# Patient Record
Sex: FEMALE | Race: WHITE | NOT HISPANIC OR LATINO | Employment: OTHER | URBAN - METROPOLITAN AREA
[De-identification: names, ages, dates, MRNs, and addresses within clinical notes are randomized per-mention and may not be internally consistent; named-entity substitution may affect disease eponyms.]

---

## 2022-04-06 ENCOUNTER — OFFICE VISIT (OUTPATIENT)
Dept: ORTHOPEDIC SURGERY | Facility: CLINIC | Age: 67
End: 2022-04-06

## 2022-04-06 VITALS — HEIGHT: 64 IN | HEART RATE: 85 BPM | OXYGEN SATURATION: 96 % | BODY MASS INDEX: 31.58 KG/M2 | WEIGHT: 185 LBS

## 2022-04-06 DIAGNOSIS — R60.0 LOCALIZED EDEMA: ICD-10-CM

## 2022-04-06 DIAGNOSIS — L03.113 CELLULITIS OF RIGHT UPPER EXTREMITY: ICD-10-CM

## 2022-04-06 DIAGNOSIS — M19.041 ARTHRITIS OF RIGHT HAND: Primary | ICD-10-CM

## 2022-04-06 PROCEDURE — 99203 OFFICE O/P NEW LOW 30 MIN: CPT | Performed by: STUDENT IN AN ORGANIZED HEALTH CARE EDUCATION/TRAINING PROGRAM

## 2022-04-06 RX ORDER — TRAMADOL HYDROCHLORIDE 50 MG/1
50 TABLET ORAL EVERY 6 HOURS PRN
Qty: 20 TABLET | Refills: 0 | Status: SHIPPED | OUTPATIENT
Start: 2022-04-06 | End: 2022-06-21

## 2022-04-06 RX ORDER — TRAMADOL HYDROCHLORIDE 50 MG/1
50 TABLET ORAL EVERY 6 HOURS PRN
Qty: 20 TABLET | Refills: 0 | Status: CANCELLED | OUTPATIENT
Start: 2022-04-06

## 2022-04-06 RX ORDER — CEPHALEXIN 500 MG/1
500 CAPSULE ORAL EVERY 6 HOURS
Qty: 28 CAPSULE | Refills: 0 | Status: SHIPPED | OUTPATIENT
Start: 2022-04-06 | End: 2022-04-13

## 2022-04-06 RX ORDER — IBUPROFEN 200 MG
200 TABLET ORAL EVERY 6 HOURS PRN
COMMUNITY
End: 2022-06-29

## 2022-04-06 NOTE — PROGRESS NOTES
"Chief Complaint  Pain and Edema of the Right Hand    Subjective          Linda Ortiz presents to [facility] for   History of Present Illness    The patient presents here today for her right hand. She reports right hand swelling. She reports this has been ongoing for about 3 weeks. She reports she has no real specific injury or trauma. She has no other complaints.  She denies any fevers.  She denies any wounds to the hand recently.  She denies any bites.  She reports she has pain with gripping and lifting with the hand.     Allergies   Allergen Reactions   • Hydrocodone-Guaifenesin GI Intolerance        Social History     Socioeconomic History   • Marital status: Single   Tobacco Use   • Smoking status: Former Smoker     Types: Cigarettes     Quit date: 2022     Years since quittin.1   • Smokeless tobacco: Never Used   Substance and Sexual Activity   • Alcohol use: Yes     Comment: occ beer        I reviewed the patient's chief complaint, history of present illness, review of systems, past medical history, surgical history, family history, social history, medications, and allergy list.     REVIEW OF SYSTEMS    Constitutional: Denies fevers, chills, weight loss  Cardiovascular: Denies chest pain, shortness of breath  Skin: Denies rashes, acute skin changes  Neurologic: Denies headache, loss of consciousness  MSK: Right hand      Objective   Vital Signs:   Pulse 85   Ht 162.6 cm (64\")   Wt 83.9 kg (185 lb)   SpO2 96%   BMI 31.76 kg/m²     Body mass index is 31.76 kg/m².    Physical Exam    General: Alert. No acute distress.     Right hand- Light redness and swelling to radial border of the first CMC joint. Swelling to thenar eminence that is compressible.  No discrete palpable fluid collections.  Non-tender to thumb distally. Sensation to light touch median, radial, ulnar nerve. Positive AIN, PIN, ulnar nerve. Positive pulses. No pain with wrist ROM.  Tolerates gentle passive " thumb range of motion with mild pain.    Procedures    Imaging Results (Most Recent)     None                   Assessment and Plan    Diagnoses and all orders for this visit:    1. Arthritis of right hand (Primary)    2. Localized edema      Discussed the treatment plan with the patient. I reviewed the x-rays that were previously obtained.  She has light erythema concerning for cellulitis.  No discrete fluid collections palpable on exam.  Discussed treatment options.  She would like to try oral antibiotics at this time.  Keflex was prescribed.  We will continue to monitor closely.  May consider an MRI if not improving.  Discussed concerning signs and symptoms related to infection.  Prescription for tramadol given.  Follow-up in 1 week.    Call or return if symptoms worsen or patient has any concerns.     Scribed for Trevor Nguyen MD by Michelle Cooper  04/06/2022   15:26 EDT         Follow Up   1 week and After MRI  Patient was given instructions and counseling regarding her condition or for health maintenance advice. Please see specific information pulled into the AVS if appropriate.       I have personally performed the services described in this document as scribed by the above individual and it is both accurate and complete.     Trevor Nguyen MD  04/06/22  15:34 EDT

## 2022-04-13 ENCOUNTER — OFFICE VISIT (OUTPATIENT)
Dept: ORTHOPEDIC SURGERY | Facility: CLINIC | Age: 67
End: 2022-04-13

## 2022-04-13 VITALS — OXYGEN SATURATION: 94 % | WEIGHT: 185 LBS | BODY MASS INDEX: 31.58 KG/M2 | HEART RATE: 72 BPM | HEIGHT: 64 IN

## 2022-04-13 DIAGNOSIS — R60.0 LOCALIZED EDEMA: Primary | ICD-10-CM

## 2022-04-13 DIAGNOSIS — M19.041 ARTHRITIS OF RIGHT HAND: ICD-10-CM

## 2022-04-13 PROCEDURE — 99213 OFFICE O/P EST LOW 20 MIN: CPT | Performed by: STUDENT IN AN ORGANIZED HEALTH CARE EDUCATION/TRAINING PROGRAM

## 2022-04-13 NOTE — PROGRESS NOTES
"Chief Complaint  Pain of the Right Hand    Subjective          Linda Ortiz presents to [facility] for   History of Present Illness    Ritu returns for follow-up of her right hand.  She reports her redness and swelling are beginning to improve.  She has been taking the Keflex and using her brace.  She denies any new injuries.  She has had to miss work because of her pain at times.  She denies any fevers or chills.  She denies any draining wounds.    Allergies   Allergen Reactions   • Hydrocodone-Guaifenesin GI Intolerance        Social History     Socioeconomic History   • Marital status: Single   Tobacco Use   • Smoking status: Former Smoker     Types: Cigarettes     Quit date: 2022     Years since quittin.1   • Smokeless tobacco: Never Used   Substance and Sexual Activity   • Alcohol use: Yes     Comment: occ beer        I reviewed the patient's chief complaint, history of present illness, review of systems, past medical history, surgical history, family history, social history, medications, and allergy list.     REVIEW OF SYSTEMS    Constitutional: Denies fevers, chills, weight loss  Cardiovascular: Denies chest pain, shortness of breath  Skin: Denies rashes, acute skin changes  Neurologic: Denies headache, loss of consciousness  MSK: Right hand pain      Objective   Vital Signs:   Pulse 72   Ht 162.6 cm (64\")   Wt 83.9 kg (185 lb)   SpO2 94%   BMI 31.76 kg/m²     Body mass index is 31.76 kg/m².    Physical Exam    General: Alert. No acute distress.   Right upper extremity: Swelling over the thenar eminence, slightly improved from previous evaluation.  This is centered over the CMC joint.  Erythema resolving.  No discrete fluid collections.  Arthritic changes in the fingers.  Sensation intact in the median, radial, ulnar nerve distribution.  Tolerates gentle passive thumb range of motion at the CMC, M CP, and IP joints with mild pain.  Palpable radial " pulse.    Procedures    Imaging Results (Most Recent)     None                   Assessment and Plan    Diagnoses and all orders for this visit:    1. Localized edema (Primary)    2. Arthritis of right hand        Due to her persistent swelling we discussed MRI evaluation.  She was agreeable.  She will complete her Keflex and follow-up with me to discuss the MRI results and additional treatment options.    Call or return if symptoms worsen or patient has any concerns.       Scribed for Trevor Nguyen MD by Trevor Nguyen MD  04/13/2022   14:38 EDT         Follow Up   Return for MRI results  .  Patient was given instructions and counseling regarding her condition or for health maintenance advice. Please see specific information pulled into the AVS if appropriate.       I have personally performed the services described in this document as scribed by the above individual and it is both accurate and complete.     Trevor Nguyen MD  04/13/22  17:13 EDT

## 2022-04-20 ENCOUNTER — TELEPHONE (OUTPATIENT)
Dept: ORTHOPEDIC SURGERY | Facility: CLINIC | Age: 67
End: 2022-04-20

## 2022-04-20 DIAGNOSIS — L03.113 CELLULITIS OF RIGHT UPPER EXTREMITY: Primary | ICD-10-CM

## 2022-04-20 RX ORDER — CEPHALEXIN 500 MG/1
500 CAPSULE ORAL EVERY 6 HOURS
Qty: 40 CAPSULE | Refills: 0 | Status: SHIPPED | OUTPATIENT
Start: 2022-04-20 | End: 2022-06-09

## 2022-04-20 NOTE — TELEPHONE ENCOUNTER
Keflex sent. Need to get MRI scheduled sooner. Switch to STAT if needed.     Electronically signed by Trevor Nguyen MD, 04/20/22, 3:44 PM EDT.

## 2022-04-20 NOTE — TELEPHONE ENCOUNTER
Patient states her right hand is still very swollen, painful and warm to the touch. She is unable to follow up with us in the office until 05/16/22 due to her MRI being scheduled for 05/10/22. Patient is concerned that she may need another round of Keflex for the right hand before her follow up appointment. Patient states if we call her back and she does not answer to please leave her a detailed message. Pharmacy [facility].

## 2022-04-21 ENCOUNTER — HOSPITAL ENCOUNTER (OUTPATIENT)
Dept: MRI IMAGING | Facility: HOSPITAL | Age: 67
Discharge: HOME | End: 2022-04-21
Admitting: STUDENT IN AN ORGANIZED HEALTH CARE EDUCATION/TRAINING PROGRAM

## 2022-04-21 DIAGNOSIS — M19.041 ARTHRITIS OF RIGHT HAND: ICD-10-CM

## 2022-04-21 DIAGNOSIS — R60.0 LOCALIZED EDEMA: ICD-10-CM

## 2022-04-21 PROCEDURE — 73218 MRI UPPER EXTREMITY W/O DYE: CPT

## 2022-04-21 NOTE — TELEPHONE ENCOUNTER
LVM for patient to advise of antibiotcs being called in to the pharmacy. Will work on getting her MRI moved up and rescheduled.

## 2022-04-21 NOTE — TELEPHONE ENCOUNTER
Spoke with patient, advised that MRI has been moved up to today 04/21 at 6:30pm at [facility], she states she is able to make it to that appointment. Also advised patient that the antibiotics had been sent to the pharmacy. Scheduled patient for a follow up on Wednesday 04/27/22 at 2:30pm to go over MRI with Dr. Nguyen.

## 2022-04-25 DIAGNOSIS — R22.32 MASS OF LEFT HAND: Primary | ICD-10-CM

## 2022-04-26 ENCOUNTER — TELEPHONE (OUTPATIENT)
Dept: ORTHOPEDIC SURGERY | Facility: CLINIC | Age: 67
End: 2022-04-26

## 2022-04-27 ENCOUNTER — OFFICE VISIT (OUTPATIENT)
Dept: ORTHOPEDIC SURGERY | Facility: CLINIC | Age: 67
End: 2022-04-27

## 2022-04-27 VITALS — OXYGEN SATURATION: 96 % | WEIGHT: 185 LBS | HEIGHT: 64 IN | HEART RATE: 105 BPM | BODY MASS INDEX: 31.58 KG/M2

## 2022-04-27 DIAGNOSIS — R60.0 LOCALIZED EDEMA: ICD-10-CM

## 2022-04-27 DIAGNOSIS — R22.31 MASS OF RIGHT HAND: Primary | ICD-10-CM

## 2022-04-27 PROCEDURE — 99213 OFFICE O/P EST LOW 20 MIN: CPT | Performed by: STUDENT IN AN ORGANIZED HEALTH CARE EDUCATION/TRAINING PROGRAM

## 2022-04-27 NOTE — PROGRESS NOTES
"Chief Complaint   Edema and Follow-up of the Right Hand    Subjective          Linda Ortiz presents to [facility] for   History of Present Illness    The patient presents here today for follow up evaluation of the right hand. She is recently had an MRI and is here today for those results. The patients MRI shows a mass to the thumb. She has been having swelling and pain to the hand. She tried antibiotics with no relief. She reports tramadol makes her sick.     Allergies   Allergen Reactions   • Hydrocodone-Guaifenesin GI Intolerance        Social History     Socioeconomic History   • Marital status: Single   Tobacco Use   • Smoking status: Former Smoker     Types: Cigarettes     Quit date: 2022     Years since quittin.2   • Smokeless tobacco: Never Used   Substance and Sexual Activity   • Alcohol use: Yes     Comment: occ beer        I reviewed the patient's chief complaint, history of present illness, review of systems, past medical history, surgical history, family history, social history, medications, and allergy list.     REVIEW OF SYSTEMS    Constitutional: Denies fevers, chills, weight loss  Cardiovascular: Denies chest pain, shortness of breath  Skin: Denies rashes, acute skin changes  Neurologic: Denies headache, loss of consciousness  MSK: Right hand pain      Objective   Vital Signs:   Pulse 105   Ht 162.6 cm (64\")   Wt 83.9 kg (185 lb)   SpO2 96%   BMI 31.76 kg/m²     Body mass index is 31.76 kg/m².    Physical Exam    General: Alert. No acute distress.   Right upper extremity: Swelling over the thenar eminence, slightly improved from previous evaluation.  This is centered over the CMC joint.  Erythema resolving.  No discrete fluid collections.  Arthritic changes in the fingers.  Sensation intact in the median, radial, ulnar nerve distribution.  Tolerates gentle passive thumb range of motion at the CMC, M CP, and IP joints with mild pain.  Palpable radial " pulse.    Procedures    Imaging Results (Most Recent)     None         MRI [facility]- IMPRESSION:               Large heterogeneous 4.7 cm mass centered in the thumb metacarpal with large soft tissue   extension, extension into the trapezium and adjacent thenar musculature, and with mass effect on   the flexor carpi radialis and flexor pollicis longus tendons, most consistent with a malignant soft   tissue mass.  Could consider postcontrast imaging.  Recommend orthopedic oncologic consultation and   biopsy.          Assessment and Plan    Diagnoses and all orders for this visit:    1. Mass of right hand (Primary)    2. Localized edema      Discussed the treatment plan with the patient. I reviewed her MRI with her today.  Plan to proceed with her referral to orthopedic oncology.  We had a long discussion regarding her MRI findings and the importance of following up for medical and surgical management going forward.  Her appointment has been made with the specialist in [place].     Call or return if symptoms worsen or patient has any concerns.       Scribed for Trevor Nguyen MD by Michelle Cooper  04/27/2022   14:57 EDT         Follow Up   Return if symptoms worsen or fail to improve.  Patient was given instructions and counseling regarding her condition or for health maintenance advice. Please see specific information pulled into the AVS if appropriate.       I have personally performed the services described in this document as scribed by the above individual and it is both accurate and complete.     Trevor Nguyen MD  04/27/22  15:02 EDT

## 2022-05-10 ENCOUNTER — APPOINTMENT (OUTPATIENT)
Dept: MRI IMAGING | Facility: HOSPITAL | Age: 67
End: 2022-05-10

## 2022-06-06 ENCOUNTER — TRANSCRIBE ORDERS (OUTPATIENT)
Dept: ADMINISTRATIVE | Facility: HOSPITAL | Age: 67
End: 2022-06-06

## 2022-06-06 DIAGNOSIS — Z85.118 PERSONAL HISTORY OF LUNG CANCER: Primary | ICD-10-CM

## 2022-06-09 ENCOUNTER — CONSULT (OUTPATIENT)
Dept: RADIATION ONCOLOGY | Facility: HOSPITAL | Age: 67
End: 2022-06-09

## 2022-06-09 VITALS
TEMPERATURE: 97.3 F | RESPIRATION RATE: 16 BRPM | WEIGHT: 171.74 LBS | HEART RATE: 102 BPM | BODY MASS INDEX: 29.48 KG/M2 | DIASTOLIC BLOOD PRESSURE: 66 MMHG | SYSTOLIC BLOOD PRESSURE: 98 MMHG | OXYGEN SATURATION: 95 %

## 2022-06-09 DIAGNOSIS — C34.12 MALIGNANT NEOPLASM OF UPPER LOBE OF LEFT LUNG: Primary | ICD-10-CM

## 2022-06-09 PROBLEM — C34.90 LUNG CANCER: Status: ACTIVE | Noted: 2022-06-09

## 2022-06-09 PROCEDURE — G0463 HOSPITAL OUTPT CLINIC VISIT: HCPCS | Performed by: RADIOLOGY

## 2022-06-09 PROCEDURE — 99204 OFFICE O/P NEW MOD 45 MIN: CPT | Performed by: RADIOLOGY

## 2022-06-09 RX ORDER — ACETAMINOPHEN 325 MG/1
650 TABLET ORAL EVERY 6 HOURS PRN
COMMUNITY

## 2022-06-09 NOTE — PROGRESS NOTES
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  Ms. Ortiz first began noticing right hand discomfort approximately 4 months ago.  She was evaluated by an orthopedic surgeon, Dr. Dumont and underwent MRI of the hand revealing a tumor.  CT scan of the chest, abdomen and pelvis performed on 5/17/2022 revealed multiple enlarged left hilar and mediastinal lymph nodes including left hilum, AP window, left paratracheal, precarinal, left supraclavicular regions.  There is a 9 mm nodule in the right lower lobe and a 7 mm nodule in the left upper lobe.  A right adrenal mass was apparent measuring 9.2 cm and a left adrenal mass measured 10.4 cm.  Pathology from biopsy of the left adrenal mass revealed poorly differentiated non-small cell carcinoma.  Tumor cells were positive for PAX 8, vimentin and focally positive for chromogranin and GATA3.  Cells were negative for CK7, CK20, TTF-1, Napsin, WT1, CDX2, ER, MS, synaptophysin, S100, CD10.  The pathologist reported that even though the negative TTF-1, CK7 and Napsin immunohistochemistry results do not support the diagnosis of lung primary adenocarcinoma, the possibility of primary lung carcinoma such as large cell carcinoma cannot be entirely ruled out.  Additionally other differential diagnoses include renal or gynecologic tract primary, hepatocellular carcinoma or carcinoma with Hepatolite differentiation.  Ms. Ortiz reports severe right hand pain and inability to use  this extremity.  She additionally reports swelling.  She denies any significant changes in breathing.  She additionally denies, headaches, vision changes or focal weakness.  She has yet to undergo MRI of the brain ordered PET/CT.    Subjective       Review of Systems: Review of Systems   Constitutional: Positive for fatigue and unexpected weight change (quit drinking beer 1/2022). Negative for appetite change.   HENT: Negative for sore throat, tinnitus and trouble swallowing.    Eyes: Negative for visual disturbance.        Wears glasses   Respiratory: Positive for cough (productive with clear secretions- coughed up some blood after quit smoking). Negative for shortness of breath.    Cardiovascular: Negative for chest pain and palpitations.   Gastrointestinal: Negative for constipation, diarrhea and nausea.   Genitourinary: Negative for difficulty urinating, dysuria, frequency and urgency.   Musculoskeletal: Positive for arthralgias, back pain (in shoulder blades) and joint swelling (right thumb).   Skin: Positive for color change (mild erythema in right hand). Negative for rash.   Neurological: Positive for dizziness and weakness (in right hand and generalized). Negative for headaches.   Psychiatric/Behavioral: Positive for sleep disturbance (wakes throughout the night with pain  in the right hand). Negative for agitation and suicidal ideas.       Past Medical History:   Past Medical History:   Diagnosis Date   • Lung cancer (HCC)    • Metastatic cancer (HCC)        Past Surgical History:   Past Surgical History:   Procedure Laterality Date   • HIP SURGERY Bilateral    • KNEE ARTHROSCOPY         Family History:   Family History   Problem Relation Age of Onset   • Breast cancer Mother    • Lung cancer Father    • Thyroid cancer Maternal Aunt    • Breast cancer Maternal Aunt    • Lung cancer Paternal Aunt    • COPD Paternal Uncle        Social History:   Social History     Socioeconomic History   • Marital status:  Single   Tobacco Use   • Smoking status: Former Smoker     Packs/day: 1.00     Types: Cigarettes     Start date:      Quit date: 2022     Years since quittin.3   • Smokeless tobacco: Never Used   Vaping Use   • Vaping Use: Former   • Start date: 2013   • Quit date: 2014   • Substances: Nicotine, Flavoring   Substance and Sexual Activity   • Alcohol use: Not Currently   • Drug use: Never       Medications:     Current Outpatient Medications:   •  acetaminophen (TYLENOL) 325 MG tablet, Take 650 mg by mouth Every 6 (Six) Hours As Needed for Mild Pain ., Disp: , Rfl:   •  ibuprofen (ADVIL,MOTRIN) 200 MG tablet, Take 200 mg by mouth Every 6 (Six) Hours As Needed for Mild Pain ., Disp: , Rfl:   •  traMADol (ULTRAM) 50 MG tablet, Take 1 tablet by mouth Every 6 (Six) Hours As Needed for Moderate Pain ., Disp: 20 tablet, Rfl: 0    Allergies:   Allergies   Allergen Reactions   • Hydrocodone-Guaifenesin GI Intolerance       Pain: (on a scale of 0-10)   Pain Score    22 1347   PainSc:   8   PainLoc: Hand  Comment: right       Advanced Care Plan: N   Quality of Life: 80 - Restricted Physical Activity    Objective     Physical Exam:   Vital Signs:   Vitals:    22 1347   BP: 98/66   Pulse: 102   Resp: 16   Temp: 97.3 °F (36.3 °C)   TempSrc: Temporal   SpO2: 95%   Weight: 77.9 kg (171 lb 11.8 oz)   PainSc:   8   PainLoc: Hand  Comment: right     Body mass index is 29.48 kg/m².     Physical Exam  Constitutional:       General: She is not in acute distress.     Appearance: Normal appearance. She is normal weight. She is not toxic-appearing.   HENT:      Head: Normocephalic and atraumatic.      Nose: Nose normal.      Mouth/Throat:      Mouth: Mucous membranes are moist.   Pulmonary:      Effort: Pulmonary effort is normal. No respiratory distress.      Breath sounds: Normal breath sounds. No wheezing or rales.   Abdominal:      General: There is no distension.      Palpations: Abdomen is soft.    Musculoskeletal:      Comments: Marked right hand edema with a firm palpable mass at the right thenar eminence edema extends proximally to the mid ulnar region   Skin:     General: Skin is warm and dry.   Neurological:      General: No focal deficit present.      Mental Status: She is alert and oriented to person, place, and time.      Cranial Nerves: No cranial nerve deficit.      Gait: Gait normal.   Psychiatric:         Mood and Affect: Mood normal.         Behavior: Behavior normal.         Judgment: Judgment normal.         Results:   Radiographs: I personally reviewed the MRI of the hand with and without contrast performed on 4/21/2022.  This reveals a heterogeneous 4.7 cm mass centered in the thumb metacarpal with large soft tissue extension.    Pathology: I personally reviewed the pathology report from the procedure performed on 5/22/2022.  The pertinent findings are as above in HPI.      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  ECOG 1    I discussed the clinical, radiographic and pathologic findings to date with Ms. Ortiz.  I explained the role of radiotherapy in the palliation of pain and metastatic lesions.  I recommended external beam radiotherapy to the right hand, outlining the risks, potential benefits and alternatives of this approach.  Ms. Ortiz is agreeable to my recommendation and is scheduled for CT simulation for treatment  planning purposes.        Torin Salcido MD  Radiation Oncology  [facility]    This document has been signed by Torin Salcido MD on Jemima 10, 2022 16:05 EDT

## 2022-06-10 ENCOUNTER — HOSPITAL ENCOUNTER (OUTPATIENT)
Dept: RADIATION ONCOLOGY | Facility: HOSPITAL | Age: 67
Setting detail: RADIATION/ONCOLOGY SERIES
End: 2022-06-10

## 2022-06-13 ENCOUNTER — OFFICE VISIT (OUTPATIENT)
Dept: SURGERY | Facility: CLINIC | Age: 67
End: 2022-06-13

## 2022-06-13 ENCOUNTER — PREP FOR SURGERY (OUTPATIENT)
Dept: OTHER | Facility: HOSPITAL | Age: 67
End: 2022-06-13

## 2022-06-13 ENCOUNTER — TELEPHONE (OUTPATIENT)
Dept: ONCOLOGY | Facility: HOSPITAL | Age: 67
End: 2022-06-13

## 2022-06-13 VITALS — WEIGHT: 137 LBS | HEIGHT: 64 IN | RESPIRATION RATE: 16 BRPM | BODY MASS INDEX: 23.39 KG/M2

## 2022-06-13 DIAGNOSIS — C34.92 MALIGNANT NEOPLASM OF LEFT LUNG, UNSPECIFIED PART OF LUNG: Primary | ICD-10-CM

## 2022-06-13 PROCEDURE — 77263 THER RADIOLOGY TX PLNG CPLX: CPT | Performed by: RADIOLOGY

## 2022-06-13 PROCEDURE — 99202 OFFICE O/P NEW SF 15 MIN: CPT | Performed by: SURGERY

## 2022-06-13 RX ORDER — CEFAZOLIN SODIUM 2 G/100ML
2 INJECTION, SOLUTION INTRAVENOUS ONCE
Status: CANCELLED | OUTPATIENT
Start: 2022-06-13 | End: 2022-06-13

## 2022-06-13 NOTE — TELEPHONE ENCOUNTER
Caller: MARLENE     Relationship: [facility] OFFICE OF DR MARI KIM.         What was the call regarding:     PATIENT IS ANXIOUS TO GET SCHEDULED FROM REFERRAL TO [facility],  CALLING TO FIND OUT STATUS OF REFERRAL AND WHERE AT TO BE SCHEDULED HAD LAST KNOWN IT WAS BEING REVIEWED    WARM TRANSFERRED TO KAILYN AT  TO FURTHER ASSIST.

## 2022-06-13 NOTE — PROGRESS NOTES
"Chief Complaint:  Vascular Access Problem    History of Present Illness  Linda Ortiz is a 67 y.o. female referred by  to have a port-a-catheter placed.  She has a right hand mass and in the work-up for the right hand mass it was determined the patient had lung cancer and the lung cancer is actually metastatic to her right hand.  The patient is going to receive radiation therapy she said for about 10 weeks and then she is going to receive chemotherapy.  The patient says she is not going to see Dr. Zaldivar again and is already scheduled to see Dr. Hernández next week.    Allergies: Hydrocodone-guaifenesin    Outpatient Medications Marked as Taking for the 22 encounter (Office Visit) with Eber Hull MD   Medication Sig Dispense Refill   • ibuprofen (ADVIL,MOTRIN) 200 MG tablet Take 200 mg by mouth Every 6 (Six) Hours As Needed for Mild Pain .         Past Medical History:   • Lung cancer (HCC)   • Metastatic cancer (HCC)        Past Surgical History:   • HIP SURGERY   • KNEE ARTHROSCOPY       Family History:   Family History   Problem Relation Age of Onset   • Breast cancer Mother    • Lung cancer Father    • Thyroid cancer Maternal Aunt    • Breast cancer Maternal Aunt    • Lung cancer Paternal Aunt    • COPD Paternal Uncle         Social History:  Social History     Tobacco Use   • Smoking status: Former Smoker     Packs/day: 1.00     Types: Cigarettes     Start date:      Quit date: 2022     Years since quittin.3   • Smokeless tobacco: Never Used   Substance Use Topics   • Alcohol use: Not Currently       Objective     Vital Signs:  Resp 16   Ht 162.6 cm (64\")   Wt 62.1 kg (137 lb)   BMI 23.52 kg/m²   • Constitutional: alert, no acute distress, reliable historian  • HENT:  NCAT, no visible deformities or lesions  • Eyes:  sclerae clear, conjunctivae clear, EOMI  • Neck:  normal appearance, no masses, trachea midline  • Respiratory:  breathing not labored, respiratory effort " appears normal  • Cardiovascular:  heart regular rate  • Abdomen:  soft, nontender, nondistended    • Skin and subcutaneous tissue:  no visible concerning rashes or lesions, no jaundice  • Musculoskeletal: moving all extremities symmetrically and purposefully, right had swollen  • Neurologic:  no obvious motor or sensory deficits, normal gait, able to stand without difficulty, cerebellar function without any obvious abnormalities, alert & oriented x 3, speech clear  • Psychiatric:  judgment and insight intact, mood normal, affect appropriate, cooperative      Assessment:  Malignant neoplasm of left lung    Plan:  Port-a-catheter placement    Discussion: Indications, options, risks, benefits, and expected outcomes of planned surgery were discussed with the patient and she agrees to proceed.    Eber Hull MD  06/13/2022    Electronically signed by Eber Hull MD, 06/13/22, 4:10 PM EDT.

## 2022-06-14 ENCOUNTER — HOSPITAL ENCOUNTER (OUTPATIENT)
Dept: RADIATION ONCOLOGY | Facility: HOSPITAL | Age: 67
Setting detail: RADIATION/ONCOLOGY SERIES
Discharge: HOME | End: 2022-06-14

## 2022-06-14 DIAGNOSIS — C7B.03 SECONDARY CARCINOID TUMOR OF BONE: ICD-10-CM

## 2022-06-14 PROCEDURE — 77290 THER RAD SIMULAJ FIELD CPLX: CPT | Performed by: RADIOLOGY

## 2022-06-14 PROCEDURE — 77334 RADIATION TREATMENT AID(S): CPT | Performed by: RADIOLOGY

## 2022-06-16 PROCEDURE — 77295 3-D RADIOTHERAPY PLAN: CPT | Performed by: RADIOLOGY

## 2022-06-16 PROCEDURE — 77334 RADIATION TREATMENT AID(S): CPT | Performed by: RADIOLOGY

## 2022-06-16 PROCEDURE — 77300 RADIATION THERAPY DOSE PLAN: CPT | Performed by: RADIOLOGY

## 2022-06-21 ENCOUNTER — HOSPITAL ENCOUNTER (OUTPATIENT)
Dept: CARDIOLOGY | Facility: HOSPITAL | Age: 67
Discharge: HOME | End: 2022-06-21

## 2022-06-21 ENCOUNTER — HOSPITAL ENCOUNTER (OUTPATIENT)
Dept: RADIATION ONCOLOGY | Facility: HOSPITAL | Age: 67
Discharge: HOME | End: 2022-06-21

## 2022-06-21 ENCOUNTER — CONSULT (OUTPATIENT)
Dept: ONCOLOGY | Facility: HOSPITAL | Age: 67
End: 2022-06-21

## 2022-06-21 ENCOUNTER — LAB (OUTPATIENT)
Dept: ONCOLOGY | Facility: HOSPITAL | Age: 67
End: 2022-06-21

## 2022-06-21 VITALS
HEART RATE: 91 BPM | WEIGHT: 162.48 LBS | SYSTOLIC BLOOD PRESSURE: 112 MMHG | DIASTOLIC BLOOD PRESSURE: 64 MMHG | TEMPERATURE: 98.9 F | HEIGHT: 64 IN | BODY MASS INDEX: 27.74 KG/M2 | OXYGEN SATURATION: 100 % | RESPIRATION RATE: 18 BRPM

## 2022-06-21 DIAGNOSIS — R63.0 LOSS OF APPETITE: ICD-10-CM

## 2022-06-21 DIAGNOSIS — C34.92 MALIGNANT NEOPLASM OF LEFT LUNG, UNSPECIFIED PART OF LUNG: ICD-10-CM

## 2022-06-21 DIAGNOSIS — M79.601 RIGHT ARM PAIN: ICD-10-CM

## 2022-06-21 DIAGNOSIS — R30.9 PAINFUL URINATION: ICD-10-CM

## 2022-06-21 DIAGNOSIS — C34.92 MALIGNANT NEOPLASM OF LEFT LUNG, UNSPECIFIED PART OF LUNG: Primary | ICD-10-CM

## 2022-06-21 PROBLEM — C34.12 MALIGNANT NEOPLASM OF UPPER LOBE OF LEFT LUNG: Status: ACTIVE | Noted: 2022-06-09

## 2022-06-21 PROBLEM — C7B.03: Status: RESOLVED | Noted: 2022-06-14 | Resolved: 2022-06-21

## 2022-06-21 LAB
AMORPH URATE CRY URNS QL MICRO: ABNORMAL /HPF
BACTERIA UR QL AUTO: ABNORMAL /HPF
BH CV UPPER VENOUS LEFT INTERNAL JUGULAR AUGMENT: NORMAL
BH CV UPPER VENOUS LEFT INTERNAL JUGULAR COMPRESS: NORMAL
BH CV UPPER VENOUS LEFT INTERNAL JUGULAR PHASIC: NORMAL
BH CV UPPER VENOUS LEFT INTERNAL JUGULAR SPONT: NORMAL
BH CV UPPER VENOUS RIGHT AXILLARY AUGMENT: NORMAL
BH CV UPPER VENOUS RIGHT AXILLARY COMPRESS: NORMAL
BH CV UPPER VENOUS RIGHT AXILLARY PHASIC: NORMAL
BH CV UPPER VENOUS RIGHT AXILLARY SPONT: NORMAL
BH CV UPPER VENOUS RIGHT BASILIC FOREARM COMPRESS: NORMAL
BH CV UPPER VENOUS RIGHT BASILIC UPPER COMPRESS: NORMAL
BH CV UPPER VENOUS RIGHT BRACHIAL COMPRESS: NORMAL
BH CV UPPER VENOUS RIGHT CEPHALIC FOREARM COMPRESS: NORMAL
BH CV UPPER VENOUS RIGHT CEPHALIC UPPER COMPRESS: NORMAL
BH CV UPPER VENOUS RIGHT INTERNAL JUGULAR AUGMENT: NORMAL
BH CV UPPER VENOUS RIGHT INTERNAL JUGULAR COMPRESS: NORMAL
BH CV UPPER VENOUS RIGHT INTERNAL JUGULAR PHASIC: NORMAL
BH CV UPPER VENOUS RIGHT INTERNAL JUGULAR SPONT: NORMAL
BH CV UPPER VENOUS RIGHT RADIAL COMPRESS: NORMAL
BH CV UPPER VENOUS RIGHT SUBCLAVIAN AUGMENT: NORMAL
BH CV UPPER VENOUS RIGHT SUBCLAVIAN COMPRESS: NORMAL
BH CV UPPER VENOUS RIGHT SUBCLAVIAN PHASIC: NORMAL
BH CV UPPER VENOUS RIGHT SUBCLAVIAN SPONT: NORMAL
BH CV UPPER VENOUS RIGHT ULNAR COMPRESS: NORMAL
BILIRUB UR QL STRIP: ABNORMAL
CLARITY UR: ABNORMAL
COARSE GRAN CASTS URNS QL MICRO: ABNORMAL /LPF
COLOR UR: YELLOW
GLUCOSE UR STRIP-MCNC: ABNORMAL MG/DL
HGB UR QL STRIP.AUTO: ABNORMAL
HYALINE CASTS UR QL AUTO: ABNORMAL /LPF
KETONES UR QL STRIP: ABNORMAL
LEUKOCYTE ESTERASE UR QL STRIP.AUTO: ABNORMAL
MAXIMAL PREDICTED HEART RATE: 154 BPM
NITRITE UR QL STRIP: POSITIVE
PH UR STRIP.AUTO: 5.5 [PH] (ref 5–8)
PROT UR QL STRIP: ABNORMAL
RAD ONC ARIA COURSE ID: NORMAL
RAD ONC ARIA COURSE INTENT: NORMAL
RAD ONC ARIA COURSE LAST TREATMENT DATE: NORMAL
RAD ONC ARIA COURSE START DATE: NORMAL
RAD ONC ARIA COURSE TREATMENT ELAPSED DAYS: 0
RAD ONC ARIA FIRST TREATMENT DATE: NORMAL
RAD ONC ARIA PLAN FRACTIONS TREATED TO DATE: 1
RAD ONC ARIA PLAN ID: NORMAL
RAD ONC ARIA PLAN NAME: NORMAL
RAD ONC ARIA PLAN PRESCRIBED DOSE PER FRACTION: 3 GY
RAD ONC ARIA PLAN PRIMARY REFERENCE POINT: NORMAL
RAD ONC ARIA PLAN TOTAL FRACTIONS PRESCRIBED: 10
RAD ONC ARIA PLAN TOTAL PRESCRIBED DOSE: 3000 CGY
RAD ONC ARIA REFERENCE POINT DOSAGE GIVEN TO DATE: 3 GY
RAD ONC ARIA REFERENCE POINT ID: NORMAL
RAD ONC ARIA REFERENCE POINT SESSION DOSAGE GIVEN: 3 GY
RBC # UR STRIP: ABNORMAL /HPF
REF LAB TEST METHOD: ABNORMAL
SP GR UR STRIP: >=1.03 (ref 1–1.03)
SQUAMOUS #/AREA URNS HPF: ABNORMAL /HPF
STRESS TARGET HR: 131 BPM
UROBILINOGEN UR QL STRIP: ABNORMAL
WAXY CASTS #/AREA URNS LPF: ABNORMAL /LPF
WBC # UR STRIP: ABNORMAL /HPF

## 2022-06-21 PROCEDURE — 81001 URINALYSIS AUTO W/SCOPE: CPT

## 2022-06-21 PROCEDURE — G0463 HOSPITAL OUTPT CLINIC VISIT: HCPCS

## 2022-06-21 PROCEDURE — 99205 OFFICE O/P NEW HI 60 MIN: CPT | Performed by: INTERNAL MEDICINE

## 2022-06-21 PROCEDURE — 77280 THER RAD SIMULAJ FIELD SMPL: CPT | Performed by: RADIOLOGY

## 2022-06-21 PROCEDURE — G0463 HOSPITAL OUTPT CLINIC VISIT: HCPCS | Performed by: INTERNAL MEDICINE

## 2022-06-21 PROCEDURE — 77427 RADIATION TX MANAGEMENT X5: CPT | Performed by: RADIOLOGY

## 2022-06-21 PROCEDURE — 87086 URINE CULTURE/COLONY COUNT: CPT

## 2022-06-21 PROCEDURE — 93971 EXTREMITY STUDY: CPT | Performed by: SURGERY

## 2022-06-21 PROCEDURE — 93971 EXTREMITY STUDY: CPT

## 2022-06-21 PROCEDURE — 77412 RADIATION TX DELIVERY LVL 3: CPT | Performed by: RADIOLOGY

## 2022-06-21 RX ORDER — MIRTAZAPINE 15 MG/1
15 TABLET, FILM COATED ORAL NIGHTLY
Qty: 30 TABLET | Refills: 5 | Status: SHIPPED | OUTPATIENT
Start: 2022-06-21 | End: 2022-09-27

## 2022-06-21 NOTE — ASSESSMENT & PLAN NOTE
Patient has increased pain in the upper arm and axilla with some swelling as well.  I will order Doppler ultrasound to rule out thromboembolic events.

## 2022-06-21 NOTE — ASSESSMENT & PLAN NOTE
Metastatic.  Patient has biopsy-proven poorly differentiated carcinoma consistent with lung primary.  Staging scans thus far demonstrate disease in the left upper lobe, right lobe, hilar mediastinal lymph nodes, bilateral adrenal glands and the right hand.  PET scan and MRI brain will be needed to complete her staging.  These have been ordered but I will asked the office to call and expedite those tests.  Next generation sequencing will be requested looking for actionable mutations.  Patient has recently started palliative radiation to the right hand-she has had 1 fraction thus far.  She has significant pain but does not want to take pain medications.  We discussed long-acting Tylenol which may help.  We discussed that metastatic lung cancer is not curable but can be treated to offer some prolongation life as well as improvement in symptoms including pain, poor appetite, fatigue etc.  Possible treatment options could include mutation directed therapies, immunotherapy, chemotherapy or combination, clinical trials versus palliative care/hospice focusing on quality of life.  Patient states that she is interested in treatments but would like to see all of her possible options.  I will see her back as she finishes her radiation to review her staging imaging tests and mutation testing.  Case discussed with Dr. Salcido.  He will finish radiation to the hand before I initiate systemic therapy.

## 2022-06-21 NOTE — PROGRESS NOTES
Chief Complaint  Lung Cancer    Leonard Dumont MD  Provider, No Known    Subjective          Linda Ortiz presents to [facility] for evaluation of recently diagnosed metastatic lung cancer.  Patient reports that she began having pain in the right hand approximately 2 months ago.  Pain was at the base of the thumb and limited her movement.  This prompted evaluation by orthopedics.  She had x-rays of the hand on 3/29/2022 which demonstrated a mass in the thenar eminence.  She then underwent MRI of the right hand on 4/21/2022 confirming a mass in the thenar eminence.  X-ray and MRI images reviewed.  She was then referred to orthopedic oncology at [facility] in [place].  Subsequent imaging including CT chest, abdomen, pelvis performed on 5/17/2022 showed multiple enlarged left hilar mediastinal lymph nodes including hilum, AP window, left paratracheal, precarinal left supra clavicle as well as a large left upper lobe lung mass, additional 7 mm satellite lesion in the left upper lobe and a 9 mm nodule in the right lower lobe.  There also bilateral adrenal nodules measuring 10.4 cm on the left, 9.2 cm on the right.  Biopsy was performed on the adrenal gland demonstrating a very poorly differentiated non-small cell carcinoma.  The specimen was sent to [facility] for expert review with the above diagnosis.  The [facility] report and [facility] pathology report reviewed.  She has significant pain in the right hand and is unable to use the extremity.  She notes pain extending up the arm into the shoulder and axilla area.  She feels like there is some swelling there as well.  She has felt a lump in the right armpit as well which is painful.  She reports frequent cough which is generally nonproductive.  She had a scant amount of hemoptysis after she stopped smoking but none recently.  She reports that her appetite is poor and she has lost some weight over the last couple of  months.  She just does not feel like eating.  She denies nausea, vomiting, heartburn or diarrhea.  She states some of the problem is just not being able to cook for herself with her hand being incapacitated.  She states that for the last several days she has had urinary frequency with going to the bathroom 8-10 times per day.  She denies suprapubic pain, dysuria or fever.    Oncology/Hematology History Overview Note   5/19/2022 Left Adrenal mass biopsy at [facility] revealed: poorly differentiated non-small cell carcinoma, high grade. Positive fo AE1/3 and Cam5.2. Immunohistochemistry was positive for PAX8, HeParlGlypican3, Vimentin, focal positive for chromagranin, GATA3.  The case was sent to [facility] for second opinion with a final diagnosis of poorly differentiated carcinoma with diffuse reduced BRG-1 expression.    6/21/2022 XRT initiated to Right hand        Malignant neoplasm of upper lobe of left lung (HCC)   6/9/2022 Initial Diagnosis    Lung cancer (HCC)     Secondary carcinoid tumor of bone (HCC) (Resolved)   6/14/2022 Initial Diagnosis    Secondary carcinoid tumor of bone (HCC)     6/20/2022 -  Radiation    RADIATION THERAPY Treatment Details (Noted on 6/14/2022)  Site: Right Hand  Technique: 3D CRT  Goal: No goal specified  Planned Treatment Start Date: 6/20/2022         Review of Systems   Constitutional: Positive for appetite change, fatigue and unexpected weight loss. Negative for diaphoresis, fever and unexpected weight gain.   HENT: Negative for hearing loss, mouth sores, sore throat, swollen glands, trouble swallowing and voice change.    Eyes: Negative for blurred vision.   Respiratory: Negative for cough, shortness of breath and wheezing.    Cardiovascular: Negative for chest pain and palpitations.   Gastrointestinal: Negative for abdominal pain, blood in stool, constipation, diarrhea, nausea and vomiting.   Endocrine: Negative for cold intolerance and heat intolerance.   Genitourinary:  Negative for difficulty urinating, dysuria, frequency, hematuria and urinary incontinence.   Musculoskeletal: Positive for joint swelling (Right hand). Negative for arthralgias, back pain and myalgias.   Skin: Negative for rash, skin lesions and wound.   Neurological: Negative for dizziness, seizures, weakness, numbness and headache.   Hematological: Does not bruise/bleed easily.   Psychiatric/Behavioral: Negative for depressed mood. The patient is not nervous/anxious.      Current Outpatient Medications on File Prior to Visit   Medication Sig Dispense Refill   • acetaminophen (TYLENOL) 325 MG tablet Take 650 mg by mouth Every 6 (Six) Hours As Needed for Mild Pain .     • ibuprofen (ADVIL,MOTRIN) 200 MG tablet Take 200 mg by mouth Every 6 (Six) Hours As Needed for Mild Pain .     • [DISCONTINUED] traMADol (ULTRAM) 50 MG tablet Take 1 tablet by mouth Every 6 (Six) Hours As Needed for Moderate Pain . 20 tablet 0     No current facility-administered medications on file prior to visit.       Allergies   Allergen Reactions   • Hydrocodone-Guaifenesin GI Intolerance     Past Medical History:   Diagnosis Date   • Bone cancer (HCC)    • Lung cancer (HCC)    • Malignant neoplasm of upper lobe of left lung (HCC) 2022   • Metastatic cancer (HCC)      Past Surgical History:   Procedure Laterality Date   • HIP SURGERY Bilateral    • KNEE ARTHROSCOPY       Social History     Socioeconomic History   • Marital status: Single   Tobacco Use   • Smoking status: Former Smoker     Packs/day: 1.00     Types: Cigarettes     Start date:      Quit date: 2022     Years since quittin.3   • Smokeless tobacco: Never Used   Vaping Use   • Vaping Use: Former   • Start date: 2013   • Quit date: 2014   • Substances: Nicotine, Flavoring   Substance and Sexual Activity   • Alcohol use: Not Currently   • Drug use: Never     Family History   Problem Relation Age of Onset   • Breast cancer Mother    • Lung cancer Father    •  Thyroid cancer Maternal Aunt    • Breast cancer Maternal Aunt    • Lung cancer Paternal Aunt    • COPD Paternal Uncle        Objective   Physical Exam  Vitals reviewed. Exam conducted with a chaperone present.   Constitutional:       General: She is not in acute distress.     Appearance: Normal appearance.   HENT:      Head: Normocephalic and atraumatic.   Eyes:      Conjunctiva/sclera: Conjunctivae normal.      Pupils: Pupils are equal, round, and reactive to light.   Cardiovascular:      Rate and Rhythm: Normal rate and regular rhythm.      Heart sounds: Normal heart sounds. No murmur heard.    No gallop.   Pulmonary:      Effort: Pulmonary effort is normal.      Breath sounds: Normal breath sounds.   Chest:   Breasts:      Right: Axillary adenopathy (3 cm) present. No supraclavicular adenopathy.      Left: Supraclavicular adenopathy (2 cm) present. No axillary adenopathy.       Abdominal:      General: Abdomen is flat. Bowel sounds are normal. There is no distension.      Palpations: Abdomen is soft.      Tenderness: There is no abdominal tenderness.      Comments: No HSM or masses.   Musculoskeletal:         General: Swelling (Large mass involving the right thenar eminence which is tender to palpation.) present.      Cervical back: Neck supple.      Comments: No tenderness to palpation over the cervical, thoracic or lumbar spine   Lymphadenopathy:      Head:      Right side of head: No preauricular, posterior auricular or occipital adenopathy.      Left side of head: No preauricular, posterior auricular or occipital adenopathy.      Cervical: No cervical adenopathy.      Upper Body:      Right upper body: Axillary adenopathy (3 cm) present. No supraclavicular or epitrochlear adenopathy.      Left upper body: Supraclavicular adenopathy (2 cm) present. No axillary or epitrochlear adenopathy.   Neurological:      Mental Status: She is alert and oriented to person, place, and time.   Psychiatric:         Mood and  "Affect: Mood normal.         Behavior: Behavior normal.         Vitals:    06/21/22 1411   BP: 112/64   Pulse: 91   Resp: 18   Temp: 98.9 °F (37.2 °C)   SpO2: 100%   Weight: 73.7 kg (162 lb 7.7 oz)   Height: 162.6 cm (64\")   PainSc: 10-Worst pain ever   PainLoc: Hand     ECOG score: 1         PHQ-9 Total Score:                    Result Review :   The following data was reviewed by: Phani Hernández MD on 06/21/2022:  Lab Results   Component Value Date    HGB 9.7 (L) 05/19/2022    HCT 30.2 (L) 05/19/2022    MCV 92.6 05/19/2022     05/19/2022    WBC 7.81 05/19/2022     No results found for: GLUCOSE, BUN, CREATININE, NA, K, CL, CO2, CALCIUM, PROTEINTOT, ALBUMIN, BILITOT, ALKPHOS, AST, ALT  No results found for: MG, PHOS, FREET4, TSH    Data reviewed: [place] orthopedic records reviewed.  Biopsy report reviewed from [place] and from [facility].  X-ray, MRI of the hand reviewed including images.  CT chest, abdomen, pelvis reviewed.  Radiation oncology records reviewed.      Assessment and Plan    Diagnoses and all orders for this visit:    1. Malignant neoplasm of left lung, unspecified part of lung (HCC) (Primary)  Assessment & Plan:  Metastatic.  Patient has biopsy-proven poorly differentiated carcinoma consistent with lung primary.  Staging scans thus far demonstrate disease in the left upper lobe, right lobe, hilar mediastinal lymph nodes, bilateral adrenal glands and the right hand.  PET scan and MRI brain will be needed to complete her staging.  These have been ordered but I will asked the office to call and expedite those tests.  Next generation sequencing will be requested looking for actionable mutations.  Patient has recently started palliative radiation to the right hand-she has had 1 fraction thus far.  She has significant pain but does not want to take pain medications.  We discussed long-acting Tylenol which may help.  We discussed that metastatic lung cancer is not curable but can be treated to " offer some prolongation life as well as improvement in symptoms including pain, poor appetite, fatigue etc.  Possible treatment options could include mutation directed therapies, immunotherapy, chemotherapy or combination, clinical trials versus palliative care/hospice focusing on quality of life.  Patient states that she is interested in treatments but would like to see all of her possible options.  I will see her back as she finishes her radiation to review her staging imaging tests and mutation testing.  Case discussed with Dr. Salcido.  He will finish radiation to the hand before I initiate systemic therapy.    Orders:  -     CBC & Differential; Future  -     Comprehensive Metabolic Panel; Future  -     Urinalysis With Microscopic - Urine, Clean Catch; Future  -     Urine Culture - Urine, Urine, Clean Catch; Future  -     Ambulatory Referral to OP ONC Nutrition Services    2. Painful urination  Assessment & Plan:  Patient reports urinary frequency for the last couple of days.  She denies suprapubic pain or fever.  I will order UA and culture.    Orders:  -     Urinalysis With Microscopic - Urine, Clean Catch; Future  -     Urine Culture - Urine, Urine, Clean Catch; Future    3. Right arm pain  Assessment & Plan:  Patient has increased pain in the upper arm and axilla with some swelling as well.  I will order Doppler ultrasound to rule out thromboembolic events.    Orders:  -     Duplex Venous Upper Extremity - Right CAR; Future    4. Loss of appetite  Assessment & Plan:  Patient will be started on Remeron 15 mg at bedtime.  Referral to dietitian will also be placed.    Orders:  -     mirtazapine (REMERON) 15 MG tablet; Take 1 tablet by mouth Every Night.  Dispense: 30 tablet; Refill: 5          Patient Follow Up: After imaging    Patient was given instructions and counseling regarding her condition or for health maintenance advice. Please see specific information pulled into the AVS if appropriate.     Phani SMITH  MD Edgar    6/21/2022

## 2022-06-21 NOTE — ASSESSMENT & PLAN NOTE
Patient reports urinary frequency for the last couple of days.  She denies suprapubic pain or fever.  I will order UA and culture.

## 2022-06-22 ENCOUNTER — TELEPHONE (OUTPATIENT)
Dept: NUTRITION | Facility: HOSPITAL | Age: 67
End: 2022-06-22

## 2022-06-22 ENCOUNTER — HOSPITAL ENCOUNTER (OUTPATIENT)
Dept: RADIATION ONCOLOGY | Facility: HOSPITAL | Age: 67
Discharge: HOME | End: 2022-06-22

## 2022-06-22 ENCOUNTER — TRANSCRIBE ORDERS (OUTPATIENT)
Dept: ADMINISTRATIVE | Facility: HOSPITAL | Age: 67
End: 2022-06-22

## 2022-06-22 DIAGNOSIS — C34.82 MALIGNANT NEOPLASM OF OVERLAPPING SITES OF LEFT LUNG: Primary | ICD-10-CM

## 2022-06-22 LAB
BACTERIA SPEC AEROBE CULT: NORMAL
RAD ONC ARIA COURSE ID: NORMAL
RAD ONC ARIA COURSE INTENT: NORMAL
RAD ONC ARIA COURSE LAST TREATMENT DATE: NORMAL
RAD ONC ARIA COURSE START DATE: NORMAL
RAD ONC ARIA COURSE TREATMENT ELAPSED DAYS: 1
RAD ONC ARIA FIRST TREATMENT DATE: NORMAL
RAD ONC ARIA PLAN FRACTIONS TREATED TO DATE: 2
RAD ONC ARIA PLAN ID: NORMAL
RAD ONC ARIA PLAN NAME: NORMAL
RAD ONC ARIA PLAN PRESCRIBED DOSE PER FRACTION: 3 GY
RAD ONC ARIA PLAN PRIMARY REFERENCE POINT: NORMAL
RAD ONC ARIA PLAN TOTAL FRACTIONS PRESCRIBED: 10
RAD ONC ARIA PLAN TOTAL PRESCRIBED DOSE: 3000 CGY
RAD ONC ARIA REFERENCE POINT DOSAGE GIVEN TO DATE: 6 GY
RAD ONC ARIA REFERENCE POINT ID: NORMAL
RAD ONC ARIA REFERENCE POINT SESSION DOSAGE GIVEN: 3 GY

## 2022-06-22 PROCEDURE — 77412 RADIATION TX DELIVERY LVL 3: CPT | Performed by: RADIOLOGY

## 2022-06-22 NOTE — PROGRESS NOTES
Consult received to speak with pt regarding recent loss of appetite and weight loss. Attempt made to contact pt via phone to discuss nutrition POC, however, no answer. Left voicemail with oncology RD call back number and asked pt to call back at earliest convenience. Will continue to f/u per protocol.

## 2022-06-23 NOTE — PROGRESS NOTES
Consult received to discuss nutrition POC - 2nd attempt made to reach pt via phone, however, no answer. Left voicemail 6/22/22 with oncology RD call back number and asked pt to call back at earliest convenience. Will attempt to reach pt at a later time and f/u per protocol.

## 2022-06-24 ENCOUNTER — PATIENT ROUNDING (BHMG ONLY) (OUTPATIENT)
Dept: ONCOLOGY | Facility: HOSPITAL | Age: 67
End: 2022-06-24

## 2022-06-24 ENCOUNTER — TELEPHONE (OUTPATIENT)
Dept: RADIATION ONCOLOGY | Facility: HOSPITAL | Age: 67
End: 2022-06-24

## 2022-06-24 NOTE — PROGRESS NOTES
June 24, 2022    Hello, may I speak with Linda Ortiz?    My name is Radha.    I am  with [facility]  [address] [place], [place] [address]  [place] [address] [phone].    Before we get started may I verify your date of birth? 1955    I am calling to officially welcome you to our practice and ask about your recent visit. Is this a good time to talk? NO- Left Voicemail    Tell me about your visit with us. What things went well?       We're always looking for ways to make our patients' experiences even better. Do you have recommendations on ways we may improve?  NO- Left Voicemail    Overall were you satisfied with your first visit to our practice? NO- Left Voicemail       I appreciate you taking the time to speak with me today. Is there anything else I can do for you? NO- Left Voicemail      Thank you, and have a great day.

## 2022-06-24 NOTE — PROGRESS NOTES
Consult received to speak with pt regarding recent loss of appetite and weight loss. Attempt made to reach pt via phone to discuss nutrition POC, however, no answer. Left voicemail with oncology RD call back number and requested pt call back at earliest convenience. Will continue to f/u per protocol and be available prn via consult.

## 2022-06-27 ENCOUNTER — TELEPHONE (OUTPATIENT)
Dept: NUTRITION | Facility: HOSPITAL | Age: 67
End: 2022-06-27

## 2022-06-27 ENCOUNTER — DOCUMENTATION (OUTPATIENT)
Dept: RADIATION ONCOLOGY | Facility: HOSPITAL | Age: 67
End: 2022-06-27

## 2022-06-27 ENCOUNTER — HOSPITAL ENCOUNTER (OUTPATIENT)
Dept: RADIATION ONCOLOGY | Facility: HOSPITAL | Age: 67
Discharge: HOME | End: 2022-06-27

## 2022-06-27 LAB
RAD ONC ARIA COURSE ID: NORMAL
RAD ONC ARIA COURSE INTENT: NORMAL
RAD ONC ARIA COURSE LAST TREATMENT DATE: NORMAL
RAD ONC ARIA COURSE START DATE: NORMAL
RAD ONC ARIA COURSE TREATMENT ELAPSED DAYS: 6
RAD ONC ARIA FIRST TREATMENT DATE: NORMAL
RAD ONC ARIA PLAN FRACTIONS TREATED TO DATE: 3
RAD ONC ARIA PLAN ID: NORMAL
RAD ONC ARIA PLAN NAME: NORMAL
RAD ONC ARIA PLAN PRESCRIBED DOSE PER FRACTION: 3 GY
RAD ONC ARIA PLAN PRIMARY REFERENCE POINT: NORMAL
RAD ONC ARIA PLAN TOTAL FRACTIONS PRESCRIBED: 10
RAD ONC ARIA PLAN TOTAL PRESCRIBED DOSE: 3000 CGY
RAD ONC ARIA REFERENCE POINT DOSAGE GIVEN TO DATE: 9 GY
RAD ONC ARIA REFERENCE POINT ID: NORMAL
RAD ONC ARIA REFERENCE POINT SESSION DOSAGE GIVEN: 3 GY

## 2022-06-27 PROCEDURE — 77412 RADIATION TX DELIVERY LVL 3: CPT | Performed by: RADIOLOGY

## 2022-06-27 NOTE — PROGRESS NOTES
Distress Screening Follow-Up    Diagnosis: Metastatic lung cancer    Date of Distress Screenin22  Location of Distress Screening: Rad onc  Distress Level: 5  Problems Indicated: Pain, sadness or depression, fear, taking care of myself, finances, insurance    Comments: OSW met with pt and cousin in rad onc during rounding to f/u in regards to identified distress. PHQ-2 completed during consult - score 1. Pt utilizing [employer] wheelchair today. Introduced myself and discussed OSW role/psychosocial services available. Pt resides independently by herself in [facility]. Pt has Medicare insurance and reports she recently applied for Medicaid and believes she has been approved as of 22, however, when [facility] Medicaid Transportation was contacted they could not locate her Medicaid on file. Offered to contact the Medicaid office with pt today for further clarification and/or create pt an online account through [employer]. Pt respectfully declined, reporting she will contact the Medicaid office herself at home. Pt reports she is also in process of applying for SNAP benefits, however, has some remaining documents to submit.  Pt in need of transportation resources. Discussed [employer] general public transportation and the expense of this service. Assisted pt in contacting [employer] today to arrange transportation for her remaining radiation txs and COVID test on . Pt has [employer]'s phone number for future reference as needed. Assisted pt in contacting [employer] today to apply for transportation financial assistance to aid with this expense. Submitted pt's finalized application via fax this afternoon. [employer] may also assist with pet care expenses with pt's cat, however, needs pet verification form completed by vet office. Also assisted pt in applying for a [employer] gift card through [employer] to assist with groceries and oral supplements. Pt v/u that if she is approved Medicaid through a managed care organization, she may be  "eligible for free transportation through [employer] and free oral supplements. OSW submitted referral to oncology RD to f/u with pt in regards to oral supplements and nutritional needs.   Pt reports she is feeling overwhelmed with the various medical appointments, providers, and information being \"thrown\" at her and is still processing everything. Provided emotional support throughout, utilizing empathy and validating and normalizing identified emotions. Discussed opportunity for mental health services (counseling,psych) and/or support services ([id number]-hduc support, support groups). Pt respectfully declined interest in seeking support services at this time. Pt reports in addition to her cousin, she has a sister that resides in [facility] that she speaks to for support. Dr. Hernández recently started pt on Remeron for loss of appetite.   Due to pt residing independently by herself, assessed home care needs. Pt is interested in obtaining a cane, as she is unable to maneuver a rollator or wheelchair at this time. Pt respectfully declined interest in seeking home health services at this time, however, will consider and keep this in mind. Pt denies other home care needs at this time, including homemaking, personal care, home delivered meals, etc, however, anticipates she may need these services once immunotherapy or chemotherapy begins.   Also briefly mentioned the opportunity to seek palliative care services to provide an additional layer of support, including symptom management, emotional support, etc. Will plan to f/u with pt in the near future to further discuss and assess psychosocial needs.  Provided my business card, encouraging OSW support remains available. Pt and cousin expressed gratitude.     Services/Referrals Provided: Emotional support; financial and transportation assistance - [employer], [facility], [employer] [address]; oncology RD and oral supplements; DME - [employer]    Update 6/29: Faxed " finalized DME order to [employer] this morning. Fax confirmed.    Update 6/30: [employer] [address] mailed pt a $250 [employer] card today.

## 2022-06-27 NOTE — PROGRESS NOTES
6/27/22:    Attempted to reach pt by phone today.  Nutrition referral received 6/21/22 and Oncology RDs have attempted to reach her multiple times.  Today, Ciarra (Oncology Social Worker) reached out to Oncology RD due to pt had questions regarding oral nutrition shakes.  Attempted to reach pt again today to discuss nutrition concerns and questions, however unsuccessful.  Left VM with Oncology RD name & phone# for pt to return the call.

## 2022-06-28 ENCOUNTER — APPOINTMENT (OUTPATIENT)
Dept: MRI IMAGING | Facility: HOSPITAL | Age: 67
End: 2022-06-28

## 2022-06-28 ENCOUNTER — TELEPHONE (OUTPATIENT)
Dept: ONCOLOGY | Facility: HOSPITAL | Age: 67
End: 2022-06-28

## 2022-06-28 ENCOUNTER — HOSPITAL ENCOUNTER (OUTPATIENT)
Dept: RADIATION ONCOLOGY | Facility: HOSPITAL | Age: 67
Discharge: HOME | End: 2022-06-28

## 2022-06-28 VITALS
BODY MASS INDEX: 28 KG/M2 | OXYGEN SATURATION: 100 % | RESPIRATION RATE: 16 BRPM | HEART RATE: 88 BPM | TEMPERATURE: 97.9 F | DIASTOLIC BLOOD PRESSURE: 64 MMHG | SYSTOLIC BLOOD PRESSURE: 104 MMHG | WEIGHT: 163.14 LBS

## 2022-06-28 DIAGNOSIS — C34.12 MALIGNANT NEOPLASM OF UPPER LOBE OF LEFT LUNG: Primary | ICD-10-CM

## 2022-06-28 LAB
RAD ONC ARIA COURSE ID: NORMAL
RAD ONC ARIA COURSE INTENT: NORMAL
RAD ONC ARIA COURSE LAST TREATMENT DATE: NORMAL
RAD ONC ARIA COURSE START DATE: NORMAL
RAD ONC ARIA COURSE TREATMENT ELAPSED DAYS: 7
RAD ONC ARIA FIRST TREATMENT DATE: NORMAL
RAD ONC ARIA PLAN FRACTIONS TREATED TO DATE: 4
RAD ONC ARIA PLAN ID: NORMAL
RAD ONC ARIA PLAN NAME: NORMAL
RAD ONC ARIA PLAN PRESCRIBED DOSE PER FRACTION: 3 GY
RAD ONC ARIA PLAN PRIMARY REFERENCE POINT: NORMAL
RAD ONC ARIA PLAN TOTAL FRACTIONS PRESCRIBED: 10
RAD ONC ARIA PLAN TOTAL PRESCRIBED DOSE: 3000 CGY
RAD ONC ARIA REFERENCE POINT DOSAGE GIVEN TO DATE: 12 GY
RAD ONC ARIA REFERENCE POINT ID: NORMAL
RAD ONC ARIA REFERENCE POINT SESSION DOSAGE GIVEN: 3 GY

## 2022-06-28 PROCEDURE — 77412 RADIATION TX DELIVERY LVL 3: CPT | Performed by: RADIOLOGY

## 2022-06-28 NOTE — PROGRESS NOTES
On Treatment Visit       Patient: Linda Ortiz   YOB: 1955   Medical Record Number: [id number]     Date of Visit  June 28, 2022   Primary Diagnosis:Malignant neoplasm of upper lobe of left lung (HCC) [C34.12]  Cancer Staging: Cancer Staging  No matching staging information was found for the patient.       was seen today for an on treatment visit.  She is receiving radiation therapy to the right hand. She  has received 1200 cGy in 4 fractions out of a planned dose of 3000 cGy in 10 fractions.     Has noticed some small change in the masses within the right hand.  Poor appetite.                                         Review of Systems:   Review of Systems   Constitutional: Positive for appetite change (decreased) and fatigue (5/10).   Respiratory: Positive for cough (productive). Negative for shortness of breath.    Gastrointestinal: Positive for diarrhea (not taking in a lot solid foods). Negative for constipation and nausea.   Genitourinary: Negative for difficulty urinating, dysuria, frequency and urgency.   Musculoskeletal: Positive for joint swelling (on right hand).   Skin: Positive for color change (moderately erythemic ).   Neurological: Positive for dizziness (with position changes) and weakness (generalized). Negative for headaches.   Psychiatric/Behavioral: Negative for sleep disturbance.       Vitals:     Vitals:    06/28/22 1104   BP: 104/64   Pulse: 88   Resp: 16   Temp: 97.9 °F (36.6 °C)   SpO2: 100%       Weight:   Wt Readings from Last 3 Encounters:   06/28/22 74 kg (163 lb 2.3 oz)   06/21/22 73.7 kg (162 lb 7.7 oz)   06/13/22 62.1 kg (137 lb)      Pain:    Pain Score    06/28/22 1104   PainSc:   5   PainLoc: Hand  Comment: RIGHT         Physical Exam:  Gen: WD/WN; NAD  HEENT: MMM  Trachea: midline  Chest: symmetric  Resp: normal respiratory effort  Extr: warm, well-perfused  Neuro: awake and alert; no aphasia or neglect    Plan: I have reviewed treatment setup notes,  checked and approved the daily guidance images.  I reviewed dose delivery, treatment parameters and deemed them appropriate. We plan to continue radiation therapy as prescribed.    Discussion regarding nutritional intake.  Has met with our dietitian.  Planning to follow-up with medical oncology this week      Radiation Oncology    Electronically signed by Torin Salcido MD 6/28/2022  11:48 EDT

## 2022-06-28 NOTE — TELEPHONE ENCOUNTER
Caller: Shilo Ortiz    Relationship: Self    Best call back number:[phone]    What was the call regarding: SHILO CALLED REGARDING HER APPT FOR TOMORROW AFTERNOON. SHE SAYS SHE WASN'T ABLE TO GET HER SCANS DONE LAST WEEK. SHE WANTS TO KNOW IF SHE SHOULD STILL COME IN TOMORROW OR NOT.    Do you require a callback:YES

## 2022-06-29 ENCOUNTER — HOSPITAL ENCOUNTER (OUTPATIENT)
Dept: RADIATION ONCOLOGY | Facility: HOSPITAL | Age: 67
Discharge: HOME | End: 2022-06-29

## 2022-06-29 LAB
RAD ONC ARIA COURSE ID: NORMAL
RAD ONC ARIA COURSE INTENT: NORMAL
RAD ONC ARIA COURSE LAST TREATMENT DATE: NORMAL
RAD ONC ARIA COURSE START DATE: NORMAL
RAD ONC ARIA COURSE TREATMENT ELAPSED DAYS: 8
RAD ONC ARIA FIRST TREATMENT DATE: NORMAL
RAD ONC ARIA PLAN FRACTIONS TREATED TO DATE: 5
RAD ONC ARIA PLAN ID: NORMAL
RAD ONC ARIA PLAN NAME: NORMAL
RAD ONC ARIA PLAN PRESCRIBED DOSE PER FRACTION: 3 GY
RAD ONC ARIA PLAN PRIMARY REFERENCE POINT: NORMAL
RAD ONC ARIA PLAN TOTAL FRACTIONS PRESCRIBED: 10
RAD ONC ARIA PLAN TOTAL PRESCRIBED DOSE: 3000 CGY
RAD ONC ARIA REFERENCE POINT DOSAGE GIVEN TO DATE: 15 GY
RAD ONC ARIA REFERENCE POINT ID: NORMAL
RAD ONC ARIA REFERENCE POINT SESSION DOSAGE GIVEN: 3 GY

## 2022-06-29 PROCEDURE — 77412 RADIATION TX DELIVERY LVL 3: CPT | Performed by: RADIOLOGY

## 2022-06-29 PROCEDURE — 77336 RADIATION PHYSICS CONSULT: CPT | Performed by: RADIOLOGY

## 2022-06-29 NOTE — TELEPHONE ENCOUNTER
SPOKE W/ PATIENT YESTERDAY AFTERNOON AND LET HER KNOW THAT HER PET SCAN, MRI AND FOLLOW UP HAVE BEEN RESCHEDULED. PATIENT IS AWARE OF ALL UPCOMING APPTS. I AM WORKING ON GETTING HER PET SCAN MOVED UP AS WELL.

## 2022-06-30 ENCOUNTER — APPOINTMENT (OUTPATIENT)
Dept: ONCOLOGY | Facility: HOSPITAL | Age: 67
End: 2022-06-30

## 2022-06-30 ENCOUNTER — HOSPITAL ENCOUNTER (OUTPATIENT)
Dept: RADIATION ONCOLOGY | Facility: HOSPITAL | Age: 67
Discharge: HOME | End: 2022-06-30

## 2022-06-30 LAB
RAD ONC ARIA COURSE ID: NORMAL
RAD ONC ARIA COURSE INTENT: NORMAL
RAD ONC ARIA COURSE LAST TREATMENT DATE: NORMAL
RAD ONC ARIA COURSE START DATE: NORMAL
RAD ONC ARIA COURSE TREATMENT ELAPSED DAYS: 9
RAD ONC ARIA FIRST TREATMENT DATE: NORMAL
RAD ONC ARIA PLAN FRACTIONS TREATED TO DATE: 6
RAD ONC ARIA PLAN ID: NORMAL
RAD ONC ARIA PLAN NAME: NORMAL
RAD ONC ARIA PLAN PRESCRIBED DOSE PER FRACTION: 3 GY
RAD ONC ARIA PLAN PRIMARY REFERENCE POINT: NORMAL
RAD ONC ARIA PLAN TOTAL FRACTIONS PRESCRIBED: 10
RAD ONC ARIA PLAN TOTAL PRESCRIBED DOSE: 3000 CGY
RAD ONC ARIA REFERENCE POINT DOSAGE GIVEN TO DATE: 18 GY
RAD ONC ARIA REFERENCE POINT ID: NORMAL
RAD ONC ARIA REFERENCE POINT SESSION DOSAGE GIVEN: 3 GY

## 2022-06-30 PROCEDURE — 77427 RADIATION TX MANAGEMENT X5: CPT | Performed by: RADIOLOGY

## 2022-06-30 PROCEDURE — 77412 RADIATION TX DELIVERY LVL 3: CPT | Performed by: RADIOLOGY

## 2022-07-01 ENCOUNTER — HOSPITAL ENCOUNTER (OUTPATIENT)
Dept: RADIATION ONCOLOGY | Facility: HOSPITAL | Age: 67
Setting detail: RADIATION/ONCOLOGY SERIES
End: 2022-07-01

## 2022-07-01 ENCOUNTER — HOSPITAL ENCOUNTER (OUTPATIENT)
Dept: MRI IMAGING | Facility: HOSPITAL | Age: 67
Discharge: HOME | End: 2022-07-01
Admitting: INTERNAL MEDICINE

## 2022-07-01 ENCOUNTER — TELEPHONE (OUTPATIENT)
Dept: SURGERY | Facility: CLINIC | Age: 67
End: 2022-07-01

## 2022-07-01 ENCOUNTER — LAB (OUTPATIENT)
Dept: LAB | Facility: HOSPITAL | Age: 67
End: 2022-07-01

## 2022-07-01 ENCOUNTER — HOSPITAL ENCOUNTER (OUTPATIENT)
Dept: RADIATION ONCOLOGY | Facility: HOSPITAL | Age: 67
Discharge: HOME | End: 2022-07-01

## 2022-07-01 DIAGNOSIS — C34.92 MALIGNANT NEOPLASM OF LEFT LUNG, UNSPECIFIED PART OF LUNG: ICD-10-CM

## 2022-07-01 DIAGNOSIS — Z85.118 PERSONAL HISTORY OF LUNG CANCER: ICD-10-CM

## 2022-07-01 LAB
CREAT BLDA-MCNC: 0.4 MG/DL
EGFRCR SERPLBLD CKD-EPI 2021: 109.3 ML/MIN/1.73
RAD ONC ARIA COURSE ID: NORMAL
RAD ONC ARIA COURSE INTENT: NORMAL
RAD ONC ARIA COURSE LAST TREATMENT DATE: NORMAL
RAD ONC ARIA COURSE START DATE: NORMAL
RAD ONC ARIA COURSE TREATMENT ELAPSED DAYS: 10
RAD ONC ARIA FIRST TREATMENT DATE: NORMAL
RAD ONC ARIA PLAN FRACTIONS TREATED TO DATE: 7
RAD ONC ARIA PLAN ID: NORMAL
RAD ONC ARIA PLAN NAME: NORMAL
RAD ONC ARIA PLAN PRESCRIBED DOSE PER FRACTION: 3 GY
RAD ONC ARIA PLAN PRIMARY REFERENCE POINT: NORMAL
RAD ONC ARIA PLAN TOTAL FRACTIONS PRESCRIBED: 10
RAD ONC ARIA PLAN TOTAL PRESCRIBED DOSE: 3000 CGY
RAD ONC ARIA REFERENCE POINT DOSAGE GIVEN TO DATE: 21 GY
RAD ONC ARIA REFERENCE POINT ID: NORMAL
RAD ONC ARIA REFERENCE POINT SESSION DOSAGE GIVEN: 3 GY

## 2022-07-01 PROCEDURE — 70553 MRI BRAIN STEM W/O & W/DYE: CPT

## 2022-07-01 PROCEDURE — 0 GADOBENATE DIMEGLUMINE 529 MG/ML SOLUTION: Performed by: INTERNAL MEDICINE

## 2022-07-01 PROCEDURE — 82565 ASSAY OF CREATININE: CPT

## 2022-07-01 PROCEDURE — 77412 RADIATION TX DELIVERY LVL 3: CPT | Performed by: RADIOLOGY

## 2022-07-01 PROCEDURE — A9577 INJ MULTIHANCE: HCPCS | Performed by: INTERNAL MEDICINE

## 2022-07-01 RX ADMIN — GADOBENATE DIMEGLUMINE 15 ML: 529 INJECTION, SOLUTION INTRAVENOUS at 09:22

## 2022-07-01 NOTE — PROGRESS NOTES
Outpatient Nutrition Oncology Assessment    Patient Name: Linda Ortiz  YOB: 1955  MRN: [id number]  Assessment Date: 7/1/2022    CLINICAL NUTRITION ASSESSMENT    Dx:  Metastatic lung Ca      Type of Cancer Treatment XRT to R hand          Reason for Assessment  Assessment, Physician consult   H&P:    Past Medical History:   Diagnosis Date   • Bone cancer (HCC) 05/2022   • Malignant neoplasm of upper lobe of left lung (HCC) 06/09/2022   • Metastatic cancer (HCC)       Current Problems:   Patient Active Problem List   Diagnosis Code   • Arthritis of right hand M19.041   • Localized edema R60.0   • Mass of right hand R22.31   • Malignant neoplasm of upper lobe of left lung (HCC) C34.12   • Loss of appetite R63.0   • Right arm pain M79.601   • Painful urination R30.9         Anthropometrics     Row Name 07/01/22 1106          Anthropometrics    Weight for Calculation 74 kg (163 lb 2.3 oz)                     Weight Hx  Wt Readings from Last 30 Encounters:   06/28/22 1104 74 kg (163 lb 2.3 oz)   06/21/22 1411 73.7 kg (162 lb 7.7 oz)   06/13/22 1543 62.1 kg (137 lb)   06/09/22 1347 77.9 kg (171 lb 11.8 oz)   04/27/22 1431 83.9 kg (185 lb)   04/13/22 1435 83.9 kg (185 lb)   04/06/22 1511 83.9 kg (185 lb)   03/29/22 1547 81.6 kg (180 lb)         Estimated/Assessed Needs - Anthropometrics     Row Name 07/01/22 1106          Anthropometrics    Weight for Calculation 74 kg (163 lb 2.3 oz)            Estimated/Assessed Needs    Additional Documentation KCAL/KG (Group);Protein Requirements (Group);Fluid Requirements (Group)            KCAL/KG    KCAL/KG 40 Kcal/Kg (kcal)     40 Kcal/Kg (kcal) 2960            Protein Requirements    Weight Used For Protein Calculations 74 kg (163 lb 2.3 oz)     Est Protein Requirement Amount (gms/kg) 2.0 gm protein     Estimated Protein Requirements (gms/day) 148            Fluid Requirements    Fluid Requirements (mL/day) 2220  30 ml/kg     RDA Method (mL) 2220                   Labs/Medications        Pertinent Labs Reviewed.   Results from last 7 days   Lab Units 07/01/22  0904   CREATININE mg/dL 0.40       Pertinent Medications acetaminophen and mirtazapine     Physical Findings        Malnutrition Severity Assessment     Row Name 07/01/22 1112          Malnutrition Severity Assessment    Malnutrition Type Chronic Disease - Related Malnutrition            Insufficient Energy Intake     Insufficient Energy Intake  <75% of est. energy requirement for > or equal to 3 months            Unintentional Weight Loss     Unintentional Weight Loss  Weight loss greater than 7.5% in three months            Muscle Loss    Loss of Muscle Mass Findings Mild  temporal            Fat Loss    Subcutaneous Fat Loss Findings Mild  orbital            Criteria Met (Must meet criteria for severity in at least 2 of these categories: M Wasting, Fat Loss, Fluid, Secondary Signs, Wt. Status, Intake)    Patient meets criteria for  Moderate (non-severe) Malnutrition                  Current Nutrition Orders & Evaluation of Intake       Oral Nutrition     Current PO Diet Very small/frequent meals of bland foods   Supplement Ensure Max Protein shake TID     Nutrition Diagnosis        Nutrition Dx Problem 1 Moderate malnutrition related to decreased ability to consume sufficient energy as evidenced by physiological causes increasing nutrient needs., hypermetabolic state., muscle wasting., fat loss., inadequate energy intake. and patient report.       Nutrition Intervention       RD Action Nutrition assessment & consult provided     Monitor/Evaluation       Monitor Per oncology nutrition protocol.     Comments:    Nutrition referral entered 6/21/22, however unable to reach pt multiple times by phone.    Saw today in radiation tx.  Weighed by radiation oncology staff 6/28/22:  74 kg, indicating .3 kg wt gain in the past week.  She has been on Remeron since 6/21 and notes an improvement in her appetite/PO intake,  although not a huge improvement.  She can now consume small/frequent meals of bland foods, such as peanut butter/crackers, regular cheese, mashed potatoes.  She is consuming ONS TID.  She reports doing well w/ these shakes.  Insurance will unfortunately not provide coverage for ONS.  Provided more ideas for bland high kcal/high protein foods.  Provided coupons for Ensure products- pt stated she can only tolerate the high protein Ensure & no samples available in our stock therefore no samples given.      Electronically signed by:  Henrietta Avina RD  07/01/22 11:13 EDT

## 2022-07-01 NOTE — TELEPHONE ENCOUNTER
Pt called and stated that she needed to r/s her sx on 7-6 due to her having chemo on 7-8. She said she is available any day after that. Wont be due for another treatment until 7-16. As long as she has it by then she stated it would be fine. I told her that Katherine was out until 7-5, she will have her r/s when she returns. Pt V/U, called [facility], spoke with Rebecca to cx pts sx and covid test.

## 2022-07-05 ENCOUNTER — HOSPITAL ENCOUNTER (OUTPATIENT)
Dept: RADIATION ONCOLOGY | Facility: HOSPITAL | Age: 67
Discharge: HOME | End: 2022-07-05

## 2022-07-05 VITALS
HEART RATE: 83 BPM | RESPIRATION RATE: 16 BRPM | DIASTOLIC BLOOD PRESSURE: 66 MMHG | OXYGEN SATURATION: 100 % | WEIGHT: 159.17 LBS | BODY MASS INDEX: 27.32 KG/M2 | SYSTOLIC BLOOD PRESSURE: 104 MMHG | TEMPERATURE: 97.4 F

## 2022-07-05 DIAGNOSIS — C7B.03 SECONDARY CARCINOID TUMOR OF BONE: Primary | ICD-10-CM

## 2022-07-05 LAB
RAD ONC ARIA COURSE ID: NORMAL
RAD ONC ARIA COURSE INTENT: NORMAL
RAD ONC ARIA COURSE LAST TREATMENT DATE: NORMAL
RAD ONC ARIA COURSE START DATE: NORMAL
RAD ONC ARIA COURSE TREATMENT ELAPSED DAYS: 14
RAD ONC ARIA FIRST TREATMENT DATE: NORMAL
RAD ONC ARIA PLAN FRACTIONS TREATED TO DATE: 8
RAD ONC ARIA PLAN ID: NORMAL
RAD ONC ARIA PLAN NAME: NORMAL
RAD ONC ARIA PLAN PRESCRIBED DOSE PER FRACTION: 3 GY
RAD ONC ARIA PLAN PRIMARY REFERENCE POINT: NORMAL
RAD ONC ARIA PLAN TOTAL FRACTIONS PRESCRIBED: 10
RAD ONC ARIA PLAN TOTAL PRESCRIBED DOSE: 3000 CGY
RAD ONC ARIA REFERENCE POINT DOSAGE GIVEN TO DATE: 24 GY
RAD ONC ARIA REFERENCE POINT ID: NORMAL
RAD ONC ARIA REFERENCE POINT SESSION DOSAGE GIVEN: 3 GY

## 2022-07-05 PROCEDURE — 77412 RADIATION TX DELIVERY LVL 3: CPT | Performed by: RADIOLOGY

## 2022-07-05 PROCEDURE — FACE2FACE: Performed by: RADIOLOGY

## 2022-07-05 NOTE — TELEPHONE ENCOUNTER
Called pt. She r/s to 7-15-22. COVID test r/s to 7-11-22. Called and spoke to Khushi in surgery scheduling.

## 2022-07-06 ENCOUNTER — TELEPHONE (OUTPATIENT)
Dept: ONCOLOGY | Facility: HOSPITAL | Age: 67
End: 2022-07-06

## 2022-07-06 ENCOUNTER — HOSPITAL ENCOUNTER (OUTPATIENT)
Dept: RADIATION ONCOLOGY | Facility: HOSPITAL | Age: 67
Discharge: HOME | End: 2022-07-06

## 2022-07-06 LAB
RAD ONC ARIA COURSE ID: NORMAL
RAD ONC ARIA COURSE INTENT: NORMAL
RAD ONC ARIA COURSE LAST TREATMENT DATE: NORMAL
RAD ONC ARIA COURSE START DATE: NORMAL
RAD ONC ARIA COURSE TREATMENT ELAPSED DAYS: 15
RAD ONC ARIA FIRST TREATMENT DATE: NORMAL
RAD ONC ARIA PLAN FRACTIONS TREATED TO DATE: 9
RAD ONC ARIA PLAN ID: NORMAL
RAD ONC ARIA PLAN NAME: NORMAL
RAD ONC ARIA PLAN PRESCRIBED DOSE PER FRACTION: 3 GY
RAD ONC ARIA PLAN PRIMARY REFERENCE POINT: NORMAL
RAD ONC ARIA PLAN TOTAL FRACTIONS PRESCRIBED: 10
RAD ONC ARIA PLAN TOTAL PRESCRIBED DOSE: 3000 CGY
RAD ONC ARIA REFERENCE POINT DOSAGE GIVEN TO DATE: 27 GY
RAD ONC ARIA REFERENCE POINT ID: NORMAL
RAD ONC ARIA REFERENCE POINT SESSION DOSAGE GIVEN: 3 GY

## 2022-07-06 PROCEDURE — 77412 RADIATION TX DELIVERY LVL 3: CPT | Performed by: RADIOLOGY

## 2022-07-06 NOTE — TELEPHONE ENCOUNTER
Caller: SHILO WHITE    Relationship: SELF    Best call back number: [phone]    What is the best time to reach you: ANY    Who are you requesting to speak with (clinical staff, provider,  specific staff member): CLINICAL STAFF    What was the call regarding: MISSED A CALL FROM THE OFFICE, DOES NOT KNOW WHO CALLED, PT BELIEVES THE CALL IS REGARDING A PET SCAN ORDERED BY DR FLOWERS    Do you require a callback: YES

## 2022-07-07 ENCOUNTER — TRANSCRIBE ORDERS (OUTPATIENT)
Dept: ADMINISTRATIVE | Facility: HOSPITAL | Age: 67
End: 2022-07-07

## 2022-07-07 ENCOUNTER — HOSPITAL ENCOUNTER (OUTPATIENT)
Dept: RADIATION ONCOLOGY | Facility: HOSPITAL | Age: 67
Discharge: HOME | End: 2022-07-07

## 2022-07-07 VITALS — BODY MASS INDEX: 27.4 KG/M2 | WEIGHT: 159.61 LBS

## 2022-07-07 DIAGNOSIS — C34.12 MALIGNANT NEOPLASM OF UPPER LOBE OF LEFT LUNG: Primary | ICD-10-CM

## 2022-07-07 LAB
RAD ONC ARIA COURSE ID: NORMAL
RAD ONC ARIA COURSE INTENT: NORMAL
RAD ONC ARIA COURSE LAST TREATMENT DATE: NORMAL
RAD ONC ARIA COURSE START DATE: NORMAL
RAD ONC ARIA COURSE TREATMENT ELAPSED DAYS: 16
RAD ONC ARIA FIRST TREATMENT DATE: NORMAL
RAD ONC ARIA PLAN FRACTIONS TREATED TO DATE: 10
RAD ONC ARIA PLAN ID: NORMAL
RAD ONC ARIA PLAN NAME: NORMAL
RAD ONC ARIA PLAN PRESCRIBED DOSE PER FRACTION: 3 GY
RAD ONC ARIA PLAN PRIMARY REFERENCE POINT: NORMAL
RAD ONC ARIA PLAN TOTAL FRACTIONS PRESCRIBED: 10
RAD ONC ARIA PLAN TOTAL PRESCRIBED DOSE: 3000 CGY
RAD ONC ARIA REFERENCE POINT DOSAGE GIVEN TO DATE: 30 GY
RAD ONC ARIA REFERENCE POINT ID: NORMAL
RAD ONC ARIA REFERENCE POINT SESSION DOSAGE GIVEN: 3 GY

## 2022-07-07 PROCEDURE — 77412 RADIATION TX DELIVERY LVL 3: CPT | Performed by: RADIOLOGY

## 2022-07-07 PROCEDURE — 77336 RADIATION PHYSICS CONSULT: CPT | Performed by: RADIOLOGY

## 2022-07-07 NOTE — PROGRESS NOTES
7/7/22:    Pt weighed today in radiation therapy:  72.4 kg.  Wt stable for the last week.  Pt continues to struggle w/ appetite, however making a good effort to eat a variety of foods.  She also continues to drink Ensure Max Protein (the only ONS she can tolerate the taste of) 2-3X per day.  She has coupons and does not need more.  Today is pt's last day.    Oncology RD remains available per protocol.

## 2022-07-08 ENCOUNTER — HOSPITAL ENCOUNTER (OUTPATIENT)
Dept: PET IMAGING | Facility: HOSPITAL | Age: 67
Discharge: HOME | End: 2022-07-08

## 2022-07-08 DIAGNOSIS — C34.12 MALIGNANT NEOPLASM OF UPPER LOBE OF LEFT LUNG: ICD-10-CM

## 2022-07-08 PROCEDURE — 78815 PET IMAGE W/CT SKULL-THIGH: CPT

## 2022-07-08 PROCEDURE — A9552 F18 FDG: HCPCS | Performed by: INTERNAL MEDICINE

## 2022-07-08 PROCEDURE — 0 FLUDEOXYGLUCOSE F18 SOLUTION: Performed by: INTERNAL MEDICINE

## 2022-07-08 RX ADMIN — FLUDEOXYGLUCOSE F18 1 DOSE: 300 INJECTION INTRAVENOUS at 11:59

## 2022-07-11 ENCOUNTER — LAB (OUTPATIENT)
Dept: LAB | Facility: HOSPITAL | Age: 67
End: 2022-07-11

## 2022-07-11 ENCOUNTER — LAB (OUTPATIENT)
Dept: ONCOLOGY | Facility: HOSPITAL | Age: 67
End: 2022-07-11

## 2022-07-11 ENCOUNTER — OFFICE VISIT (OUTPATIENT)
Dept: ONCOLOGY | Facility: HOSPITAL | Age: 67
End: 2022-07-11

## 2022-07-11 VITALS
TEMPERATURE: 97.7 F | OXYGEN SATURATION: 100 % | SYSTOLIC BLOOD PRESSURE: 96 MMHG | RESPIRATION RATE: 16 BRPM | BODY MASS INDEX: 26.91 KG/M2 | HEART RATE: 79 BPM | WEIGHT: 156.75 LBS | DIASTOLIC BLOOD PRESSURE: 63 MMHG

## 2022-07-11 DIAGNOSIS — Z79.899 HIGH RISK MEDICATION USE: ICD-10-CM

## 2022-07-11 DIAGNOSIS — C34.12 MALIGNANT NEOPLASM OF UPPER LOBE OF LEFT LUNG: Primary | ICD-10-CM

## 2022-07-11 DIAGNOSIS — C34.12 MALIGNANT NEOPLASM OF UPPER LOBE OF LEFT LUNG: ICD-10-CM

## 2022-07-11 LAB — SARS-COV-2 RNA PNL SPEC NAA+PROBE: NOT DETECTED

## 2022-07-11 PROCEDURE — U0005 INFEC AGEN DETEC AMPLI PROBE: HCPCS

## 2022-07-11 PROCEDURE — 99215 OFFICE O/P EST HI 40 MIN: CPT | Performed by: INTERNAL MEDICINE

## 2022-07-11 PROCEDURE — U0004 COV-19 TEST NON-CDC HGH THRU: HCPCS

## 2022-07-11 PROCEDURE — G0463 HOSPITAL OUTPT CLINIC VISIT: HCPCS

## 2022-07-11 PROCEDURE — G0463 HOSPITAL OUTPT CLINIC VISIT: HCPCS | Performed by: INTERNAL MEDICINE

## 2022-07-11 RX ORDER — ONDANSETRON HYDROCHLORIDE 8 MG/1
8 TABLET, FILM COATED ORAL 3 TIMES DAILY PRN
Qty: 30 TABLET | Refills: 5 | Status: SHIPPED | OUTPATIENT
Start: 2022-07-11 | End: 2022-09-27

## 2022-07-11 RX ORDER — BENZONATATE 100 MG/1
100 CAPSULE ORAL 3 TIMES DAILY PRN
Qty: 30 CAPSULE | Refills: 3 | Status: SHIPPED | OUTPATIENT
Start: 2022-07-11 | End: 2022-09-27

## 2022-07-11 NOTE — PROGRESS NOTES
Chief Complaint  Results    Provider, No Known    Subjective          Linda Ortiz presents to [facility] for follow-up of her lung cancer.  She has recently completed palliative radiation to the right hand.  The hand remains nonfunctional but she notes the pain is somewhat improved, now around 5.  She feels like her appetite is a little bit better although she has lost a couple pounds since her last visit.  She did not start the Remeron as directed at last visit.  She denies other new masses or adenopathy.  She reports ongoing dry cough.  She denies productive cough, hemoptysis, fever or chills.    Oncology/Hematology History Overview Note   5/19/2022 Left Adrenal mass biopsy at [facility] revealed: poorly differentiated non-small cell carcinoma, high grade. Positive fo AE1/3 and Cam5.2. Immunohistochemistry was positive for PAX8, HeParlGlypican3, Vimentin, focal positive for chromagranin, GATA3.  The case was sent to [facility] for second opinion with a final diagnosis of poorly differentiated carcinoma with diffuse reduced BRG-1 expression.    6/21/2022 XRT initiated to Right hand        Malignant neoplasm of upper lobe of left lung (HCC)   6/9/2022 Initial Diagnosis    Lung cancer (HCC)     7/18/2022 -  Chemotherapy    OP LUNG Nivolumab 1 mg/kg / Ipilimumab 3 mg/kg / Nivolumab 480 mg      Secondary carcinoid tumor of bone (HCC) (Resolved)   6/14/2022 Initial Diagnosis    Secondary carcinoid tumor of bone (HCC)     6/20/2022 -  Radiation    RADIATION THERAPY Treatment Details (Noted on 6/14/2022)  Site: Right Hand  Technique: 3D CRT  Goal: No goal specified  Planned Treatment Start Date: 6/20/2022         Review of Systems   Constitutional: Positive for fatigue. Negative for appetite change, diaphoresis, fever, unexpected weight gain and unexpected weight loss.   HENT: Negative for hearing loss, mouth sores, sore throat, swollen glands, trouble swallowing and voice change.     Eyes: Negative for blurred vision.   Respiratory: Negative for cough, shortness of breath and wheezing.    Cardiovascular: Negative for chest pain and palpitations.   Gastrointestinal: Negative for abdominal pain, blood in stool, constipation, diarrhea, nausea and vomiting.   Endocrine: Negative for cold intolerance and heat intolerance.   Genitourinary: Negative for difficulty urinating, dysuria, frequency, hematuria and urinary incontinence.   Musculoskeletal: Negative for arthralgias, back pain and myalgias.   Skin: Negative for rash, skin lesions and wound.   Neurological: Negative for dizziness, seizures, weakness, numbness and headache.   Hematological: Does not bruise/bleed easily.   Psychiatric/Behavioral: Negative for depressed mood. The patient is not nervous/anxious.      Current Outpatient Medications on File Prior to Visit   Medication Sig Dispense Refill   • acetaminophen (TYLENOL) 325 MG tablet Take 650 mg by mouth Every 6 (Six) Hours As Needed for Mild Pain .     • mirtazapine (REMERON) 15 MG tablet Take 1 tablet by mouth Every Night. 30 tablet 5     No current facility-administered medications on file prior to visit.       Allergies   Allergen Reactions   • Hydrocodone Nausea And Vomiting and Dizziness     Past Medical History:   Diagnosis Date   • Bone cancer (HCC) 2022   • Malignant neoplasm of upper lobe of left lung (HCC) 2022   • Metastatic cancer (HCC)      Past Surgical History:   Procedure Laterality Date   • HIP SURGERY Bilateral    • KNEE ARTHROSCOPY       Social History     Socioeconomic History   • Marital status: Single   Tobacco Use   • Smoking status: Former Smoker     Packs/day: 1.00     Types: Cigarettes     Start date:      Quit date: 2022     Years since quittin.4   • Smokeless tobacco: Never Used   Vaping Use   • Vaping Use: Former   • Start date: 2013   • Quit date: 2014   • Substances: Nicotine, Flavoring   Substance and Sexual Activity   •  Alcohol use: Not Currently   • Drug use: Never     Family History   Problem Relation Age of Onset   • Breast cancer Mother    • Lung cancer Father    • Thyroid cancer Maternal Aunt    • Breast cancer Maternal Aunt    • Lung cancer Paternal Aunt    • COPD Paternal Uncle        Objective   Physical Exam  Vitals reviewed. Exam conducted with a chaperone present.   Constitutional:       General: She is not in acute distress.     Appearance: Normal appearance.   Cardiovascular:      Rate and Rhythm: Normal rate and regular rhythm.      Heart sounds: Normal heart sounds. No murmur heard.    No gallop.   Pulmonary:      Effort: Pulmonary effort is normal.      Breath sounds: Normal breath sounds.   Abdominal:      General: Abdomen is flat. Bowel sounds are normal.   Musculoskeletal:      Cervical back: Neck supple.      Right lower leg: No edema.      Left lower leg: No edema.      Comments: Right thenar eminence with tender mass slightly decreased compared to previous.  Hyperpigmentation to the skin from recent radiation   Lymphadenopathy:      Cervical: No cervical adenopathy.   Neurological:      Mental Status: She is alert and oriented to person, place, and time.   Psychiatric:         Mood and Affect: Mood normal.         Behavior: Behavior normal.         Vitals:    07/11/22 0807   BP: 96/63   Pulse: 79   Resp: 16   Temp: 97.7 °F (36.5 °C)   SpO2: 100%   Weight: 71.1 kg (156 lb 12 oz)   PainSc:   5   PainLoc: Hand     ECOG score: 1         PHQ-9 Total Score:                    Result Review :   The following data was reviewed by: Phani Hernández MD on 07/11/2022:  Lab Results   Component Value Date    HGB 9.7 (L) 05/19/2022    HCT 30.2 (L) 05/19/2022    MCV 92.6 05/19/2022     05/19/2022    WBC 7.81 05/19/2022     Lab Results   Component Value Date    CREATININE 0.40 07/01/2022     No results found for: MG, PHOS, FREET4, TSH    Data reviewed: Radiologic studies PET scan report and images reviewed with  patient demonstrating diffuse metastatic disease including the lungs, thoracic lymph nodes, supraclavicular and cervical lymphadenopathy, axillary adenopathy, bilateral adrenal glands.  MRI brain reviewed demonstrating no intracranial metastatic disease.      Assessment and Plan    Diagnoses and all orders for this visit:    1. Malignant neoplasm of upper lobe of left lung (HCC) (Primary)  Assessment & Plan:  Metastatic.  Patient has recently completed radiation to symptomatic right thumb lesion.  She does note that her pain somewhat better but incomplete relief.  Next generation sequencing has been requested but her pathology specimen was sent to [facility] for second opinion and has not yet been forwarded for the testing.  I reviewed her PET scan which shows diffuse metastatic disease including the lungs, lymph nodes, adrenal glands and the hand.  MRI brain does not show any evidence of intracranial metastatic disease.  She continues to have a reasonable performance status.  Patient understands that treatment is palliative with goals of some prolongation life as well as decrease in symptoms related to her disease.  We discussed options including traditional chemotherapy, immunotherapy, combination, clinical trials, hospice/palliative care focusing on quality of life or waiting for the mutation testing to return.  She is not interested in waiting at this point.  She is not interested in travel nor is she enthusiastic about chemotherapy.  We discussed immunotherapy is a very reasonable option for metastatic non-small cell lung cancer.  I recommended Yervoy and Opdivo.  Side effects were discussed including thyroid dysfunction, liver dysfunction, kidney dysfunction, colitis/diarrhea, rash, arthritis, pneumonitis, neurologic dysfunction etc.  Written teaching information provided.  Patient is agreeable to therapy as outlined.  She has an appointment for Port-A-Cath placement later this week.  She will have lab work  today to ensure adequate endorgan functions and blood counts.  I will plan her initial cycle after her port.  I will see her back for cycle 2, day 1 with lab work prior to monitor for toxicities.    Orders:  -     CBC & Differential; Future  -     Comprehensive Metabolic Panel; Future  -     benzonatate (Tessalon Perles) 100 MG capsule; Take 1 capsule by mouth 3 (Three) Times a Day As Needed for Cough.  Dispense: 30 capsule; Refill: 3  -     ondansetron (ZOFRAN) 8 MG tablet; Take 1 tablet by mouth 3 (Three) Times a Day As Needed for Nausea or Vomiting.  Dispense: 30 tablet; Refill: 5  -     TSH; Future  -     T4, free; Future  -     ACTH; Future  -     Lab Appointment Request; Future  -     Clinic Appointment Request; Future  -     Infusion Appointment Request 3; Future    2. High risk medication use  -     TSH; Future          Patient Follow Up: Cycle 2-day 1    Patient was given instructions and counseling regarding her condition or for health maintenance advice. Please see specific information pulled into the AVS if appropriate.     Phani Hernández MD    7/11/2022

## 2022-07-11 NOTE — ASSESSMENT & PLAN NOTE
Metastatic.  Patient has recently completed radiation to symptomatic right thumb lesion.  She does note that her pain somewhat better but incomplete relief.  Next generation sequencing has been requested but her pathology specimen was sent to [facility] for second opinion and has not yet been forwarded for the testing.  I reviewed her PET scan which shows diffuse metastatic disease including the lungs, lymph nodes, adrenal glands and the hand.  MRI brain does not show any evidence of intracranial metastatic disease.  She continues to have a reasonable performance status.  Patient understands that treatment is palliative with goals of some prolongation life as well as decrease in symptoms related to her disease.  We discussed options including traditional chemotherapy, immunotherapy, combination, clinical trials, hospice/palliative care focusing on quality of life or waiting for the mutation testing to return.  She is not interested in waiting at this point.  She is not interested in travel nor is she enthusiastic about chemotherapy.  We discussed immunotherapy is a very reasonable option for metastatic non-small cell lung cancer.  I recommended Yervoy and Opdivo.  Side effects were discussed including thyroid dysfunction, liver dysfunction, kidney dysfunction, colitis/diarrhea, rash, arthritis, pneumonitis, neurologic dysfunction etc.  Written teaching information provided.  Patient is agreeable to therapy as outlined.  She has an appointment for Port-A-Cath placement later this week.  She will have lab work today to ensure adequate endorgan functions and blood counts.  I will plan her initial cycle after her port.  I will see her back for cycle 2, day 1 with lab work prior to monitor for toxicities.

## 2022-07-13 ENCOUNTER — PATIENT ROUNDING (BHMG ONLY) (OUTPATIENT)
Dept: RADIATION ONCOLOGY | Facility: HOSPITAL | Age: 67
End: 2022-07-13

## 2022-07-13 ENCOUNTER — OFFICE VISIT (OUTPATIENT)
Dept: ONCOLOGY | Facility: HOSPITAL | Age: 67
End: 2022-07-13

## 2022-07-13 VITALS
DIASTOLIC BLOOD PRESSURE: 68 MMHG | OXYGEN SATURATION: 100 % | RESPIRATION RATE: 18 BRPM | SYSTOLIC BLOOD PRESSURE: 100 MMHG | WEIGHT: 156.53 LBS | TEMPERATURE: 97.5 F | BODY MASS INDEX: 26.87 KG/M2

## 2022-07-13 DIAGNOSIS — Z71.9 ENCOUNTER FOR EDUCATION: Primary | ICD-10-CM

## 2022-07-13 PROCEDURE — G0463 HOSPITAL OUTPT CLINIC VISIT: HCPCS | Performed by: NURSE PRACTITIONER

## 2022-07-13 PROCEDURE — 99215 OFFICE O/P EST HI 40 MIN: CPT | Performed by: NURSE PRACTITIONER

## 2022-07-13 NOTE — PROGRESS NOTES
"Patient completed radiation treatment for  Cancer to the RUE on 7/7/2022. Following up with patient regarding any concerns the patient may have at this time and receiving feedback in regards to patient over all care while under treatment.     Patient asked about symptoms and side effects that continue to be bothersome. Patient states she continues to have pain but radiation did help slightly. Patient is managing pain with Tylenol and that does \"take of the edge.\" Patient states that skin in dark in color, moisturizing daily.     Patient states that Pain is at 7 on scale of 0/10. Patient states medications have not changed. No refills needed at this time.    Patient was asked if there was anything that could've made their experience better at [facility] while they were under treatment. Patient states: \"Not that I'm aware of. \"    Patient encouraged to call the office if any concerns arise. Patient reminded of Follow up appointment on 9/12/22 @ 0757    "

## 2022-07-13 NOTE — PROGRESS NOTES
CHEMOTHERAPY PREPARATION    Linda Ortiz  [id number]  1955    Chief Complaint: chemo education     History of present illness:  Linda Ortiz is a 67 y.o. year old female who is here today for chemotherapy preparation and needs assessment.  The patient has been diagnosed with metastatic lung cancer and is scheduled to begin treatment with 7/19/2022.     Oncology History:    Oncology/Hematology History Overview Note   5/19/2022 Left Adrenal mass biopsy at [facility] revealed: poorly differentiated non-small cell carcinoma, high grade. Positive fo AE1/3 and Cam5.2. Immunohistochemistry was positive for PAX8, HeParlGlypican3, Vimentin, focal positive for chromagranin, GATA3.  The case was sent to [facility] for second opinion with a final diagnosis of poorly differentiated carcinoma with diffuse reduced BRG-1 expression.    6/21/2022 XRT initiated to Right hand        Malignant neoplasm of upper lobe of left lung (HCC)   6/9/2022 Initial Diagnosis    Lung cancer (HCC)     7/18/2022 -  Chemotherapy    OP LUNG Nivolumab 1 mg/kg / Ipilimumab 3 mg/kg / Nivolumab 480 mg      Secondary carcinoid tumor of bone (HCC) (Resolved)   6/14/2022 Initial Diagnosis    Secondary carcinoid tumor of bone (HCC)     6/20/2022 -  Radiation    RADIATION THERAPY Treatment Details (Noted on 6/14/2022)  Site: Right Hand  Technique: 3D CRT  Goal: No goal specified  Planned Treatment Start Date: 6/20/2022         The current medication list and allergy list were reviewed and reconciled.     Past Medical History, Past Surgical History, Social History, Family History have been reviewed and are without significant changes except as mentioned.    Review of Systems:    Review of Systems   Constitutional: Positive for fatigue.   HENT: Negative.    Eyes: Negative.    Respiratory: Negative.    Cardiovascular: Negative.    Gastrointestinal: Positive for diarrhea and nausea.   Endocrine: Negative.    Genitourinary: Negative.    Skin: Negative.     Allergic/Immunologic: Negative.    Neurological: Negative.    Hematological: Negative.    Psychiatric/Behavioral: Negative.    All other systems reviewed and are negative.      Physical Exam:    Vitals:    07/13/22 1521   BP: 100/68   Resp: 18   Temp: 97.5 °F (36.4 °C)   SpO2: 100%     Vitals:    07/13/22 1521   PainSc: 0-No pain          ECOG: (2) Ambulatory & Capable of Self Care, Unable to Carry Out Work Activity, Up & About Greater Than 50% of Waking Hours    General: well appearing, in no acute distress  Cardiovascular: regular rate and rhythm, no murmurs noted  Lungs: clear to auscultation bilaterally, respirations even and unlabored  Extremities: no lower extremity edema  Skin: no rashes, lesions, bruising, or petechiae  Psych: Mood is stable            NEEDS ASSESSMENTS    VAD Assessment  The patient and I discussed planned intravenous chemotherapy as well as other IV treatments that are often needed throughout the course of treatment. These may include, but are not limited to blood transfusions, antibiotics, and IV hydration. The vasculature does not appear to be adequate for multiple peripheral IVs throughout their treatment course. Discussed risks and benefits of VADs. The patient would like to pursue Port-A-Cath insertion prior to initiation of treatment.       Palliative Care  The patient and I discussed palliative care services. Palliative care is not the same as Hospice care. This is specialized medical care for people living with serious illness with the goal of improving quality of life for the patient and their family. [facility] has partnered with [facility] to offer our patients outpatient palliative care early along with their treatment to assist in coordination of care, symptom management, pain management, and medical decision making.  Oncology criteria for palliative care referral is not met at this time. The patient is not interested in a palliative care consultation.           CHEMOTHERAPY  EDUCATION    Booklets Given: Chemotherapy and You [x]  Eating Hints [x]    Sexuality/Fertility Books []      Chemotherapy/Biotherapy Education Sheets: (list all that apply)  Brook Mcmillan, education materials on immunotherapy.                                                                                                                                                                 Chemotherapy Regimen:   Treatment Plans     Name Type Plan Dates Plan Provider         Active    OP LUNG Nivolumab 1 mg/kg / Ipilimumab 3 mg/kg / Nivolumab 480 mg  ONCOLOGY TREATMENT  7/17/2022 - Present Phani Hernández MD                    TOPICS EDUCATION PROVIDED COMMENTS   ANEMIA:  role of RBC, cause, s/s, ways to manage, role of transfusion [x]    THROMBOCYTOPENIA:  role of platelet, cause, s/s, ways to prevent bleeding, things to avoid, when to seek help [x]    NEUTROPENIA:  role of WBC, cause, infection precautions, s/s of infection, when to call MD [x]    NUTRITION & APPETITE CHANGES:  importance of maintaining healthy diet & weight, ways to manage to improve intake, dietary consult, exercise regimen [x]    DIARRHEA:  causes, s/s of dehydration, ways to manage, dietary changes, when to call MD [x]    CONSTIPATION:  causes, ways to manage, dietary changes, when to call MD [x]    NAUSEA & VOMITING:  cause, use of antiemetics, dietary changes, when to call MD [x]    MOUTH SORES:  causes, oral care, ways to manage [x]    ALOPECIA:  cause, ways to manage, resources [x]    INFERTILITY & SEXUALITY:  causes, fertility preservation options, sexuality changes, ways to manage, importance of birth control []    NERVOUS SYSTEM CHANGES:  causes, s/s, neuropathies, cognitive changes, ways to manage [x]    PAIN:  causes, ways to manage [x]    SKIN & NAIL CHANGES:  cause, s/s, ways to manage [x]    ORGAN TOXICITIES:  cause, s/s, need for diagnostic tests, labs, when to notify MD [x]    SURVIVORSHIP:  distress, distress assessment, secondary  malignancies, early/late effects, follow-up, social issues, social support []    HOME CARE:  use of spill kits, storing of PO chemo, how to manage bodily fluids [x]    MISCELLANEOUS:  drug interactions, administration, vesicant, et [x]        Assessment and Plan:    Diagnoses and all orders for this visit:    1. Encounter for education (Primary)      Port placement scheduled for Friday, 7/15/22.   Start date for immunotherapy for  7/19/22.     The patient and I have reviewed their cancer diagnosis and scheduled treatment plan. Needs assessment was completed including genetics, psychosocial needs, barriers to care, VAD evaluation, advanced care planning, and palliative care services. Referrals have been ordered as appropriate based upon our evaluation and patient desires.     Chemotherapy teaching was also completed today as documented above. Adequate time was given to answer all questions to her satisfaction. Patient and family are aware of their care team members and contact information if they have questions or problems throughout the treatment course. The patient is adequately prepared to begin treatment as scheduled.     Reviewed with patient education regarding EMLA cream, dexamethasone and Zofran prescriptions sent to pharmacy.       I advised the patient that she can take Tylenol or Ibuprofen as needed for aches/pains related to cancer/treatment.  I also advised that her can use Senakot or Miralax as needed for constipation or Imodium as needed for diarrhea.       I reviewed with the patient the care team members. I also reviewed the option of the urgent care clinic through our oncology office for evaluation and management of symptoms related to treatment.    I spent 40 minutes caring for Linda on this date of service. This time includes time spent by me in the following activities: preparing for the visit, reviewing tests, obtaining and/or reviewing a separately obtained history, performing a medically  appropriate examination and/or evaluation, counseling and educating the patient/family/caregiver, ordering medications, tests, or procedures, referring and communicating with other health care professionals, documenting information in the medical record, independently interpreting results and communicating that information with the patient/family/caregiver and care coordination.     Kia Morrison, APRN

## 2022-07-15 ENCOUNTER — HOSPITAL ENCOUNTER (OUTPATIENT)
Facility: HOSPITAL | Age: 67
Setting detail: HOSPITAL OUTPATIENT SURGERY
Discharge: HOME | End: 2022-07-15
Attending: SURGERY | Admitting: SURGERY

## 2022-07-15 ENCOUNTER — ANESTHESIA (OUTPATIENT)
Dept: PERIOP | Facility: HOSPITAL | Age: 67
End: 2022-07-15

## 2022-07-15 ENCOUNTER — ANESTHESIA EVENT (OUTPATIENT)
Dept: PERIOP | Facility: HOSPITAL | Age: 67
End: 2022-07-15

## 2022-07-15 ENCOUNTER — APPOINTMENT (OUTPATIENT)
Dept: GENERAL RADIOLOGY | Facility: HOSPITAL | Age: 67
End: 2022-07-15

## 2022-07-15 VITALS
SYSTOLIC BLOOD PRESSURE: 158 MMHG | HEIGHT: 64 IN | TEMPERATURE: 98.3 F | DIASTOLIC BLOOD PRESSURE: 89 MMHG | HEART RATE: 103 BPM | RESPIRATION RATE: 18 BRPM | BODY MASS INDEX: 26.38 KG/M2 | WEIGHT: 154.54 LBS | OXYGEN SATURATION: 97 %

## 2022-07-15 DIAGNOSIS — C34.92 MALIGNANT NEOPLASM OF LEFT LUNG, UNSPECIFIED PART OF LUNG: ICD-10-CM

## 2022-07-15 PROCEDURE — 77001 FLUOROGUIDE FOR VEIN DEVICE: CPT

## 2022-07-15 PROCEDURE — 25010000002 MIDAZOLAM PER 1 MG: Performed by: ANESTHESIOLOGY

## 2022-07-15 PROCEDURE — 71045 X-RAY EXAM CHEST 1 VIEW: CPT

## 2022-07-15 PROCEDURE — 25010000002 HEPARIN (PORCINE) PER 1000 UNITS: Performed by: SURGERY

## 2022-07-15 PROCEDURE — 25010000002 ONDANSETRON PER 1 MG: Performed by: ANESTHESIOLOGY

## 2022-07-15 PROCEDURE — C1788 PORT, INDWELLING, IMP: HCPCS | Performed by: SURGERY

## 2022-07-15 PROCEDURE — 36561 INSERT TUNNELED CV CATH: CPT | Performed by: SURGERY

## 2022-07-15 PROCEDURE — 25010000002 CEFAZOLIN IN DEXTROSE 2-4 GM/100ML-% SOLUTION: Performed by: SURGERY

## 2022-07-15 PROCEDURE — 25010000002 PROPOFOL 10 MG/ML EMULSION: Performed by: NURSE ANESTHETIST, CERTIFIED REGISTERED

## 2022-07-15 PROCEDURE — 76000 FLUOROSCOPY <1 HR PHYS/QHP: CPT

## 2022-07-15 PROCEDURE — 77001 FLUOROGUIDE FOR VEIN DEVICE: CPT | Performed by: SURGERY

## 2022-07-15 DEVICE — PRT INTRO VASC/INTERV VORTEX FILL/HL DETACH/POLYURET/CATH 8F: Type: IMPLANTABLE DEVICE | Site: CHEST | Status: FUNCTIONAL

## 2022-07-15 RX ORDER — OXYCODONE HYDROCHLORIDE AND ACETAMINOPHEN 5; 325 MG/1; MG/1
1-2 TABLET ORAL EVERY 4 HOURS PRN
Qty: 6 TABLET | Refills: 0 | Status: SHIPPED | OUTPATIENT
Start: 2022-07-15 | End: 2022-09-27

## 2022-07-15 RX ORDER — LIDOCAINE HYDROCHLORIDE 20 MG/ML
INJECTION, SOLUTION EPIDURAL; INFILTRATION; INTRACAUDAL; PERINEURAL AS NEEDED
Status: DISCONTINUED | OUTPATIENT
Start: 2022-07-15 | End: 2022-07-15 | Stop reason: SURG

## 2022-07-15 RX ORDER — PROCHLORPERAZINE EDISYLATE 5 MG/ML
5 INJECTION INTRAMUSCULAR; INTRAVENOUS ONCE
Status: DISCONTINUED | OUTPATIENT
Start: 2022-07-15 | End: 2022-07-15 | Stop reason: HOSPADM

## 2022-07-15 RX ORDER — BUPIVACAINE HYDROCHLORIDE 2.5 MG/ML
INJECTION, SOLUTION EPIDURAL; INFILTRATION; INTRACAUDAL AS NEEDED
Status: DISCONTINUED | OUTPATIENT
Start: 2022-07-15 | End: 2022-07-15 | Stop reason: HOSPADM

## 2022-07-15 RX ORDER — MIDAZOLAM HYDROCHLORIDE 1 MG/ML
2 INJECTION INTRAMUSCULAR; INTRAVENOUS ONCE
Status: COMPLETED | OUTPATIENT
Start: 2022-07-15 | End: 2022-07-15

## 2022-07-15 RX ORDER — PROCHLORPERAZINE 25 MG
25 SUPPOSITORY, RECTAL RECTAL ONCE
Status: DISCONTINUED | OUTPATIENT
Start: 2022-07-15 | End: 2022-07-15 | Stop reason: HOSPADM

## 2022-07-15 RX ORDER — MEPERIDINE HYDROCHLORIDE 25 MG/ML
25 INJECTION INTRAMUSCULAR; INTRAVENOUS; SUBCUTANEOUS ONCE
Status: DISCONTINUED | OUTPATIENT
Start: 2022-07-15 | End: 2022-07-15 | Stop reason: HOSPADM

## 2022-07-15 RX ORDER — GLYCOPYRROLATE 0.2 MG/ML
0.2 INJECTION INTRAMUSCULAR; INTRAVENOUS
Status: COMPLETED | OUTPATIENT
Start: 2022-07-15 | End: 2022-07-15

## 2022-07-15 RX ORDER — PROCHLORPERAZINE MALEATE 5 MG/1
5 TABLET ORAL ONCE
Status: DISCONTINUED | OUTPATIENT
Start: 2022-07-15 | End: 2022-07-15 | Stop reason: HOSPADM

## 2022-07-15 RX ORDER — SODIUM CHLORIDE, SODIUM LACTATE, POTASSIUM CHLORIDE, CALCIUM CHLORIDE 600; 310; 30; 20 MG/100ML; MG/100ML; MG/100ML; MG/100ML
9 INJECTION, SOLUTION INTRAVENOUS CONTINUOUS PRN
Status: DISCONTINUED | OUTPATIENT
Start: 2022-07-15 | End: 2022-07-15 | Stop reason: HOSPADM

## 2022-07-15 RX ORDER — ONDANSETRON 2 MG/ML
4 INJECTION INTRAMUSCULAR; INTRAVENOUS ONCE
Status: COMPLETED | OUTPATIENT
Start: 2022-07-15 | End: 2022-07-15

## 2022-07-15 RX ORDER — CEFAZOLIN SODIUM 2 G/100ML
2 INJECTION, SOLUTION INTRAVENOUS ONCE
Status: COMPLETED | OUTPATIENT
Start: 2022-07-15 | End: 2022-07-15

## 2022-07-15 RX ORDER — MAGNESIUM HYDROXIDE 1200 MG/15ML
LIQUID ORAL AS NEEDED
Status: DISCONTINUED | OUTPATIENT
Start: 2022-07-15 | End: 2022-07-15 | Stop reason: HOSPADM

## 2022-07-15 RX ORDER — ACETAMINOPHEN 500 MG
1000 TABLET ORAL ONCE
Status: DISCONTINUED | OUTPATIENT
Start: 2022-07-15 | End: 2022-07-15 | Stop reason: HOSPADM

## 2022-07-15 RX ADMIN — ONDANSETRON 4 MG: 2 INJECTION INTRAMUSCULAR; INTRAVENOUS at 09:35

## 2022-07-15 RX ADMIN — SODIUM CHLORIDE, POTASSIUM CHLORIDE, SODIUM LACTATE AND CALCIUM CHLORIDE 9 ML/HR: 600; 310; 30; 20 INJECTION, SOLUTION INTRAVENOUS at 09:48

## 2022-07-15 RX ADMIN — MIDAZOLAM HYDROCHLORIDE 2 MG: 1 INJECTION, SOLUTION INTRAMUSCULAR; INTRAVENOUS at 09:47

## 2022-07-15 RX ADMIN — LIDOCAINE HYDROCHLORIDE 50 MG: 20 INJECTION, SOLUTION EPIDURAL; INFILTRATION; INTRACAUDAL; PERINEURAL at 10:50

## 2022-07-15 RX ADMIN — PROPOFOL 125 MCG/KG/MIN: 10 INJECTION, EMULSION INTRAVENOUS at 10:50

## 2022-07-15 RX ADMIN — CEFAZOLIN SODIUM 2 G: 2 INJECTION, SOLUTION INTRAVENOUS at 10:56

## 2022-07-15 RX ADMIN — GLYCOPYRROLATE 0.2 MG: 0.2 INJECTION INTRAMUSCULAR; INTRAVENOUS at 09:47

## 2022-07-15 NOTE — DISCHARGE INSTRUCTIONS
Dr. Hull's Instructions          DIET  Resume your regular diet.     ACTIVITY  Do not lift anything greater than 25 pounds with the arm on the same side the port was placed for one week.  After one week, you have no activity restrictions and may gradually increase your activities using common sense.     WOUND CARE & SHOWERING/BATHING  Remove the dressing in 2 days but leave the strips of tape (steri-strips) underneath the dressing in place.  Let the steri-strips fall off of your incision by themselves or gently pull them off if they haven´t fallen off by 10 days.  You have sutures in your incision but they are placed below the level of the skin and they will slowly dissolve (they do not need to be removed).  The skin around your incision will likely have some bruising.  This is normal.  You can shower beginning 2 days after your surgery but try not to get the steri-strips very wet for the first week after surgery.  Wait two weeks after your surgery before taking any tub baths.      HOME MEDICATIONS  Resume all your normal home medications.     PAIN CONTROL  You will receive a narcotic pain medicine prescription before you are discharged home.  Be sure to take the narcotic pain medication with some food so as not to upset your stomach.  Do not drive while you are taking the prescription pain medication.  Take Tylenol or Motrin for mild pain.     BOWEL MOVEMENTS  It is not unusual for narcotic pain medications to cause constipation.  Also, the medications and anesthesia you received for your surgery can have a constipating effect.  To help avoid constipation, drink at least four eight-ounce glasses of water each day and use over-the-counter laxatives/stool softeners (dulcolax, milk of magnesia, senokot, etc.).  I recommend drinking the aforementioned amount of water daily and taking 30 ml of milk of magnesia two times each day while you are using the prescribed narcotic pain medication.  If your bowel movements  become too loose or too frequent, then simply stop following these recommendations unless you start to feel constipated.     FOLLOW-UP VISIT & QUESTIONS/CONCERNS  Call Dr. Kapoor office at [phone] and schedule a follow-up appointment for about two weeks after your surgery date.  However, if you are doing fine and don´t have any concerns then simply cancel the follow-up appointment.  Should you have any questions or concerns, have a temperature over 101 degrees, have increasing redness or swelling at the site of the incision, or have any other problems you think need medical attention, please call Dr. Kapoor office or go to the emergency room.                DISCHARGE INSTRUCTIONS  INSERTION OF   PORT-A-CATH      For your surgery you had:  General anesthesia (you may have a sore throat for the first 24 hours)  IV sedation  Local anesthesia  Monitored Anesthesia Care  You may experience dizziness, drowsiness, or light-headedness for several hours following surgery/procedure.  Do not stay alone today or tonight.  Limit your activity for 24 hours.  You should not drive, operate machinery, drink alcohol, or sign legally binding documents for 24 hours or while you are taking pain medication.  Resume your diet slowly.  Follow whatever special dietary instructions you may have been given by your doctor.  Last dose of pain medication was given at:   .  NOTIFY YOUR DOCTOR IF YOU EXPERIENCE ANY OF THE FOLLOWING:  Temperature greater than 101 degrees Fahrenheit  Shaking Chills  Redness or excessive drainage from incision  Nausea, vomiting and/opr pain that is not controlled by prescribed medications  Increase in bleeding or bleeding that is excessive  Unable to urinate in 6 hours after surgery  If unable to reach your doctor, please go to the closest Emergency Room INC[facility]ARE  You may remove your dressing on SUNDAY  You may shower SUNDAY.  Apply an ice pack intermittently for 20 minutes for a total of 24-48 hours.   Do not apply directly to the skin.       Port should be flushed/heparinized once a month (even when not being used).  Keep Port-A-Cath ID Card in your purse of wallet.  Medications per physician instructions as indicated on Discharge Medication Information Sheet.  You should see    for follow-up care  on   .                                                Phone number:       SPECIAL INSTRUCTIONS:

## 2022-07-15 NOTE — OP NOTE
INSERTION VENOUS ACCESS DEVICE  Procedure Report    Patient Name:  Linda Ortiz  YOB: 1955    Date of Surgery:  7/15/2022     Pre-op Diagnosis:   Malignant neoplasm of left lung, unspecified part of lung (HCC) [C34.92]    Pre-Op Diagnosis Codes:     * Malignant neoplasm of left lung, unspecified part of lung (HCC) [C34.92]       Post-op Diagnosis:   Post-Op Diagnosis Codes:     * Malignant neoplasm of left lung, unspecified part of lung (HCC) [C34.92]    Procedure/CPT® Codes:   04854    Procedure(s):  INSERTION VENOUS ACCESS DEVICE    Staff:  Surgeon(s):  Eber Hull MD    Assistant: Ernst Gregg CSA    Anesthesia: Monitored Anesthesia Care    Estimated Blood Loss: Minimal    Complications:  None    Drains:  None    Packing:  None    Implants:    Implant Name Type Inv. Item Serial No.  Lot No. LRB No. Used Action   PRT INTRO VASC/INTERV VORTEX FILL/HL DETACH/POLYURET/CATH 8F - [id number] Implant PRT INTRO VASC/INTERV VORTEX FILL/HL DETACH/POLYURET/CATH 8F  [employer] [id number] N/A 1 Implanted     Specimen: None    Indications: Metastatic lung cancer.     Findings:  Angiodynamics Smartport placed via right subclavian vein.    Description of Procedure: Patient was taken to the operating room placed supine on the operating table.  Timeout verification was performed. MAC anesthesia was administered.  Patient was prepped and draped in usual fashion.  The right subclavian vein was accessed after one pass of a needle.  A guidewire was placed through the needle and the needle was withdrawn.  Fluoroscopy was used to confirm the guidewire was positioned appropriately in the superior vena cava.  A subcutaneous pocket was created at the right upper chest to accommodate the port.  The inner dilator was placed inside of the tear-away sheath and both were placed over the guidewire into the right subclavian vein.  The inner guidewire and dilator were removed.  The catheter was placed  through the tear-away sheath and the sheath was withdrawn.  The tunneler trocar was used to tunnel the catheter to the subcutaneous pocket.  Then under direct visualization with fluoroscopy, the catheter was pulled back until the tip was positioned appropriately in the superior vena cava.  The catheter was cut to the appropriate length and the locking cap was placed onto the catheter.   The catheter was connected to the port, the locking cap was secured, and the port was placed within the subcutaneous pocket.  I accessed the port with a Jaime needle.  I could easily aspirate venous blood.  The port was then flushed with heparinized solution.  The wound was closed appropriately with buried absorbable suture followed by appropriate dressings.  No complications.  Sponge needle and instrument counts were correct.  Patient was transported to the recovery area in stable condition.    Assistant: Ernst Gregg CSA was responsible for performing the following activities: Retraction, Suturing, Closing and Placing Dressing.  His skilled assistance was necessary for the success of this case.    Eber Hull MD     Date: 7/15/2022  Time: 11:22 EDT

## 2022-07-15 NOTE — H&P
"Chief Complaint:  Vascular Access Problem    History of Present Illness  Linda Ortiz is a 67 y.o. female referred to have a port-a-catheter placed.  She has a right hand mass and in the work-up for the right hand mass it was determined the patient had lung cancer and the lung cancer is actually metastatic to her right hand.  The patient is going to receive radiation therapy she said for about 10 weeks and then she is going to receive chemotherapy.     Allergies: Hydrocodone-guaifenesin    Outpatient Medications Marked as Taking for the 22 encounter (Office Visit) with Eber Hull MD   Medication Sig Dispense Refill   • ibuprofen (ADVIL,MOTRIN) 200 MG tablet Take 200 mg by mouth Every 6 (Six) Hours As Needed for Mild Pain .         Past Medical History:   • Lung cancer (HCC)   • Metastatic cancer (HCC)        Past Surgical History:   • HIP SURGERY   • KNEE ARTHROSCOPY       Family History:   Family History   Problem Relation Age of Onset   • Breast cancer Mother    • Lung cancer Father    • Thyroid cancer Maternal Aunt    • Breast cancer Maternal Aunt    • Lung cancer Paternal Aunt    • COPD Paternal Uncle         Social History:  Social History     Tobacco Use   • Smoking status: Former Smoker     Packs/day: 1.00     Types: Cigarettes     Start date:      Quit date: 2022     Years since quittin.3   • Smokeless tobacco: Never Used   Substance Use Topics   • Alcohol use: Not Currently       Objective     Vital Signs:  Resp 16   Ht 162.6 cm (64\")   Wt 62.1 kg (137 lb)   BMI 23.52 kg/m²   • Respiratory:  breathing not labored, respiratory effort appears normal  • Cardiovascular:  heart regular rate  • Neurologic:  no obvious motor or sensory deficits, alert & oriented x 3, speech clear  • Psychiatric:  judgment and insight intact, mood normal, affect appropriate, cooperative    Assessment:  Malignant neoplasm of left lung    Plan:  Port-a-catheter placement    Discussion: Indications, options, " risks, benefits, and expected outcomes of planned surgery were discussed with the patient and she agrees to proceed.    Eber Hull MD    Electronically signed by Eber Hull MD, 07/15/22, 9:21 AM EDT.

## 2022-07-15 NOTE — ANESTHESIA POSTPROCEDURE EVALUATION
Patient: Linda Ortiz    Procedure Summary     Date: 07/15/22 Room / Location: [facility] OR  / [facility]    Anesthesia Start: 1046 Anesthesia Stop: 1135    Procedure: INSERTION VENOUS ACCESS DEVICE (N/A ) Diagnosis:       Malignant neoplasm of left lung, unspecified part of lung (HCC)      (Malignant neoplasm of left lung, unspecified part of lung (HCC) [C34.92])    Surgeons: Eber Hull MD Provider: Isidro Norwood MD    Anesthesia Type: MAC ASA Status: 3          Anesthesia Type: MAC    Vitals  Vitals Value Taken Time   /89 07/15/22 1149   Temp 36.8 °C (98.3 °F) 07/15/22 1132   Pulse 96 07/15/22 1159   Resp 18 07/15/22 1132   SpO2 99 % 07/15/22 1159   Vitals shown include unvalidated device data.        Post Anesthesia Care and Evaluation    Patient location during evaluation: bedside  Patient participation: complete - patient participated  Level of consciousness: awake, responsive to light touch, responsive to noxious stimuli, responsive to painful stimuli, responsive to physical stimuli and responsive to verbal stimuli  Pain score: 1  Pain management: adequate    Airway patency: patent  Anesthetic complications: No anesthetic complications  PONV Status: none  Cardiovascular status: acceptable and stable  Respiratory status: acceptable and nasal cannula  Hydration status: acceptable    Comments: An Anesthesiologist personally participated in the most demanding procedures (including induction and emergence if applicable) in the anesthesia plan, monitored the course of anesthesia administration at frequent intervals and remained physically present and available for immediate diagnosis and treatment of emergencies.

## 2022-07-15 NOTE — ANESTHESIA PREPROCEDURE EVALUATION
Anesthesia Evaluation     Patient summary reviewed and Nursing notes reviewed   no history of anesthetic complications:  NPO Solid Status: > 8 hours  NPO Liquid Status: > 2 hours           Airway   Mallampati: II  TM distance: >3 FB  Neck ROM: full  No difficulty expected  Dental    (+) lower dentures and upper dentures    Pulmonary - normal exam    breath sounds clear to auscultation  (+) lung cancer,   Cardiovascular - negative cardio ROS and normal exam  Exercise tolerance: good (4-7 METS)    Rhythm: regular  Rate: normal        Neuro/Psych- negative ROS  GI/Hepatic/Renal/Endo - negative ROS     Musculoskeletal     Abdominal    Substance History - negative use     OB/GYN negative ob/gyn ROS         Other   arthritis,    history of cancer active        Phys Exam Other: Pt with cough, clear upon auscultation              Anesthesia Plan    ASA 3     MAC     (Patient understands anesthesia not responsible for dental damage.    Pt with recent cough, saw physician, gave antitussives no abx, no temp, clear sputum, explained to pt she is at increased risk for postop oxygen use, with chemo starting next Tuesday consider the necessity of port overrides cough    )  intravenous induction     Anesthetic plan, risks, benefits, and alternatives have been provided, discussed and informed consent has been obtained with: patient.  Use of blood products discussed with patient .   Plan discussed with CRNA.        CODE STATUS:

## 2022-07-18 PROBLEM — Z45.2 ENCOUNTER FOR ADJUSTMENT OR MANAGEMENT OF VASCULAR ACCESS DEVICE: Status: ACTIVE | Noted: 2022-07-18

## 2022-07-18 RX ORDER — SODIUM CHLORIDE 0.9 % (FLUSH) 0.9 %
20 SYRINGE (ML) INJECTION AS NEEDED
Status: CANCELLED | OUTPATIENT
Start: 2022-07-19

## 2022-07-18 RX ORDER — HEPARIN SODIUM (PORCINE) LOCK FLUSH IV SOLN 100 UNIT/ML 100 UNIT/ML
500 SOLUTION INTRAVENOUS AS NEEDED
Status: CANCELLED | OUTPATIENT
Start: 2022-07-19

## 2022-07-19 ENCOUNTER — HOSPITAL ENCOUNTER (OUTPATIENT)
Dept: ONCOLOGY | Facility: HOSPITAL | Age: 67
Setting detail: INFUSION SERIES
Discharge: HOME | End: 2022-07-19

## 2022-07-19 ENCOUNTER — DOCUMENTATION (OUTPATIENT)
Dept: ONCOLOGY | Facility: HOSPITAL | Age: 67
End: 2022-07-19

## 2022-07-19 VITALS
TEMPERATURE: 96.6 F | DIASTOLIC BLOOD PRESSURE: 65 MMHG | OXYGEN SATURATION: 98 % | BODY MASS INDEX: 28.32 KG/M2 | WEIGHT: 153.88 LBS | RESPIRATION RATE: 16 BRPM | HEART RATE: 101 BPM | HEIGHT: 62 IN | SYSTOLIC BLOOD PRESSURE: 100 MMHG

## 2022-07-19 DIAGNOSIS — C34.12 MALIGNANT NEOPLASM OF UPPER LOBE OF LEFT LUNG: Primary | ICD-10-CM

## 2022-07-19 DIAGNOSIS — Z45.2 ENCOUNTER FOR ADJUSTMENT OR MANAGEMENT OF VASCULAR ACCESS DEVICE: ICD-10-CM

## 2022-07-19 LAB
ALBUMIN SERPL-MCNC: 2.46 G/DL (ref 3.5–5.2)
ALBUMIN/GLOB SERPL: 0.5 G/DL
ALP SERPL-CCNC: 155 U/L (ref 39–117)
ALT SERPL W P-5'-P-CCNC: 9 U/L (ref 1–33)
ANION GAP SERPL CALCULATED.3IONS-SCNC: 7 MMOL/L (ref 5–15)
AST SERPL-CCNC: 71 U/L (ref 1–32)
BASOPHILS # BLD AUTO: 0.03 10*3/MM3 (ref 0–0.2)
BASOPHILS NFR BLD AUTO: 0.2 % (ref 0–1.5)
BILIRUB SERPL-MCNC: 0.9 MG/DL (ref 0–1.2)
BUN SERPL-MCNC: 10 MG/DL (ref 8–23)
BUN/CREAT SERPL: 13.5 (ref 7–25)
CALCIUM SPEC-SCNC: 8.2 MG/DL (ref 8.6–10.5)
CHLORIDE SERPL-SCNC: 89 MMOL/L (ref 98–107)
CO2 SERPL-SCNC: 27 MMOL/L (ref 22–29)
CREAT SERPL-MCNC: 0.74 MG/DL (ref 0.57–1)
DEPRECATED RDW RBC AUTO: 59.9 FL (ref 37–54)
EGFRCR SERPLBLD CKD-EPI 2021: 89.4 ML/MIN/1.73
EOSINOPHIL # BLD AUTO: 0.02 10*3/MM3 (ref 0–0.4)
EOSINOPHIL NFR BLD AUTO: 0.2 % (ref 0.3–6.2)
ERYTHROCYTE [DISTWIDTH] IN BLOOD BY AUTOMATED COUNT: 17.9 % (ref 12.3–15.4)
GLOBULIN UR ELPH-MCNC: 4.9 GM/DL
GLUCOSE SERPL-MCNC: 100 MG/DL (ref 65–99)
HCT VFR BLD AUTO: 28 % (ref 34–46.6)
HGB BLD-MCNC: 9.3 G/DL (ref 12–15.9)
IMM GRANULOCYTES # BLD AUTO: 0.05 10*3/MM3 (ref 0–0.05)
IMM GRANULOCYTES NFR BLD AUTO: 0.4 % (ref 0–0.5)
LYMPHOCYTES # BLD AUTO: 0.84 10*3/MM3 (ref 0.7–3.1)
LYMPHOCYTES NFR BLD AUTO: 6.4 % (ref 19.6–45.3)
MCH RBC QN AUTO: 30.2 PG (ref 26.6–33)
MCHC RBC AUTO-ENTMCNC: 33.2 G/DL (ref 31.5–35.7)
MCV RBC AUTO: 90.9 FL (ref 79–97)
MONOCYTES # BLD AUTO: 1.34 10*3/MM3 (ref 0.1–0.9)
MONOCYTES NFR BLD AUTO: 10.2 % (ref 5–12)
NEUTROPHILS NFR BLD AUTO: 10.84 10*3/MM3 (ref 1.7–7)
NEUTROPHILS NFR BLD AUTO: 82.6 % (ref 42.7–76)
PLATELET # BLD AUTO: 506 10*3/MM3 (ref 140–450)
PMV BLD AUTO: 8.6 FL (ref 6–12)
POTASSIUM SERPL-SCNC: 3.9 MMOL/L (ref 3.5–5.2)
PROT SERPL-MCNC: 7.4 G/DL (ref 6–8.5)
RBC # BLD AUTO: 3.08 10*6/MM3 (ref 3.77–5.28)
SODIUM SERPL-SCNC: 123 MMOL/L (ref 136–145)
T4 FREE SERPL-MCNC: 1.63 NG/DL (ref 0.93–1.7)
TSH SERPL DL<=0.05 MIU/L-ACNC: 4.12 UIU/ML (ref 0.27–4.2)
WBC NRBC COR # BLD: 13.12 10*3/MM3 (ref 3.4–10.8)

## 2022-07-19 PROCEDURE — 84443 ASSAY THYROID STIM HORMONE: CPT | Performed by: INTERNAL MEDICINE

## 2022-07-19 PROCEDURE — 25010000002 NIVOLUMAB 40 MG/4ML SOLUTION 4 ML VIAL: Performed by: INTERNAL MEDICINE

## 2022-07-19 PROCEDURE — 85025 COMPLETE CBC W/AUTO DIFF WBC: CPT | Performed by: INTERNAL MEDICINE

## 2022-07-19 PROCEDURE — 80053 COMPREHEN METABOLIC PANEL: CPT | Performed by: INTERNAL MEDICINE

## 2022-07-19 PROCEDURE — 96417 CHEMO IV INFUS EACH ADDL SEQ: CPT

## 2022-07-19 PROCEDURE — 84439 ASSAY OF FREE THYROXINE: CPT | Performed by: INTERNAL MEDICINE

## 2022-07-19 PROCEDURE — 96413 CHEMO IV INFUSION 1 HR: CPT

## 2022-07-19 PROCEDURE — 82024 ASSAY OF ACTH: CPT | Performed by: INTERNAL MEDICINE

## 2022-07-19 PROCEDURE — 25010000002 HEPARIN LOCK FLUSH PER 10 UNITS: Performed by: INTERNAL MEDICINE

## 2022-07-19 PROCEDURE — 25010000002 IPILIMUMAB 200 MG/40ML SOLUTION 40 ML VIAL: Performed by: INTERNAL MEDICINE

## 2022-07-19 RX ORDER — SODIUM CHLORIDE 0.9 % (FLUSH) 0.9 %
20 SYRINGE (ML) INJECTION AS NEEDED
Status: CANCELLED | OUTPATIENT
Start: 2022-07-19

## 2022-07-19 RX ORDER — SODIUM CHLORIDE 9 MG/ML
250 INJECTION, SOLUTION INTRAVENOUS ONCE
Status: CANCELLED | OUTPATIENT
Start: 2022-07-19

## 2022-07-19 RX ORDER — HEPARIN SODIUM (PORCINE) LOCK FLUSH IV SOLN 100 UNIT/ML 100 UNIT/ML
500 SOLUTION INTRAVENOUS AS NEEDED
Status: CANCELLED | OUTPATIENT
Start: 2022-07-19

## 2022-07-19 RX ORDER — SODIUM CHLORIDE 0.9 % (FLUSH) 0.9 %
20 SYRINGE (ML) INJECTION AS NEEDED
Status: DISCONTINUED | OUTPATIENT
Start: 2022-07-19 | End: 2022-07-20 | Stop reason: HOSPADM

## 2022-07-19 RX ORDER — HEPARIN SODIUM (PORCINE) LOCK FLUSH IV SOLN 100 UNIT/ML 100 UNIT/ML
500 SOLUTION INTRAVENOUS AS NEEDED
Status: DISCONTINUED | OUTPATIENT
Start: 2022-07-19 | End: 2022-07-20 | Stop reason: HOSPADM

## 2022-07-19 RX ORDER — SODIUM CHLORIDE 9 MG/ML
250 INJECTION, SOLUTION INTRAVENOUS ONCE
Status: COMPLETED | OUTPATIENT
Start: 2022-07-19 | End: 2022-07-19

## 2022-07-19 RX ADMIN — SODIUM CHLORIDE 250 ML: 9 INJECTION, SOLUTION INTRAVENOUS at 09:20

## 2022-07-19 RX ADMIN — SODIUM CHLORIDE 70 MG: 9 INJECTION, SOLUTION INTRAVENOUS at 09:32

## 2022-07-19 RX ADMIN — SODIUM CHLORIDE 200 MG: 9 INJECTION, SOLUTION INTRAVENOUS at 10:10

## 2022-07-19 RX ADMIN — HEPARIN SODIUM (PORCINE) LOCK FLUSH IV SOLN 100 UNIT/ML 500 UNITS: 100 SOLUTION at 11:49

## 2022-07-19 RX ADMIN — Medication 20 ML: at 11:49

## 2022-07-19 NOTE — PROGRESS NOTES
[place]   Cancer [facility]  [address] [place] [address].  [place] Short [id number]  [phone]      IMMUNOTHERAPY EDUCATION SHEET    NAME:  Linda Ortiz      : 1955           DATE: 22      Immunotherapy/Biotherapy Education Sheets: (list all that apply)  Nivolumab and Ipilimumab                                                                                                                                                                Immunotherapy Regimen: Nivolumab + Ipilimumab every 21 days x 4 cycles, followed by Nivolumab monotherapy every 28 days x 6 cycles    TOPICS EDUCATION PROVIDED EDUCATION REINFORCED COMMENTS   NEUTROPENIA:  role of WBC, cause, infection precautions, s/s of infection, when to call MD [x] [] Discussed decreased WBC counts and increased infection risk. Encouraged patient to take preventative measures such as hand washing and mask wearing.   NUTRITION & APPETITE CHANGES:  importance of maintaining healthy diet & weight, ways to manage to improve intake, dietary consult, exercise regimen [x] [] Discussed importance of adequate rest/exercise and hydration. Counseled patient to avoid food which may worsen diarrhea.   DIARRHEA:  causes, s/s of dehydration, ways to manage, dietary changes, when to call MD [x] [] Counseled patient on diarrhea and increased severity during Ipilimumab part of regimen. Educated her on OTC loperamide as treatment option for diarrhea. Encouraged her to contact provider/seek medical attention if diarrhea persists/contains blood.   NAUSEA & VOMITING:  cause, use of antiemetics, dietary changes, when to call MD [x] [] Counseled patient on nausea/vomiting and to use at-home antinausea medications at onset of nausea symptoms. Encouraged her to seek medical attention/contact provider if vomiting persists/contains blood.   SKIN & NAIL CHANGES:  cause, s/s, ways to manage [x] [] Discussed rash and increased severity during Ipilimumab part of regimen. Encouraged  patient to report any rash to provider so that they may give her proper treatment for it.   ORGAN TOXICITIES:  cause, s/s, need for diagnostic tests, labs, when to notify MD [x] [] Educated patient on immune-mediated reactions and their associated signs/symptoms, including worsened difficulty breathing/cough, RUQ pain, jaundice, persistent diarrhea, and severe rash. Encouraged patient to contact provider/seek medical attention if these symptoms occur. Also informed patient that thyroid levels will be monitored regularly since these medications can affect thyroid function.   MISCELLANEOUS:  drug interactions, administration, vesicant, et [x] [] Reminded patient that she will receive Nivolumab/Ipilimumab every 21 days for 4 cycles, and then she will only receive Nivolumab after cycle 4.     Notes:  I spoke to the patient about her new immunotherapy regimen. During this counseling session, I discussed common side effects, such as fatigue, rash, low WBC/increased infection risk, and diarrhea, as well as methods of treating/preventing these side effects. I also discussed rare but serious side effects such as allergic reactions and immune-mediated reactions, as well as their associated signs/symptoms. I encouraged her to seek medical attention and contact her provider if any of these occur. I also discussed self care measures such as adequate rest/exercise, proper hydration, and wearing sunscreen when outdoors. She did not have any questions and was provided medication information packets at the end of the session.    Taj Pierre  Pharmacy Student

## 2022-07-19 NOTE — PROGRESS NOTES
Nurse Navigator Chemo Visit Note    Lnida Ortiz    07/19/2022      Met patient in chemo treatment area. Denies needs or questions.  Still having a lot of pain in right thumb/hand after finishing radiation therapy.  States the doctor told her that it would be 2-3 weeks before she noticed real relief from the radiation.  States she may still lose her thumb but seems ok with that.  Reassured patient that I would periodically be checking on her and she could call or reach out for any needs. Verbalized understanding.      Marissa Marie RN  Nurse Navigator   Cancer [facility]

## 2022-07-19 NOTE — PROGRESS NOTES
7/19/22:    Pt last seen by Oncology RD 7/7/22 in radiation oncology.  S/P XRT to R hand.  Now receiving immunotherapy (Opdivo, Yervoy) due to metastatic lung Ca.  Her wt continues to decline:  Total 5.3% X 1 month.  Pt continues to struggle with decreased appetite, early satiety, and dysgeusia.  She is trying to eat bland foods frequently, in small amounts, and trying to drink Ensure Plus 2-3 X day (pt's insurance will not pay for oral nutrition supplements, unfortunately).  Pt was given Rx for Remeron 1 month ago, but not noticing improvement in intake/appetite.  She reports most recent concerns are n/v/diarrhea for ~ 1 week, however trying to eat/drink regardless, as above.  She also c/o thick secretions in her throat, which cause coughing episodes and occasionally gagging.  Pt asked MD about Mucinex and was told she can take to help.  Encouraged adequate fluids to help with Mucinex- pt drinks a lot of fluids daily (sometimes 7 total 20 oz water bottles).  Briefly discussed possible side effects of diarrhea to worsen with immunotherapy.  Pt has Imodium AD if needed.    To see Oncologist today- encouraged pt to discuss all nutrition-related concerns and review appropriate medications to take/or changes as needed.      *Pt may benefit from appetite medication Marinol.    Addendum 7/20/22:  No Ensure coupons available at the infusion center 7/19/22.  Mailed out Ensure coupons today 7/20/22 from radiation oncology.

## 2022-07-19 NOTE — PROGRESS NOTES
Diagnosis: Lung cancer    Reason for referral: Medication assistance    Comments: OSW assistance requested by clinical RN, Nichol ZULUAGA, reporting pt expressed difficulty with affording medications. OSW met with pt in [facility] onc infusion center this morning during first day of infusion tx. OSW previously met with pt and cousin in \[facility]\"" onc back on 6/27. Pt presented in a pleasant mood today. Pt reports her pharmacy, [facility], wanted to charge her over $100 for nausea prescription, however, was able to obtain this prescription and another medication at [facility] pharmacy instead for substantially cheaper. Encouraged pt to contact OSW or oncology financial navigator if she experiences high copay for prescriptions, as there may be coupons or patient assistance programs available to reduce this expense. Pt v/u. Pt confirms she received gift card from [employer] and $100 check from [employer]. Pt reports she missed the deadline to submit finalized paperwork for Medicaid application. Pt plans to reapply.  Pt reports her neighbors have been supportive, dropping off soup, paper supplies (toilet paper, napkins, paper towels), etc. Pt's cousin texting her this morning to check in on her. Pt reports she has been provided with some bathroom DME by family, such as bathtub grab bar, etc.   Pt reports experiencing a significant cough recently, which she's been prescribed Tessalon Perles on 7/11. Due to cough, pt hasn't had much of an appetite. Pt seen by RD this morning to address nutritional needs. Pt reports she's been advised by medical staff to utilize Mucinex as well. Encouraged pt to contact triage nurse if symptoms don't improve. Pt v/u.  No other needs identified at this time. Encouraged OSW support remains available. Pt expressed gratitude.

## 2022-07-20 ENCOUNTER — TELEPHONE (OUTPATIENT)
Dept: ONCOLOGY | Facility: HOSPITAL | Age: 67
End: 2022-07-20

## 2022-07-20 LAB — ACTH PLAS-MCNC: 50.9 PG/ML (ref 7.2–63.3)

## 2022-07-20 NOTE — TELEPHONE ENCOUNTER
Linda Ortiz 1955       Symptoms    • Does patient have nausea and vomiting?    • Does patient have medications prescribed for N/V?    • Does patient have diarrhea?    • Does the patient have diarrhea medications?    • Does the patient have pain?    • Is pain medications effective?    Discharge  • Do you have any questions about your discharge instructions?    Patient Satisfaction with [facility]    • Where you satisfied with the care you received?    Pt suggestions for the [facility]    • Do you have any suggestions to improve your care?    Comments    • Follow up phone call comments  • No one answered the phone. This nurse left a vm.

## 2022-07-20 NOTE — ADDENDUM NOTE
Encounter addended by: Henrietta Avina RD on: 7/20/2022 8:24 AM   Actions taken: Clinical Note Signed

## 2022-07-29 ENCOUNTER — OFFICE VISIT (OUTPATIENT)
Dept: SURGERY | Facility: CLINIC | Age: 67
End: 2022-07-29

## 2022-07-29 VITALS — WEIGHT: 149 LBS | HEIGHT: 62 IN | BODY MASS INDEX: 27.42 KG/M2 | RESPIRATION RATE: 16 BRPM

## 2022-07-29 DIAGNOSIS — Z09 ENCOUNTER FOR FOLLOW-UP: Primary | ICD-10-CM

## 2022-07-29 PROCEDURE — 99024 POSTOP FOLLOW-UP VISIT: CPT | Performed by: SURGERY

## 2022-07-29 NOTE — PROGRESS NOTES
Patient is here for follow-up after portacatheter placement.  She is doing well.  No concerns regarding the port.  Port site looks fine.  No new issues to address.  Patient can see me PRN.

## 2022-08-09 ENCOUNTER — HOSPITAL ENCOUNTER (OUTPATIENT)
Dept: ONCOLOGY | Facility: HOSPITAL | Age: 67
Setting detail: INFUSION SERIES
Discharge: HOME | End: 2022-08-09

## 2022-08-09 ENCOUNTER — DOCUMENTATION (OUTPATIENT)
Dept: ONCOLOGY | Facility: HOSPITAL | Age: 67
End: 2022-08-09

## 2022-08-09 ENCOUNTER — OFFICE VISIT (OUTPATIENT)
Dept: ONCOLOGY | Facility: HOSPITAL | Age: 67
End: 2022-08-09

## 2022-08-09 VITALS
TEMPERATURE: 98.5 F | WEIGHT: 144.84 LBS | HEART RATE: 88 BPM | BODY MASS INDEX: 26.83 KG/M2 | OXYGEN SATURATION: 97 % | RESPIRATION RATE: 20 BRPM | DIASTOLIC BLOOD PRESSURE: 63 MMHG | SYSTOLIC BLOOD PRESSURE: 100 MMHG

## 2022-08-09 VITALS
HEIGHT: 62 IN | OXYGEN SATURATION: 97 % | BODY MASS INDEX: 26.65 KG/M2 | SYSTOLIC BLOOD PRESSURE: 100 MMHG | WEIGHT: 144.84 LBS | DIASTOLIC BLOOD PRESSURE: 63 MMHG | HEART RATE: 88 BPM | RESPIRATION RATE: 20 BRPM | TEMPERATURE: 98.5 F

## 2022-08-09 DIAGNOSIS — C34.12 MALIGNANT NEOPLASM OF UPPER LOBE OF LEFT LUNG: Primary | ICD-10-CM

## 2022-08-09 DIAGNOSIS — R05.8 PRODUCTIVE COUGH: ICD-10-CM

## 2022-08-09 DIAGNOSIS — E83.51 HYPOCALCEMIA: ICD-10-CM

## 2022-08-09 DIAGNOSIS — Z45.2 ENCOUNTER FOR ADJUSTMENT OR MANAGEMENT OF VASCULAR ACCESS DEVICE: ICD-10-CM

## 2022-08-09 LAB
ALBUMIN SERPL-MCNC: 2.31 G/DL (ref 3.5–5.2)
ALBUMIN/GLOB SERPL: 0.4 G/DL
ALP SERPL-CCNC: 108 U/L (ref 39–117)
ALT SERPL W P-5'-P-CCNC: 25 U/L (ref 1–33)
ANION GAP SERPL CALCULATED.3IONS-SCNC: 10.6 MMOL/L (ref 5–15)
AST SERPL-CCNC: 77 U/L (ref 1–32)
BASOPHILS # BLD AUTO: 0.06 10*3/MM3 (ref 0–0.2)
BASOPHILS NFR BLD AUTO: 0.7 % (ref 0–1.5)
BILIRUB SERPL-MCNC: 0.7 MG/DL (ref 0–1.2)
BUN SERPL-MCNC: 9 MG/DL (ref 8–23)
BUN/CREAT SERPL: 16.1 (ref 7–25)
CALCIUM SPEC-SCNC: 7.7 MG/DL (ref 8.6–10.5)
CHLORIDE SERPL-SCNC: 91 MMOL/L (ref 98–107)
CO2 SERPL-SCNC: 23.4 MMOL/L (ref 22–29)
CREAT SERPL-MCNC: 0.56 MG/DL (ref 0.57–1)
DEPRECATED RDW RBC AUTO: 62.3 FL (ref 37–54)
EGFRCR SERPLBLD CKD-EPI 2021: 100.8 ML/MIN/1.73
EOSINOPHIL # BLD AUTO: 0.11 10*3/MM3 (ref 0–0.4)
EOSINOPHIL NFR BLD AUTO: 1.3 % (ref 0.3–6.2)
ERYTHROCYTE [DISTWIDTH] IN BLOOD BY AUTOMATED COUNT: 18.2 % (ref 12.3–15.4)
GLOBULIN UR ELPH-MCNC: 5.2 GM/DL
GLUCOSE SERPL-MCNC: 95 MG/DL (ref 65–99)
HCT VFR BLD AUTO: 26.7 % (ref 34–46.6)
HGB BLD-MCNC: 8.9 G/DL (ref 12–15.9)
IMM GRANULOCYTES # BLD AUTO: 0.02 10*3/MM3 (ref 0–0.05)
IMM GRANULOCYTES NFR BLD AUTO: 0.2 % (ref 0–0.5)
LYMPHOCYTES # BLD AUTO: 0.99 10*3/MM3 (ref 0.7–3.1)
LYMPHOCYTES NFR BLD AUTO: 11.5 % (ref 19.6–45.3)
MCH RBC QN AUTO: 30.9 PG (ref 26.6–33)
MCHC RBC AUTO-ENTMCNC: 33.3 G/DL (ref 31.5–35.7)
MCV RBC AUTO: 92.7 FL (ref 79–97)
MONOCYTES # BLD AUTO: 1.07 10*3/MM3 (ref 0.1–0.9)
MONOCYTES NFR BLD AUTO: 12.4 % (ref 5–12)
NEUTROPHILS NFR BLD AUTO: 6.37 10*3/MM3 (ref 1.7–7)
NEUTROPHILS NFR BLD AUTO: 73.9 % (ref 42.7–76)
PLATELET # BLD AUTO: 332 10*3/MM3 (ref 140–450)
PMV BLD AUTO: 8.8 FL (ref 6–12)
POTASSIUM SERPL-SCNC: 4.3 MMOL/L (ref 3.5–5.2)
PROT SERPL-MCNC: 7.5 G/DL (ref 6–8.5)
RBC # BLD AUTO: 2.88 10*6/MM3 (ref 3.77–5.28)
SODIUM SERPL-SCNC: 125 MMOL/L (ref 136–145)
T4 FREE SERPL-MCNC: 1.43 NG/DL (ref 0.93–1.7)
TSH SERPL DL<=0.05 MIU/L-ACNC: 2.18 UIU/ML (ref 0.27–4.2)
WBC NRBC COR # BLD: 8.62 10*3/MM3 (ref 3.4–10.8)

## 2022-08-09 PROCEDURE — 80053 COMPREHEN METABOLIC PANEL: CPT | Performed by: INTERNAL MEDICINE

## 2022-08-09 PROCEDURE — 99214 OFFICE O/P EST MOD 30 MIN: CPT | Performed by: INTERNAL MEDICINE

## 2022-08-09 PROCEDURE — 96417 CHEMO IV INFUS EACH ADDL SEQ: CPT

## 2022-08-09 PROCEDURE — 84439 ASSAY OF FREE THYROXINE: CPT | Performed by: INTERNAL MEDICINE

## 2022-08-09 PROCEDURE — 25010000002 HEPARIN LOCK FLUSH PER 10 UNITS: Performed by: INTERNAL MEDICINE

## 2022-08-09 PROCEDURE — 25010000002 IPILIMUMAB 200 MG/40ML SOLUTION 40 ML VIAL: Performed by: INTERNAL MEDICINE

## 2022-08-09 PROCEDURE — 96413 CHEMO IV INFUSION 1 HR: CPT

## 2022-08-09 PROCEDURE — 25010000002 NIVOLUMAB 40 MG/4ML SOLUTION 4 ML VIAL: Performed by: INTERNAL MEDICINE

## 2022-08-09 PROCEDURE — 84443 ASSAY THYROID STIM HORMONE: CPT | Performed by: INTERNAL MEDICINE

## 2022-08-09 PROCEDURE — 85025 COMPLETE CBC W/AUTO DIFF WBC: CPT | Performed by: INTERNAL MEDICINE

## 2022-08-09 PROCEDURE — 82024 ASSAY OF ACTH: CPT | Performed by: INTERNAL MEDICINE

## 2022-08-09 PROCEDURE — 96415 CHEMO IV INFUSION ADDL HR: CPT

## 2022-08-09 RX ORDER — SODIUM CHLORIDE 9 MG/ML
250 INJECTION, SOLUTION INTRAVENOUS ONCE
Status: COMPLETED | OUTPATIENT
Start: 2022-08-09 | End: 2022-08-09

## 2022-08-09 RX ORDER — SODIUM CHLORIDE 0.9 % (FLUSH) 0.9 %
20 SYRINGE (ML) INJECTION AS NEEDED
Status: DISCONTINUED | OUTPATIENT
Start: 2022-08-09 | End: 2022-08-10 | Stop reason: HOSPADM

## 2022-08-09 RX ORDER — HEPARIN SODIUM (PORCINE) LOCK FLUSH IV SOLN 100 UNIT/ML 100 UNIT/ML
500 SOLUTION INTRAVENOUS AS NEEDED
Status: CANCELLED | OUTPATIENT
Start: 2022-08-09

## 2022-08-09 RX ORDER — DOXYCYCLINE HYCLATE 100 MG/1
100 CAPSULE ORAL 2 TIMES DAILY
Qty: 14 CAPSULE | Refills: 0 | Status: SHIPPED | OUTPATIENT
Start: 2022-08-09 | End: 2022-09-27

## 2022-08-09 RX ORDER — SODIUM CHLORIDE 0.9 % (FLUSH) 0.9 %
20 SYRINGE (ML) INJECTION AS NEEDED
Status: CANCELLED | OUTPATIENT
Start: 2022-08-09

## 2022-08-09 RX ORDER — DOXYCYCLINE HYCLATE 100 MG/1
100 CAPSULE ORAL 2 TIMES DAILY
Qty: 14 CAPSULE | Refills: 0 | Status: SHIPPED | OUTPATIENT
Start: 2022-08-09 | End: 2022-08-09 | Stop reason: SDUPTHER

## 2022-08-09 RX ORDER — SODIUM CHLORIDE 9 MG/ML
250 INJECTION, SOLUTION INTRAVENOUS ONCE
Status: CANCELLED | OUTPATIENT
Start: 2022-08-09

## 2022-08-09 RX ORDER — HEPARIN SODIUM (PORCINE) LOCK FLUSH IV SOLN 100 UNIT/ML 100 UNIT/ML
500 SOLUTION INTRAVENOUS AS NEEDED
Status: DISCONTINUED | OUTPATIENT
Start: 2022-08-09 | End: 2022-08-10 | Stop reason: HOSPADM

## 2022-08-09 RX ADMIN — HEPARIN SODIUM (PORCINE) LOCK FLUSH IV SOLN 100 UNIT/ML 500 UNITS: 100 SOLUTION at 13:47

## 2022-08-09 RX ADMIN — SODIUM CHLORIDE 200 MG: 9 INJECTION, SOLUTION INTRAVENOUS at 11:52

## 2022-08-09 RX ADMIN — SODIUM CHLORIDE 70 MG: 9 INJECTION, SOLUTION INTRAVENOUS at 11:08

## 2022-08-09 RX ADMIN — Medication 20 ML: at 13:47

## 2022-08-09 RX ADMIN — SODIUM CHLORIDE 250 ML: 9 INJECTION, SOLUTION INTRAVENOUS at 11:04

## 2022-08-09 NOTE — PROGRESS NOTES
Diagnosis: Lung cancer    Reason for referral: Rounding    Comments: OSW met with pt in med onc [facility] center this morning. Pt presented in a very pleasant mood. Pt reports her appetite is improving. Pt declines home health care to be arranged at this time. Pt reports her cousins is helping with laundry and pt is paying her neighbor to assist with cleaning her apartment. Discussed opportunity to contact the [facility] agency on aging to see if pt is eligible for their in home services program to aid with household chores, however, pt respectfully declined at this time. Encouraged OSW support remains available. Pt expressed gratitude.    Services/Referrals Provided: No needs identified at this time

## 2022-08-09 NOTE — ASSESSMENT & PLAN NOTE
Status post palliative radiation to the right hand.  She has had good partial improvement in pain and function of the right thumb.  She is now on palliative immunotherapy with ipilimumab and nivolumab.  Lab work today is adequate for treatment.  Proceed with cycle 2 as planned.  I will see her back for cycle 3 with lab work prior to monitor for toxicities.

## 2022-08-09 NOTE — PROGRESS NOTES
8/9/22:    Encounter with pt due to ongoing wt decline:  9.3% loss X 1 month.  Pt reports the wt decline has tapered off and she was happy to see she only lost 5# in the past 1-2 weeks.  She reports a better appetite & making a better effort to eat more often and variety of foods:  Cereal, fruits, cream soups, cornbread.  She continues to drink Ensure Max Protein- her preference.  Pt is on Remeron at bedtime (since 6/21/22) and perhaps helping.      Oncology RD remains available per protocol.

## 2022-08-09 NOTE — ASSESSMENT & PLAN NOTE
Likely exacerbation of her underlying COPD.  I will treat her with doxycycline for 1 week.  She can continue cough preparations as needed.

## 2022-08-09 NOTE — PROGRESS NOTES
Chief Complaint  Chemotherapy      Provider, No Known    Subjective          Linda Ortiz presents to [facility] for ongoing treatment of her metastatic lung cancer.  She is status post palliative radiation to the right hand and is now on immunotherapy with ipilimumab and nivolumab.  She states that she tolerated her initial cycle of immunotherapy well.  She denies any specific problems or complaints with the regimen.  The right thumb continues to be painful but she does note good improvement with radiation.  She is able to use her finger some but not the thumb.  She denies any other new masses or adenopathy, no unusual aches or pains.  She notes normal appetite and her weight is stable.  Her energy level is low but she is able to perform her ADLs.  She notes increased cough productive of yellowish-green sputum.  Over-the-counter remedies have not been helpful.  She denies fever or chills.    Oncology/Hematology History Overview Note   5/19/2022 Left Adrenal mass biopsy at [facility] revealed: poorly differentiated non-small cell carcinoma, high grade. Positive fo AE1/3 and Cam5.2. Immunohistochemistry was positive for PAX8, HeParlGlypican3, Vimentin, focal positive for chromagranin, GATA3.  The case was sent to [facility] for second opinion with a final diagnosis of poorly differentiated carcinoma with diffuse reduced BRG-1 expression.    6/21/2022 XRT initiated to Right hand        Malignant neoplasm of upper lobe of left lung (HCC)   6/9/2022 Initial Diagnosis    Lung cancer (HCC)     7/19/2022 -  Chemotherapy    OP LUNG Nivolumab 1 mg/kg / Ipilimumab 3 mg/kg / Nivolumab 480 mg      Secondary carcinoid tumor of bone (HCC) (Resolved)   6/14/2022 Initial Diagnosis    Secondary carcinoid tumor of bone (HCC)     6/20/2022 -  Radiation    RADIATION THERAPY Treatment Details (Noted on 6/14/2022)  Site: Right Hand  Technique: 3D CRT  Goal: No goal specified  Planned Treatment  Start Date: 6/20/2022         Review of Systems   Constitutional: Positive for fatigue. Negative for appetite change, diaphoresis, fever, unexpected weight gain and unexpected weight loss.   HENT: Negative for hearing loss, mouth sores, sore throat, swollen glands, trouble swallowing and voice change.    Eyes: Negative for blurred vision.   Respiratory: Positive for cough (Productive of yellowish sputum). Negative for shortness of breath and wheezing.    Cardiovascular: Negative for chest pain and palpitations.   Gastrointestinal: Negative for abdominal pain, blood in stool, constipation, diarrhea, nausea and vomiting.   Endocrine: Negative for cold intolerance and heat intolerance.   Genitourinary: Negative for difficulty urinating, dysuria, frequency, hematuria and urinary incontinence.   Musculoskeletal: Negative for arthralgias, back pain and myalgias.   Skin: Negative for rash, skin lesions and wound.   Neurological: Negative for dizziness, seizures, weakness, numbness and headache.   Hematological: Does not bruise/bleed easily.   Psychiatric/Behavioral: Negative for depressed mood. The patient is not nervous/anxious.      Current Outpatient Medications on File Prior to Visit   Medication Sig Dispense Refill   • acetaminophen (TYLENOL) 325 MG tablet Take 650 mg by mouth Every 6 (Six) Hours As Needed for Mild Pain .     • benzonatate (Tessalon Perles) 100 MG capsule Take 1 capsule by mouth 3 (Three) Times a Day As Needed for Cough. 30 capsule 3   • Loperamide HCl (IMODIUM PO) Take  by mouth.     • mirtazapine (REMERON) 15 MG tablet Take 1 tablet by mouth Every Night. 30 tablet 5   • ondansetron (ZOFRAN) 8 MG tablet Take 1 tablet by mouth 3 (Three) Times a Day As Needed for Nausea or Vomiting. 30 tablet 5   • oxyCODONE-acetaminophen (Percocet) 5-325 MG per tablet Take 1-2 tablets by mouth Every 4 (Four) Hours As Needed (pain). 6 tablet 0     Current Facility-Administered Medications on File Prior to Visit    Medication Dose Route Frequency Provider Last Rate Last Admin   • heparin injection 500 Units  500 Units Intravenous PRN Phani Hernández MD   500 Units at 22 1347   • [COMPLETED] ipilimumab (YERVOY) 200 mg in sodium chloride 0.9 % 150 mL chemo IVPB  200 mg Intravenous Once Phani Hernández MD   Stopped at 22 1322   • [COMPLETED] nivolumab (OPDIVO) 70 mg in sodium chloride 0.9 % 62 mL chemo IVPB  1 mg/kg (Treatment Plan Recorded) Intravenous Once Phani Hernández MD   Stopped at 22 1138   • sodium chloride 0.9 % flush 20 mL  20 mL Intravenous PRN Phani Hernández MD   20 mL at 22 1347   • [COMPLETED] sodium chloride 0.9 % infusion 250 mL  250 mL Intravenous Once Phani Hernández MD   Stopped at 22 1348       Allergies   Allergen Reactions   • Hydrocodone Nausea And Vomiting and Dizziness     Past Medical History:   Diagnosis Date   • Bone cancer (HCC) 2022   • Hypocalcemia 2022   • Malignant neoplasm of upper lobe of left lung (HCC) 2022   • Metastatic cancer (HCC)      Past Surgical History:   Procedure Laterality Date   • HIP SURGERY Bilateral    • KNEE ARTHROSCOPY     • VENOUS ACCESS DEVICE (PORT) INSERTION N/A 7/15/2022    Procedure: INSERTION VENOUS ACCESS DEVICE;  Surgeon: Eber Hull MD;  Location: [facility];  Service: General;  Laterality: N/A;     Social History     Socioeconomic History   • Marital status: Single   Tobacco Use   • Smoking status: Former Smoker     Packs/day: 1.00     Types: Cigarettes     Start date:      Quit date: 2022     Years since quittin.5   • Smokeless tobacco: Never Used   Vaping Use   • Vaping Use: Former   • Start date: 2013   • Quit date: 2014   • Substances: Nicotine, Flavoring   Substance and Sexual Activity   • Alcohol use: Not Currently   • Drug use: Never     Family History   Problem Relation Age of Onset   • Breast cancer Mother    • Lung cancer Father    • Thyroid cancer Maternal Aunt    •  Breast cancer Maternal Aunt    • Lung cancer Paternal Aunt    • COPD Paternal Uncle        Objective   Physical Exam  Vitals reviewed. Exam conducted with a chaperone present.   Constitutional:       General: She is not in acute distress.     Appearance: Normal appearance.   Cardiovascular:      Rate and Rhythm: Normal rate and regular rhythm.      Heart sounds: Normal heart sounds. No murmur heard.    No gallop.   Pulmonary:      Effort: Pulmonary effort is normal.      Breath sounds: Wheezing (Scattered end expiratory wheezes) present.      Comments: Port-A-Cath  Abdominal:      General: Abdomen is flat. Bowel sounds are normal. There is no distension.      Palpations: Abdomen is soft.      Tenderness: There is no abdominal tenderness.   Musculoskeletal:      Cervical back: Neck supple.      Right lower leg: No edema.      Left lower leg: No edema.      Comments: Mass in the right thenar eminence decreased in size compared to previous.   Lymphadenopathy:      Cervical: No cervical adenopathy.   Neurological:      Mental Status: She is alert and oriented to person, place, and time.   Psychiatric:         Mood and Affect: Mood normal.         Behavior: Behavior normal.         Vitals:    08/09/22 0904   BP: 100/63   Pulse: 88   Resp: 20   Temp: 98.5 °F (36.9 °C)   SpO2: 97%   Weight: 65.7 kg (144 lb 13.5 oz)   PainSc: 0-No pain     ECOG score: 2         PHQ-9 Total Score:                    Result Review :   The following data was reviewed by: Phani Hernández MD on 08/09/2022:  Lab Results   Component Value Date    HGB 8.9 (L) 08/09/2022    HCT 26.7 (L) 08/09/2022    MCV 92.7 08/09/2022     08/09/2022    WBC 8.62 08/09/2022    NEUTROABS 6.37 08/09/2022    LYMPHSABS 0.99 08/09/2022    MONOSABS 1.07 (H) 08/09/2022    EOSABS 0.11 08/09/2022    BASOSABS 0.06 08/09/2022     Lab Results   Component Value Date    GLUCOSE 95 08/09/2022    BUN 9 08/09/2022    CREATININE 0.56 (L) 08/09/2022     (C) 08/09/2022     K 4.3 08/09/2022    CL 91 (L) 08/09/2022    CO2 23.4 08/09/2022    CALCIUM 7.7 (L) 08/09/2022    PROTEINTOT 7.5 08/09/2022    ALBUMIN 2.31 (L) 08/09/2022    BILITOT 0.7 08/09/2022    ALKPHOS 108 08/09/2022    AST 77 (H) 08/09/2022    ALT 25 08/09/2022     Lab Results   Component Value Date    FREET4 1.43 08/09/2022    TSH 2.180 08/09/2022             Assessment and Plan    Diagnoses and all orders for this visit:    1. Malignant neoplasm of upper lobe of left lung (HCC) (Primary)  Assessment & Plan:  Status post palliative radiation to the right hand.  She has had good partial improvement in pain and function of the right thumb.  She is now on palliative immunotherapy with ipilimumab and nivolumab.  Lab work today is adequate for treatment.  Proceed with cycle 2 as planned.  I will see her back for cycle 3 with lab work prior to monitor for toxicities.      2. Productive cough  Assessment & Plan:  Likely exacerbation of her underlying COPD.  I will treat her with doxycycline for 1 week.  She can continue cough preparations as needed.    Orders:  -     Discontinue: doxycycline (VIBRAMYCIN) 100 MG capsule; Take 1 capsule by mouth 2 (Two) Times a Day.  Dispense: 14 capsule; Refill: 0  -     doxycycline (VIBRAMYCIN) 100 MG capsule; Take 1 capsule by mouth 2 (Two) Times a Day.  Dispense: 14 capsule; Refill: 0    3. Hypocalcemia  Assessment & Plan:  Serum calcium is mildly decreased today in part related to her low albumin.  Recommend to extra strength Tums daily for calcium supplementation.  Repeat lab work next visit            Patient Follow Up: Cycle 3-day 1    Patient was given instructions and counseling regarding her condition or for health maintenance advice. Please see specific information pulled into the AVS if appropriate.     Phani Hernández MD    8/9/2022

## 2022-08-09 NOTE — ASSESSMENT & PLAN NOTE
Serum calcium is mildly decreased today in part related to her low albumin.  Recommend to extra strength Tums daily for calcium supplementation.  Repeat lab work next visit

## 2022-08-10 LAB — ACTH PLAS-MCNC: 48.1 PG/ML (ref 7.2–63.3)

## 2022-08-11 ENCOUNTER — TELEPHONE (OUTPATIENT)
Dept: ONCOLOGY | Facility: HOSPITAL | Age: 67
End: 2022-08-11

## 2022-08-11 NOTE — TELEPHONE ENCOUNTER
pt called and c/o itching all over. pt was instructed to start taking a daily 24 hr allergy pill, such as Claritin or Zyrtec, until she has finished her infusion tx's. pt was also instructed to take Benadryl PRN until the itching has stopped. pt voices understanding.

## 2022-08-15 ENCOUNTER — TELEPHONE (OUTPATIENT)
Dept: ONCOLOGY | Facility: HOSPITAL | Age: 67
End: 2022-08-15

## 2022-08-15 NOTE — TELEPHONE ENCOUNTER
pt called and c/o itching all over has not improved since starting antihistamines on Friday per this nurses recommendation. pt started taking a daily 24 hr Claritin and a 24hr  Benadryl on Friday. pt states that she is still itching all over and the antihistamines have not helped. pt still denies a rash or hives. pt has had itching issues since around 7/25/22, per pt. pt is wanting itch relief but also voices she wants to continue her infusions until she has completed her tx regimen. pt is asking what she can do to relieve her itching.

## 2022-08-15 NOTE — TELEPHONE ENCOUNTER
This nurse called the pt and left a vm with Dr Hernández's instructions. This nurse also stated in the vm to call back with any questions or concerns.

## 2022-08-17 ENCOUNTER — TELEPHONE (OUTPATIENT)
Dept: ONCOLOGY | Facility: HOSPITAL | Age: 67
End: 2022-08-17

## 2022-08-29 ENCOUNTER — HOSPITAL ENCOUNTER (OUTPATIENT)
Dept: ONCOLOGY | Facility: HOSPITAL | Age: 67
Setting detail: INFUSION SERIES
Discharge: HOME | End: 2022-08-29

## 2022-08-29 ENCOUNTER — OFFICE VISIT (OUTPATIENT)
Dept: ONCOLOGY | Facility: HOSPITAL | Age: 67
End: 2022-08-29

## 2022-08-29 VITALS
TEMPERATURE: 97.9 F | WEIGHT: 143.96 LBS | OXYGEN SATURATION: 100 % | DIASTOLIC BLOOD PRESSURE: 58 MMHG | RESPIRATION RATE: 18 BRPM | SYSTOLIC BLOOD PRESSURE: 109 MMHG | HEART RATE: 93 BPM | BODY MASS INDEX: 26.66 KG/M2

## 2022-08-29 DIAGNOSIS — D64.9 ANEMIA, UNSPECIFIED TYPE: ICD-10-CM

## 2022-08-29 DIAGNOSIS — C34.12 MALIGNANT NEOPLASM OF UPPER LOBE OF LEFT LUNG: Primary | ICD-10-CM

## 2022-08-29 DIAGNOSIS — Z45.2 ENCOUNTER FOR ADJUSTMENT OR MANAGEMENT OF VASCULAR ACCESS DEVICE: ICD-10-CM

## 2022-08-29 LAB
ALBUMIN SERPL-MCNC: 2.13 G/DL (ref 3.5–5.2)
ALBUMIN/GLOB SERPL: 0.4 G/DL
ALP SERPL-CCNC: 136 U/L (ref 39–117)
ALT SERPL W P-5'-P-CCNC: 27 U/L (ref 1–33)
ANION GAP SERPL CALCULATED.3IONS-SCNC: 8.6 MMOL/L (ref 5–15)
AST SERPL-CCNC: 47 U/L (ref 1–32)
BASOPHILS # BLD AUTO: 0.07 10*3/MM3 (ref 0–0.2)
BASOPHILS NFR BLD AUTO: 1.1 % (ref 0–1.5)
BILIRUB SERPL-MCNC: 0.4 MG/DL (ref 0–1.2)
BUN SERPL-MCNC: 10 MG/DL (ref 8–23)
BUN/CREAT SERPL: 15.9 (ref 7–25)
CALCIUM SPEC-SCNC: 7.4 MG/DL (ref 8.6–10.5)
CHLORIDE SERPL-SCNC: 102 MMOL/L (ref 98–107)
CO2 SERPL-SCNC: 22.4 MMOL/L (ref 22–29)
CREAT SERPL-MCNC: 0.63 MG/DL (ref 0.57–1)
DEPRECATED RDW RBC AUTO: 55.8 FL (ref 37–54)
EGFRCR SERPLBLD CKD-EPI 2021: 98 ML/MIN/1.73
EOSINOPHIL # BLD AUTO: 0.24 10*3/MM3 (ref 0–0.4)
EOSINOPHIL NFR BLD AUTO: 3.8 % (ref 0.3–6.2)
ERYTHROCYTE [DISTWIDTH] IN BLOOD BY AUTOMATED COUNT: 16.2 % (ref 12.3–15.4)
GLOBULIN UR ELPH-MCNC: 4.8 GM/DL
GLUCOSE SERPL-MCNC: 89 MG/DL (ref 65–99)
HCT VFR BLD AUTO: 25.8 % (ref 34–46.6)
HGB BLD-MCNC: 8.6 G/DL (ref 12–15.9)
IMM GRANULOCYTES # BLD AUTO: 0.01 10*3/MM3 (ref 0–0.05)
IMM GRANULOCYTES NFR BLD AUTO: 0.2 % (ref 0–0.5)
LYMPHOCYTES # BLD AUTO: 1.26 10*3/MM3 (ref 0.7–3.1)
LYMPHOCYTES NFR BLD AUTO: 20.1 % (ref 19.6–45.3)
MCH RBC QN AUTO: 31.3 PG (ref 26.6–33)
MCHC RBC AUTO-ENTMCNC: 33.3 G/DL (ref 31.5–35.7)
MCV RBC AUTO: 93.8 FL (ref 79–97)
MONOCYTES # BLD AUTO: 0.81 10*3/MM3 (ref 0.1–0.9)
MONOCYTES NFR BLD AUTO: 12.9 % (ref 5–12)
NEUTROPHILS NFR BLD AUTO: 3.89 10*3/MM3 (ref 1.7–7)
NEUTROPHILS NFR BLD AUTO: 61.9 % (ref 42.7–76)
PLATELET # BLD AUTO: 389 10*3/MM3 (ref 140–450)
PMV BLD AUTO: 9 FL (ref 6–12)
POTASSIUM SERPL-SCNC: 4.3 MMOL/L (ref 3.5–5.2)
PROT SERPL-MCNC: 6.9 G/DL (ref 6–8.5)
RBC # BLD AUTO: 2.75 10*6/MM3 (ref 3.77–5.28)
SODIUM SERPL-SCNC: 133 MMOL/L (ref 136–145)
T4 FREE SERPL-MCNC: 1.16 NG/DL (ref 0.93–1.7)
TSH SERPL DL<=0.05 MIU/L-ACNC: 3.09 UIU/ML (ref 0.27–4.2)
WBC NRBC COR # BLD: 6.28 10*3/MM3 (ref 3.4–10.8)

## 2022-08-29 PROCEDURE — 36591 DRAW BLOOD OFF VENOUS DEVICE: CPT

## 2022-08-29 PROCEDURE — 84439 ASSAY OF FREE THYROXINE: CPT | Performed by: INTERNAL MEDICINE

## 2022-08-29 PROCEDURE — 80053 COMPREHEN METABOLIC PANEL: CPT | Performed by: INTERNAL MEDICINE

## 2022-08-29 PROCEDURE — 82024 ASSAY OF ACTH: CPT | Performed by: INTERNAL MEDICINE

## 2022-08-29 PROCEDURE — G0463 HOSPITAL OUTPT CLINIC VISIT: HCPCS

## 2022-08-29 PROCEDURE — 85025 COMPLETE CBC W/AUTO DIFF WBC: CPT | Performed by: INTERNAL MEDICINE

## 2022-08-29 PROCEDURE — 84443 ASSAY THYROID STIM HORMONE: CPT | Performed by: INTERNAL MEDICINE

## 2022-08-29 PROCEDURE — 25010000002 HEPARIN LOCK FLUSH PER 10 UNITS: Performed by: INTERNAL MEDICINE

## 2022-08-29 PROCEDURE — 99214 OFFICE O/P EST MOD 30 MIN: CPT | Performed by: INTERNAL MEDICINE

## 2022-08-29 RX ORDER — SODIUM CHLORIDE 0.9 % (FLUSH) 0.9 %
20 SYRINGE (ML) INJECTION AS NEEDED
Status: CANCELLED | OUTPATIENT
Start: 2022-08-29

## 2022-08-29 RX ORDER — HEPARIN SODIUM (PORCINE) LOCK FLUSH IV SOLN 100 UNIT/ML 100 UNIT/ML
500 SOLUTION INTRAVENOUS AS NEEDED
Status: DISCONTINUED | OUTPATIENT
Start: 2022-08-29 | End: 2022-08-30 | Stop reason: HOSPADM

## 2022-08-29 RX ORDER — HEPARIN SODIUM (PORCINE) LOCK FLUSH IV SOLN 100 UNIT/ML 100 UNIT/ML
500 SOLUTION INTRAVENOUS AS NEEDED
Status: CANCELLED | OUTPATIENT
Start: 2022-08-29

## 2022-08-29 RX ORDER — SODIUM CHLORIDE 0.9 % (FLUSH) 0.9 %
20 SYRINGE (ML) INJECTION AS NEEDED
Status: DISCONTINUED | OUTPATIENT
Start: 2022-08-29 | End: 2022-08-30 | Stop reason: HOSPADM

## 2022-08-29 RX ADMIN — HEPARIN 500 UNITS: 100 SYRINGE at 08:12

## 2022-08-29 RX ADMIN — Medication 20 ML: at 08:12

## 2022-08-29 NOTE — PROGRESS NOTES
Chief Complaint  Lung Cancer    Provider, No Known  Provider, No Known    Subjective          Linda Ortiz presents to [facility] HEMATOLOGY [facility] ONCOLOGY for ongoing treatment of her metastatic lung cancer.  She is on immunotherapy with Opdivo and Yervoy.  She states she is tolerating her treatment well.  She continues to have difficulty using the right hand due to metastatic focus there however it has overall improved.  She notes good appetite.  She was able to do some housework this past weekend.  She denies any masses or adenopathy.    Oncology/Hematology History Overview Note   5/19/2022 Left Adrenal mass biopsy at [facility] revealed: poorly differentiated non-small cell carcinoma, high grade. Positive fo AE1/3 and Cam5.2. Immunohistochemistry was positive for PAX8, HeParlGlypican3, Vimentin, focal positive for chromagranin, GATA3.  The case was sent to [facility] for second opinion with a final diagnosis of poorly differentiated carcinoma with diffuse reduced BRG-1 expression.    6/21/2022 XRT initiated to Right hand        Malignant neoplasm of upper lobe of left lung (HCC)   6/9/2022 Initial Diagnosis    Lung cancer (HCC)     7/19/2022 -  Chemotherapy    OP LUNG Nivolumab 1 mg/kg / Ipilimumab 3 mg/kg / Nivolumab 480 mg      Secondary carcinoid tumor of bone (HCC) (Resolved)   6/14/2022 Initial Diagnosis    Secondary carcinoid tumor of bone (HCC)     6/20/2022 -  Radiation    RADIATION THERAPY Treatment Details (Noted on 6/14/2022)  Site: Right Hand  Technique: 3D CRT  Goal: No goal specified  Planned Treatment Start Date: 6/20/2022         Review of Systems   Constitutional: Positive for fatigue. Negative for appetite change, diaphoresis, fever, unexpected weight gain and unexpected weight loss.   HENT: Negative for hearing loss, mouth sores, sore throat, swollen glands, trouble swallowing and voice change.    Eyes: Negative for blurred vision.   Respiratory: Negative for cough, shortness of  breath and wheezing.    Cardiovascular: Negative for chest pain and palpitations.   Gastrointestinal: Negative for abdominal pain, blood in stool, constipation, diarrhea, nausea and vomiting.   Endocrine: Negative for cold intolerance and heat intolerance.   Genitourinary: Negative for difficulty urinating, dysuria, frequency, hematuria and urinary incontinence.   Musculoskeletal: Negative for arthralgias, back pain and myalgias.   Skin: Negative for rash, skin lesions and wound.   Neurological: Negative for dizziness, seizures, weakness, numbness and headache.   Hematological: Does not bruise/bleed easily.   Psychiatric/Behavioral: Negative for depressed mood. The patient is not nervous/anxious.      Current Outpatient Medications on File Prior to Visit   Medication Sig Dispense Refill   • acetaminophen (TYLENOL) 325 MG tablet Take 650 mg by mouth Every 6 (Six) Hours As Needed for Mild Pain .     • benzonatate (Tessalon Perles) 100 MG capsule Take 1 capsule by mouth 3 (Three) Times a Day As Needed for Cough. 30 capsule 3   • doxycycline (VIBRAMYCIN) 100 MG capsule Take 1 capsule by mouth 2 (Two) Times a Day. 14 capsule 0   • Loperamide HCl (IMODIUM PO) Take  by mouth.     • mirtazapine (REMERON) 15 MG tablet Take 1 tablet by mouth Every Night. 30 tablet 5   • ondansetron (ZOFRAN) 8 MG tablet Take 1 tablet by mouth 3 (Three) Times a Day As Needed for Nausea or Vomiting. 30 tablet 5   • oxyCODONE-acetaminophen (Percocet) 5-325 MG per tablet Take 1-2 tablets by mouth Every 4 (Four) Hours As Needed (pain). 6 tablet 0     Current Facility-Administered Medications on File Prior to Visit   Medication Dose Route Frequency Provider Last Rate Last Admin   • [DISCONTINUED] heparin injection 500 Units  500 Units Intravenous PRN Phani Hernández MD   500 Units at 08/29/22 0812   • [DISCONTINUED] sodium chloride 0.9 % flush 20 mL  20 mL Intravenous PRN Phani Hernández MD   20 mL at 08/29/22 0812       Allergies   Allergen  Reactions   • Hydrocodone Nausea And Vomiting and Dizziness     Past Medical History:   Diagnosis Date   • Bone cancer (HCC) 2022   • Hypocalcemia 2022   • Malignant neoplasm of upper lobe of left lung (HCC) 2022   • Metastatic cancer (HCC)      Past Surgical History:   Procedure Laterality Date   • HIP SURGERY Bilateral    • KNEE ARTHROSCOPY     • VENOUS ACCESS DEVICE (PORT) INSERTION N/A 7/15/2022    Procedure: INSERTION VENOUS ACCESS DEVICE;  Surgeon: Eber Hull MD;  [place] [facility] [place]  Service: General;  Laterality: N/A;     Social History     Socioeconomic History   • Marital status: Single   Tobacco Use   • Smoking status: Former Smoker     Packs/day: 1.00     Types: Cigarettes     Start date:      Quit date: 2022     Years since quittin.5   • Smokeless tobacco: Never Used   Vaping Use   • Vaping Use: Former   • Start date: 2013   • Quit date: 2014   • Substances: Nicotine, Flavoring   Substance and Sexual Activity   • Alcohol use: Not Currently   • Drug use: Never     Family History   Problem Relation Age of Onset   • Breast cancer Mother    • Lung cancer Father    • Thyroid cancer Maternal Aunt    • Breast cancer Maternal Aunt    • Lung cancer Paternal Aunt    • COPD Paternal Uncle        Objective   Physical Exam  Vitals reviewed. Exam conducted with a chaperone present.   Constitutional:       General: She is not in acute distress.     Appearance: Normal appearance.   Cardiovascular:      Rate and Rhythm: Normal rate and regular rhythm.      Heart sounds: Normal heart sounds. No murmur heard.    No gallop.   Pulmonary:      Effort: Pulmonary effort is normal.      Breath sounds: Normal breath sounds.      Comments: Port-A-Cath  Abdominal:      General: Abdomen is flat. Bowel sounds are normal.      Palpations: Abdomen is soft.   Musculoskeletal:      Cervical back: Neck supple.      Right lower leg: No edema.      Left lower leg: No edema.      Comments: Mass  in the right thenar eminence has decreased and is less tender   Lymphadenopathy:      Cervical: No cervical adenopathy.   Neurological:      Mental Status: She is alert and oriented to person, place, and time.   Psychiatric:         Mood and Affect: Mood normal.         Behavior: Behavior normal.         Vitals:    08/29/22 0828   BP: 109/58   Pulse: 93   Resp: 18   Temp: 97.9 °F (36.6 °C)   SpO2: 100%   Weight: 65.3 kg (143 lb 15.4 oz)   PainSc: 0-No pain     ECOG score: 1         PHQ-9 Total Score:                    Result Review :   The following data was reviewed by: Phani Hernández MD on 08/29/2022:  Lab Results   Component Value Date    HGB 8.6 (L) 08/29/2022    HCT 25.8 (L) 08/29/2022    MCV 93.8 08/29/2022     08/29/2022    WBC 6.28 08/29/2022    NEUTROABS 3.89 08/29/2022    LYMPHSABS 1.26 08/29/2022    MONOSABS 0.81 08/29/2022    EOSABS 0.24 08/29/2022    BASOSABS 0.07 08/29/2022     Lab Results   Component Value Date    GLUCOSE 89 08/29/2022    BUN 10 08/29/2022    CREATININE 0.63 08/29/2022     (L) 08/29/2022    K 4.3 08/29/2022     08/29/2022    CO2 22.4 08/29/2022    CALCIUM 7.4 (L) 08/29/2022    PROTEINTOT 6.9 08/29/2022    ALBUMIN 2.13 (L) 08/29/2022    BILITOT 0.4 08/29/2022    ALKPHOS 136 (H) 08/29/2022    AST 47 (H) 08/29/2022    ALT 27 08/29/2022     Lab Results   Component Value Date    FREET4 1.16 08/29/2022    TSH 3.090 08/29/2022             Assessment and Plan    Diagnoses and all orders for this visit:    1. Malignant neoplasm of upper lobe of left lung (HCC) (Primary)  Assessment & Plan:  Metastatic.  Patient is on palliative treatment with Yervoy and Opdivo.  Tolerating her treatments well.  Lab work is adequate for treatment.  Proceed with cycle 3 as planned.  RTC cycle 4-day 1 with lab work prior to monitor for toxicities and restaging scans to assess response to therapy.    Orders:  -     CT chest w contrast; Future  -     CT abdomen pelvis w contrast; Future  -      CBC and Differential; Future  -     Comprehensive metabolic panel; Future  -     TSH; Future  -     T4, free; Future  -     ACTH; Future    2. Anemia, unspecified type  Assessment & Plan:  Patient has persistent anemia with hemoglobin 8.6 g/dL.  This is not a typical side effect of immunotherapy.  I will check stool for occult blood.  Repeat CBC next visit.    Orders:  -     Occult Blood X 1, Stool - Stool, Per Rectum; Future          Patient Follow Up: Cycle 4-day 1    Patient was given instructions and counseling regarding her condition or for health maintenance advice. Please see specific information pulled into the AVS if appropriate.     Phani Hernández MD    8/30/2022

## 2022-08-30 ENCOUNTER — HOSPITAL ENCOUNTER (OUTPATIENT)
Dept: ONCOLOGY | Facility: HOSPITAL | Age: 67
Setting detail: INFUSION SERIES
Discharge: HOME | End: 2022-08-30

## 2022-08-30 VITALS
RESPIRATION RATE: 20 BRPM | OXYGEN SATURATION: 100 % | HEIGHT: 62 IN | TEMPERATURE: 97.8 F | BODY MASS INDEX: 26.65 KG/M2 | HEART RATE: 90 BPM | SYSTOLIC BLOOD PRESSURE: 109 MMHG | WEIGHT: 144.84 LBS | DIASTOLIC BLOOD PRESSURE: 59 MMHG

## 2022-08-30 DIAGNOSIS — Z45.2 ENCOUNTER FOR ADJUSTMENT OR MANAGEMENT OF VASCULAR ACCESS DEVICE: Primary | ICD-10-CM

## 2022-08-30 DIAGNOSIS — C34.12 MALIGNANT NEOPLASM OF UPPER LOBE OF LEFT LUNG: ICD-10-CM

## 2022-08-30 PROBLEM — D64.9 ANEMIA: Status: ACTIVE | Noted: 2022-08-30

## 2022-08-30 LAB
ACTH PLAS-MCNC: 62.8 PG/ML (ref 7.2–63.3)
HEMOCCULT STL QL IA: NEGATIVE

## 2022-08-30 PROCEDURE — 96417 CHEMO IV INFUS EACH ADDL SEQ: CPT

## 2022-08-30 PROCEDURE — 96413 CHEMO IV INFUSION 1 HR: CPT

## 2022-08-30 PROCEDURE — 25010000002 HEPARIN LOCK FLUSH PER 10 UNITS: Performed by: INTERNAL MEDICINE

## 2022-08-30 PROCEDURE — 96415 CHEMO IV INFUSION ADDL HR: CPT

## 2022-08-30 PROCEDURE — 25010000002 NIVOLUMAB 40 MG/4ML SOLUTION 4 ML VIAL: Performed by: INTERNAL MEDICINE

## 2022-08-30 PROCEDURE — 82274 ASSAY TEST FOR BLOOD FECAL: CPT | Performed by: INTERNAL MEDICINE

## 2022-08-30 PROCEDURE — 25010000002 IPILIMUMAB 200 MG/40ML SOLUTION 40 ML VIAL: Performed by: INTERNAL MEDICINE

## 2022-08-30 RX ORDER — HEPARIN SODIUM (PORCINE) LOCK FLUSH IV SOLN 100 UNIT/ML 100 UNIT/ML
500 SOLUTION INTRAVENOUS AS NEEDED
Status: DISCONTINUED | OUTPATIENT
Start: 2022-08-30 | End: 2022-09-01 | Stop reason: HOSPADM

## 2022-08-30 RX ORDER — SODIUM CHLORIDE 0.9 % (FLUSH) 0.9 %
20 SYRINGE (ML) INJECTION AS NEEDED
Status: DISCONTINUED | OUTPATIENT
Start: 2022-08-30 | End: 2022-09-01 | Stop reason: HOSPADM

## 2022-08-30 RX ORDER — HEPARIN SODIUM (PORCINE) LOCK FLUSH IV SOLN 100 UNIT/ML 100 UNIT/ML
500 SOLUTION INTRAVENOUS AS NEEDED
Status: CANCELLED | OUTPATIENT
Start: 2022-08-30

## 2022-08-30 RX ORDER — SODIUM CHLORIDE 9 MG/ML
250 INJECTION, SOLUTION INTRAVENOUS ONCE
Status: CANCELLED | OUTPATIENT
Start: 2022-08-30

## 2022-08-30 RX ORDER — SODIUM CHLORIDE 9 MG/ML
250 INJECTION, SOLUTION INTRAVENOUS ONCE
Status: COMPLETED | OUTPATIENT
Start: 2022-08-30 | End: 2022-08-30

## 2022-08-30 RX ORDER — SODIUM CHLORIDE 0.9 % (FLUSH) 0.9 %
20 SYRINGE (ML) INJECTION AS NEEDED
Status: CANCELLED | OUTPATIENT
Start: 2022-08-30

## 2022-08-30 RX ADMIN — SODIUM CHLORIDE 250 ML: 9 INJECTION, SOLUTION INTRAVENOUS at 09:40

## 2022-08-30 RX ADMIN — Medication 20 ML: at 12:26

## 2022-08-30 RX ADMIN — HEPARIN SODIUM (PORCINE) LOCK FLUSH IV SOLN 100 UNIT/ML 500 UNITS: 100 SOLUTION at 12:26

## 2022-08-30 RX ADMIN — SODIUM CHLORIDE 70 MG: 9 INJECTION, SOLUTION INTRAVENOUS at 09:52

## 2022-08-30 RX ADMIN — SODIUM CHLORIDE 200 MG: 9 INJECTION, SOLUTION INTRAVENOUS at 10:43

## 2022-08-30 NOTE — ASSESSMENT & PLAN NOTE
Metastatic.  Patient is on palliative treatment with Yervoy and Opdivo.  Tolerating her treatments well.  Lab work is adequate for treatment.  Proceed with cycle 3 as planned.  RTC cycle 4-day 1 with lab work prior to monitor for toxicities and restaging scans to assess response to therapy.

## 2022-08-30 NOTE — PROGRESS NOTES
"Patient reported that she had a \"reaction\" at her last infusion and became very \"itchy.\"  She states that she discussed with Dr Hernández at office visit and he directed her to take 50 mg of Benadryl PO with infusion.  This medication is not a part of her treatment plan. Patient brought home med with her today but did not want to take unless she felt like she needed it. Advised patient to take, but declined a second time stating she would take when she got home.  Patient tolerated infusion well without any complaints.  Some itching around her tegaderm covering her port but no other reports.  No rash or bumps noted.    "

## 2022-08-30 NOTE — ADDENDUM NOTE
Encounter addended by: Mercedes Zaidi on: 8/30/2022 9:25 AM   Actions taken: Child order released for a procedure order

## 2022-08-30 NOTE — ASSESSMENT & PLAN NOTE
Patient has persistent anemia with hemoglobin 8.6 g/dL.  This is not a typical side effect of immunotherapy.  I will check stool for occult blood.  Repeat CBC next visit.

## 2022-08-31 ENCOUNTER — TELEPHONE (OUTPATIENT)
Dept: ONCOLOGY | Facility: HOSPITAL | Age: 67
End: 2022-08-31

## 2022-08-31 NOTE — TELEPHONE ENCOUNTER
Instructed patient she is not schedule for a barium ct, she has surveillance ct scans to monitor her response to her treatment.

## 2022-08-31 NOTE — TELEPHONE ENCOUNTER
Caller: Linda Ortiz    Relationship: Self    Best call back number: [phone]    What is the best time to reach you: ANYTIME    CAN LEAVE VOICEMAIL IF UNABLE TO REACH     Who are you requesting to speak with (clinical staff, provider,  specific staff member): DR FLOWERS         What was the call regarding:     SAW DR FLOWERS Monday  AND TURNED IN STOOL SAMPLE, WAS CHECKING FOR ANY BLEEDING THAT MAY BE OCCURRING IN THE BOWEL AREA, JUST GOT TEST RESULTS AND WERE SHOWING NEGATIVE    IS SCHEDULED FOR BARIUM CT ON 09/15 AND WANTING TO KNOW IF WILL STILL NEED TO DO THIS SINCE WAS NEGATIVE ON  THE STOOL SAMPLE    Do you require a callback: YES

## 2022-09-12 ENCOUNTER — APPOINTMENT (OUTPATIENT)
Dept: RADIATION ONCOLOGY | Facility: HOSPITAL | Age: 67
End: 2022-09-12

## 2022-09-15 ENCOUNTER — APPOINTMENT (OUTPATIENT)
Dept: CT IMAGING | Facility: HOSPITAL | Age: 67
End: 2022-09-15

## 2022-09-16 ENCOUNTER — APPOINTMENT (OUTPATIENT)
Dept: CT IMAGING | Facility: HOSPITAL | Age: 67
End: 2022-09-16

## 2022-09-16 ENCOUNTER — HOSPITAL ENCOUNTER (OUTPATIENT)
Dept: CT IMAGING | Facility: HOSPITAL | Age: 67
Discharge: HOME | End: 2022-09-16
Admitting: INTERNAL MEDICINE

## 2022-09-16 DIAGNOSIS — C34.12 MALIGNANT NEOPLASM OF UPPER LOBE OF LEFT LUNG: ICD-10-CM

## 2022-09-16 PROCEDURE — 74177 CT ABD & PELVIS W/CONTRAST: CPT

## 2022-09-16 PROCEDURE — 71260 CT THORAX DX C+: CPT

## 2022-09-16 PROCEDURE — 0 IOPAMIDOL PER 1 ML: Performed by: INTERNAL MEDICINE

## 2022-09-16 RX ORDER — HEPARIN SODIUM (PORCINE) LOCK FLUSH IV SOLN 100 UNIT/ML 100 UNIT/ML
SOLUTION INTRAVENOUS
Status: DISCONTINUED
Start: 2022-09-16 | End: 2022-09-17 | Stop reason: HOSPADM

## 2022-09-16 RX ADMIN — IOPAMIDOL 100 ML: 755 INJECTION, SOLUTION INTRAVENOUS at 15:05

## 2022-09-19 ENCOUNTER — APPOINTMENT (OUTPATIENT)
Dept: CT IMAGING | Facility: HOSPITAL | Age: 67
End: 2022-09-19

## 2022-09-19 ENCOUNTER — OFFICE VISIT (OUTPATIENT)
Dept: ONCOLOGY | Facility: HOSPITAL | Age: 67
End: 2022-09-19

## 2022-09-19 ENCOUNTER — HOSPITAL ENCOUNTER (OUTPATIENT)
Dept: ONCOLOGY | Facility: HOSPITAL | Age: 67
Setting detail: INFUSION SERIES
Discharge: HOME | End: 2022-09-19

## 2022-09-19 VITALS
HEART RATE: 87 BPM | WEIGHT: 137.79 LBS | SYSTOLIC BLOOD PRESSURE: 101 MMHG | OXYGEN SATURATION: 100 % | BODY MASS INDEX: 25.52 KG/M2 | RESPIRATION RATE: 16 BRPM | TEMPERATURE: 96.8 F | DIASTOLIC BLOOD PRESSURE: 62 MMHG

## 2022-09-19 DIAGNOSIS — Z45.2 ENCOUNTER FOR ADJUSTMENT OR MANAGEMENT OF VASCULAR ACCESS DEVICE: Primary | ICD-10-CM

## 2022-09-19 DIAGNOSIS — C34.12 MALIGNANT NEOPLASM OF UPPER LOBE OF LEFT LUNG: Primary | ICD-10-CM

## 2022-09-19 LAB
ALBUMIN SERPL-MCNC: 2.6 G/DL (ref 3.5–5.2)
ALBUMIN/GLOB SERPL: 0.4 G/DL
ALP SERPL-CCNC: 85 U/L (ref 39–117)
ALT SERPL W P-5'-P-CCNC: 17 U/L (ref 1–33)
ANION GAP SERPL CALCULATED.3IONS-SCNC: 5.6 MMOL/L (ref 5–15)
AST SERPL-CCNC: 52 U/L (ref 1–32)
BASOPHILS # BLD AUTO: 0.06 10*3/MM3 (ref 0–0.2)
BASOPHILS NFR BLD AUTO: 1 % (ref 0–1.5)
BILIRUB SERPL-MCNC: 0.4 MG/DL (ref 0–1.2)
BUN SERPL-MCNC: 8 MG/DL (ref 8–23)
BUN/CREAT SERPL: 10.7 (ref 7–25)
CALCIUM SPEC-SCNC: 8.6 MG/DL (ref 8.6–10.5)
CHLORIDE SERPL-SCNC: 94 MMOL/L (ref 98–107)
CO2 SERPL-SCNC: 28.4 MMOL/L (ref 22–29)
CREAT SERPL-MCNC: 0.75 MG/DL (ref 0.57–1)
DEPRECATED RDW RBC AUTO: 57 FL (ref 37–54)
EGFRCR SERPLBLD CKD-EPI 2021: 87.9 ML/MIN/1.73
EOSINOPHIL # BLD AUTO: 0.24 10*3/MM3 (ref 0–0.4)
EOSINOPHIL NFR BLD AUTO: 3.9 % (ref 0.3–6.2)
ERYTHROCYTE [DISTWIDTH] IN BLOOD BY AUTOMATED COUNT: 16 % (ref 12.3–15.4)
GLOBULIN UR ELPH-MCNC: 5.9 GM/DL
GLUCOSE SERPL-MCNC: 63 MG/DL (ref 65–99)
HCT VFR BLD AUTO: 30 % (ref 34–46.6)
HGB BLD-MCNC: 10.6 G/DL (ref 12–15.9)
IMM GRANULOCYTES # BLD AUTO: 0.02 10*3/MM3 (ref 0–0.05)
IMM GRANULOCYTES NFR BLD AUTO: 0.3 % (ref 0–0.5)
LYMPHOCYTES # BLD AUTO: 2.1 10*3/MM3 (ref 0.7–3.1)
LYMPHOCYTES NFR BLD AUTO: 34.4 % (ref 19.6–45.3)
MCH RBC QN AUTO: 35 PG (ref 26.6–33)
MCHC RBC AUTO-ENTMCNC: 35.3 G/DL (ref 31.5–35.7)
MCV RBC AUTO: 99 FL (ref 79–97)
MONOCYTES # BLD AUTO: 0.78 10*3/MM3 (ref 0.1–0.9)
MONOCYTES NFR BLD AUTO: 12.8 % (ref 5–12)
NEUTROPHILS NFR BLD AUTO: 2.91 10*3/MM3 (ref 1.7–7)
NEUTROPHILS NFR BLD AUTO: 47.6 % (ref 42.7–76)
NRBC BLD AUTO-RTO: 0 /100 WBC (ref 0–0.2)
PLATELET # BLD AUTO: 620 10*3/MM3 (ref 140–450)
PMV BLD AUTO: 9 FL (ref 6–12)
POTASSIUM SERPL-SCNC: 4.7 MMOL/L (ref 3.5–5.2)
PROT SERPL-MCNC: 8.5 G/DL (ref 6–8.5)
RBC # BLD AUTO: 3.03 10*6/MM3 (ref 3.77–5.28)
SODIUM SERPL-SCNC: 128 MMOL/L (ref 136–145)
T4 FREE SERPL-MCNC: 0.59 NG/DL (ref 0.93–1.7)
TSH SERPL DL<=0.05 MIU/L-ACNC: 2.41 UIU/ML (ref 0.27–4.2)
WBC NRBC COR # BLD: 6.11 10*3/MM3 (ref 3.4–10.8)

## 2022-09-19 PROCEDURE — 82024 ASSAY OF ACTH: CPT | Performed by: INTERNAL MEDICINE

## 2022-09-19 PROCEDURE — 25010000002 HEPARIN LOCK FLUSH PER 10 UNITS: Performed by: INTERNAL MEDICINE

## 2022-09-19 PROCEDURE — 99214 OFFICE O/P EST MOD 30 MIN: CPT | Performed by: INTERNAL MEDICINE

## 2022-09-19 PROCEDURE — G0463 HOSPITAL OUTPT CLINIC VISIT: HCPCS

## 2022-09-19 PROCEDURE — 80053 COMPREHEN METABOLIC PANEL: CPT | Performed by: INTERNAL MEDICINE

## 2022-09-19 PROCEDURE — 85025 COMPLETE CBC W/AUTO DIFF WBC: CPT | Performed by: INTERNAL MEDICINE

## 2022-09-19 PROCEDURE — 84443 ASSAY THYROID STIM HORMONE: CPT | Performed by: INTERNAL MEDICINE

## 2022-09-19 PROCEDURE — 36591 DRAW BLOOD OFF VENOUS DEVICE: CPT

## 2022-09-19 PROCEDURE — 84439 ASSAY OF FREE THYROXINE: CPT | Performed by: INTERNAL MEDICINE

## 2022-09-19 RX ORDER — SODIUM CHLORIDE 0.9 % (FLUSH) 0.9 %
20 SYRINGE (ML) INJECTION AS NEEDED
Status: CANCELLED | OUTPATIENT
Start: 2022-09-19

## 2022-09-19 RX ORDER — HEPARIN SODIUM (PORCINE) LOCK FLUSH IV SOLN 100 UNIT/ML 100 UNIT/ML
500 SOLUTION INTRAVENOUS AS NEEDED
Status: DISCONTINUED | OUTPATIENT
Start: 2022-09-19 | End: 2022-09-20 | Stop reason: HOSPADM

## 2022-09-19 RX ORDER — SODIUM CHLORIDE 9 MG/ML
250 INJECTION, SOLUTION INTRAVENOUS ONCE
Status: CANCELLED | OUTPATIENT
Start: 2022-09-20

## 2022-09-19 RX ORDER — SODIUM CHLORIDE 0.9 % (FLUSH) 0.9 %
20 SYRINGE (ML) INJECTION AS NEEDED
Status: DISCONTINUED | OUTPATIENT
Start: 2022-09-19 | End: 2022-09-20 | Stop reason: HOSPADM

## 2022-09-19 RX ORDER — HEPARIN SODIUM (PORCINE) LOCK FLUSH IV SOLN 100 UNIT/ML 100 UNIT/ML
500 SOLUTION INTRAVENOUS AS NEEDED
Status: CANCELLED | OUTPATIENT
Start: 2022-09-19

## 2022-09-19 RX ADMIN — HEPARIN SODIUM (PORCINE) LOCK FLUSH IV SOLN 100 UNIT/ML 500 UNITS: 100 SOLUTION at 09:42

## 2022-09-19 RX ADMIN — Medication 20 ML: at 09:42

## 2022-09-19 NOTE — PROGRESS NOTES
Chief Complaint  Lung Cancer    Provider, No Known  Provider, No Known    Subjective          Linda Ortiz presents to [facility] HEMATOLOGY & ONCOLOGY for ongoing treatment of her metastatic lung cancer.  She is on ipilimumab plus nivolumab.  Tolerating her treatments well.  She denies any issues or side effects.  The right hand metastatic focus is improved and she is able to use the hand a little bit more although not back to normal.  She denies other new masses or adenopathy, no unusual aches or pains.  She reports good appetite.    Oncology/Hematology History Overview Note   5/19/2022 Left Adrenal mass biopsy at [facility] revealed: poorly differentiated non-small cell carcinoma, high grade. Positive fo AE1/3 and Cam5.2. Immunohistochemistry was positive for PAX8, HeParlGlypican3, Vimentin, focal positive for chromagranin, GATA3.  The case was sent to [facility] for second opinion with a final diagnosis of poorly differentiated carcinoma with diffuse reduced BRG-1 expression.    6/21/2022 XRT initiated to Right hand     7/19/2022 Opdivo + Yervoy initiated x 4 doses  10/11/2022 Opdivo only          Malignant neoplasm of upper lobe of left lung (HCC)   6/9/2022 Initial Diagnosis    Lung cancer (HCC)     7/19/2022 -  Chemotherapy    OP LUNG Nivolumab 1 mg/kg / Ipilimumab 3 mg/kg / Nivolumab 480 mg      Secondary carcinoid tumor of bone (HCC) (Resolved)   6/14/2022 Initial Diagnosis    Secondary carcinoid tumor of bone (HCC)     6/20/2022 -  Radiation    RADIATION THERAPY Treatment Details (Noted on 6/14/2022)  Site: Right Hand  Technique: 3D CRT  Goal: No goal specified  Planned Treatment Start Date: 6/20/2022         Review of Systems   Constitutional: Negative for appetite change, diaphoresis, fatigue, fever, unexpected weight gain and unexpected weight loss.   HENT: Negative for hearing loss, mouth sores, sore throat, swollen glands, trouble swallowing and voice change.    Eyes: Negative for  blurred vision.   Respiratory: Negative for cough, shortness of breath and wheezing.    Cardiovascular: Negative for chest pain and palpitations.   Gastrointestinal: Negative for abdominal pain, blood in stool, constipation, diarrhea, nausea and vomiting.   Endocrine: Negative for cold intolerance and heat intolerance.   Genitourinary: Negative for difficulty urinating, dysuria, frequency, hematuria and urinary incontinence.   Musculoskeletal: Negative for arthralgias, back pain and myalgias.   Skin: Negative for rash, skin lesions and wound.   Neurological: Negative for dizziness, seizures, weakness, numbness and headache.   Hematological: Does not bruise/bleed easily.   Psychiatric/Behavioral: Negative for depressed mood. The patient is not nervous/anxious.      Current Outpatient Medications on File Prior to Visit   Medication Sig Dispense Refill   • acetaminophen (TYLENOL) 325 MG tablet Take 650 mg by mouth Every 6 (Six) Hours As Needed for Mild Pain .     • benzonatate (Tessalon Perles) 100 MG capsule Take 1 capsule by mouth 3 (Three) Times a Day As Needed for Cough. 30 capsule 3   • doxycycline (VIBRAMYCIN) 100 MG capsule Take 1 capsule by mouth 2 (Two) Times a Day. 14 capsule 0   • Loperamide HCl (IMODIUM PO) Take  by mouth.     • mirtazapine (REMERON) 15 MG tablet Take 1 tablet by mouth Every Night. 30 tablet 5   • ondansetron (ZOFRAN) 8 MG tablet Take 1 tablet by mouth 3 (Three) Times a Day As Needed for Nausea or Vomiting. 30 tablet 5   • oxyCODONE-acetaminophen (Percocet) 5-325 MG per tablet Take 1-2 tablets by mouth Every 4 (Four) Hours As Needed (pain). 6 tablet 0     Current Facility-Administered Medications on File Prior to Visit   Medication Dose Route Frequency Provider Last Rate Last Admin   • [DISCONTINUED] heparin injection 500 Units  500 Units Intravenous PRN Phani Hernández MD   500 Units at 09/19/22 0942   • [DISCONTINUED] sodium chloride 0.9 % flush 20 mL  20 mL Intravenous PRN Edgar  Phani MENDENHALL MD   20 mL at 22 0942       Allergies   Allergen Reactions   • Hydrocodone Nausea And Vomiting and Dizziness     Past Medical History:   Diagnosis Date   • Bone cancer (HCC) 2022   • Hypocalcemia 2022   • Malignant neoplasm of upper lobe of left lung (HCC) 2022   • Metastatic cancer (HCC)      Past Surgical History:   Procedure Laterality Date   • HIP SURGERY Bilateral    • KNEE ARTHROSCOPY     • VENOUS ACCESS DEVICE (PORT) INSERTION N/A 7/15/2022    Procedure: INSERTION VENOUS ACCESS DEVICE;  Surgeon: Eber Hull MD;  [place] [facility] [place] [facility] [place]  Service: General;  Laterality: N/A;     Social History     Socioeconomic History   • Marital status: Single   Tobacco Use   • Smoking status: Former Smoker     Packs/day: 1.00     Types: Cigarettes     Start date:      Quit date: 2022     Years since quittin.6   • Smokeless tobacco: Never Used   Vaping Use   • Vaping Use: Former   • Start date: 2013   • Quit date: 2014   • Substances: Nicotine, Flavoring   Substance and Sexual Activity   • Alcohol use: Not Currently   • Drug use: Never     Family History   Problem Relation Age of Onset   • Breast cancer Mother    • Lung cancer Father    • Thyroid cancer Maternal Aunt    • Breast cancer Maternal Aunt    • Lung cancer Paternal Aunt    • COPD Paternal Uncle        Objective   Physical Exam  Vitals reviewed. Exam conducted with a chaperone present.   Constitutional:       General: She is not in acute distress.     Appearance: Normal appearance.   Cardiovascular:      Rate and Rhythm: Normal rate and regular rhythm.      Heart sounds: Normal heart sounds. No murmur heard.    No gallop.   Pulmonary:      Effort: Pulmonary effort is normal.      Breath sounds: Normal breath sounds.      Comments: Port    Abdominal:      General: Abdomen is flat. Bowel sounds are normal.      Palpations: Abdomen is soft.   Musculoskeletal:      Cervical back: Neck supple.      Right lower leg:  No edema.      Left lower leg: No edema.   Lymphadenopathy:      Cervical: No cervical adenopathy.   Neurological:      Mental Status: She is alert and oriented to person, place, and time.   Psychiatric:         Mood and Affect: Mood normal.         Behavior: Behavior normal.         Vitals:    09/19/22 1045   BP: 101/62   Pulse: 87   Resp: 16   Temp: 96.8 °F (36 °C)   SpO2: 100%   Weight: 62.5 kg (137 lb 12.6 oz)   PainSc: 0-No pain     ECOG score: 1         PHQ-9 Total Score:                    Result Review :   The following data was reviewed by: Phani Hernández MD on 09/19/2022:  Lab Results   Component Value Date    HGB 10.6 (L) 09/19/2022    HCT 30.0 (L) 09/19/2022    MCV 99.0 (H) 09/19/2022     (H) 09/19/2022    WBC 6.11 09/19/2022    NEUTROABS 2.91 09/19/2022    LYMPHSABS 2.10 09/19/2022    MONOSABS 0.78 09/19/2022    EOSABS 0.24 09/19/2022    BASOSABS 0.06 09/19/2022     Lab Results   Component Value Date    GLUCOSE 63 (L) 09/19/2022    BUN 8 09/19/2022    CREATININE 0.75 09/19/2022     (L) 09/19/2022    K 4.7 09/19/2022    CL 94 (L) 09/19/2022    CO2 28.4 09/19/2022    CALCIUM 8.6 09/19/2022    PROTEINTOT 8.5 09/19/2022    ALBUMIN 2.60 (L) 09/19/2022    BILITOT 0.4 09/19/2022    ALKPHOS 85 09/19/2022    AST 52 (H) 09/19/2022    ALT 17 09/19/2022     Lab Results   Component Value Date    FREET4 0.59 (L) 09/19/2022    TSH 2.410 09/19/2022       Data reviewed: Radiologic studies CT chest, abdomen, pelvis reviewed      Assessment and Plan    Diagnoses and all orders for this visit:    1. Malignant neoplasm of upper lobe of left lung (HCC) (Primary)  Assessment & Plan:  Patient is on palliative treatment with nivolumab and Yervoy.  Tolerating her treatments well.  The right hand continues to be somewhat painful but she is able to use it more easily.  Lab work today is adequate for treatment.  Proceed with cycle 4 as planned.  Restaging CT scans reviewed demonstrating good response to therapy thus  far.  I will see her back in 3 weeks time for cycle 5 with lab work prior to monitor for toxicities            Patient Follow Up: Cycle 5-day 1    Patient was given instructions and counseling regarding her condition or for health maintenance advice. Please see specific information pulled into the AVS if appropriate.     Phani Hernández MD    9/20/2022

## 2022-09-20 ENCOUNTER — DOCUMENTATION (OUTPATIENT)
Dept: ONCOLOGY | Facility: HOSPITAL | Age: 67
End: 2022-09-20

## 2022-09-20 ENCOUNTER — HOSPITAL ENCOUNTER (OUTPATIENT)
Dept: ONCOLOGY | Facility: HOSPITAL | Age: 67
Setting detail: INFUSION SERIES
Discharge: HOME | End: 2022-09-20

## 2022-09-20 VITALS
HEART RATE: 87 BPM | WEIGHT: 138.67 LBS | SYSTOLIC BLOOD PRESSURE: 87 MMHG | BODY MASS INDEX: 25.52 KG/M2 | OXYGEN SATURATION: 97 % | HEIGHT: 62 IN | DIASTOLIC BLOOD PRESSURE: 54 MMHG | RESPIRATION RATE: 20 BRPM | TEMPERATURE: 98.4 F

## 2022-09-20 DIAGNOSIS — C34.12 MALIGNANT NEOPLASM OF UPPER LOBE OF LEFT LUNG: ICD-10-CM

## 2022-09-20 DIAGNOSIS — Z45.2 ENCOUNTER FOR ADJUSTMENT OR MANAGEMENT OF VASCULAR ACCESS DEVICE: Primary | ICD-10-CM

## 2022-09-20 LAB — ACTH PLAS-MCNC: 47 PG/ML (ref 7.2–63.3)

## 2022-09-20 PROCEDURE — 25010000002 NIVOLUMAB 40 MG/4ML SOLUTION 4 ML VIAL: Performed by: INTERNAL MEDICINE

## 2022-09-20 PROCEDURE — 25010000002 HEPARIN LOCK FLUSH PER 10 UNITS: Performed by: INTERNAL MEDICINE

## 2022-09-20 PROCEDURE — 96413 CHEMO IV INFUSION 1 HR: CPT

## 2022-09-20 PROCEDURE — 96415 CHEMO IV INFUSION ADDL HR: CPT

## 2022-09-20 PROCEDURE — 25010000002 IPILIMUMAB 200 MG/40ML SOLUTION 40 ML VIAL: Performed by: INTERNAL MEDICINE

## 2022-09-20 PROCEDURE — 96417 CHEMO IV INFUS EACH ADDL SEQ: CPT

## 2022-09-20 PROCEDURE — 96366 THER/PROPH/DIAG IV INF ADDON: CPT

## 2022-09-20 RX ORDER — SODIUM CHLORIDE 0.9 % (FLUSH) 0.9 %
20 SYRINGE (ML) INJECTION AS NEEDED
Status: DISCONTINUED | OUTPATIENT
Start: 2022-09-20 | End: 2022-09-21 | Stop reason: HOSPADM

## 2022-09-20 RX ORDER — SODIUM CHLORIDE 0.9 % (FLUSH) 0.9 %
20 SYRINGE (ML) INJECTION AS NEEDED
Status: CANCELLED | OUTPATIENT
Start: 2022-09-20

## 2022-09-20 RX ORDER — HEPARIN SODIUM (PORCINE) LOCK FLUSH IV SOLN 100 UNIT/ML 100 UNIT/ML
500 SOLUTION INTRAVENOUS AS NEEDED
Status: DISCONTINUED | OUTPATIENT
Start: 2022-09-20 | End: 2022-09-21 | Stop reason: HOSPADM

## 2022-09-20 RX ORDER — SODIUM CHLORIDE 9 MG/ML
250 INJECTION, SOLUTION INTRAVENOUS ONCE
Status: COMPLETED | OUTPATIENT
Start: 2022-09-20 | End: 2022-09-20

## 2022-09-20 RX ORDER — HEPARIN SODIUM (PORCINE) LOCK FLUSH IV SOLN 100 UNIT/ML 100 UNIT/ML
500 SOLUTION INTRAVENOUS AS NEEDED
Status: CANCELLED | OUTPATIENT
Start: 2022-09-20

## 2022-09-20 RX ADMIN — SODIUM CHLORIDE 250 ML: 9 INJECTION, SOLUTION INTRAVENOUS at 09:24

## 2022-09-20 RX ADMIN — SODIUM CHLORIDE 200 MG: 9 INJECTION, SOLUTION INTRAVENOUS at 10:07

## 2022-09-20 RX ADMIN — SODIUM CHLORIDE 70 MG: 9 INJECTION, SOLUTION INTRAVENOUS at 09:25

## 2022-09-20 RX ADMIN — HEPARIN SODIUM (PORCINE) LOCK FLUSH IV SOLN 100 UNIT/ML 500 UNITS: 100 SOLUTION at 11:47

## 2022-09-20 RX ADMIN — Medication 20 ML: at 11:46

## 2022-09-20 NOTE — PROGRESS NOTES
Diagnosis: Lung cancer    Reason for Referral: Financial assistance    Content of Visit: Pt stopped [facility] in [facility] center this morning during tx. Pt presented in a very pleasant mood. Pt reports doing well overall, however, recently got over a stomach bug and therefore has lost some weight. Pt reports otherwise she has been eating well. Pt reports she's been speaking some with her sister that resides in [place] that she hasn't spoken to in ten years. Pt reports her sister is currently undergoing cancer tx herself for lymphoma. Pt inquiring if there are additional financial resources available to her in addition to [employer] and [employer] that she previously received assistance from. Pt reports she has been utilizing [employer] transportation on tx days, however, drives herself occasionally to other medical appointments or to run quick errands. Pt drove herself yesterday to her lab and f/u appointment with us. Provided pt with a $15 gas card today and referred her to the [employer] for additional transportation assistance. Informed pt she may utilize gas cards received from [employer] to pay T[employer] if needed. Pt v/u. Pt reports she pays $10/month to have [employer] deliver her groceries to her residence. Pt is picking up her senior food box from [employer] this afternoon, reporting she receives one each month. Pt reports she met a woman who has provided her with food and commodities through a [employer] organization. Pt also has a friend that has offered to  some items on her behalf. Pt's neighbor used to help some, but is no longer involved. Pt reports her main concern is her electric bill once the temperature drops outside. Discussed opportunity to contact [employer] to apply for their LIHEAP program to aid with electric bill throughout the winter months. Emailed pt [employer]'s contact information per her request, along with additional [facility]  resources. Also emailed pt a list of local [facility] food pantries. No other needs identified at this time. Encouraged [employer] support remains available. Pt expressed gratitude.     Update 9/21: Received notification from pt that she contacted [employer] and was informed they will not have funds until first of November and encouraged pt to contact them back at the end of October.    Resources/Referrals Provided: Financial/food/utility and transportation resources - $15 gas card, [place] [facility], [facility] community resources, [facility] [employer] [place] for utility assistance, list of local food pantries

## 2022-09-23 ENCOUNTER — OFFICE VISIT (OUTPATIENT)
Dept: RADIATION ONCOLOGY | Facility: HOSPITAL | Age: 67
End: 2022-09-23

## 2022-09-23 VITALS
SYSTOLIC BLOOD PRESSURE: 95 MMHG | DIASTOLIC BLOOD PRESSURE: 64 MMHG | TEMPERATURE: 97.6 F | HEART RATE: 86 BPM | RESPIRATION RATE: 16 BRPM | BODY MASS INDEX: 25.52 KG/M2 | OXYGEN SATURATION: 100 % | WEIGHT: 137.79 LBS

## 2022-09-23 DIAGNOSIS — C34.12 MALIGNANT NEOPLASM OF UPPER LOBE OF LEFT LUNG: Primary | ICD-10-CM

## 2022-09-23 DIAGNOSIS — R22.31 MASS OF RIGHT HAND: ICD-10-CM

## 2022-09-23 PROCEDURE — 99212-NC PR NO CHARGE CBC OFFICE OUTPATIENT VISIT 10 MINUTES: Performed by: RADIOLOGY

## 2022-09-23 PROCEDURE — G0463 HOSPITAL OUTPT CLINIC VISIT: HCPCS | Performed by: RADIOLOGY

## 2022-09-23 NOTE — PROGRESS NOTES
Follow Up Office Visit      Encounter Date: 09/23/2022   Patient Name: Linda Ortiz  YOB: 1955   Medical Record Number: [id number]   Primary Diagnosis: Malignant neoplasm of upper lobe of left lung (HCC) [C34.12]       Chief Complaint:    Chief Complaint   Patient presents with   • Follow-up       History of Present Illness: Linda Ortiz returns for short interval follow-up as she undergoes systemic therapy with nivolumab and ipilimumab per Dr. Hernández.  She reports feeling well overall with the exception of significantly decreased appetite and an approximate 25 pound weight loss.  Her right hand swelling has improved markedly but she does have limited function with regard to her right hand.  Repeat CT scan of the chest on 9/16/2022 revealed a good response to therapy with interval decrease in size in left upper lobe mass decreased in size in mediastinal and right axillary adenopathy.  Additionally, there was decrease in size of bilateral adrenal masses.    Subjective      Review of Systems: Review of Systems   Constitutional: Positive for appetite change (decreased) and unexpected weight change (25# wt loss since 6/2022). Negative for fatigue.   Respiratory: Positive for cough (productive with yellow secretions). Negative for shortness of breath.    Gastrointestinal: Positive for diarrhea (occasional- 1-2 x daily). Negative for constipation and nausea.   Genitourinary: Negative for difficulty urinating, dysuria, frequency and urgency.   Musculoskeletal: Positive for arthralgias (new shoulder pain). Negative for back pain.   Skin: Negative for color change.        Hyperpigmentation of right hand     Neurological: Positive for headaches. Negative for dizziness.   Psychiatric/Behavioral: Negative for sleep disturbance.       The following portions of the patient's history were reviewed and updated as appropriate: allergies, current medications, past family history, past medical history, past  social history, past surgical history and problem list.    Medications:     Current Outpatient Medications:   •  acetaminophen (TYLENOL) 325 MG tablet, Take 650 mg by mouth Every 6 (Six) Hours As Needed for Mild Pain ., Disp: , Rfl:   •  benzonatate (Tessalon Perles) 100 MG capsule, Take 1 capsule by mouth 3 (Three) Times a Day As Needed for Cough., Disp: 30 capsule, Rfl: 3  •  doxycycline (VIBRAMYCIN) 100 MG capsule, Take 1 capsule by mouth 2 (Two) Times a Day., Disp: 14 capsule, Rfl: 0  •  Loperamide HCl (IMODIUM PO), Take  by mouth., Disp: , Rfl:   •  mirtazapine (REMERON) 15 MG tablet, Take 1 tablet by mouth Every Night., Disp: 30 tablet, Rfl: 5  •  ondansetron (ZOFRAN) 8 MG tablet, Take 1 tablet by mouth 3 (Three) Times a Day As Needed for Nausea or Vomiting., Disp: 30 tablet, Rfl: 5  •  oxyCODONE-acetaminophen (Percocet) 5-325 MG per tablet, Take 1-2 tablets by mouth Every 4 (Four) Hours As Needed (pain)., Disp: 6 tablet, Rfl: 0    Allergies:   Allergies   Allergen Reactions   • Hydrocodone Nausea And Vomiting and Dizziness       ECOG: (1) Restricted in physically strenuous activity, ambulatory and able to do work of light nature  Quality of Life: 80 - Restricted Physical Activity     Objective     Physical Exam:   Vital Signs:   Vitals:    09/23/22 1420   BP: 95/64   Pulse: 86   Resp: 16   Temp: 97.6 °F (36.4 °C)   TempSrc: Temporal   SpO2: 100%   Weight: 62.5 kg (137 lb 12.6 oz)   PainSc:   1     Body mass index is 25.52 kg/m².     Physical Exam  Constitutional:       General: She is not in acute distress.     Appearance: She is not ill-appearing or toxic-appearing.   HENT:      Head: Normocephalic and atraumatic.   Pulmonary:      Effort: Pulmonary effort is normal. No respiratory distress.   Abdominal:      General: Abdomen is flat. There is no distension.      Palpations: Abdomen is soft.   Musculoskeletal:      Comments: Interval decrease in the right thenar eminence consistent with response to external  beam radiotherapy; decreased range of motion within right hand;  strength 1 out of 5   Skin:     General: Skin is warm and dry.      Coloration: Skin is not jaundiced.   Neurological:      General: No focal deficit present.      Mental Status: She is alert and oriented to person, place, and time.      Cranial Nerves: No cranial nerve deficit.      Gait: Gait normal.   Psychiatric:         Mood and Affect: Mood normal.         Behavior: Behavior normal.         Judgment: Judgment normal.         Radiographs: CT Chest With Contrast Diagnostic    Result Date: 9/16/2022    1. Good response to therapy with interval decrease in size and left upper lobe mass and decrease in size and mediastinal on right axillary adenopathy.  There has also been interval decrease in size of bilateral adrenal masses. 2. Slight increase in size of noncalcified nodule within the right lower lobe now measuring 10 mm, previously 8 mm.     SHAYLEE WILDE MD       Electronically Signed and Approved By: SHAYLEE WILDE MD on 9/16/2022 at 15:39             MRI Brain With & Without Contrast    Result Date: 7/1/2022    1. Negative for intracranial metastasis. 2. No acute intracranial process identified. 3. Findings suggestive of mild chronic small vessel ischemic disease.      SUJATHA COURTNEY MD       Electronically Signed and Approved By: SUJATHA COURTNEY MD on 7/01/2022 at 10:19             CT Abdomen Pelvis With Contrast    Result Date: 9/16/2022    1. Good interval response to therapy with decrease in size of bilateral adrenal masses and retroperitoneal adenopathy. 2. No acute process seen within the abdomen or pelvis.     SHAYLEE WILDE MD       Electronically Signed and Approved By: SHAYLEE WILDE MD on 9/16/2022 at 15:47             XR Chest 1 View    Result Date: 7/15/2022    1. Right-sided subclavian approach port projects in expected position.  No pneumothorax or immediate postprocedure complication 2. Large left-sided perihilar mass        MADDISON HIGGINS MD       Electronically Signed and Approved By: MADDISON HIGGINS MD on 7/15/2022 at 12:39             NM PET/CT Skull Base to Mid Thigh    Result Date: 7/8/2022    1. 8.3 cm FDG avid mass within medial left upper lobe, most compatible with primary lung malignancy. 2. FDG avid mediastinal, right axillary, retroperitoneal, and left cervical lymphadenopathy, likely metastatic. 3. Large bilateral FDG avid adrenal masses, likely metastatic. 4. FDG avid metastasis involving T3 spinous process with soft tissue component. 5. Two right lower lobe nodules measuring up to 10 mm that are not FDG avid.  These are equivocal.  Recommend attention on follow-up imaging.     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    She is now status post external beam radiotherapy with interval clinical response.  She is currently undergoing immunotherapy with good radiographic and clinical response.    I will follow-up with Ms. Ortiz in 6 months.  She was encouraged to contact me in the interim with any questions or concerns regarding her care.           Torin Salcido MD  Radiation Oncology  [facility]    This document has been signed by Torin Salcido MD on September 26, 2022 10:10 EDT

## 2022-09-27 ENCOUNTER — HOSPITAL ENCOUNTER (INPATIENT)
Facility: HOSPITAL | Age: 67
LOS: 8 days | Discharge: HOME HEALTH | End: 2022-10-05
Attending: EMERGENCY MEDICINE | Admitting: FAMILY MEDICINE

## 2022-09-27 ENCOUNTER — APPOINTMENT (OUTPATIENT)
Dept: GENERAL RADIOLOGY | Facility: HOSPITAL | Age: 67
End: 2022-09-27

## 2022-09-27 DIAGNOSIS — R13.12 DYSPHAGIA, OROPHARYNGEAL: ICD-10-CM

## 2022-09-27 DIAGNOSIS — A41.9 SEPSIS, DUE TO UNSPECIFIED ORGANISM, UNSPECIFIED WHETHER ACUTE ORGAN DYSFUNCTION PRESENT: ICD-10-CM

## 2022-09-27 DIAGNOSIS — R53.1 WEAKNESS: Primary | ICD-10-CM

## 2022-09-27 DIAGNOSIS — R26.2 DIFFICULTY WALKING: ICD-10-CM

## 2022-09-27 DIAGNOSIS — Z78.9 DECREASED ACTIVITIES OF DAILY LIVING (ADL): ICD-10-CM

## 2022-09-27 DIAGNOSIS — C34.12 MALIGNANT NEOPLASM OF UPPER LOBE OF LEFT LUNG: ICD-10-CM

## 2022-09-27 DIAGNOSIS — E87.1 HYPONATREMIA: ICD-10-CM

## 2022-09-27 LAB
ALBUMIN SERPL-MCNC: 2.2 G/DL (ref 3.5–5.2)
ALBUMIN/GLOB SERPL: 0.4 G/DL
ALP SERPL-CCNC: 81 U/L (ref 39–117)
ALT SERPL W P-5'-P-CCNC: 17 U/L (ref 1–33)
ANION GAP SERPL CALCULATED.3IONS-SCNC: 10.4 MMOL/L (ref 5–15)
AST SERPL-CCNC: 54 U/L (ref 1–32)
BACTERIA UR QL AUTO: ABNORMAL /HPF
BASOPHILS # BLD AUTO: 0.05 10*3/MM3 (ref 0–0.2)
BASOPHILS NFR BLD AUTO: 0.7 % (ref 0–1.5)
BILIRUB SERPL-MCNC: 0.5 MG/DL (ref 0–1.2)
BILIRUB UR QL STRIP: NEGATIVE
BUN SERPL-MCNC: 13 MG/DL (ref 8–23)
BUN/CREAT SERPL: 18.1 (ref 7–25)
CALCIUM SPEC-SCNC: 8.6 MG/DL (ref 8.6–10.5)
CHLORIDE SERPL-SCNC: 85 MMOL/L (ref 98–107)
CLARITY UR: CLEAR
CO2 SERPL-SCNC: 23.6 MMOL/L (ref 22–29)
COLOR UR: YELLOW
CREAT SERPL-MCNC: 0.72 MG/DL (ref 0.57–1)
D-LACTATE SERPL-SCNC: 1.7 MMOL/L (ref 0.5–2)
D-LACTATE SERPL-SCNC: 3.9 MMOL/L (ref 0.5–2)
DEPRECATED RDW RBC AUTO: 49.1 FL (ref 37–54)
EGFRCR SERPLBLD CKD-EPI 2021: 92.3 ML/MIN/1.73
EOSINOPHIL # BLD AUTO: 0.34 10*3/MM3 (ref 0–0.4)
EOSINOPHIL NFR BLD AUTO: 4.9 % (ref 0.3–6.2)
ERYTHROCYTE [DISTWIDTH] IN BLOOD BY AUTOMATED COUNT: 14.6 % (ref 12.3–15.4)
GLOBULIN UR ELPH-MCNC: 5.7 GM/DL
GLUCOSE SERPL-MCNC: 69 MG/DL (ref 65–99)
GLUCOSE UR STRIP-MCNC: NEGATIVE MG/DL
HCT VFR BLD AUTO: 34.8 % (ref 34–46.6)
HGB BLD-MCNC: 12.2 G/DL (ref 12–15.9)
HGB UR QL STRIP.AUTO: ABNORMAL
HOLD SPECIMEN: NORMAL
HOLD SPECIMEN: NORMAL
HYALINE CASTS UR QL AUTO: ABNORMAL /LPF
IMM GRANULOCYTES # BLD AUTO: 0.06 10*3/MM3 (ref 0–0.05)
IMM GRANULOCYTES NFR BLD AUTO: 0.9 % (ref 0–0.5)
KETONES UR QL STRIP: ABNORMAL
LEUKOCYTE ESTERASE UR QL STRIP.AUTO: ABNORMAL
LYMPHOCYTES # BLD AUTO: 1.41 10*3/MM3 (ref 0.7–3.1)
LYMPHOCYTES NFR BLD AUTO: 20.3 % (ref 19.6–45.3)
MAGNESIUM SERPL-MCNC: 1.6 MG/DL (ref 1.6–2.4)
MCH RBC QN AUTO: 32.8 PG (ref 26.6–33)
MCHC RBC AUTO-ENTMCNC: 35.1 G/DL (ref 31.5–35.7)
MCV RBC AUTO: 93.5 FL (ref 79–97)
MONOCYTES # BLD AUTO: 0.84 10*3/MM3 (ref 0.1–0.9)
MONOCYTES NFR BLD AUTO: 12.1 % (ref 5–12)
MUCOUS THREADS URNS QL MICRO: ABNORMAL /HPF
NEUTROPHILS NFR BLD AUTO: 4.26 10*3/MM3 (ref 1.7–7)
NEUTROPHILS NFR BLD AUTO: 61.1 % (ref 42.7–76)
NITRITE UR QL STRIP: NEGATIVE
NRBC BLD AUTO-RTO: 0 /100 WBC (ref 0–0.2)
PH UR STRIP.AUTO: 5.5 [PH] (ref 5–8)
PLATELET # BLD AUTO: 396 10*3/MM3 (ref 140–450)
PMV BLD AUTO: 9.2 FL (ref 6–12)
POTASSIUM SERPL-SCNC: 4.3 MMOL/L (ref 3.5–5.2)
PROT SERPL-MCNC: 7.9 G/DL (ref 6–8.5)
PROT UR QL STRIP: ABNORMAL
RBC # BLD AUTO: 3.72 10*6/MM3 (ref 3.77–5.28)
RBC # UR STRIP: ABNORMAL /HPF
REF LAB TEST METHOD: ABNORMAL
SODIUM SERPL-SCNC: 119 MMOL/L (ref 136–145)
SP GR UR STRIP: 1.02 (ref 1–1.03)
SQUAMOUS #/AREA URNS HPF: ABNORMAL /HPF
TROPONIN T SERPL-MCNC: <0.01 NG/ML (ref 0–0.03)
TSH SERPL DL<=0.05 MIU/L-ACNC: 0.59 UIU/ML (ref 0.27–4.2)
UROBILINOGEN UR QL STRIP: ABNORMAL
WBC # UR STRIP: ABNORMAL /HPF
WBC NRBC COR # BLD: 6.96 10*3/MM3 (ref 3.4–10.8)
WHOLE BLOOD HOLD COAG: NORMAL
WHOLE BLOOD HOLD SPECIMEN: NORMAL

## 2022-09-27 PROCEDURE — 99285 EMERGENCY DEPT VISIT HI MDM: CPT

## 2022-09-27 PROCEDURE — 93005 ELECTROCARDIOGRAM TRACING: CPT | Performed by: EMERGENCY MEDICINE

## 2022-09-27 PROCEDURE — 83735 ASSAY OF MAGNESIUM: CPT | Performed by: EMERGENCY MEDICINE

## 2022-09-27 PROCEDURE — 93010 ELECTROCARDIOGRAM REPORT: CPT | Performed by: INTERNAL MEDICINE

## 2022-09-27 PROCEDURE — 85025 COMPLETE CBC W/AUTO DIFF WBC: CPT | Performed by: EMERGENCY MEDICINE

## 2022-09-27 PROCEDURE — 87040 BLOOD CULTURE FOR BACTERIA: CPT | Performed by: EMERGENCY MEDICINE

## 2022-09-27 PROCEDURE — 71045 X-RAY EXAM CHEST 1 VIEW: CPT

## 2022-09-27 PROCEDURE — 84443 ASSAY THYROID STIM HORMONE: CPT | Performed by: INTERNAL MEDICINE

## 2022-09-27 PROCEDURE — 84484 ASSAY OF TROPONIN QUANT: CPT | Performed by: EMERGENCY MEDICINE

## 2022-09-27 PROCEDURE — 80053 COMPREHEN METABOLIC PANEL: CPT | Performed by: EMERGENCY MEDICINE

## 2022-09-27 PROCEDURE — 99223 1ST HOSP IP/OBS HIGH 75: CPT | Performed by: INTERNAL MEDICINE

## 2022-09-27 PROCEDURE — 36415 COLL VENOUS BLD VENIPUNCTURE: CPT | Performed by: EMERGENCY MEDICINE

## 2022-09-27 PROCEDURE — 25010000002 MEROPENEM PER 100 MG: Performed by: EMERGENCY MEDICINE

## 2022-09-27 PROCEDURE — 83605 ASSAY OF LACTIC ACID: CPT | Performed by: EMERGENCY MEDICINE

## 2022-09-27 PROCEDURE — 83935 ASSAY OF URINE OSMOLALITY: CPT | Performed by: INTERNAL MEDICINE

## 2022-09-27 PROCEDURE — 81001 URINALYSIS AUTO W/SCOPE: CPT | Performed by: EMERGENCY MEDICINE

## 2022-09-27 RX ORDER — CEFTRIAXONE SODIUM 2 G/50ML
2 INJECTION, SOLUTION INTRAVENOUS EVERY 24 HOURS
Status: DISCONTINUED | OUTPATIENT
Start: 2022-09-28 | End: 2022-09-28

## 2022-09-27 RX ORDER — HEPARIN SODIUM 5000 [USP'U]/ML
5000 INJECTION, SOLUTION INTRAVENOUS; SUBCUTANEOUS EVERY 12 HOURS SCHEDULED
Status: DISCONTINUED | OUTPATIENT
Start: 2022-09-27 | End: 2022-09-28

## 2022-09-27 RX ORDER — SODIUM CHLORIDE 0.9 % (FLUSH) 0.9 %
10 SYRINGE (ML) INJECTION EVERY 12 HOURS SCHEDULED
Status: DISCONTINUED | OUTPATIENT
Start: 2022-09-27 | End: 2022-10-05 | Stop reason: HOSPADM

## 2022-09-27 RX ORDER — ACETAMINOPHEN 325 MG/1
650 TABLET ORAL EVERY 4 HOURS PRN
Status: DISCONTINUED | OUTPATIENT
Start: 2022-09-27 | End: 2022-10-05 | Stop reason: HOSPADM

## 2022-09-27 RX ORDER — NITROGLYCERIN 0.4 MG/1
0.4 TABLET SUBLINGUAL
Status: DISCONTINUED | OUTPATIENT
Start: 2022-09-27 | End: 2022-10-05 | Stop reason: HOSPADM

## 2022-09-27 RX ORDER — SODIUM CHLORIDE 0.9 % (FLUSH) 0.9 %
10 SYRINGE (ML) INJECTION AS NEEDED
Status: DISCONTINUED | OUTPATIENT
Start: 2022-09-27 | End: 2022-10-05 | Stop reason: HOSPADM

## 2022-09-27 RX ORDER — ACETAMINOPHEN 160 MG/5ML
650 SOLUTION ORAL EVERY 4 HOURS PRN
Status: DISCONTINUED | OUTPATIENT
Start: 2022-09-27 | End: 2022-10-05 | Stop reason: HOSPADM

## 2022-09-27 RX ORDER — SODIUM CHLORIDE 9 MG/ML
40 INJECTION, SOLUTION INTRAVENOUS AS NEEDED
Status: DISCONTINUED | OUTPATIENT
Start: 2022-09-27 | End: 2022-10-05 | Stop reason: HOSPADM

## 2022-09-27 RX ORDER — ACETAMINOPHEN 650 MG/1
650 SUPPOSITORY RECTAL EVERY 4 HOURS PRN
Status: DISCONTINUED | OUTPATIENT
Start: 2022-09-27 | End: 2022-10-05 | Stop reason: HOSPADM

## 2022-09-27 RX ORDER — SODIUM CHLORIDE 0.9 % (FLUSH) 0.9 %
10 SYRINGE (ML) INJECTION AS NEEDED
Status: DISCONTINUED | OUTPATIENT
Start: 2022-09-27 | End: 2022-09-30

## 2022-09-27 RX ADMIN — MEROPENEM 1 G: 1 INJECTION, POWDER, FOR SOLUTION INTRAVENOUS at 19:00

## 2022-09-27 RX ADMIN — SODIUM CHLORIDE 1000 ML: 9 INJECTION, SOLUTION INTRAVENOUS at 18:59

## 2022-09-27 RX ADMIN — Medication 10 ML: at 23:28

## 2022-09-28 LAB
BASOPHILS # BLD AUTO: 0.04 10*3/MM3 (ref 0–0.2)
BASOPHILS NFR BLD AUTO: 0.7 % (ref 0–1.5)
CORTIS SERPL-MCNC: 0.89 MCG/DL
DEPRECATED RDW RBC AUTO: 49.1 FL (ref 37–54)
EOSINOPHIL # BLD AUTO: 0.15 10*3/MM3 (ref 0–0.4)
EOSINOPHIL NFR BLD AUTO: 2.8 % (ref 0.3–6.2)
ERYTHROCYTE [DISTWIDTH] IN BLOOD BY AUTOMATED COUNT: 14.6 % (ref 12.3–15.4)
HCT VFR BLD AUTO: 29.7 % (ref 34–46.6)
HGB BLD-MCNC: 10.7 G/DL (ref 12–15.9)
IMM GRANULOCYTES # BLD AUTO: 0.02 10*3/MM3 (ref 0–0.05)
IMM GRANULOCYTES NFR BLD AUTO: 0.4 % (ref 0–0.5)
LYMPHOCYTES # BLD AUTO: 1.21 10*3/MM3 (ref 0.7–3.1)
LYMPHOCYTES NFR BLD AUTO: 22.6 % (ref 19.6–45.3)
MCH RBC QN AUTO: 33 PG (ref 26.6–33)
MCHC RBC AUTO-ENTMCNC: 36 G/DL (ref 31.5–35.7)
MCV RBC AUTO: 91.7 FL (ref 79–97)
MONOCYTES # BLD AUTO: 0.83 10*3/MM3 (ref 0.1–0.9)
MONOCYTES NFR BLD AUTO: 15.5 % (ref 5–12)
NEUTROPHILS NFR BLD AUTO: 3.11 10*3/MM3 (ref 1.7–7)
NEUTROPHILS NFR BLD AUTO: 58 % (ref 42.7–76)
NRBC BLD AUTO-RTO: 0 /100 WBC (ref 0–0.2)
OSMOLALITY SERPL: 250 MOSM/KG (ref 280–301)
OSMOLALITY UR: 565 MOSM/KG (ref 50–1400)
PLATELET # BLD AUTO: 367 10*3/MM3 (ref 140–450)
PMV BLD AUTO: 9.5 FL (ref 6–12)
RBC # BLD AUTO: 3.24 10*6/MM3 (ref 3.77–5.28)
SODIUM SERPL-SCNC: 118 MMOL/L (ref 136–145)
SODIUM SERPL-SCNC: 120 MMOL/L (ref 136–145)
SODIUM SERPL-SCNC: 120 MMOL/L (ref 136–145)
SODIUM SERPL-SCNC: 121 MMOL/L (ref 136–145)
SODIUM SERPL-SCNC: 122 MMOL/L (ref 136–145)
WBC NRBC COR # BLD: 5.36 10*3/MM3 (ref 3.4–10.8)

## 2022-09-28 PROCEDURE — 99233 SBSQ HOSP IP/OBS HIGH 50: CPT | Performed by: INTERNAL MEDICINE

## 2022-09-28 PROCEDURE — 85025 COMPLETE CBC W/AUTO DIFF WBC: CPT | Performed by: INTERNAL MEDICINE

## 2022-09-28 PROCEDURE — 25010000002 ENOXAPARIN PER 10 MG: Performed by: INTERNAL MEDICINE

## 2022-09-28 PROCEDURE — 87205 SMEAR GRAM STAIN: CPT | Performed by: INTERNAL MEDICINE

## 2022-09-28 PROCEDURE — 83930 ASSAY OF BLOOD OSMOLALITY: CPT | Performed by: INTERNAL MEDICINE

## 2022-09-28 PROCEDURE — 97165 OT EVAL LOW COMPLEX 30 MIN: CPT

## 2022-09-28 PROCEDURE — 87070 CULTURE OTHR SPECIMN AEROBIC: CPT | Performed by: INTERNAL MEDICINE

## 2022-09-28 PROCEDURE — 82533 TOTAL CORTISOL: CPT | Performed by: INTERNAL MEDICINE

## 2022-09-28 PROCEDURE — 25010000002 ONDANSETRON PER 1 MG: Performed by: INTERNAL MEDICINE

## 2022-09-28 PROCEDURE — 84295 ASSAY OF SERUM SODIUM: CPT | Performed by: INTERNAL MEDICINE

## 2022-09-28 PROCEDURE — 97161 PT EVAL LOW COMPLEX 20 MIN: CPT

## 2022-09-28 PROCEDURE — 25010000002 CEFTRIAXONE PER 250 MG: Performed by: INTERNAL MEDICINE

## 2022-09-28 RX ORDER — SODIUM CHLORIDE FOR INHALATION 3 %
4 VIAL, NEBULIZER (ML) INHALATION ONCE AS NEEDED
Status: COMPLETED | OUTPATIENT
Start: 2022-09-28 | End: 2022-09-30

## 2022-09-28 RX ORDER — ONDANSETRON 2 MG/ML
4 INJECTION INTRAMUSCULAR; INTRAVENOUS EVERY 6 HOURS PRN
Status: DISCONTINUED | OUTPATIENT
Start: 2022-09-28 | End: 2022-10-05 | Stop reason: HOSPADM

## 2022-09-28 RX ORDER — SODIUM CHLORIDE 9 MG/ML
150 INJECTION, SOLUTION INTRAVENOUS CONTINUOUS
Status: DISCONTINUED | OUTPATIENT
Start: 2022-09-28 | End: 2022-09-28

## 2022-09-28 RX ORDER — ALBUTEROL SULFATE 2.5 MG/3ML
2.5 SOLUTION RESPIRATORY (INHALATION) ONCE AS NEEDED
Status: COMPLETED | OUTPATIENT
Start: 2022-09-28 | End: 2022-10-04

## 2022-09-28 RX ORDER — CEFTRIAXONE SODIUM 1 G/50ML
1 INJECTION, SOLUTION INTRAVENOUS EVERY 24 HOURS
Status: DISCONTINUED | OUTPATIENT
Start: 2022-09-28 | End: 2022-09-30

## 2022-09-28 RX ORDER — COSYNTROPIN 0.25 MG/ML
0.25 INJECTION, POWDER, FOR SOLUTION INTRAMUSCULAR; INTRAVENOUS ONCE
Status: COMPLETED | OUTPATIENT
Start: 2022-09-29 | End: 2022-09-29

## 2022-09-28 RX ORDER — ENOXAPARIN SODIUM 100 MG/ML
40 INJECTION SUBCUTANEOUS EVERY 24 HOURS
Status: DISCONTINUED | OUTPATIENT
Start: 2022-09-28 | End: 2022-09-30

## 2022-09-28 RX ORDER — SACCHAROMYCES BOULARDII 250 MG
250 CAPSULE ORAL 2 TIMES DAILY
Status: DISCONTINUED | OUTPATIENT
Start: 2022-09-28 | End: 2022-10-05 | Stop reason: HOSPADM

## 2022-09-28 RX ADMIN — Medication 250 MG: at 08:09

## 2022-09-28 RX ADMIN — SODIUM CHLORIDE 75 ML/HR: 9 INJECTION, SOLUTION INTRAVENOUS at 06:27

## 2022-09-28 RX ADMIN — Medication 250 MG: at 20:54

## 2022-09-28 RX ADMIN — SODIUM CHLORIDE 150 ML/HR: 9 INJECTION, SOLUTION INTRAVENOUS at 13:02

## 2022-09-28 RX ADMIN — ONDANSETRON 4 MG: 2 INJECTION INTRAMUSCULAR; INTRAVENOUS at 17:24

## 2022-09-28 RX ADMIN — Medication 10 ML: at 17:25

## 2022-09-28 RX ADMIN — Medication 15 G: at 20:54

## 2022-09-28 RX ADMIN — ACETAMINOPHEN 650 MG: 325 TABLET ORAL at 11:14

## 2022-09-28 RX ADMIN — CEFTRIAXONE SODIUM 1 G: 1 INJECTION, SOLUTION INTRAVENOUS at 21:09

## 2022-09-28 RX ADMIN — ACETAMINOPHEN 650 MG: 325 TABLET ORAL at 17:40

## 2022-09-28 RX ADMIN — ACETAMINOPHEN 650 MG: 325 TABLET ORAL at 01:49

## 2022-09-28 RX ADMIN — ENOXAPARIN SODIUM 40 MG: 100 INJECTION SUBCUTANEOUS at 08:09

## 2022-09-28 RX ADMIN — Medication 10 ML: at 20:55

## 2022-09-29 LAB
ALBUMIN SERPL-MCNC: 2.2 G/DL (ref 3.5–5.2)
ANION GAP SERPL CALCULATED.3IONS-SCNC: 6.4 MMOL/L (ref 5–15)
ARTERIAL PATENCY WRIST A: ABNORMAL
BACTERIA UR QL AUTO: ABNORMAL /HPF
BASE EXCESS BLDA CALC-SCNC: -7.8 MMOL/L (ref -2–2)
BDY SITE: ABNORMAL
BILIRUB UR QL STRIP: NEGATIVE
BUN SERPL-MCNC: 7 MG/DL (ref 8–23)
BUN/CREAT SERPL: 12.5 (ref 7–25)
CA-I BLDA-SCNC: 1.07 MMOL/L (ref 1.13–1.32)
CALCIUM SPEC-SCNC: 7.9 MG/DL (ref 8.6–10.5)
CHLORIDE BLDA-SCNC: 96 MMOL/L (ref 98–106)
CHLORIDE SERPL-SCNC: 91 MMOL/L (ref 98–107)
CK SERPL-CCNC: 67 U/L (ref 20–180)
CLARITY UR: CLEAR
CO2 SERPL-SCNC: 22.6 MMOL/L (ref 22–29)
COHGB MFR BLD: 0.3 % (ref 0–1.5)
COLOR UR: YELLOW
CORTIS SERPL-MCNC: 0.64 MCG/DL
CORTIS SERPL-MCNC: 2.6 MCG/DL
CORTIS SERPL-MCNC: 3.62 MCG/DL
CREAT SERPL-MCNC: 0.56 MG/DL (ref 0.57–1)
DEPRECATED RDW RBC AUTO: 47.7 FL (ref 37–54)
EGFRCR SERPLBLD CKD-EPI 2021: 100.8 ML/MIN/1.73
ERYTHROCYTE [DISTWIDTH] IN BLOOD BY AUTOMATED COUNT: 14.3 % (ref 12.3–15.4)
FHHB: 6.3 % (ref 0–5)
GAS FLOW AIRWAY: ABNORMAL L/MIN
GLUCOSE BLDA-MCNC: 147 MMOL/L (ref 65–99)
GLUCOSE BLDC GLUCOMTR-MCNC: 112 MG/DL (ref 70–99)
GLUCOSE BLDC GLUCOMTR-MCNC: 127 MG/DL (ref 70–99)
GLUCOSE BLDC GLUCOMTR-MCNC: 212 MG/DL (ref 70–99)
GLUCOSE BLDC GLUCOMTR-MCNC: 33 MG/DL (ref 70–99)
GLUCOSE BLDC GLUCOMTR-MCNC: 65 MG/DL (ref 70–99)
GLUCOSE SERPL-MCNC: 63 MG/DL (ref 65–99)
GLUCOSE UR STRIP-MCNC: NEGATIVE MG/DL
HCO3 BLDA-SCNC: 16.7 MMOL/L (ref 22–26)
HCT VFR BLD AUTO: 28.4 % (ref 34–46.6)
HGB BLD-MCNC: 10.3 G/DL (ref 12–15.9)
HGB BLDA-MCNC: 11.8 G/DL (ref 11.7–14.6)
HGB UR QL STRIP.AUTO: ABNORMAL
HYALINE CASTS UR QL AUTO: ABNORMAL /LPF
INHALED O2 CONCENTRATION: <21 %
KETONES UR QL STRIP: ABNORMAL
LACTATE BLDA-SCNC: 1.24 MMOL/L (ref 0.5–2)
LEUKOCYTE ESTERASE UR QL STRIP.AUTO: ABNORMAL
MAGNESIUM SERPL-MCNC: 1.4 MG/DL (ref 1.6–2.4)
MCH RBC QN AUTO: 33.2 PG (ref 26.6–33)
MCHC RBC AUTO-ENTMCNC: 36.3 G/DL (ref 31.5–35.7)
MCV RBC AUTO: 91.6 FL (ref 79–97)
METHGB BLD QL: 0.3 % (ref 0–1.5)
MODALITY: ABNORMAL
MRSA DNA SPEC QL NAA+PROBE: NORMAL
NITRITE UR QL STRIP: NEGATIVE
NOTE: ABNORMAL
OSMOLALITY UR: 332 MOSM/KG (ref 50–1400)
OXYHGB MFR BLDV: 93.1 % (ref 94–99)
PCO2 BLDA: 31.2 MM HG (ref 35–45)
PH BLDA: 7.35 PH UNITS (ref 7.35–7.45)
PH UR STRIP.AUTO: 5.5 [PH] (ref 5–8)
PHOSPHATE SERPL-MCNC: 3.3 MG/DL (ref 2.5–4.5)
PLATELET # BLD AUTO: 321 10*3/MM3 (ref 140–450)
PMV BLD AUTO: 9 FL (ref 6–12)
PO2 BLD: >370 MM[HG] (ref 0–500)
PO2 BLDA: 77.7 MM HG (ref 80–100)
POTASSIUM BLDA-SCNC: 3.82 MMOL/L (ref 3.5–5)
POTASSIUM SERPL-SCNC: 4.1 MMOL/L (ref 3.5–5.2)
PROCALCITONIN SERPL-MCNC: 0.21 NG/ML (ref 0–0.25)
PROT UR QL STRIP: NEGATIVE
RBC # BLD AUTO: 3.1 10*6/MM3 (ref 3.77–5.28)
RBC # UR STRIP: ABNORMAL /HPF
REF LAB TEST METHOD: ABNORMAL
SAO2 % BLDCOA: 93.7 % (ref 95–99)
SODIUM BLDA-SCNC: 117.4 MMOL/L (ref 136–146)
SODIUM SERPL-SCNC: 117 MMOL/L (ref 136–145)
SODIUM SERPL-SCNC: 120 MMOL/L (ref 136–145)
SODIUM SERPL-SCNC: 124 MMOL/L (ref 136–145)
SODIUM UR-SCNC: 116 MMOL/L
SP GR UR STRIP: 1.01 (ref 1–1.03)
SQUAMOUS #/AREA URNS HPF: ABNORMAL /HPF
UROBILINOGEN UR QL STRIP: ABNORMAL
WBC # UR STRIP: ABNORMAL /HPF
WBC NRBC COR # BLD: 4.53 10*3/MM3 (ref 3.4–10.8)

## 2022-09-29 PROCEDURE — 82805 BLOOD GASES W/O2 SATURATION: CPT | Performed by: INTERNAL MEDICINE

## 2022-09-29 PROCEDURE — 87086 URINE CULTURE/COLONY COUNT: CPT | Performed by: INTERNAL MEDICINE

## 2022-09-29 PROCEDURE — 25010000002 VANCOMYCIN 5 G RECONSTITUTED SOLUTION: Performed by: INTERNAL MEDICINE

## 2022-09-29 PROCEDURE — 25010000002 ONDANSETRON PER 1 MG: Performed by: INTERNAL MEDICINE

## 2022-09-29 PROCEDURE — 84100 ASSAY OF PHOSPHORUS: CPT | Performed by: INTERNAL MEDICINE

## 2022-09-29 PROCEDURE — 81001 URINALYSIS AUTO W/SCOPE: CPT | Performed by: INTERNAL MEDICINE

## 2022-09-29 PROCEDURE — 94799 UNLISTED PULMONARY SVC/PX: CPT

## 2022-09-29 PROCEDURE — 84295 ASSAY OF SERUM SODIUM: CPT | Performed by: INTERNAL MEDICINE

## 2022-09-29 PROCEDURE — 84145 PROCALCITONIN (PCT): CPT | Performed by: INTERNAL MEDICINE

## 2022-09-29 PROCEDURE — 83935 ASSAY OF URINE OSMOLALITY: CPT | Performed by: INTERNAL MEDICINE

## 2022-09-29 PROCEDURE — 99291 CRITICAL CARE FIRST HOUR: CPT | Performed by: INTERNAL MEDICINE

## 2022-09-29 PROCEDURE — 25010000002 ENOXAPARIN PER 10 MG: Performed by: INTERNAL MEDICINE

## 2022-09-29 PROCEDURE — 94640 AIRWAY INHALATION TREATMENT: CPT

## 2022-09-29 PROCEDURE — 25010000002 CEFTRIAXONE PER 250 MG: Performed by: INTERNAL MEDICINE

## 2022-09-29 PROCEDURE — 0 MAGNESIUM SULFATE 4 GM/100ML SOLUTION: Performed by: INTERNAL MEDICINE

## 2022-09-29 PROCEDURE — 82962 GLUCOSE BLOOD TEST: CPT

## 2022-09-29 PROCEDURE — 84300 ASSAY OF URINE SODIUM: CPT | Performed by: INTERNAL MEDICINE

## 2022-09-29 PROCEDURE — 82533 TOTAL CORTISOL: CPT | Performed by: INTERNAL MEDICINE

## 2022-09-29 PROCEDURE — 83735 ASSAY OF MAGNESIUM: CPT | Performed by: INTERNAL MEDICINE

## 2022-09-29 PROCEDURE — 25010000002 HYDROCORTISONE SOD SUC (PF) 100 MG RECONSTITUTED SOLUTION: Performed by: INTERNAL MEDICINE

## 2022-09-29 PROCEDURE — 82550 ASSAY OF CK (CPK): CPT | Performed by: INTERNAL MEDICINE

## 2022-09-29 PROCEDURE — 25010000002 CALCIUM GLUCONATE-NACL 1-0.675 GM/50ML-% SOLUTION: Performed by: INTERNAL MEDICINE

## 2022-09-29 PROCEDURE — 82024 ASSAY OF ACTH: CPT | Performed by: INTERNAL MEDICINE

## 2022-09-29 PROCEDURE — 94664 DEMO&/EVAL PT USE INHALER: CPT

## 2022-09-29 PROCEDURE — 82040 ASSAY OF SERUM ALBUMIN: CPT | Performed by: INTERNAL MEDICINE

## 2022-09-29 PROCEDURE — 82375 ASSAY CARBOXYHB QUANT: CPT | Performed by: INTERNAL MEDICINE

## 2022-09-29 PROCEDURE — 87641 MR-STAPH DNA AMP PROBE: CPT | Performed by: INTERNAL MEDICINE

## 2022-09-29 PROCEDURE — 83050 HGB METHEMOGLOBIN QUAN: CPT | Performed by: INTERNAL MEDICINE

## 2022-09-29 PROCEDURE — 36600 WITHDRAWAL OF ARTERIAL BLOOD: CPT | Performed by: INTERNAL MEDICINE

## 2022-09-29 PROCEDURE — 25010000002 COSYNTROPIN PER 0.25 MG: Performed by: INTERNAL MEDICINE

## 2022-09-29 PROCEDURE — 80048 BASIC METABOLIC PNL TOTAL CA: CPT | Performed by: INTERNAL MEDICINE

## 2022-09-29 PROCEDURE — 85027 COMPLETE CBC AUTOMATED: CPT | Performed by: INTERNAL MEDICINE

## 2022-09-29 RX ORDER — ARFORMOTEROL TARTRATE 15 UG/2ML
15 SOLUTION RESPIRATORY (INHALATION)
Status: DISCONTINUED | OUTPATIENT
Start: 2022-09-29 | End: 2022-10-05 | Stop reason: HOSPADM

## 2022-09-29 RX ORDER — IPRATROPIUM BROMIDE AND ALBUTEROL SULFATE 2.5; .5 MG/3ML; MG/3ML
3 SOLUTION RESPIRATORY (INHALATION)
Status: DISCONTINUED | OUTPATIENT
Start: 2022-09-29 | End: 2022-10-01

## 2022-09-29 RX ORDER — DEXTROSE MONOHYDRATE 25 G/50ML
INJECTION, SOLUTION INTRAVENOUS
Status: COMPLETED
Start: 2022-09-29 | End: 2022-09-29

## 2022-09-29 RX ORDER — DEXTROSE AND SODIUM CHLORIDE 5; .9 G/100ML; G/100ML
500 INJECTION, SOLUTION INTRAVENOUS CONTINUOUS
Status: DISCONTINUED | OUTPATIENT
Start: 2022-09-29 | End: 2022-09-29

## 2022-09-29 RX ORDER — DEXTROSE AND SODIUM CHLORIDE 5; .9 G/100ML; G/100ML
30 INJECTION, SOLUTION INTRAVENOUS CONTINUOUS
Status: DISCONTINUED | OUTPATIENT
Start: 2022-09-29 | End: 2022-09-29

## 2022-09-29 RX ORDER — FLUDROCORTISONE ACETATE 0.1 MG/1
50 TABLET ORAL DAILY
Status: DISCONTINUED | OUTPATIENT
Start: 2022-09-29 | End: 2022-10-05 | Stop reason: HOSPADM

## 2022-09-29 RX ORDER — MAGNESIUM SULFATE HEPTAHYDRATE 40 MG/ML
4 INJECTION, SOLUTION INTRAVENOUS ONCE
Status: COMPLETED | OUTPATIENT
Start: 2022-09-29 | End: 2022-09-29

## 2022-09-29 RX ORDER — BUDESONIDE 0.5 MG/2ML
0.5 INHALANT ORAL
Status: DISCONTINUED | OUTPATIENT
Start: 2022-09-29 | End: 2022-10-05 | Stop reason: HOSPADM

## 2022-09-29 RX ORDER — MAGNESIUM SULFATE 1 G/100ML
1 INJECTION INTRAVENOUS
Status: COMPLETED | OUTPATIENT
Start: 2022-09-30 | End: 2022-09-30

## 2022-09-29 RX ORDER — NOREPINEPHRINE BIT/0.9 % NACL 8 MG/250ML
.02-.3 INFUSION BOTTLE (ML) INTRAVENOUS
Status: DISCONTINUED | OUTPATIENT
Start: 2022-09-29 | End: 2022-10-01

## 2022-09-29 RX ORDER — NOREPINEPHRINE BIT/0.9 % NACL 8 MG/250ML
INFUSION BOTTLE (ML) INTRAVENOUS
Status: COMPLETED
Start: 2022-09-29 | End: 2022-09-29

## 2022-09-29 RX ORDER — CALCIUM GLUCONATE 20 MG/ML
1 INJECTION, SOLUTION INTRAVENOUS ONCE
Status: COMPLETED | OUTPATIENT
Start: 2022-09-29 | End: 2022-09-29

## 2022-09-29 RX ORDER — SODIUM CHLORIDE 9 MG/ML
1000 INJECTION, SOLUTION INTRAVENOUS ONCE
Status: COMPLETED | OUTPATIENT
Start: 2022-09-29 | End: 2022-09-29

## 2022-09-29 RX ADMIN — Medication 250 MG: at 21:15

## 2022-09-29 RX ADMIN — SODIUM CHLORIDE 1000 ML/HR: 9 INJECTION, SOLUTION INTRAVENOUS at 17:57

## 2022-09-29 RX ADMIN — Medication 0.02 MCG/KG/MIN: at 17:56

## 2022-09-29 RX ADMIN — IPRATROPIUM BROMIDE AND ALBUTEROL SULFATE 3 ML: 2.5; .5 SOLUTION RESPIRATORY (INHALATION) at 23:11

## 2022-09-29 RX ADMIN — Medication 10 ML: at 09:23

## 2022-09-29 RX ADMIN — Medication 10 ML: at 21:15

## 2022-09-29 RX ADMIN — FLUDROCORTISONE ACETATE 50 MCG: 0.1 TABLET ORAL at 18:51

## 2022-09-29 RX ADMIN — DEXTROSE AND SODIUM CHLORIDE 30 ML/HR: 5; 900 INJECTION, SOLUTION INTRAVENOUS at 14:52

## 2022-09-29 RX ADMIN — CEFTRIAXONE SODIUM 1 G: 1 INJECTION, SOLUTION INTRAVENOUS at 21:14

## 2022-09-29 RX ADMIN — ACETAMINOPHEN 650 MG: 325 TABLET ORAL at 01:42

## 2022-09-29 RX ADMIN — VANCOMYCIN HYDROCHLORIDE 1250 MG: 5 INJECTION, POWDER, LYOPHILIZED, FOR SOLUTION INTRAVENOUS at 18:41

## 2022-09-29 RX ADMIN — CALCIUM GLUCONATE 1 G: 20 INJECTION, SOLUTION INTRAVENOUS at 18:43

## 2022-09-29 RX ADMIN — Medication 15 G: at 21:15

## 2022-09-29 RX ADMIN — HYDROCORTISONE SODIUM SUCCINATE 100 MG: 100 INJECTION, POWDER, FOR SOLUTION INTRAMUSCULAR; INTRAVENOUS at 16:45

## 2022-09-29 RX ADMIN — ARFORMOTEROL TARTRATE 15 MCG: 15 SOLUTION RESPIRATORY (INHALATION) at 20:37

## 2022-09-29 RX ADMIN — SODIUM CHLORIDE 1000 ML: 9 INJECTION, SOLUTION INTRAVENOUS at 18:53

## 2022-09-29 RX ADMIN — DEXTROSE MONOHYDRATE 50 ML: 25 INJECTION, SOLUTION INTRAVENOUS at 14:26

## 2022-09-29 RX ADMIN — Medication 250 MG: at 09:19

## 2022-09-29 RX ADMIN — IPRATROPIUM BROMIDE AND ALBUTEROL SULFATE 3 ML: 2.5; .5 SOLUTION RESPIRATORY (INHALATION) at 20:37

## 2022-09-29 RX ADMIN — COSYNTROPIN 0.25 MG: 0.25 INJECTION, POWDER, LYOPHILIZED, FOR SOLUTION INTRAMUSCULAR; INTRAVENOUS at 08:20

## 2022-09-29 RX ADMIN — ONDANSETRON 4 MG: 2 INJECTION INTRAMUSCULAR; INTRAVENOUS at 14:44

## 2022-09-29 RX ADMIN — ENOXAPARIN SODIUM 40 MG: 100 INJECTION SUBCUTANEOUS at 09:19

## 2022-09-29 RX ADMIN — Medication 15 G: at 09:19

## 2022-09-29 RX ADMIN — HYDROCORTISONE SODIUM SUCCINATE 50 MG: 100 INJECTION, POWDER, FOR SOLUTION INTRAMUSCULAR; INTRAVENOUS at 21:42

## 2022-09-29 RX ADMIN — MAGNESIUM SULFATE HEPTAHYDRATE 4 G: 4 INJECTION, SOLUTION INTRAVENOUS at 09:24

## 2022-09-29 RX ADMIN — SODIUM CHLORIDE 1000 ML: 9 INJECTION, SOLUTION INTRAVENOUS at 18:29

## 2022-09-29 RX ADMIN — BUDESONIDE 0.5 MG: 0.5 SUSPENSION RESPIRATORY (INHALATION) at 20:38

## 2022-09-30 ENCOUNTER — ANESTHESIA EVENT (OUTPATIENT)
Dept: GASTROENTEROLOGY | Facility: HOSPITAL | Age: 67
End: 2022-09-30

## 2022-09-30 ENCOUNTER — ANESTHESIA (OUTPATIENT)
Dept: GASTROENTEROLOGY | Facility: HOSPITAL | Age: 67
End: 2022-09-30

## 2022-09-30 ENCOUNTER — APPOINTMENT (OUTPATIENT)
Dept: CT IMAGING | Facility: HOSPITAL | Age: 67
End: 2022-09-30

## 2022-09-30 ENCOUNTER — APPOINTMENT (OUTPATIENT)
Dept: GENERAL RADIOLOGY | Facility: HOSPITAL | Age: 67
End: 2022-09-30

## 2022-09-30 PROBLEM — A41.9 SEPSIS: Status: ACTIVE | Noted: 2022-09-27

## 2022-09-30 LAB
ACB CMPLX DNA BAL NAA+NON-PRB-NCNCRNG: NOT DETECTED
ACTH PLAS-MCNC: 5.7 PG/ML (ref 7.2–63.3)
ALBUMIN SERPL-MCNC: 1.8 G/DL (ref 3.5–5.2)
ANION GAP SERPL CALCULATED.3IONS-SCNC: 11.2 MMOL/L (ref 5–15)
ANION GAP SERPL CALCULATED.3IONS-SCNC: 8.6 MMOL/L (ref 5–15)
BACTERIA SPEC AEROBE CULT: NO GROWTH
BACTERIA SPEC RESP CULT: NORMAL
BASOPHILS # BLD AUTO: 0.03 10*3/MM3 (ref 0–0.2)
BASOPHILS NFR BLD AUTO: 0.2 % (ref 0–1.5)
BLACTX-M ISLT/SPM QL: NOT DETECTED
BLAIMP ISLT/SPM QL: NOT DETECTED
BLAKPC ISLT/SPM QL: NOT DETECTED
BLAOXA-48-LIKE ISLT/SPM QL: NOT DETECTED
BLAVIM ISLT/SPM QL: NOT DETECTED
BUN SERPL-MCNC: 22 MG/DL (ref 8–23)
BUN SERPL-MCNC: 8 MG/DL (ref 8–23)
BUN/CREAT SERPL: 10.3 (ref 7–25)
BUN/CREAT SERPL: 31.4 (ref 7–25)
C PNEUM DNA NPH QL NAA+NON-PROBE: NOT DETECTED
CALCIUM SPEC-SCNC: 7.7 MG/DL (ref 8.6–10.5)
CALCIUM SPEC-SCNC: 7.9 MG/DL (ref 8.6–10.5)
CHLORIDE SERPL-SCNC: 92 MMOL/L (ref 98–107)
CHLORIDE SERPL-SCNC: 95 MMOL/L (ref 98–107)
CILIATED BAL QL: 4 %
CO2 SERPL-SCNC: 16.8 MMOL/L (ref 22–29)
CO2 SERPL-SCNC: 23.4 MMOL/L (ref 22–29)
CREAT SERPL-MCNC: 0.7 MG/DL (ref 0.57–1)
CREAT SERPL-MCNC: 0.78 MG/DL (ref 0.57–1)
CRP SERPL-MCNC: 18.45 MG/DL (ref 0–0.5)
D DIMER PPP FEU-MCNC: 4.05 MCGFEU/ML (ref 0–0.57)
D-LACTATE SERPL-SCNC: 3 MMOL/L (ref 0.5–2)
DEPRECATED RDW RBC AUTO: 47.6 FL (ref 37–54)
E CLOAC COMP DNA BAL NAA+NON-PRB-NCNCRNG: NOT DETECTED
E COLI DNA BAL NAA+NON-PRB-NCNCRNG: NOT DETECTED
EGFRCR SERPLBLD CKD-EPI 2021: 83.9 ML/MIN/1.73
EGFRCR SERPLBLD CKD-EPI 2021: 95.5 ML/MIN/1.73
EOSINOPHIL # BLD AUTO: 0.03 10*3/MM3 (ref 0–0.4)
EOSINOPHIL NFR BLD AUTO: 0.2 % (ref 0.3–6.2)
EOSINOPHIL NFR FLD MANUAL: 5 %
ERYTHROCYTE [DISTWIDTH] IN BLOOD BY AUTOMATED COUNT: 14.5 % (ref 12.3–15.4)
FLUAV SUBTYP SPEC NAA+PROBE: NOT DETECTED
FLUBV RNA ISLT QL NAA+PROBE: NOT DETECTED
GLUCOSE BLDC GLUCOMTR-MCNC: 148 MG/DL (ref 70–99)
GLUCOSE BLDC GLUCOMTR-MCNC: 243 MG/DL (ref 70–99)
GLUCOSE SERPL-MCNC: 146 MG/DL (ref 65–99)
GLUCOSE SERPL-MCNC: 264 MG/DL (ref 65–99)
GP B STREP DNA BAL NAA+NON-PRB-NCNCRNG: NOT DETECTED
GRAM STN SPEC: NORMAL
HADV DNA SPEC NAA+PROBE: NOT DETECTED
HAEM INFLU DNA BAL NAA+NON-PRB-NCNCRNG: NOT DETECTED
HCOV RNA LOWER RESP QL NAA+NON-PROBE: NOT DETECTED
HCT VFR BLD AUTO: 30.2 % (ref 34–46.6)
HGB BLD-MCNC: 10.8 G/DL (ref 12–15.9)
HMPV RNA NPH QL NAA+NON-PROBE: NOT DETECTED
HPIV RNA LOWER RESP QL NAA+NON-PROBE: NOT DETECTED
IMM GRANULOCYTES # BLD AUTO: 0.11 10*3/MM3 (ref 0–0.05)
IMM GRANULOCYTES NFR BLD AUTO: 0.7 % (ref 0–0.5)
K AEROGENES DNA BAL NAA+NON-PRB-NCNCRNG: DETECTED
K OXYTOCA DNA BAL NAA+NON-PRB-NCNCRNG: NOT DETECTED
K PNEU GRP DNA BAL NAA+NON-PRB-NCNCRNG: NOT DETECTED
L PNEUMO DNA LOWER RESP QL NAA+NON-PROBE: NOT DETECTED
L PNEUMO1 AG UR QL IA: NEGATIVE
LYMPHOCYTES # BLD AUTO: 1.05 10*3/MM3 (ref 0.7–3.1)
LYMPHOCYTES NFR BLD AUTO: 7 % (ref 19.6–45.3)
LYMPHOCYTES NFR FLD MANUAL: 6 %
M CATARRHALIS DNA BAL NAA+NON-PRB-NCNCRNG: NOT DETECTED
M PNEUMO IGG SER IA-ACNC: NOT DETECTED
MACROPHAGE FLUID: 18 %
MAGNESIUM SERPL-MCNC: 3.5 MG/DL (ref 1.6–2.4)
MCH RBC QN AUTO: 32.4 PG (ref 26.6–33)
MCHC RBC AUTO-ENTMCNC: 35.8 G/DL (ref 31.5–35.7)
MCV RBC AUTO: 90.7 FL (ref 79–97)
MECA+MECC ISLT/SPM QL: ABNORMAL
MONOCYTES # BLD AUTO: 0.79 10*3/MM3 (ref 0.1–0.9)
MONOCYTES NFR BLD AUTO: 5.3 % (ref 5–12)
NDM GENE: NOT DETECTED
NEUTROPHILS NFR BLD AUTO: 13.03 10*3/MM3 (ref 1.7–7)
NEUTROPHILS NFR BLD AUTO: 86.6 % (ref 42.7–76)
NEUTROPHILS NFR FLD MANUAL: 67 %
NRBC BLD AUTO-RTO: 0 /100 WBC (ref 0–0.2)
NT-PROBNP SERPL-MCNC: 3300 PG/ML (ref 0–900)
P AERUGINOSA DNA BAL NAA+NON-PRB-NCNCRNG: NOT DETECTED
PHOSPHATE SERPL-MCNC: 4.2 MG/DL (ref 2.5–4.5)
PLATELET # BLD AUTO: 390 10*3/MM3 (ref 140–450)
PMV BLD AUTO: 8.9 FL (ref 6–12)
POTASSIUM SERPL-SCNC: 4.1 MMOL/L (ref 3.5–5.2)
POTASSIUM SERPL-SCNC: 4.1 MMOL/L (ref 3.5–5.2)
PROCALCITONIN SERPL-MCNC: 2.98 NG/ML (ref 0–0.25)
PROTEUS SP DNA BAL NAA+NON-PRB-NCNCRNG: NOT DETECTED
RBC # BLD AUTO: 3.33 10*6/MM3 (ref 3.77–5.28)
RHINOVIRUS RNA SPEC NAA+PROBE: NOT DETECTED
RSV RNA NPH QL NAA+NON-PROBE: NOT DETECTED
S AUREUS DNA BAL NAA+NON-PRB-NCNCRNG: NOT DETECTED
S MARCESCENS DNA BAL NAA+NON-PRB-NCNCRNG: NOT DETECTED
S PNEUM AG SPEC QL LA: NEGATIVE
S PNEUM DNA BAL NAA+NON-PRB-NCNCRNG: NOT DETECTED
S PYO DNA BAL NAA+NON-PRB-NCNCRNG: NOT DETECTED
SODIUM SERPL-SCNC: 120 MMOL/L (ref 136–145)
SODIUM SERPL-SCNC: 127 MMOL/L (ref 136–145)
SODIUM SERPL-SCNC: 127 MMOL/L (ref 136–145)
VISUAL PRESENCE OF BLOOD: NORMAL
WBC NRBC COR # BLD: 15.04 10*3/MM3 (ref 3.4–10.8)

## 2022-09-30 PROCEDURE — 25010000002 PROPOFOL 10 MG/ML EMULSION

## 2022-09-30 PROCEDURE — 0B9H8ZX DRAINAGE OF LUNG LINGULA, VIA NATURAL OR ARTIFICIAL OPENING ENDOSCOPIC, DIAGNOSTIC: ICD-10-PCS | Performed by: INTERNAL MEDICINE

## 2022-09-30 PROCEDURE — 70450 CT HEAD/BRAIN W/O DYE: CPT

## 2022-09-30 PROCEDURE — 25010000002 MAGNESIUM SULFATE IN D5W 1G/100ML (PREMIX) 1-5 GM/100ML-% SOLUTION: Performed by: INTERNAL MEDICINE

## 2022-09-30 PROCEDURE — 87070 CULTURE OTHR SPECIMN AEROBIC: CPT | Performed by: INTERNAL MEDICINE

## 2022-09-30 PROCEDURE — 84295 ASSAY OF SERUM SODIUM: CPT | Performed by: INTERNAL MEDICINE

## 2022-09-30 PROCEDURE — 94761 N-INVAS EAR/PLS OXIMETRY MLT: CPT

## 2022-09-30 PROCEDURE — 94799 UNLISTED PULMONARY SVC/PX: CPT

## 2022-09-30 PROCEDURE — 87633 RESP VIRUS 12-25 TARGETS: CPT | Performed by: INTERNAL MEDICINE

## 2022-09-30 PROCEDURE — 87205 SMEAR GRAM STAIN: CPT | Performed by: INTERNAL MEDICINE

## 2022-09-30 PROCEDURE — 88108 CYTOPATH CONCENTRATE TECH: CPT | Performed by: INTERNAL MEDICINE

## 2022-09-30 PROCEDURE — 83880 ASSAY OF NATRIURETIC PEPTIDE: CPT | Performed by: INTERNAL MEDICINE

## 2022-09-30 PROCEDURE — 94664 DEMO&/EVAL PT USE INHALER: CPT

## 2022-09-30 PROCEDURE — 88312 SPECIAL STAINS GROUP 1: CPT | Performed by: INTERNAL MEDICINE

## 2022-09-30 PROCEDURE — 25010000002 ENOXAPARIN PER 10 MG: Performed by: INTERNAL MEDICINE

## 2022-09-30 PROCEDURE — 80048 BASIC METABOLIC PNL TOTAL CA: CPT | Performed by: INTERNAL MEDICINE

## 2022-09-30 PROCEDURE — 25010000002 AMPICILLIN-SULBACTAM PER 1.5 G: Performed by: INTERNAL MEDICINE

## 2022-09-30 PROCEDURE — 87449 NOS EACH ORGANISM AG IA: CPT | Performed by: INTERNAL MEDICINE

## 2022-09-30 PROCEDURE — 25010000002 HYDROCORTISONE SOD SUC (PF) 100 MG RECONSTITUTED SOLUTION: Performed by: INTERNAL MEDICINE

## 2022-09-30 PROCEDURE — 0 IOPAMIDOL PER 1 ML: Performed by: INTERNAL MEDICINE

## 2022-09-30 PROCEDURE — 25010000002 CALCIUM GLUCONATE 2-0.675 GM/100ML-% SOLUTION: Performed by: INTERNAL MEDICINE

## 2022-09-30 PROCEDURE — 85025 COMPLETE CBC W/AUTO DIFF WBC: CPT | Performed by: INTERNAL MEDICINE

## 2022-09-30 PROCEDURE — 31624 DX BRONCHOSCOPE/LAVAGE: CPT | Performed by: INTERNAL MEDICINE

## 2022-09-30 PROCEDURE — 71260 CT THORAX DX C+: CPT

## 2022-09-30 PROCEDURE — 99291 CRITICAL CARE FIRST HOUR: CPT | Performed by: INTERNAL MEDICINE

## 2022-09-30 PROCEDURE — 84145 PROCALCITONIN (PCT): CPT | Performed by: INTERNAL MEDICINE

## 2022-09-30 PROCEDURE — 87899 AGENT NOS ASSAY W/OPTIC: CPT | Performed by: INTERNAL MEDICINE

## 2022-09-30 PROCEDURE — 89051 BODY FLUID CELL COUNT: CPT | Performed by: INTERNAL MEDICINE

## 2022-09-30 PROCEDURE — 87206 SMEAR FLUORESCENT/ACID STAI: CPT | Performed by: INTERNAL MEDICINE

## 2022-09-30 PROCEDURE — 87147 CULTURE TYPE IMMUNOLOGIC: CPT | Performed by: INTERNAL MEDICINE

## 2022-09-30 PROCEDURE — 31645 BRNCHSC W/THER ASPIR 1ST: CPT | Performed by: INTERNAL MEDICINE

## 2022-09-30 PROCEDURE — 71045 X-RAY EXAM CHEST 1 VIEW: CPT

## 2022-09-30 PROCEDURE — 83605 ASSAY OF LACTIC ACID: CPT | Performed by: PHYSICIAN ASSISTANT

## 2022-09-30 PROCEDURE — 82962 GLUCOSE BLOOD TEST: CPT

## 2022-09-30 PROCEDURE — 80069 RENAL FUNCTION PANEL: CPT | Performed by: INTERNAL MEDICINE

## 2022-09-30 PROCEDURE — 87116 MYCOBACTERIA CULTURE: CPT | Performed by: INTERNAL MEDICINE

## 2022-09-30 PROCEDURE — 87102 FUNGUS ISOLATION CULTURE: CPT | Performed by: INTERNAL MEDICINE

## 2022-09-30 PROCEDURE — 87071 CULTURE AEROBIC QUANT OTHER: CPT | Performed by: INTERNAL MEDICINE

## 2022-09-30 PROCEDURE — 25010000002 VANCOMYCIN 5 G RECONSTITUTED SOLUTION: Performed by: INTERNAL MEDICINE

## 2022-09-30 PROCEDURE — 86140 C-REACTIVE PROTEIN: CPT | Performed by: INTERNAL MEDICINE

## 2022-09-30 PROCEDURE — 83735 ASSAY OF MAGNESIUM: CPT | Performed by: INTERNAL MEDICINE

## 2022-09-30 PROCEDURE — 63710000001 INSULIN LISPRO (HUMAN) PER 5 UNITS: Performed by: PHYSICIAN ASSISTANT

## 2022-09-30 PROCEDURE — 85379 FIBRIN DEGRADATION QUANT: CPT | Performed by: INTERNAL MEDICINE

## 2022-09-30 RX ORDER — CALCIUM GLUCONATE 20 MG/ML
2 INJECTION, SOLUTION INTRAVENOUS ONCE
Status: COMPLETED | OUTPATIENT
Start: 2022-09-30 | End: 2022-09-30

## 2022-09-30 RX ORDER — INSULIN LISPRO 100 [IU]/ML
0-9 INJECTION, SOLUTION INTRAVENOUS; SUBCUTANEOUS
Status: DISCONTINUED | OUTPATIENT
Start: 2022-09-30 | End: 2022-10-05 | Stop reason: HOSPADM

## 2022-09-30 RX ORDER — NICOTINE POLACRILEX 4 MG
15 LOZENGE BUCCAL
Status: DISCONTINUED | OUTPATIENT
Start: 2022-09-30 | End: 2022-10-05 | Stop reason: HOSPADM

## 2022-09-30 RX ORDER — MAGNESIUM HYDROXIDE 1200 MG/15ML
LIQUID ORAL AS NEEDED
Status: DISCONTINUED | OUTPATIENT
Start: 2022-09-30 | End: 2022-09-30 | Stop reason: HOSPADM

## 2022-09-30 RX ORDER — DEXTROSE MONOHYDRATE 25 G/50ML
25 INJECTION, SOLUTION INTRAVENOUS
Status: DISCONTINUED | OUTPATIENT
Start: 2022-09-30 | End: 2022-10-05 | Stop reason: HOSPADM

## 2022-09-30 RX ORDER — ENOXAPARIN SODIUM 100 MG/ML
60 INJECTION SUBCUTANEOUS EVERY 12 HOURS
Status: DISCONTINUED | OUTPATIENT
Start: 2022-09-30 | End: 2022-10-01

## 2022-09-30 RX ORDER — SODIUM CHLORIDE 9 MG/ML
9 INJECTION, SOLUTION INTRAVENOUS CONTINUOUS
Status: DISCONTINUED | OUTPATIENT
Start: 2022-09-30 | End: 2022-10-01

## 2022-09-30 RX ORDER — ENOXAPARIN SODIUM 100 MG/ML
1 INJECTION SUBCUTANEOUS EVERY 12 HOURS
Status: DISCONTINUED | OUTPATIENT
Start: 2022-09-30 | End: 2022-09-30

## 2022-09-30 RX ORDER — FUROSEMIDE 10 MG/ML
20 INJECTION INTRAMUSCULAR; INTRAVENOUS EVERY 12 HOURS
Status: DISCONTINUED | OUTPATIENT
Start: 2022-09-30 | End: 2022-09-30

## 2022-09-30 RX ORDER — FUROSEMIDE 10 MG/ML
40 INJECTION INTRAMUSCULAR; INTRAVENOUS ONCE
Status: DISCONTINUED | OUTPATIENT
Start: 2022-09-30 | End: 2022-09-30

## 2022-09-30 RX ORDER — LIDOCAINE HYDROCHLORIDE 40 MG/ML
INJECTION, SOLUTION RETROBULBAR; TOPICAL AS NEEDED
Status: DISCONTINUED | OUTPATIENT
Start: 2022-09-30 | End: 2022-09-30 | Stop reason: HOSPADM

## 2022-09-30 RX ORDER — LIDOCAINE HYDROCHLORIDE 20 MG/ML
INJECTION, SOLUTION EPIDURAL; INFILTRATION; INTRACAUDAL; PERINEURAL AS NEEDED
Status: DISCONTINUED | OUTPATIENT
Start: 2022-09-30 | End: 2022-09-30 | Stop reason: SURG

## 2022-09-30 RX ORDER — PROPOFOL 10 MG/ML
VIAL (ML) INTRAVENOUS AS NEEDED
Status: DISCONTINUED | OUTPATIENT
Start: 2022-09-30 | End: 2022-09-30 | Stop reason: SURG

## 2022-09-30 RX ORDER — GUAIFENESIN 600 MG/1
1200 TABLET, EXTENDED RELEASE ORAL EVERY 12 HOURS SCHEDULED
Status: DISCONTINUED | OUTPATIENT
Start: 2022-09-30 | End: 2022-10-05 | Stop reason: HOSPADM

## 2022-09-30 RX ADMIN — Medication 10 ML: at 09:56

## 2022-09-30 RX ADMIN — VANCOMYCIN HYDROCHLORIDE 1000 MG: 5 INJECTION, POWDER, LYOPHILIZED, FOR SOLUTION INTRAVENOUS at 08:44

## 2022-09-30 RX ADMIN — MAGNESIUM SULFATE HEPTAHYDRATE 1 G: 10 INJECTION, SOLUTION INTRAVENOUS at 00:42

## 2022-09-30 RX ADMIN — SODIUM BICARBONATE 150 MEQ: 84 INJECTION, SOLUTION INTRAVENOUS at 17:01

## 2022-09-30 RX ADMIN — SODIUM CHLORIDE: 9 INJECTION, SOLUTION INTRAVENOUS at 15:22

## 2022-09-30 RX ADMIN — GUAIFENESIN 1200 MG: 600 TABLET ORAL at 12:01

## 2022-09-30 RX ADMIN — SODIUM BICARBONATE 150 MEQ: 84 INJECTION, SOLUTION INTRAVENOUS at 09:55

## 2022-09-30 RX ADMIN — Medication 250 MG: at 09:54

## 2022-09-30 RX ADMIN — IPRATROPIUM BROMIDE AND ALBUTEROL SULFATE 3 ML: 2.5; .5 SOLUTION RESPIRATORY (INHALATION) at 07:09

## 2022-09-30 RX ADMIN — ARFORMOTEROL TARTRATE 15 MCG: 15 SOLUTION RESPIRATORY (INHALATION) at 07:09

## 2022-09-30 RX ADMIN — HYDROCORTISONE SODIUM SUCCINATE 50 MG: 100 INJECTION, POWDER, FOR SOLUTION INTRAMUSCULAR; INTRAVENOUS at 22:08

## 2022-09-30 RX ADMIN — SODIUM CHLORIDE SOLN NEBU 3% 4 ML: 3 NEBU SOLN at 11:47

## 2022-09-30 RX ADMIN — LIDOCAINE HYDROCHLORIDE 40 MG: 20 INJECTION, SOLUTION EPIDURAL; INFILTRATION; INTRACAUDAL; PERINEURAL at 15:28

## 2022-09-30 RX ADMIN — HYDROCORTISONE SODIUM SUCCINATE 50 MG: 100 INJECTION, POWDER, FOR SOLUTION INTRAMUSCULAR; INTRAVENOUS at 09:54

## 2022-09-30 RX ADMIN — BUDESONIDE 0.5 MG: 0.5 SUSPENSION RESPIRATORY (INHALATION) at 19:40

## 2022-09-30 RX ADMIN — IOPAMIDOL 100 ML: 755 INJECTION, SOLUTION INTRAVENOUS at 18:10

## 2022-09-30 RX ADMIN — INSULIN LISPRO 4 UNITS: 100 INJECTION, SOLUTION INTRAVENOUS; SUBCUTANEOUS at 11:58

## 2022-09-30 RX ADMIN — HYDROCORTISONE SODIUM SUCCINATE 50 MG: 100 INJECTION, POWDER, FOR SOLUTION INTRAMUSCULAR; INTRAVENOUS at 17:01

## 2022-09-30 RX ADMIN — IPRATROPIUM BROMIDE AND ALBUTEROL SULFATE 3 ML: 2.5; .5 SOLUTION RESPIRATORY (INHALATION) at 04:14

## 2022-09-30 RX ADMIN — AMPICILLIN SODIUM AND SULBACTAM SODIUM 3 G: 2; 1 INJECTION, POWDER, FOR SOLUTION INTRAMUSCULAR; INTRAVENOUS at 17:01

## 2022-09-30 RX ADMIN — HYDROCORTISONE SODIUM SUCCINATE 50 MG: 100 INJECTION, POWDER, FOR SOLUTION INTRAMUSCULAR; INTRAVENOUS at 03:34

## 2022-09-30 RX ADMIN — IPRATROPIUM BROMIDE AND ALBUTEROL SULFATE 3 ML: 2.5; .5 SOLUTION RESPIRATORY (INHALATION) at 19:40

## 2022-09-30 RX ADMIN — ARFORMOTEROL TARTRATE 15 MCG: 15 SOLUTION RESPIRATORY (INHALATION) at 19:40

## 2022-09-30 RX ADMIN — MAGNESIUM SULFATE HEPTAHYDRATE 1 G: 10 INJECTION, SOLUTION INTRAVENOUS at 01:31

## 2022-09-30 RX ADMIN — PROPOFOL 150 MCG/KG/MIN: 10 INJECTION, EMULSION INTRAVENOUS at 15:28

## 2022-09-30 RX ADMIN — Medication 250 MG: at 22:08

## 2022-09-30 RX ADMIN — Medication 15 G: at 09:55

## 2022-09-30 RX ADMIN — ENOXAPARIN SODIUM 40 MG: 100 INJECTION SUBCUTANEOUS at 09:54

## 2022-09-30 RX ADMIN — BUDESONIDE 0.5 MG: 0.5 SUSPENSION RESPIRATORY (INHALATION) at 07:09

## 2022-09-30 RX ADMIN — PROPOFOL 60 MG: 10 INJECTION, EMULSION INTRAVENOUS at 15:28

## 2022-09-30 RX ADMIN — IPRATROPIUM BROMIDE AND ALBUTEROL SULFATE 3 ML: 2.5; .5 SOLUTION RESPIRATORY (INHALATION) at 11:43

## 2022-09-30 RX ADMIN — FLUDROCORTISONE ACETATE 50 MCG: 0.1 TABLET ORAL at 09:54

## 2022-09-30 RX ADMIN — AMPICILLIN SODIUM AND SULBACTAM SODIUM 3 G: 2; 1 INJECTION, POWDER, FOR SOLUTION INTRAMUSCULAR; INTRAVENOUS at 22:08

## 2022-09-30 RX ADMIN — GUAIFENESIN 1200 MG: 600 TABLET ORAL at 22:08

## 2022-09-30 RX ADMIN — MAGNESIUM SULFATE HEPTAHYDRATE 1 G: 10 INJECTION, SOLUTION INTRAVENOUS at 02:32

## 2022-09-30 RX ADMIN — IPRATROPIUM BROMIDE AND ALBUTEROL SULFATE 3 ML: 2.5; .5 SOLUTION RESPIRATORY (INHALATION) at 23:27

## 2022-09-30 RX ADMIN — Medication 10 ML: at 20:55

## 2022-09-30 RX ADMIN — CALCIUM GLUCONATE 2 G: 20 INJECTION, SOLUTION INTRAVENOUS at 09:55

## 2022-09-30 NOTE — ANESTHESIA PREPROCEDURE EVALUATION
Anesthesia Evaluation     Patient summary reviewed and Nursing notes reviewed   no history of anesthetic complications:  NPO Solid Status: > 8 hours  NPO Liquid Status: > 2 hours           Airway   Mallampati: I  TM distance: >3 FB  Neck ROM: full  No difficulty expected  Dental      Pulmonary - normal exam    breath sounds clear to auscultation  (+) lung cancer,   Cardiovascular - normal exam  Exercise tolerance: poor (<4 METS)    Rhythm: regular  Rate: normal        Neuro/Psych  (+) weakness,    GI/Hepatic/Renal/Endo - negative ROS     Musculoskeletal     Abdominal    Substance History - negative use     OB/GYN negative ob/gyn ROS         Other   arthritis,    history of cancer active    ROS/Med Hx Other: PAT Nursing Notes unavailable.        cxr on 9/30 reveals  IMPRESSION:                 Worsening progression is suggested radiographically with new or increased bilateral infiltrates   since 9/27/2022 at 1501 hours.  The findings may represent infectious multifocal pneumonia.    Aspiration pneumonia is possible.  Pulmonary edema cannot be excluded.  Treatment effect would be   in the differential diagnosis.    Pt hyponatremic    Latest Reference Range & Units Most Recent   Glucose 70 - 99 mg/dL 243 (H)  09/30/22 11:38   Sodium 136 - 145 mmol/L 120 (L)  09/30/22 03:33   Potassium 3.5 - 5.2 mmol/L 4.1  09/30/22 03:33   CO2 22.0 - 29.0 mmol/L 16.8 (L)  09/30/22 03:33   Chloride 98 - 107 mmol/L 92 (L)  09/30/22 03:33   Anion Gap 5.0 - 15.0 mmol/L 11.2  09/30/22 03:33   Creatinine 0.57 - 1.00 mg/dL 0.78  09/30/22 03:33   BUN 8 - 23 mg/dL 8  09/30/22 03:33   BUN/Creatinine Ratio 7.0 - 25.0  10.3  09/30/22 03:33   Calcium 8.6 - 10.5 mg/dL 7.7 (L)  09/30/22 03:33   Ionized Calcium 1.13 - 1.32 mmol/L 1.07 (L)  09/29/22 19:02   eGFR >60.0 mL/min/1.73 83.9  09/30/22 03:33   Alkaline Phosphatase 39 - 117 U/L 81  09/27/22 18:14   Total Protein 6.0 - 8.5 g/dL 7.9  09/27/22 18:14   ALT (SGPT) 1 - 33 U/L 17  09/27/22 18:14    AST (SGOT) 1 - 32 U/L 54 (H)  09/27/22 18:14   Total Bilirubin 0.0 - 1.2 mg/dL 0.5  09/27/22 18:14   Albumin 3.50 - 5.20 g/dL 1.80 (L)  09/30/22 03:33   Globulin gm/dL 5.7  09/27/22 18:14   A/G Ratio g/dL 0.4  09/27/22 18:14   Phosphorus 2.5 - 4.5 mg/dL 4.2  09/30/22 03:33   (H): Data is abnormally high  (L): Data is abnormally low       Anesthesia Plan    ASA 4     general     (Patient understands anesthesia not responsible for dental damage.    Pt was hypotensive earlier today on levophed gtt, weaned off around 9am, bp has been stable since, pt hyponatremic will give normal saline    )  intravenous induction     Anesthetic plan, risks, benefits, and alternatives have been provided, discussed and informed consent has been obtained with: patient.    Use of blood products discussed with patient .   Plan discussed with CRNA.        CODE STATUS:    Code Status (Patient has no pulse and is not breathing): CPR (Attempt to Resuscitate)  Medical Interventions (Patient has pulse or is breathing): Full Support

## 2022-09-30 NOTE — ANESTHESIA POSTPROCEDURE EVALUATION
Patient: Linda Ortiz    Procedure Summary     Date: 09/30/22 Room / Location: [facility] ENDOSCOPY 3 / [facility] ENDOSCOPY    Anesthesia Start: 1522 Anesthesia Stop: 1551    Procedure: BRONCHOSCOPY WITH BAL, WASHINGS (N/A Bronchus) Diagnosis:       Sepsis, due to unspecified organism, unspecified whether acute organ dysfunction present (HCC)      Malignant neoplasm of upper lobe of left lung (HCC)      (Sepsis, due to unspecified organism, unspecified whether acute organ dysfunction present (HCC) [A41.9])      (Malignant neoplasm of upper lobe of left lung (HCC) [C34.12])    Surgeons: Fausto Leon MD Provider: Leroy Marks MD    Anesthesia Type: general ASA Status: 4          Anesthesia Type: general    Vitals  Vitals Value Taken Time   /72 09/30/22 1602   Temp 36.2 °C (97.2 °F) 09/30/22 1602   Pulse 86 09/30/22 1607   Resp 24 09/30/22 1602   SpO2 96 % 09/30/22 1606   Vitals shown include unvalidated device data.        Post Anesthesia Care and Evaluation    Patient location during evaluation: bedside  Patient participation: complete - patient participated  Level of consciousness: awake  Pain management: adequate    Airway patency: patent  Anesthetic complications: No anesthetic complications  PONV Status: none  Cardiovascular status: acceptable and stable  Respiratory status: acceptable  Hydration status: acceptable    Comments: An Anesthesiologist personally participated in the most demanding procedures (including induction and emergence if applicable) in the anesthesia plan, monitored the course of anesthesia administration at frequent intervals and remained physically present and available for immediate diagnosis and treatment of emergencies.

## 2022-10-01 ENCOUNTER — APPOINTMENT (OUTPATIENT)
Dept: CARDIOLOGY | Facility: HOSPITAL | Age: 67
End: 2022-10-01

## 2022-10-01 LAB
ALBUMIN SERPL-MCNC: 1.9 G/DL (ref 3.5–5.2)
ALBUMIN/GLOB SERPL: 0.4 G/DL
ALP SERPL-CCNC: 76 U/L (ref 39–117)
ALT SERPL W P-5'-P-CCNC: 16 U/L (ref 1–33)
ANION GAP SERPL CALCULATED.3IONS-SCNC: 7.7 MMOL/L (ref 5–15)
AST SERPL-CCNC: 44 U/L (ref 1–32)
BASOPHILS # BLD AUTO: 0.01 10*3/MM3 (ref 0–0.2)
BASOPHILS NFR BLD AUTO: 0.1 % (ref 0–1.5)
BH CV ECHO MEAS - AO ROOT DIAM: 3 CM
BH CV ECHO MEAS - EDV(MOD-SP2): 54 ML
BH CV ECHO MEAS - EDV(MOD-SP4): 54 ML
BH CV ECHO MEAS - EF(MOD-BP): 55.5 %
BH CV ECHO MEAS - ESV(MOD-SP2): 24 ML
BH CV ECHO MEAS - ESV(MOD-SP4): 24 ML
BH CV ECHO MEAS - IVSD: 1 CM
BH CV ECHO MEAS - LA DIMENSION: 3.8 CM
BH CV ECHO MEAS - LAT PEAK E' VEL: 6.7 CM/SEC
BH CV ECHO MEAS - LVIDD: 5 CM
BH CV ECHO MEAS - LVIDS: 3.5 CM
BH CV ECHO MEAS - LVOT DIAM: 2 CM
BH CV ECHO MEAS - LVPWD: 1 CM
BH CV ECHO MEAS - MED PEAK E' VEL: 6.42 CM/SEC
BH CV ECHO MEAS - MV A MAX VEL: 84 CM/SEC
BH CV ECHO MEAS - MV DEC TIME: 174 MSEC
BH CV ECHO MEAS - MV E MAX VEL: 70 CM/SEC
BH CV ECHO MEAS - MV E/A: 0.8
BH CV ECHO MEAS - RVDD: 2.7 CM
BH CV ECHO MEAS - TAPSE (>1.6): 1.85 CM
BH CV ECHO MEASUREMENTS AVERAGE E/E' RATIO: 10.67
BILIRUB SERPL-MCNC: 0.3 MG/DL (ref 0–1.2)
BUN SERPL-MCNC: 16 MG/DL (ref 8–23)
BUN/CREAT SERPL: 28.6 (ref 7–25)
CALCIUM SPEC-SCNC: 7.7 MG/DL (ref 8.6–10.5)
CHLORIDE SERPL-SCNC: 93 MMOL/L (ref 98–107)
CO2 SERPL-SCNC: 28.3 MMOL/L (ref 22–29)
CREAT SERPL-MCNC: 0.56 MG/DL (ref 0.57–1)
DEPRECATED RDW RBC AUTO: 46.8 FL (ref 37–54)
EGFRCR SERPLBLD CKD-EPI 2021: 100.8 ML/MIN/1.73
EOSINOPHIL # BLD AUTO: 0 10*3/MM3 (ref 0–0.4)
EOSINOPHIL NFR BLD AUTO: 0 % (ref 0.3–6.2)
ERYTHROCYTE [DISTWIDTH] IN BLOOD BY AUTOMATED COUNT: 14.6 % (ref 12.3–15.4)
FERRITIN SERPL-MCNC: 3023 NG/ML (ref 13–150)
GLOBULIN UR ELPH-MCNC: 4.9 GM/DL
GLUCOSE BLDC GLUCOMTR-MCNC: 104 MG/DL (ref 70–99)
GLUCOSE BLDC GLUCOMTR-MCNC: 115 MG/DL (ref 70–99)
GLUCOSE BLDC GLUCOMTR-MCNC: 223 MG/DL (ref 70–99)
GLUCOSE SERPL-MCNC: 268 MG/DL (ref 65–99)
HCT VFR BLD AUTO: 24.3 % (ref 34–46.6)
HGB BLD-MCNC: 8.9 G/DL (ref 12–15.9)
IMM GRANULOCYTES # BLD AUTO: 0.14 10*3/MM3 (ref 0–0.05)
IMM GRANULOCYTES NFR BLD AUTO: 0.9 % (ref 0–0.5)
IRON 24H UR-MRATE: 46 MCG/DL (ref 37–145)
IRON SATN MFR SERPL: 58 % (ref 20–50)
IVRT: 79 MSEC
LEFT ATRIUM VOLUME INDEX: 16 ML/M2
LYMPHOCYTES # BLD AUTO: 0.93 10*3/MM3 (ref 0.7–3.1)
LYMPHOCYTES NFR BLD AUTO: 5.9 % (ref 19.6–45.3)
MAGNESIUM SERPL-MCNC: 2 MG/DL (ref 1.6–2.4)
MAXIMAL PREDICTED HEART RATE: 154 BPM
MCH RBC QN AUTO: 32.7 PG (ref 26.6–33)
MCHC RBC AUTO-ENTMCNC: 36.6 G/DL (ref 31.5–35.7)
MCV RBC AUTO: 89.3 FL (ref 79–97)
MONOCYTES # BLD AUTO: 0.85 10*3/MM3 (ref 0.1–0.9)
MONOCYTES NFR BLD AUTO: 5.4 % (ref 5–12)
NEUTROPHILS NFR BLD AUTO: 13.93 10*3/MM3 (ref 1.7–7)
NEUTROPHILS NFR BLD AUTO: 87.7 % (ref 42.7–76)
NRBC BLD AUTO-RTO: 0 /100 WBC (ref 0–0.2)
PHOSPHATE SERPL-MCNC: 2 MG/DL (ref 2.5–4.5)
PLATELET # BLD AUTO: 294 10*3/MM3 (ref 140–450)
PMV BLD AUTO: 9.4 FL (ref 6–12)
POTASSIUM SERPL-SCNC: 3.3 MMOL/L (ref 3.5–5.2)
PROT SERPL-MCNC: 6.8 G/DL (ref 6–8.5)
RBC # BLD AUTO: 2.72 10*6/MM3 (ref 3.77–5.28)
RETICS # AUTO: 0.05 10*6/MM3 (ref 0.02–0.13)
RETICS/RBC NFR AUTO: 2.08 % (ref 0.7–1.9)
SODIUM SERPL-SCNC: 128 MMOL/L (ref 136–145)
SODIUM SERPL-SCNC: 129 MMOL/L (ref 136–145)
SODIUM SERPL-SCNC: 129 MMOL/L (ref 136–145)
STRESS TARGET HR: 131 BPM
TIBC SERPL-MCNC: 79 MCG/DL (ref 298–536)
TRANSFERRIN SERPL-MCNC: 53 MG/DL (ref 200–360)
WBC NRBC COR # BLD: 15.86 10*3/MM3 (ref 3.4–10.8)

## 2022-10-01 PROCEDURE — 94799 UNLISTED PULMONARY SVC/PX: CPT

## 2022-10-01 PROCEDURE — 84100 ASSAY OF PHOSPHORUS: CPT | Performed by: INTERNAL MEDICINE

## 2022-10-01 PROCEDURE — 80053 COMPREHEN METABOLIC PANEL: CPT | Performed by: INTERNAL MEDICINE

## 2022-10-01 PROCEDURE — 93306 TTE W/DOPPLER COMPLETE: CPT

## 2022-10-01 PROCEDURE — 25010000002 ENOXAPARIN PER 10 MG: Performed by: HOSPITALIST

## 2022-10-01 PROCEDURE — 83735 ASSAY OF MAGNESIUM: CPT | Performed by: INTERNAL MEDICINE

## 2022-10-01 PROCEDURE — 25010000002 HYDROCORTISONE SOD SUC (PF) 100 MG RECONSTITUTED SOLUTION: Performed by: INTERNAL MEDICINE

## 2022-10-01 PROCEDURE — 85045 AUTOMATED RETICULOCYTE COUNT: CPT | Performed by: INTERNAL MEDICINE

## 2022-10-01 PROCEDURE — 99291 CRITICAL CARE FIRST HOUR: CPT | Performed by: INTERNAL MEDICINE

## 2022-10-01 PROCEDURE — 82962 GLUCOSE BLOOD TEST: CPT

## 2022-10-01 PROCEDURE — 63710000001 INSULIN LISPRO (HUMAN) PER 5 UNITS: Performed by: INTERNAL MEDICINE

## 2022-10-01 PROCEDURE — 25010000002 AMPICILLIN-SULBACTAM PER 1.5 G: Performed by: INTERNAL MEDICINE

## 2022-10-01 PROCEDURE — 92610 EVALUATE SWALLOWING FUNCTION: CPT

## 2022-10-01 PROCEDURE — 84295 ASSAY OF SERUM SODIUM: CPT | Performed by: INTERNAL MEDICINE

## 2022-10-01 PROCEDURE — 85025 COMPLETE CBC W/AUTO DIFF WBC: CPT | Performed by: INTERNAL MEDICINE

## 2022-10-01 PROCEDURE — 84466 ASSAY OF TRANSFERRIN: CPT | Performed by: INTERNAL MEDICINE

## 2022-10-01 PROCEDURE — 94664 DEMO&/EVAL PT USE INHALER: CPT

## 2022-10-01 PROCEDURE — 83540 ASSAY OF IRON: CPT | Performed by: INTERNAL MEDICINE

## 2022-10-01 PROCEDURE — 82728 ASSAY OF FERRITIN: CPT | Performed by: INTERNAL MEDICINE

## 2022-10-01 RX ORDER — POTASSIUM CHLORIDE 1.5 G/1.77G
40 POWDER, FOR SOLUTION ORAL ONCE
Status: COMPLETED | OUTPATIENT
Start: 2022-10-02 | End: 2022-10-02

## 2022-10-01 RX ORDER — FAMOTIDINE 20 MG/1
20 TABLET, FILM COATED ORAL
Status: DISCONTINUED | OUTPATIENT
Start: 2022-10-02 | End: 2022-10-05 | Stop reason: HOSPADM

## 2022-10-01 RX ORDER — POTASSIUM CHLORIDE 1.5 G/1.77G
20 POWDER, FOR SOLUTION ORAL
Status: COMPLETED | OUTPATIENT
Start: 2022-10-01 | End: 2022-10-01

## 2022-10-01 RX ORDER — POTASSIUM CHLORIDE 750 MG/1
40 CAPSULE, EXTENDED RELEASE ORAL ONCE
Status: DISCONTINUED | OUTPATIENT
Start: 2022-10-02 | End: 2022-10-01

## 2022-10-01 RX ORDER — IPRATROPIUM BROMIDE AND ALBUTEROL SULFATE 2.5; .5 MG/3ML; MG/3ML
3 SOLUTION RESPIRATORY (INHALATION) EVERY 4 HOURS PRN
Status: DISCONTINUED | OUTPATIENT
Start: 2022-10-01 | End: 2022-10-05 | Stop reason: HOSPADM

## 2022-10-01 RX ORDER — FENTANYL/ROPIVACAINE/NS/PF 2-625MCG/1
15 PLASTIC BAG, INJECTION (ML) EPIDURAL
Status: COMPLETED | OUTPATIENT
Start: 2022-10-01 | End: 2022-10-01

## 2022-10-01 RX ORDER — FUROSEMIDE 10 MG/ML
40 INJECTION INTRAMUSCULAR; INTRAVENOUS ONCE
Status: COMPLETED | OUTPATIENT
Start: 2022-10-02 | End: 2022-10-02

## 2022-10-01 RX ADMIN — ENOXAPARIN SODIUM 60 MG: 100 INJECTION SUBCUTANEOUS at 00:00

## 2022-10-01 RX ADMIN — BUDESONIDE 0.5 MG: 0.5 SUSPENSION RESPIRATORY (INHALATION) at 18:13

## 2022-10-01 RX ADMIN — AMPICILLIN SODIUM AND SULBACTAM SODIUM 3 G: 2; 1 INJECTION, POWDER, FOR SOLUTION INTRAMUSCULAR; INTRAVENOUS at 04:02

## 2022-10-01 RX ADMIN — ARFORMOTEROL TARTRATE 15 MCG: 15 SOLUTION RESPIRATORY (INHALATION) at 18:13

## 2022-10-01 RX ADMIN — ACETAMINOPHEN 650 MG: 325 TABLET ORAL at 13:29

## 2022-10-01 RX ADMIN — Medication 250 MG: at 22:15

## 2022-10-01 RX ADMIN — Medication 250 MG: at 08:15

## 2022-10-01 RX ADMIN — Medication 15 G: at 08:16

## 2022-10-01 RX ADMIN — POTASSIUM PHOSPHATE, MONOBASIC AND POTASSIUM PHOSPHATE, DIBASIC 15 MMOL: 224; 236 INJECTION, SOLUTION, CONCENTRATE INTRAVENOUS at 08:01

## 2022-10-01 RX ADMIN — IPRATROPIUM BROMIDE AND ALBUTEROL SULFATE 3 ML: 2.5; .5 SOLUTION RESPIRATORY (INHALATION) at 07:34

## 2022-10-01 RX ADMIN — SODIUM BICARBONATE 150 MEQ: 84 INJECTION, SOLUTION INTRAVENOUS at 03:54

## 2022-10-01 RX ADMIN — ENOXAPARIN SODIUM 60 MG: 100 INJECTION SUBCUTANEOUS at 12:17

## 2022-10-01 RX ADMIN — ENOXAPARIN SODIUM 60 MG: 100 INJECTION SUBCUTANEOUS at 22:14

## 2022-10-01 RX ADMIN — IPRATROPIUM BROMIDE AND ALBUTEROL SULFATE 3 ML: 2.5; .5 SOLUTION RESPIRATORY (INHALATION) at 18:13

## 2022-10-01 RX ADMIN — FLUDROCORTISONE ACETATE 50 MCG: 0.1 TABLET ORAL at 12:17

## 2022-10-01 RX ADMIN — HYDROCORTISONE SODIUM SUCCINATE 50 MG: 100 INJECTION, POWDER, FOR SOLUTION INTRAMUSCULAR; INTRAVENOUS at 04:02

## 2022-10-01 RX ADMIN — IPRATROPIUM BROMIDE AND ALBUTEROL SULFATE 3 ML: 2.5; .5 SOLUTION RESPIRATORY (INHALATION) at 15:48

## 2022-10-01 RX ADMIN — Medication 10 ML: at 08:01

## 2022-10-01 RX ADMIN — AMPICILLIN SODIUM AND SULBACTAM SODIUM 3 G: 2; 1 INJECTION, POWDER, FOR SOLUTION INTRAMUSCULAR; INTRAVENOUS at 12:14

## 2022-10-01 RX ADMIN — POTASSIUM CHLORIDE 20 MEQ: 1.5 POWDER, FOR SOLUTION ORAL at 15:03

## 2022-10-01 RX ADMIN — HYDROCORTISONE SODIUM SUCCINATE 50 MG: 100 INJECTION, POWDER, FOR SOLUTION INTRAMUSCULAR; INTRAVENOUS at 12:18

## 2022-10-01 RX ADMIN — INSULIN LISPRO 4 UNITS: 100 INJECTION, SOLUTION INTRAVENOUS; SUBCUTANEOUS at 08:00

## 2022-10-01 RX ADMIN — Medication 10 ML: at 22:16

## 2022-10-01 RX ADMIN — IPRATROPIUM BROMIDE AND ALBUTEROL SULFATE 3 ML: 2.5; .5 SOLUTION RESPIRATORY (INHALATION) at 11:56

## 2022-10-01 RX ADMIN — AMPICILLIN SODIUM AND SULBACTAM SODIUM 3 G: 2; 1 INJECTION, POWDER, FOR SOLUTION INTRAMUSCULAR; INTRAVENOUS at 17:56

## 2022-10-01 RX ADMIN — GUAIFENESIN 1200 MG: 600 TABLET ORAL at 22:15

## 2022-10-01 RX ADMIN — HYDROCORTISONE SODIUM SUCCINATE 50 MG: 100 INJECTION, POWDER, FOR SOLUTION INTRAMUSCULAR; INTRAVENOUS at 17:56

## 2022-10-01 RX ADMIN — BUDESONIDE 0.5 MG: 0.5 SUSPENSION RESPIRATORY (INHALATION) at 07:34

## 2022-10-01 RX ADMIN — AMPICILLIN SODIUM AND SULBACTAM SODIUM 3 G: 2; 1 INJECTION, POWDER, FOR SOLUTION INTRAMUSCULAR; INTRAVENOUS at 22:15

## 2022-10-01 RX ADMIN — GUAIFENESIN 1200 MG: 600 TABLET ORAL at 08:15

## 2022-10-01 RX ADMIN — POTASSIUM CHLORIDE 20 MEQ: 1.5 POWDER, FOR SOLUTION ORAL at 17:57

## 2022-10-01 RX ADMIN — POTASSIUM PHOSPHATE, MONOBASIC AND POTASSIUM PHOSPHATE, DIBASIC 15 MMOL: 224; 236 INJECTION, SOLUTION, CONCENTRATE INTRAVENOUS at 12:15

## 2022-10-01 RX ADMIN — ARFORMOTEROL TARTRATE 15 MCG: 15 SOLUTION RESPIRATORY (INHALATION) at 07:34

## 2022-10-01 RX ADMIN — POTASSIUM CHLORIDE 20 MEQ: 1.5 POWDER, FOR SOLUTION ORAL at 17:58

## 2022-10-01 RX ADMIN — IPRATROPIUM BROMIDE AND ALBUTEROL SULFATE 3 ML: 2.5; .5 SOLUTION RESPIRATORY (INHALATION) at 03:18

## 2022-10-01 NOTE — PROGRESS NOTES
Pulmonary / Critical Care  Progress Note      Patient Name: Linda Ortiz  : 1955  MRN: [id number]  Primary Care Physician:  Provider, No Known  Date of admission: 2022  ICU: 1d 16h      Subjective   Subjective        68y/o female critically ill in CCU with altered mental status, shock adrenal insufficiency versus septic, metastatic none small cell lung cancer     Date of Admission: 2022  CCU day: Transferred to CCU on 2020 due to hypotension and worsening mental status, day 3  Oxygen requirement: Nasal cannula  Sedation: None  Pressors: Previously required Levophed   Critical drips: None  Antibiotic regimen: On Unasyn, pneumonia panel positive for Klebsiella  Lines and insertion date: Peripherals, right upper chest port     Over the last 24 hours....   Off Levophed  Was on ceftriaxone, transition to Unasyn  Underwent bronchoscopy  Pneumonia panel positive for Klebsiella  Head CT showed no acute intracranial finding     No acute events overnight     This morning....   Patient much more awake and alert this morning  Speech much clear  Confusion appears improved  On room air  900 cc UOP in last 24 hours  Underwent chest CT with contrast found to have new pericardial effusion with bilateral pleural effusions no PEs next    ROS  General: Denied complaints  HEENT: Denied complaints  Respiratory: Denied complaints  Cardiovascular: Denied complaints  GI: Denied complaints  MSK: Denied complaints  Neurologic: Denied complaints  Skin: Denied complaints    Objective   Objective     Vitals:   Temp:  [95 °F (35 °C)-97.8 °F (36.6 °C)] 97.6 °F (36.4 °C)  Heart Rate:  [] 94  Resp:  [18-27] 27  BP: ()/(47-89) 96/65  Flow (L/min):  [1-4] 1    Physical Exam   Vital Signs Reviewed   Frail looking, pale female, with improved mental status, slightly improved cough strength  HEENT:  PERRL, EOMI.  OP, nares clear, no sinus tenderness  Neck:  Supple, no JVD, no thyromegaly  Lymph: no axillary, cervical,  supraclavicular lymphadenopathy noted bilaterally  Chest:   Has poor cough strength, diminished breath sounds bilaterally, scattered rhonchi, no wheezing or crackles, resonant to percussion bilaterally  CV: Tachycardic, hypertensive, no MGR, pulses are feeble in extremities and  Abd:  Soft, NT, ND, + BS, no HSM  EXT:  no clubbing, no cyanosis, no edema, no joint tenderness  Neuro:    Weak, bedridden, cranial nerves II through XII are intact  Skin: No rashes or lesions noted      Result Review    Result Review:  I have personally reviewed the results from the time of this admission to 10/1/2022 10:01 EDT and agree with these findings:  [x]  Laboratory  [x]  Microbiology  [x]  Radiology  []  EKG/Telemetry   []  Cardiology/Vascular   []  Pathology  []  Old records  []  Other:  Most notable findings include:     LAB RESULTS:      Lab 10/01/22  0402 09/30/22  1627 09/30/22  0333 09/29/22  1902 09/29/22  0757 09/29/22  0101 09/28/22  0443 09/27/22  1814 09/27/22  1511   WBC 15.86*  --  15.04*  --   --  4.53 5.36  --  6.96   HEMOGLOBIN 8.9*  --  10.8*  --   --  10.3* 10.7*  --  12.2   HEMATOCRIT 24.3*  --  30.2*  --   --  28.4* 29.7*  --  34.8   PLATELETS 294  --  390  --   --  321 367  --  396   NEUTROS ABS 13.93*  --  13.03*  --   --   --  3.11  --  4.26   IMMATURE GRANS (ABS) 0.14*  --  0.11*  --   --   --  0.02  --  0.06*   LYMPHS ABS 0.93  --  1.05  --   --   --  1.21  --  1.41   MONOS ABS 0.85  --  0.79  --   --   --  0.83  --  0.84   EOS ABS 0.00  --  0.03  --   --   --  0.15  --  0.34   MCV 89.3  --  90.7  --   --  91.6 91.7  --  93.5   CRP  --   --  18.45*  --   --   --   --   --   --    PROCALCITONIN  --   --  2.98*  --  0.21  --   --   --   --    LACTATE  --  3.0*  --   --   --   --   --  1.7 3.9*   LACTATE, ARTERIAL  --   --   --  1.24  --   --   --   --   --    D DIMER QUANT  --   --  4.05*  --   --   --   --   --   --          Lab 10/01/22  0402 10/01/22  0003 09/30/22  2128 09/30/22  1627 09/30/22  0333  09/29/22  2132 09/29/22  1902 09/29/22  0437 09/29/22  0101 09/28/22  0146 09/27/22  1814 09/27/22  1511   0000   SODIUM 129*  129* 128* 127* 127* 120*   < >  --    < > 120*   < > 119*  --   --    SODIUM, ARTERIAL  --   --   --   --   --   --  117.4*  --   --   --   --   --   --    POTASSIUM 3.3*  --   --  4.1 4.1  --   --   --  4.1  --  4.3  --   --    CHLORIDE 93*  --   --  95* 92*  --   --   --  91*  --  85*  --   --    CO2 28.3  --   --  23.4 16.8*  --   --   --  22.6  --  23.6  --   --    ANION GAP 7.7  --   --  8.6 11.2  --   --   --  6.4  --  10.4  --   --    BUN 16  --   --  22 8  --   --   --  7*  --  13  --   --    CREATININE 0.56*  --   --  0.70 0.78  --   --   --  0.56*  --  0.72  --   --    EGFR 100.8  --   --  95.5 83.9  --   --   --  100.8  --  92.3  --   --    GLUCOSE 268*  --   --  146* 264*  --   --   --  63*  --  69  --    < >   GLUCOSE, ARTERIAL  --   --   --   --   --   --  147*  --   --   --   --   --   --    CALCIUM 7.7*  --   --  7.9* 7.7*  --   --   --  7.9*  --  8.6  --   --    IONIZED CALCIUM  --   --   --   --   --   --  1.07*  --   --   --   --   --   --    MAGNESIUM 2.0  --   --   --  3.5*  --   --   --  1.4*  --   --  1.6  --    PHOSPHORUS 2.0*  --   --   --  4.2  --   --   --  3.3  --   --   --   --    TSH  --   --   --   --   --   --   --   --   --   --  0.593  --   --     < > = values in this interval not displayed.         Lab 10/01/22  0402 09/30/22  0333 09/29/22  0757 09/27/22  1814   TOTAL PROTEIN 6.8  --   --  7.9   ALBUMIN 1.90* 1.80* 2.20* 2.20*   GLOBULIN 4.9  --   --  5.7   ALT (SGPT) 16  --   --  17   AST (SGOT) 44*  --   --  54*   BILIRUBIN 0.3  --   --  0.5   ALK PHOS 76  --   --  81         Lab 09/30/22  0333 09/27/22  1814   PROBNP 3,300.0*  --    TROPONIN T  --  <0.010                 Lab 09/29/22  1902   PH, ARTERIAL 7.347*   PCO2, ARTERIAL 31.2*   PO2 ART 77.7*   O2 SATURATION ART 93.7*   FIO2 <21   HCO3 ART 16.7*   BASE EXCESS ART -7.8*   CARBOXYHEMOGLOBIN 0.3      Brief Urine Lab Results  (Last result in the past 365 days)      Color   Clarity   Blood   Leuk Est   Nitrite   Protein   CREAT   Urine HCG        09/29/22 0333 Yellow   Clear   Small (1+)   Small (1+)   Negative   Negative               Microbiology Results (last 10 days)     Procedure Component Value - Date/Time    S. Pneumo Ag Urine or CSF - Urine, Urine, Clean Catch [[id number]]  (Normal) Collected: 09/30/22 1821    Lab Status: Final result Specimen: Urine, Clean Catch Updated: 09/30/22 1906     Strep Pneumo Ag Negative    Legionella Antigen, Urine - Urine, Urine, Clean Catch [[id number]]  (Normal) Collected: 09/30/22 1821    Lab Status: Final result Specimen: Urine, Clean Catch Updated: 09/30/22 1906     LEGIONELLA ANTIGEN, URINE Negative    Respiratory Culture - Wash, Bronchus [[id number]] Collected: 09/30/22 1536    Lab Status: Preliminary result Specimen: Wash from Bronchus Updated: 09/30/22 1827     Gram Stain Few (2+) Gram positive cocci    BAL Culture, Quantitative - Lavage, Lung, Lingula [[id number]] Collected: 09/30/22 1533    Lab Status: Preliminary result Specimen: Lavage from Lung, Lingula Updated: 09/30/22 1831     Gram Stain Few (2+) Gram positive cocci      Rare (1+) Gram positive bacilli    Pneumonia Panel - Lavage, Lung, Lingula [[id number]]  (Abnormal) Collected: 09/30/22 1533    Lab Status: Final result Specimen: Lavage from Lung, Lingula Updated: 09/30/22 1832     Escherichia coli PCR Not Detected     Acinetobacter calcoaceticus-baumannii complex PCR Not Detected     Enterobacter cloacae PCR Not Detected     Klebsiella oxytoca PCR Not Detected     Klebsiella pneumoniae group PCR Not Detected     Klebsiella aerogenes PCR Detected     Moraxella catarrhalis PCR Not Detected     Proteus species PCR Not Detected     Pseudomonas aeroginosa PCR Not Detected     Serratia marcescens PCR Not Detected     Staphylococcus aureus PCR Not Detected     Streptococcus pyogenes PCR Not Detected     Haemophilus  influenzae PCR Not Detected     Streptococcus agalactiae PCR Not Detected     Streptococcus pneumoniae PCR Not Detected     Chlamydophila pneumoniae PCR Not Detected     Legionella pneumophilia PCR Not Detected     Mycoplasma pneumo by PCR Not Detected     ADENOVIRUS, PCR Not Detected     CTX-M Gene Not Detected     IMP Gene Not Detected     KPC Gene Not Detected     mecA/C and MREJ Gene N/A     NDM Gene Not Detected     OXA-48-like Gene Not Detected     VIM Gene Not Detected     Coronavirus Not Detected     Human Metapneumovirus Not Detected     Human Rhinovirus/Enterovirus Not Detected     Influenza A PCR Not Detected     Influenza B PCR Not Detected     RSV, PCR Not Detected     Parainfluenza virus PCR Not Detected    MRSA Screen, PCR (Inpatient) - Swab, Nares [[id number]]  (Normal) Collected: 09/29/22 1807    Lab Status: Final result Specimen: Swab from Nares Updated: 09/29/22 1921     MRSA PCR No MRSA Detected    Narrative:      The negative predictive value of this diagnostic test is high and should only be used to consider de-escalating anti-MRSA therapy. A positive result may indicate colonization with MRSA and must be correlated clinically.    Urine Culture - Urine, Urine, Clean Catch [[id number]]  (Normal) Collected: 09/29/22 0333    Lab Status: Final result Specimen: Urine, Clean Catch Updated: 09/30/22 0929     Urine Culture No growth    Respiratory Culture - Sputum, Cough [[id number]] Collected: 09/28/22 2113    Lab Status: Final result Specimen: Sputum from Cough Updated: 09/30/22 1201     Respiratory Culture Scant growth (1+) Normal respiratory reji. No S. aureus or Pseudomonas aeruginosa detected. Final report.     Gram Stain Few (2+) WBCs seen      Few (2+) Mucous strands      Few (2+) Gram positive cocci in pairs and chains    Blood Culture - Blood, Arm, Left [[id number]]  (Normal) Collected: 09/27/22 1511    Lab Status: Preliminary result Specimen: Blood from Arm, Left Updated: 09/30/22 1532      Blood Culture No growth at 3 days    Blood Culture - Blood, Arm, Left [[id number]]  (Normal) Collected: 09/27/22 1511    Lab Status: Preliminary result Specimen: Blood from Arm, Left Updated: 09/30/22 1532     Blood Culture No growth at 3 days          CT Head Without Contrast    Result Date: 9/30/2022    1. No acute intracranial finding.     SARMAD TRIVEDI MD       Electronically Signed and Approved By: SARMAD TRIVEDI MD on 9/30/2022 at 21:50             CT Chest With Contrast Diagnostic    Result Date: 9/30/2022    1. Mild anasarca. 2. New pericardial effusion measuring up to at least 2.2 centimeters in thickness new compared to prior CT 9/16/2022. 3. New bilateral pleural effusions also measuring up to 4.5 centimeters on the right and 4.8 centimeters on the left. 4. Bilateral axillary adenopathy greater on the right similar to previous exam. 5. AP window and precarinal adenopathy similar to previous exam. 6. Left upper lobe mass again seen measuring up to 5.5 centimeters similar to previous exam. 7. Mild reflux of contrast into the IVC. 8. Mild upper and lower lobe lung opacities could reflect pulmonary edema or pneumonia.  There is superimposed atelectasis. 9. Pulmonary embolus in distal segmental and proximal subsegmental right lower lobe pulmonary artery branches appears partly along the vessel wall may be subacute or chronic.  Similar findings seen on previous exam 9/16/2022.  Discussed with Dr. Hope. 10. Bilateral adrenal masses compatible with metastatic disease again seen. 11. 0.8 centimeter right lower lobe nodule again seen.     SARMAD TRIVEDI MD       Electronically Signed and Approved By: SARMAD TRIVEDI MD on 9/30/2022 at 22:30             XR Chest 1 View    Result Date: 9/30/2022    Worsening progression is suggested radiographically with new or increased bilateral infiltrates since 9/27/2022 at 1501 hours.  The findings may represent infectious multifocal pneumonia.  Aspiration pneumonia  is possible.  Pulmonary edema cannot be excluded.  Treatment effect would be in the differential diagnosis.     Please note that portions of this note were completed with a voice recognition program.  RINA PÉREZ JR, MD       Electronically Signed and Approved By: RINA PÉREZ JR, MD on 9/30/2022 at 0:39                  Assessment & Plan   Assessment / Plan     Active Hospital Problems:  Active Hospital Problems    Diagnosis    • Weakness    • Sepsis (HCC)      Added automatically from request for surgery [id number]     • Malignant neoplasm of upper lobe of left lung (HCC)        Plan:   Continue supplemental O2 as needed to keep O2 saturations greater than 90%  Weaning off Levophed.  Continue to monitor hemodynamics  Previously on vancomycin and ceftriaxone.  De-escalated to Unasyn  Underwent bronchoscopy 9/30.  Pneumonia panel positive for Klebsiella  CT chest with contrast revealed new pericardial effusion with bilateral pleural effusions, questionable Pes  Would aggressively diurese now.   Primary service started on Lovenox  Continue stress dose steroids, IV hydrocortisone and fluorinef.   Trend renal panel and electrolytes  We will replace electrolytes as needed  Nephrology following.  Appreciate recommendations  Bowel regimen.  Stop bicarb drip   consulted to help patient obtain home medications    Reviewed labs, imaging and provider notes.   Discussed with bedside nurse and primary service.   Critical care time spent 30 mins excluding any procedures.        DVT:DVT prophylaxis:  Medical DVT prophylaxis orders are present.  Diet:   Diet Order   Procedures   • NPO Diet NPO Type: Sips with Meds     CODE STATUS:   Code Status and Medical Interventions:   Ordered at: 09/27/22 1939     Code Status (Patient has no pulse and is not breathing):    CPR (Attempt to Resuscitate)     Medical Interventions (Patient has pulse or is breathing):    Full Support     Dispo: Continue monitor in CCU      The  above plan is a product of multidisciplinary rounds performed this morning on 10/1/2022 at bedside with the critical care team consisting of the on-call physician, Dr. Leon, and myself Ameena Antoine PA-C, bedside RN, pharmacist, respiratory therapist, dietitian and other Allied health services.       Electronically signed by NOHEMI Priest, 10/1/2022 at 1024      This visit was performed by BOTH a physician and an APC. I personally evaluated and examined the patient. I performed all aspects of MDM as documented. , I have reviewed and confirmed the accuracy of the patient's history as documented in this note. and I have reexamined the patient and the results are consistent with the previously documented exam. I have updated the documentation as necessary.     Electronically signed by Fausto Leon MD, 10/01/22, 2:45 PM EDT.

## 2022-10-01 NOTE — THERAPY EVALUATION
Acute Care - Speech Language Pathology   Swallow Initial Evaluation  Swetha     Patient Name: Linda Ortiz  : 1955  MRN: [id number]  Today's Date: 10/1/2022               Admit Date: 2022    Visit Dx:     ICD-10-CM ICD-9-CM   1. Weakness  R53.1 780.79   2. Hyponatremia  E87.1 276.1   3. Sepsis, due to unspecified organism, unspecified whether acute organ dysfunction present (HCC)  A41.9 038.9     995.91   4. Decreased activities of daily living (ADL)  Z78.9 V49.89   5. Difficulty walking  R26.2 719.7   6. Malignant neoplasm of upper lobe of left lung (HCC)  C34.12 162.3   7. Dysphagia, oropharyngeal  R13.12 787.22     Patient Active Problem List   Diagnosis   • Arthritis of right hand   • Localized edema   • Mass of right hand   • Malignant neoplasm of upper lobe of left lung (HCC)   • Loss of appetite   • Right arm pain   • Painful urination   • Encounter for adjustment or management of vascular access device   • Productive cough   • Hypocalcemia   • Anemia   • Weakness   • Sepsis (HCC)     Past Medical History:   Diagnosis Date   • Bone cancer (HCC) 2022   • Hypocalcemia 2022   • Malignant neoplasm of upper lobe of left lung (HCC) 2022   • Metastatic cancer (HCC)      Past Surgical History:   Procedure Laterality Date   • HIP SURGERY Bilateral    • KNEE ARTHROSCOPY     • VENOUS ACCESS DEVICE (PORT) INSERTION N/A 7/15/2022    Procedure: INSERTION VENOUS ACCESS DEVICE;  Surgeon: Eber Hull MD;  Location: [facility] OR [facility] [place];  Service: General;  Laterality: N/A;     PAIN SCALE: None noted    PRECAUTIONS/CONTRAINDICATIONS: None noted    SUSPECTED ABUSE/NEGLECT/EXPLOITATION: None noted    SOCIAL/PSYCHOLOGICAL NEEDS/BARRIERS: None noted    PAST SOCIAL HISTORY: Lives at home    PRIOR FUNCTION: Independent    PATIENT GOALS/EXPECTATIONS: Did not report    HISTORY: This patient is a 67-year-old with metastatic lung cancer.  Mets to the bone.  Patient status post bronchoscopy with some concern  for left paralyzed vocal cord.  Patient does have adequate voicing at bedside.  Some mild hoarseness.    CURRENT DIET LEVEL: N.p.o.    OBJECTIVE:    TEST ADMINISTERED: Clinical dysphagia evaluation alert and oriented    COGNITION/SAFETY AWARENESS: Alert and oriented    BEHAVIORAL OBSERVATIONS: Pleasant cooperative    ORAL MOTOR EXAM: Lingual and labial strength and range of motion within functional limits.  Patient wears full dentures which appear to fit well.     VOICE QUALITY: Hoarseness    REFLEX EXAM: Deferred    POSTURE: 90 degrees upright    FEEDING/SWALLOWING FUNCTION: Assessed with thin liquids, nectar thick liquids, purée consistencies and soft solids.    CLINICAL OBSERVATIONS: Oral stage is characterized by good bolus preparation and control.  Timely oral transit.  Pharyngeal phase is timely with good laryngeal elevation per palpation.  Patient does present with clinical signs of aspiration intermittently with thin liquids only.  Patient appears to tolerate nectar thick liquids well.  Vocal quality remained clear to auscultation.  Denies globus sensation after completion of swallow.    DYSPHAGIA CRITERIA: Risk of aspiration    FUNCTIONAL ASSESSMENT INSTRUMENT: Patient currently scored a level 5 of 7 on Functional Communication Measures for swallowing indicating a 20-39% limitation in function.    ASSESSMENT/ PLAN OF CARE:  Pt presents with limitations, noted below, that impede her ability to swallow safely. The skills of a therapist will be required to safely and effectively implement the following treatment plan to restore maximal level of function.    PROBLEMS:  1.  Dysphagia   LTG 1: (30 days) patient will increase ability to tolerate least restrictive diet and improve functional communication measure for swallowing to a 7 of 7   STG 1a: (30 days) patient will tolerate mechanical soft diet and nectar thick liquids without clinical sign or symptom of aspiration with 8 of 10 trials.   STG 1b: (14 days)  patient will participate modified barium swallow study   STG 1c: (14 days) patient/family teaching regarding diet recommendations, safe swallow strategies and signs and symptoms of aspiration.     TREATMENT: Dysphagia therapy to ensure diet tolerance, advance to least restrictive diet and analyze for aspiration risk.    FREQUENCY/DURATION: Daily 5 times a week    REHAB POTENTIAL:  Pt has good rehab potential.  The following limitations may influence improvement/ length of tx medical status.    RECOMMENDATIONS:   1.   DIET: Mechanical soft/whole foods, nectar thick liquids    2.  POSITION: 90 degrees upright for all intake    3.  COMPENSATORY STRATEGIES: Slow rate, small bites/drinks    Modified barium swallow study on Monday with MD order.    Pt/responsible party agrees with plan of care and has been informed of all alternatives, risks and benefits.    EDUCATION  The patient has been educated in the following areas:   Dysphagia (Swallowing Impairment) Modified Diet Instruction.            Ankita Alvarado, SLP  10/1/2022

## 2022-10-01 NOTE — PLAN OF CARE
Goal Outcome Evaluation:    AAO X4. PLEASANT & COOPERATIVE. TYLENOL ADM FOR C/O HEADACHE; SEE EMAR.     TOLERATING ACTIVITY, 1L/NC, & THICKENED LIQUIDS WITH MEDS.         (TRANSFER CARE TO ED MEDRANO RN)

## 2022-10-01 NOTE — CONSULTS
Cardiology Consult Note  [facility] CORONARY CARE UNIT          Patient Identification:  Linda Ortiz      [id number]  67 y.o.        female  1955         Reason for Consultation: Pericardial effusion on CT scan    PCP: Provider, No Known    History of Present Illness:     Patient is 67-year-old female with metastatic lung cancer with prior radiation and also mets to left renal and left adrenal gland came to the emergency room because of feeling severely weak not able to get up.  Found to have hyponatremia with a sodium of 119 possible UTI and septic shock and adrenal insufficiency.  Was on vasopressors (Levophed)    She had a CT scan of the chest which showed pericardial effusion and I was asked with the patient regarding that.  She also had echocardiogram which showed small pericardial effusion with without any tamponade and good LV systolic function.  Patient denies any chest pains but has some shortness of breath.  BNP is elevated.  She also has new pleural effusions    Past History:  Past Medical History:   Diagnosis Date   • Bone cancer (HCC) 2022   • Hypocalcemia 2022   • Malignant neoplasm of upper lobe of left lung (HCC) 2022   • Metastatic cancer (HCC)      Past Surgical History:   Procedure Laterality Date   • HIP SURGERY Bilateral    • KNEE ARTHROSCOPY     • VENOUS ACCESS DEVICE (PORT) INSERTION N/A 7/15/2022    Procedure: INSERTION VENOUS ACCESS DEVICE;  Surgeon: Eber Hull MD;  Location: [facility];  Service: General;  Laterality: N/A;     Allergies   Allergen Reactions   • Hydrocodone Nausea And Vomiting and Dizziness     Social History     Socioeconomic History   • Marital status: Single   Tobacco Use   • Smoking status: Former Smoker     Packs/day: 1.00     Types: Cigarettes     Start date:      Quit date: 2022     Years since quittin.6   • Smokeless tobacco: Never Used   Vaping Use   • Vaping Use: Former   • Start date: 2013   • Quit date:  5/30/2014   • Substances: Nicotine, Flavoring   Substance and Sexual Activity   • Alcohol use: Not Currently   • Drug use: Never   • Sexual activity: Defer     Family History   Problem Relation Age of Onset   • Breast cancer Mother    • Lung cancer Father    • Thyroid cancer Maternal Aunt    • Breast cancer Maternal Aunt    • Lung cancer Paternal Aunt    • COPD Paternal Uncle          Medications:  Prior to Admission medications    Medication Sig Start Date End Date Taking? Authorizing Provider   acetaminophen (TYLENOL) 325 MG tablet Take 650 mg by mouth Every 6 (Six) Hours As Needed for Mild Pain .   Yes Provider, MD Karla      Current medications:  ampicillin-sulbactam, 3 g, Intravenous, Q6H  arformoterol, 15 mcg, Nebulization, BID - RT  budesonide, 0.5 mg, Nebulization, BID - RT  enoxaparin, 60 mg, Subcutaneous, Q12H  fludrocortisone, 50 mcg, Oral, Daily  guaiFENesin, 1,200 mg, Oral, Q12H  Hydrocortisone Sod Suc (PF), 50 mg, Intravenous, Q6H  insulin lispro, 0-9 Units, Subcutaneous, TID AC  ipratropium-albuterol, 3 mL, Nebulization, Q4H - RT  potassium chloride, 20 mEq, Oral, Q2H  saccharomyces boulardii, 250 mg, Oral, BID  sodium chloride, 10 mL, Intravenous, Q12H  Urea, 15 g, Oral, BID      Current IV drips:  norepinephrine, 0.02-0.3 mcg/kg/min, Last Rate: Stopped (09/30/22 1000)  sodium chloride, 9 mL, Last Rate: Stopped (09/30/22 1538)          Review of Systems  Review of Systems   Constitutional: Negative for appetite change, fatigue and fever.   HENT: Negative for congestion.    Respiratory: Positive for shortness of breath. Negative for apnea, choking, chest tightness and wheezing.    Cardiovascular: Negative for chest pain, palpitations and leg swelling.   Gastrointestinal: Negative for diarrhea, nausea and vomiting.   Genitourinary: Negative for dysuria, frequency and hematuria.   Musculoskeletal: Positive for arthralgias. Negative for myalgias.   Skin: Negative for pallor.   Neurological:  "Negative for dizziness, tremors, syncope and weakness.   Psychiatric/Behavioral: Negative for agitation and confusion.         Physical Exam    /70   Pulse 81   Temp 97.6 °F (36.4 °C) (Oral)   Resp 18   Ht 162.6 cm (64\")   Wt 66.2 kg (145 lb 15.1 oz)   SpO2 91%   BMI 25.05 kg/m²  Body mass index is 25.05 kg/m².   Oxygen saturation   @FLOWAN(10::1)@ SpO2  Min: 87 %  Max: 100 %    General Appearance:   · no acute distress  · Alert and oriented x3  HENT:   · lips not cyanotic  · Atraumatic  Neck:  · thyroid not enlarged  · supple  Respiratory:  · no respiratory distress  · normal breath sounds  · no rales  Cardiovascular:  · no jugular venous distention  · regular rhythm  · apical impulse normal  · S1 normal, S2 normal  · no S3, no S4   · no murmur  · no rub, no thrill  · no carotid bruit  · pedal pulses normal  · lower extremity edema: none    Gastrointestinal:   · bowel sounds normal  · non-tender  · no hepatomegaly, no splenomegaly  Musculoskeletal:  · no clubbing of fingers.   · normocephalic, head atraumatic  Skin:   · warm, dry  · no rashes  Neuro/Psychiatric:  · normal mood and affect  · judgement and insight appropriate      Cardiographics:     ECG  (personally reviewed) EKG: Sinus rhythm minor ST-T changes   Telemetry:  (personally reviewed) sinus rhythm   Results for orders placed during the hospital encounter of 09/27/22    Adult Transthoracic Echo Complete W/ Cont if Necessary Per Protocol    Interpretation Summary  · Calculated left ventricular EF = 55.5% Estimated left ventricular EF was in agreement with the calculated left ventricular EF. Left ventricular systolic function is normal.  · Estimated right ventricular systolic pressure from tricuspid regurgitation is normal (<35 mmHg).  · There is a small (<1cm) circumferential pericardial effusion , no tamponade         No results found for this or any previous visit.      Cardiolite (Tc-99m Sestamibi) stress test   Lab Review:       CBC  "   CBC 9/29/22 9/30/22 10/1/22   WBC 4.53 15.04 (A) 15.86 (A)   RBC 3.10 (A) 3.33 (A) 2.72 (A)   Hemoglobin 10.3 (A) 10.8 (A) 8.9 (A)   Hematocrit 28.4 (A) 30.2 (A) 24.3 (A)   MCV 91.6 90.7 89.3   MCH 33.2 (A) 32.4 32.7   MCHC 36.3 (A) 35.8 (A) 36.6 (A)   RDW 14.3 14.5 14.6   Platelets 321 390 294   (A) Abnormal value                CMP    CMP 9/29/22 9/29/22 9/29/22 9/29/22 9/29/22 9/29/22 9/29/22 9/29/22 9/30/22 9/30/22 9/30/22 10/1/22 10/1/22 10/1/22    0101 0437 0757 0851 1129 1644 1902 2132 0333 1627 2128 0003 0402 0402   Glucose 63 (A)      147 (A)  264 (A) 146 (A)   268 (A)    BUN 7 (A)        8 22   16    Creatinine 0.56 (A)        0.78 0.70   0.56 (A)    Sodium 120 (A) 120 (A)  120 (A) 120 (A) 117 (A) 117.4 (A) 124 (A) 120 (A) 127 (A) 127 (A) 128 (A) 129 (A) 129 (A)   Potassium 4.1        4.1 4.1   3.3 (A)    Chloride 91 (A)        92 (A) 95 (A)   93 (A)    Calcium 7.9 (A)        7.7 (A) 7.9 (A)   7.7 (A)    Albumin   2.20 (A)      1.80 (A)    1.90 (A)    Total Bilirubin             0.3    Alkaline Phosphatase             76    AST (SGOT)             44 (A)    ALT (SGPT)             16    (A) Abnormal value               CARDIAC LABS:      Lab 09/30/22  0333 09/27/22  1814   PROBNP 3,300.0*  --    TROPONIN T  --  <0.010      No results found for: DIGOXIN   Lab Results   Component Value Date    TSH 0.593 09/27/2022           Invalid input(s): LDLCALC  No results found for: POCTROP  No components found for: DDIMERQUAN  Lab Results   Component Value Date    MG 2.0 10/01/2022                 Assessment:  Small pericardial effusion without any tamponade  Septic shock improving  Adrenal crisis  Carcinoma of the lung with metastasis to left adrenal gland  Hyponatremia  Question of aspiration pneumonia  Hypotension  Congestive heart failure acute diastolic      Plan:  Patient has small pericardial effusion which could be due to her carcinoma of the lung , radiation , or fluid overload.  There is no evidence of  tamponade.  No need for pericardiocentesis at this time  If tolerated small dose of diuretics may be advisable      Thank you for allowing me to share in [facility].        Alex Lundy MD   10/1/2022    17:11 EDT

## 2022-10-01 NOTE — PROGRESS NOTES
" LOS: 4 days   Patient Care Team:  Provider, No Known as PCP - General  Phani Hernández MD as Consulting Physician (Hematology and Oncology)  Kia Morrison APRN as Nurse Practitioner (Nurse Practitioner)    Chief Complaint: Hyponatremia    Subjective     Patient is alert, states she has a cough, no chest pain, no nausea or vomiting currently.    History taken from: patient    Objective     Vital Sign Min/Max for last 24 hours  Temp  Min: 95 °F (35 °C)  Max: 97.8 °F (36.6 °C)   BP  Min: 87/73  Max: 139/86   Pulse  Min: 81  Max: 103   Resp  Min: 18  Max: 27   SpO2  Min: 87 %  Max: 100 %   Flow (L/min)  Min: 1  Max: 4   No data recorded     Flowsheet Rows    Flowsheet Row First Filed Value   Admission Height 165.1 cm (65\") Documented at 09/27/2022 1440   Admission Weight --  [unable to obtain weight due to no bed scale] Documented at 09/27/2022 1440          I/O this shift:  In: 100 [IV Piggyback:100]  Out: 350 [Urine:350]  I/O last 3 completed shifts:  In: 2594 [P.O.:120; I.V.:2474]  Out: 1251 [Urine:1250; Blood:1]      General: Patient is alert and oriented no acute distress  Cardiac: Regular rate and rhythm without a rub  Lungs: Occasional rhonchi only  Abdomen: Bowel sounds positive nontender soft  Extremities: Very trace lower extremity swelling no cyanosis  Neuro: Appears be intact with motor and sensory grossly  Skin: No diffuse rashes noted  : Deferred      Results Review:     I reviewed the patient's new clinical results.    WBC WBC   Date Value Ref Range Status   10/01/2022 15.86 (H) 3.40 - 10.80 10*3/mm3 Final   09/30/2022 15.04 (H) 3.40 - 10.80 10*3/mm3 Final   09/29/2022 4.53 3.40 - 10.80 10*3/mm3 Final      HGB Hemoglobin   Date Value Ref Range Status   10/01/2022 8.9 (L) 12.0 - 15.9 g/dL Final   09/30/2022 10.8 (L) 12.0 - 15.9 g/dL Final   09/29/2022 10.3 (L) 12.0 - 15.9 g/dL Final      HCT Hematocrit   Date Value Ref Range Status   10/01/2022 24.3 (L) 34.0 - 46.6 % Final   09/30/2022 30.2 " (L) 34.0 - 46.6 % Final   09/29/2022 28.4 (L) 34.0 - 46.6 % Final      Platlets No results found for: LABPLAT   MCV MCV   Date Value Ref Range Status   10/01/2022 89.3 79.0 - 97.0 fL Final   09/30/2022 90.7 79.0 - 97.0 fL Final   09/29/2022 91.6 79.0 - 97.0 fL Final          Sodium Sodium   Date Value Ref Range Status   10/01/2022 129 (L) 136 - 145 mmol/L Final   10/01/2022 129 (L) 136 - 145 mmol/L Final   10/01/2022 128 (L) 136 - 145 mmol/L Final   09/30/2022 127 (L) 136 - 145 mmol/L Final   09/30/2022 127 (L) 136 - 145 mmol/L Final   09/30/2022 120 (L) 136 - 145 mmol/L Final   09/29/2022 124 (L) 136 - 145 mmol/L Final   09/29/2022 117 (C) 136 - 145 mmol/L Final   09/29/2022 120 (L) 136 - 145 mmol/L Final   09/29/2022 120 (L) 136 - 145 mmol/L Final   09/29/2022 120 (L) 136 - 145 mmol/L Final   09/29/2022 120 (L) 136 - 145 mmol/L Final   09/28/2022 121 (L) 136 - 145 mmol/L Final   09/28/2022 120 (L) 136 - 145 mmol/L Final   09/28/2022 118 (C) 136 - 145 mmol/L Final     Sodium, Arterial   Date Value Ref Range Status   09/29/2022 117.4 (C) 136 - 146 mmol/L Final      Potassium Potassium   Date Value Ref Range Status   10/01/2022 3.3 (L) 3.5 - 5.2 mmol/L Final   09/30/2022 4.1 3.5 - 5.2 mmol/L Final   09/30/2022 4.1 3.5 - 5.2 mmol/L Final   09/29/2022 4.1 3.5 - 5.2 mmol/L Final      Chloride Chloride   Date Value Ref Range Status   10/01/2022 93 (L) 98 - 107 mmol/L Final   09/30/2022 95 (L) 98 - 107 mmol/L Final   09/30/2022 92 (L) 98 - 107 mmol/L Final   09/29/2022 91 (L) 98 - 107 mmol/L Final      CO2 CO2   Date Value Ref Range Status   10/01/2022 28.3 22.0 - 29.0 mmol/L Final   09/30/2022 23.4 22.0 - 29.0 mmol/L Final   09/30/2022 16.8 (L) 22.0 - 29.0 mmol/L Final   09/29/2022 22.6 22.0 - 29.0 mmol/L Final      BUN BUN   Date Value Ref Range Status   10/01/2022 16 8 - 23 mg/dL Final   09/30/2022 22 8 - 23 mg/dL Final   09/30/2022 8 8 - 23 mg/dL Final   09/29/2022 7 (L) 8 - 23 mg/dL Final      Creatinine Creatinine    Date Value Ref Range Status   10/01/2022 0.56 (L) 0.57 - 1.00 mg/dL Final   09/30/2022 0.70 0.57 - 1.00 mg/dL Final   09/30/2022 0.78 0.57 - 1.00 mg/dL Final   09/29/2022 0.56 (L) 0.57 - 1.00 mg/dL Final      Calcium Calcium   Date Value Ref Range Status   10/01/2022 7.7 (L) 8.6 - 10.5 mg/dL Final   09/30/2022 7.9 (L) 8.6 - 10.5 mg/dL Final   09/30/2022 7.7 (L) 8.6 - 10.5 mg/dL Final   09/29/2022 7.9 (L) 8.6 - 10.5 mg/dL Final      PO4 No results found for: CAPO4   Albumin Albumin   Date Value Ref Range Status   10/01/2022 1.90 (L) 3.50 - 5.20 g/dL Final   09/30/2022 1.80 (L) 3.50 - 5.20 g/dL Final   09/29/2022 2.20 (L) 3.50 - 5.20 g/dL Final      Magnesium Magnesium   Date Value Ref Range Status   10/01/2022 2.0 1.6 - 2.4 mg/dL Final   09/30/2022 3.5 (H) 1.6 - 2.4 mg/dL Final   09/29/2022 1.4 (L) 1.6 - 2.4 mg/dL Final      Uric Acid No results found for: URICACID     Medication Review:   ampicillin-sulbactam, 3 g, Intravenous, Q6H  arformoterol, 15 mcg, Nebulization, BID - RT  budesonide, 0.5 mg, Nebulization, BID - RT  enoxaparin, 60 mg, Subcutaneous, Q12H  fludrocortisone, 50 mcg, Oral, Daily  guaiFENesin, 1,200 mg, Oral, Q12H  Hydrocortisone Sod Suc (PF), 50 mg, Intravenous, Q6H  insulin lispro, 0-9 Units, Subcutaneous, TID AC  ipratropium-albuterol, 3 mL, Nebulization, Q4H - RT  potassium chloride, 20 mEq, Oral, Q2H  saccharomyces boulardii, 250 mg, Oral, BID  sodium chloride, 10 mL, Intravenous, Q12H  Urea, 15 g, Oral, BID          Assessment & Plan       Malignant neoplasm of upper lobe of left lung (HCC)    Weakness    Sepsis (HCC)      Assessment & Plan  - Euvolemic/hypovolemic hyponatremia 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 intake and profound adrenal insufficiency.  ACTH stim test was positive.  Currently on hydrocortisone.   Thyroid function okay.  Patient appears to be n.p.o., if diet started back again I would put her on a 1200 cc p.o. fluid restriction.  - Metastatic non-small  cell lung cancer to adrenals, lymph nodes and bone.  Was on immunotherapy prior to admission.  She has poor nutritional status and hypoalbuminemia.  - Possible UTI.  Per primary.  - Previous history of alcohol use.  - Metabolic acidosis, resolved, appears to be off the bicarb drip but still hanging, if still on will DC  --Hypokalemia: We will replace if not already done so  -- Hypophosphatemia: Appears to have already been replaced today, will check in a.m.      Torin Higginbotham MD  10/01/22  12:28 EDT

## 2022-10-02 LAB
ANION GAP SERPL CALCULATED.3IONS-SCNC: 5.6 MMOL/L (ref 5–15)
BACTERIA SPEC AEROBE CULT: NORMAL
BACTERIA SPEC RESP CULT: NORMAL
BASOPHILS # BLD AUTO: 0.01 10*3/MM3 (ref 0–0.2)
BASOPHILS NFR BLD AUTO: 0.1 % (ref 0–1.5)
BUN SERPL-MCNC: 13 MG/DL (ref 8–23)
BUN/CREAT SERPL: 25.5 (ref 7–25)
CALCIUM SPEC-SCNC: 7.4 MG/DL (ref 8.6–10.5)
CHLORIDE SERPL-SCNC: 98 MMOL/L (ref 98–107)
CO2 SERPL-SCNC: 30.4 MMOL/L (ref 22–29)
CREAT SERPL-MCNC: 0.51 MG/DL (ref 0.57–1)
DEPRECATED RDW RBC AUTO: 53.7 FL (ref 37–54)
EGFRCR SERPLBLD CKD-EPI 2021: 103.1 ML/MIN/1.73
EOSINOPHIL # BLD AUTO: 0.02 10*3/MM3 (ref 0–0.4)
EOSINOPHIL NFR BLD AUTO: 0.2 % (ref 0.3–6.2)
ERYTHROCYTE [DISTWIDTH] IN BLOOD BY AUTOMATED COUNT: 15.5 % (ref 12.3–15.4)
FOLATE SERPL-MCNC: 11.6 NG/ML (ref 4.78–24.2)
GLUCOSE BLDC GLUCOMTR-MCNC: 100 MG/DL (ref 70–99)
GLUCOSE BLDC GLUCOMTR-MCNC: 113 MG/DL (ref 70–99)
GLUCOSE BLDC GLUCOMTR-MCNC: 117 MG/DL (ref 70–99)
GLUCOSE BLDC GLUCOMTR-MCNC: 134 MG/DL (ref 70–99)
GLUCOSE SERPL-MCNC: 131 MG/DL (ref 65–99)
GRAM STN SPEC: NORMAL
HCT VFR BLD AUTO: 23.5 % (ref 34–46.6)
HCT VFR BLD AUTO: 26.1 % (ref 34–46.6)
HGB BLD-MCNC: 8.3 G/DL (ref 12–15.9)
HGB BLD-MCNC: 9 G/DL (ref 12–15.9)
IMM GRANULOCYTES # BLD AUTO: 0.11 10*3/MM3 (ref 0–0.05)
IMM GRANULOCYTES NFR BLD AUTO: 0.9 % (ref 0–0.5)
LYMPHOCYTES # BLD AUTO: 0.88 10*3/MM3 (ref 0.7–3.1)
LYMPHOCYTES NFR BLD AUTO: 7.4 % (ref 19.6–45.3)
MAGNESIUM SERPL-MCNC: 2.1 MG/DL (ref 1.6–2.4)
MCH RBC QN AUTO: 33.1 PG (ref 26.6–33)
MCHC RBC AUTO-ENTMCNC: 35.3 G/DL (ref 31.5–35.7)
MCV RBC AUTO: 93.6 FL (ref 79–97)
MONOCYTES # BLD AUTO: 0.52 10*3/MM3 (ref 0.1–0.9)
MONOCYTES NFR BLD AUTO: 4.4 % (ref 5–12)
NEUTROPHILS NFR BLD AUTO: 10.36 10*3/MM3 (ref 1.7–7)
NEUTROPHILS NFR BLD AUTO: 87 % (ref 42.7–76)
NRBC BLD AUTO-RTO: 0 /100 WBC (ref 0–0.2)
PHOSPHATE SERPL-MCNC: 2.5 MG/DL (ref 2.5–4.5)
PLATELET # BLD AUTO: 310 10*3/MM3 (ref 140–450)
PMV BLD AUTO: 9.1 FL (ref 6–12)
POTASSIUM SERPL-SCNC: 4.2 MMOL/L (ref 3.5–5.2)
RBC # BLD AUTO: 2.51 10*6/MM3 (ref 3.77–5.28)
SODIUM SERPL-SCNC: 134 MMOL/L (ref 136–145)
VIT B12 BLD-MCNC: 1092 PG/ML (ref 211–946)
WBC NRBC COR # BLD: 11.9 10*3/MM3 (ref 3.4–10.8)

## 2022-10-02 PROCEDURE — 85014 HEMATOCRIT: CPT | Performed by: INTERNAL MEDICINE

## 2022-10-02 PROCEDURE — 94799 UNLISTED PULMONARY SVC/PX: CPT

## 2022-10-02 PROCEDURE — 99233 SBSQ HOSP IP/OBS HIGH 50: CPT | Performed by: INTERNAL MEDICINE

## 2022-10-02 PROCEDURE — 82607 VITAMIN B-12: CPT | Performed by: INTERNAL MEDICINE

## 2022-10-02 PROCEDURE — 25010000002 FUROSEMIDE PER 20 MG: Performed by: INTERNAL MEDICINE

## 2022-10-02 PROCEDURE — 82962 GLUCOSE BLOOD TEST: CPT

## 2022-10-02 PROCEDURE — 84100 ASSAY OF PHOSPHORUS: CPT | Performed by: PHYSICIAN ASSISTANT

## 2022-10-02 PROCEDURE — 85025 COMPLETE CBC W/AUTO DIFF WBC: CPT | Performed by: PHYSICIAN ASSISTANT

## 2022-10-02 PROCEDURE — 94664 DEMO&/EVAL PT USE INHALER: CPT

## 2022-10-02 PROCEDURE — 97530 THERAPEUTIC ACTIVITIES: CPT

## 2022-10-02 PROCEDURE — 83735 ASSAY OF MAGNESIUM: CPT | Performed by: PHYSICIAN ASSISTANT

## 2022-10-02 PROCEDURE — 82746 ASSAY OF FOLIC ACID SERUM: CPT | Performed by: INTERNAL MEDICINE

## 2022-10-02 PROCEDURE — 25010000002 HYDROCORTISONE SOD SUC (PF) 100 MG RECONSTITUTED SOLUTION: Performed by: INTERNAL MEDICINE

## 2022-10-02 PROCEDURE — 80048 BASIC METABOLIC PNL TOTAL CA: CPT | Performed by: INTERNAL MEDICINE

## 2022-10-02 PROCEDURE — 25010000002 AMPICILLIN-SULBACTAM PER 1.5 G: Performed by: INTERNAL MEDICINE

## 2022-10-02 PROCEDURE — 85018 HEMOGLOBIN: CPT | Performed by: INTERNAL MEDICINE

## 2022-10-02 RX ORDER — FUROSEMIDE 10 MG/ML
20 INJECTION INTRAMUSCULAR; INTRAVENOUS EVERY 12 HOURS
Status: DISCONTINUED | OUTPATIENT
Start: 2022-10-02 | End: 2022-10-04

## 2022-10-02 RX ADMIN — Medication 10 ML: at 20:26

## 2022-10-02 RX ADMIN — FUROSEMIDE 20 MG: 10 INJECTION, SOLUTION INTRAMUSCULAR; INTRAVENOUS at 17:16

## 2022-10-02 RX ADMIN — FAMOTIDINE 20 MG: 20 TABLET ORAL at 09:06

## 2022-10-02 RX ADMIN — AMPICILLIN SODIUM AND SULBACTAM SODIUM 3 G: 2; 1 INJECTION, POWDER, FOR SOLUTION INTRAMUSCULAR; INTRAVENOUS at 22:37

## 2022-10-02 RX ADMIN — APIXABAN 10 MG: 5 TABLET, FILM COATED ORAL at 20:26

## 2022-10-02 RX ADMIN — Medication 15 G: at 09:14

## 2022-10-02 RX ADMIN — AMPICILLIN SODIUM AND SULBACTAM SODIUM 3 G: 2; 1 INJECTION, POWDER, FOR SOLUTION INTRAMUSCULAR; INTRAVENOUS at 05:18

## 2022-10-02 RX ADMIN — FAMOTIDINE 20 MG: 20 TABLET ORAL at 16:22

## 2022-10-02 RX ADMIN — Medication 250 MG: at 20:26

## 2022-10-02 RX ADMIN — FLUDROCORTISONE ACETATE 50 MCG: 0.1 TABLET ORAL at 09:15

## 2022-10-02 RX ADMIN — ARFORMOTEROL TARTRATE 15 MCG: 15 SOLUTION RESPIRATORY (INHALATION) at 07:09

## 2022-10-02 RX ADMIN — HYDROCORTISONE SODIUM SUCCINATE 50 MG: 100 INJECTION, POWDER, FOR SOLUTION INTRAMUSCULAR; INTRAVENOUS at 05:18

## 2022-10-02 RX ADMIN — AMPICILLIN SODIUM AND SULBACTAM SODIUM 3 G: 2; 1 INJECTION, POWDER, FOR SOLUTION INTRAMUSCULAR; INTRAVENOUS at 12:19

## 2022-10-02 RX ADMIN — BUDESONIDE 0.5 MG: 0.5 SUSPENSION RESPIRATORY (INHALATION) at 07:09

## 2022-10-02 RX ADMIN — Medication 15 G: at 20:26

## 2022-10-02 RX ADMIN — Medication 10 ML: at 09:15

## 2022-10-02 RX ADMIN — FUROSEMIDE 40 MG: 10 INJECTION, SOLUTION INTRAMUSCULAR; INTRAVENOUS at 09:06

## 2022-10-02 RX ADMIN — APIXABAN 10 MG: 5 TABLET, FILM COATED ORAL at 09:05

## 2022-10-02 RX ADMIN — Medication 250 MG: at 09:14

## 2022-10-02 RX ADMIN — GUAIFENESIN 1200 MG: 600 TABLET ORAL at 20:26

## 2022-10-02 RX ADMIN — BUDESONIDE 0.5 MG: 0.5 SUSPENSION RESPIRATORY (INHALATION) at 19:44

## 2022-10-02 RX ADMIN — AMPICILLIN SODIUM AND SULBACTAM SODIUM 3 G: 2; 1 INJECTION, POWDER, FOR SOLUTION INTRAMUSCULAR; INTRAVENOUS at 17:15

## 2022-10-02 RX ADMIN — ARFORMOTEROL TARTRATE 15 MCG: 15 SOLUTION RESPIRATORY (INHALATION) at 19:44

## 2022-10-02 RX ADMIN — HYDROCORTISONE SODIUM SUCCINATE 50 MG: 100 INJECTION, POWDER, FOR SOLUTION INTRAMUSCULAR; INTRAVENOUS at 22:37

## 2022-10-02 RX ADMIN — POTASSIUM CHLORIDE 40 MEQ: 1.5 POWDER, FOR SOLUTION ORAL at 09:07

## 2022-10-02 RX ADMIN — HYDROCORTISONE SODIUM SUCCINATE 50 MG: 100 INJECTION, POWDER, FOR SOLUTION INTRAMUSCULAR; INTRAVENOUS at 16:22

## 2022-10-02 NOTE — PROGRESS NOTES
[facility]   Hospitalist Progress Note    Date of admission: 9/27/2022  Patient Name: Linda Ortiz  1955  Date: 10/1/2022      Subjective     Chief Complaint   Patient presents with   • Weakness - Generalized   • Numbness   • Chills       Summary: 67 y.o. female with metastatic lung cancer (prior radiation to right hand 6/21/2022) and mets to left adrenal with left adrenal mass biopsy 5/2022 with finding of poorly differentiated carcinoma) that follows with Dr. Angela is on therapy with Opdivo and Yervoy but appears to be in initiated July of this year.  Patient presented with weakness, fevers, chills and concerned that she is barely able to get up from the bathroom at home.  Found to have hyponatremia to 119 on admission as well klebsiella pneumonia, possible UTI and hypothermia. Additional testing found to have adrenal insufficiency (suspect primary from metastatic disease to adrenals) with adrenal crisis and shock/sepsis from pna with worsening hypotension requiring transfer to icu and levophed.  CT PE found to have PE, pericardial effusion and bilateral pleural effusions. Cardiology assisting.    9/30 bronchoscopy  Vocal cord adducted, partial abduction abnormality on the left side  Salivary secretions and slimy secretions in trachea and bilateral bronchial tubes, suctioned out in several attempts  Friable mucosa, easy bleeding with suction  Scattered purulent secretions     Interval Followup: still with some cough but breathing feeling a bit better today.  No bleeding.  Discussed testing findings.  MBS not able to be performed today.  Discussed modified diet plan w patient until mbs on Monday.      Review of Systems  No chest pain or palpitations  No fevers or chills    Objective     Vitals:   Temp:  [97.5 °F (36.4 °C)-97.8 °F (36.6 °C)] 97.5 °F (36.4 °C)  Heart Rate:  [] 92  Resp:  [18-27] 19  BP: ()/() 116/67  Flow (L/min):  [1-3] 1    Physical Exam  Gen: awake, resting in bed,  conversant  HENT: NCAT, mmm  Chest port without erythema   Resp: diminished in b/l bases, mild ronchi, no wheezing, occ cough, on nc mild conv dyspnea  CV: RRR, trace LE pitting edema  GI: Abdomen soft, NT, ND, no guarding, +BS  Psych: Fair mood and affect, aox3  Skin: warm, dry    Result Review:  Vital signs, labs and recent relevant imaging reviewed.      ampicillin-sulbactam, 3 g, Intravenous, Q6H  arformoterol, 15 mcg, Nebulization, BID - RT  budesonide, 0.5 mg, Nebulization, BID - RT  enoxaparin, 60 mg, Subcutaneous, Q12H  [START ON 10/2/2022] famotidine, 20 mg, Oral, BID AC  fludrocortisone, 50 mcg, Oral, Daily  guaiFENesin, 1,200 mg, Oral, Q12H  [START ON 10/2/2022] Hydrocortisone Sod Suc (PF), 50 mg, Intravenous, Q12H  insulin lispro, 0-9 Units, Subcutaneous, TID AC  ipratropium-albuterol, 3 mL, Nebulization, Q4H - RT  saccharomyces boulardii, 250 mg, Oral, BID  sodium chloride, 10 mL, Intravenous, Q12H  Urea, 15 g, Oral, BID        •  acetaminophen **OR** acetaminophen **OR** acetaminophen  •  albuterol  •  dextrose  •  dextrose  •  glucagon (human recombinant)  •  nitroglycerin  •  ondansetron  •  sodium chloride  •  sodium chloride      Component   Ref Range & Units 2 d ago   (9/29/22) 12 d ago   (9/19/22) 1 mo ago   (8/29/22) 1 mo ago   (8/9/22) 2 mo ago   (7/19/22)   ACTH   7.2 - 63.3 pg/mL 5.7 Low   47.0 CM  62.8 CM  48.1 CM  50.9 CM          Assessment / Plan     Assessment/Plan:  Hyponatremia hypovolemic/borderline euvolemic with question SIADH in addition to low solute intake as well as medication side effect from her chemotherapy and primary adrenal insufficiency  Adrenal crisis with primary adrenal insufficiency in setting of metastatic carcinoma involving left adrenal gland but also low acth   Sepsis with shock requiring levophed, multifactorial including bacterial pneumonia  CAP, question aspiration, secondary to Klebsiella aerogenes   Vocal cord adducted, partial abduction abnormality on the  left side  Question UTI  Acute Hypoxic respiratory failure requiring 4L nc s/p bronchoscopy 9/30  Metastatic non-small cell lung cancer on outpatient treatment with Opdivo and Yervoy  Pericardial effusion  Bilateral Pleural Effusions  RLL pulmonary emboli (question subacute vs chronic and reportedly seen 9/16 as well)   Anemia, acute on chronic  Hx of radiation to right hand 6/21/2022  Hx of left adrenal mass biopsy 5/2022 with finding of poorly differentiated carcinoma    Telemetry: nsr    -sodium trend improving, cont fluid restriction at 1200cc, urea, and monitor trend  -stress dose HC steroids, start to taper tomorrow to q8h if continues to have stable bp and remains off pressors, cont fludrocortisone.  Acth also low suggestive of secondary insuff component as well    *have d/w dr piedra, notes ideally if pt can be on prednisone 10mg or less in the future would be ideal for not interfering with treatment; fludrocortisone should be ok for patient   -stop bicarb, acidosis improving  -cont unasyn for klebsiella pna, florastor   -MBS and speech eval; dysphagia diet    *testing pending Monday now, radiology unable to do today  -check 2d echo, cards consult for 2.2cm thick pericardial effusion seen on ct chest, apprec assistance  -lasix/diuresis for pleural effusion/volume overload as tolerated  -lovenox initiated for dvt, change to apixaban with load to start tomorrow    *worsening hb, likely multifactorial - may be partially dilutional, hypoproliferative, f/u anemia studies, no acute bleeding noted   -replace phos, potassium, monitor lytes  -nebs, oxygen, guaifenasin/resp clearance measures   - PT OT, very weak and ultimately suspect will need rehab but is considering home health      DVT prophylaxis:  Medical DVT prophylaxis orders are present.    Code Status (Patient has no pulse and is not breathing): CPR (Attempt to Resuscitate)  Medical Interventions (Patient has pulse or is breathing): Full Support        CBC     CBC 9/29/22 9/30/22 10/1/22   WBC 4.53 15.04 (A) 15.86 (A)   RBC 3.10 (A) 3.33 (A) 2.72 (A)   Hemoglobin 10.3 (A) 10.8 (A) 8.9 (A)   Hematocrit 28.4 (A) 30.2 (A) 24.3 (A)   MCV 91.6 90.7 89.3   MCH 33.2 (A) 32.4 32.7   MCHC 36.3 (A) 35.8 (A) 36.6 (A)   RDW 14.3 14.5 14.6   Platelets 321 390 294   (A) Abnormal value              CMP    CMP 9/29/22 9/29/22 9/29/22 9/29/22 9/29/22 9/29/22 9/29/22 9/29/22 9/30/22 9/30/22 9/30/22 10/1/22 10/1/22 10/1/22    0101 0437 0757 0851 1129 1644 1902 2132 0333 1627 2128 0003 0402 0402   Glucose 63 (A)      147 (A)  264 (A) 146 (A)   268 (A)    BUN 7 (A)        8 22   16    Creatinine 0.56 (A)        0.78 0.70   0.56 (A)    Sodium 120 (A) 120 (A)  120 (A) 120 (A) 117 (A) 117.4 (A) 124 (A) 120 (A) 127 (A) 127 (A) 128 (A) 129 (A) 129 (A)   Potassium 4.1        4.1 4.1   3.3 (A)    Chloride 91 (A)        92 (A) 95 (A)   93 (A)    Calcium 7.9 (A)        7.7 (A) 7.9 (A)   7.7 (A)    Albumin   2.20 (A)      1.80 (A)    1.90 (A)    Total Bilirubin             0.3    Alkaline Phosphatase             76    AST (SGOT)             44 (A)    ALT (SGPT)             16    (A) Abnormal value            Patient is critically ill due to numerous issues noted above.  I have spent 47 minutes of critical care time reviewing documentation, pertinent labs, imaging studies, examining the patient, modifying care plan, and discussing patient's condition and care plan with the patient, nursing and dr guerrero and dr de la cruz.

## 2022-10-02 NOTE — PLAN OF CARE
Goal Outcome Evaluation:  Plan of Care Reviewed With: patient        Progress: improving    Pt alert and oriented throughout the shift after transferring to the floor from CCU. Pt able to ambulate independently with walker and make needs known. VSS.

## 2022-10-02 NOTE — PROGRESS NOTES
" LOS: 5 days   Patient Care Team:  Provider, No Known as PCP - General  Phani Hernández MD as Consulting Physician (Hematology and Oncology)  Kia Morrison APRN as Nurse Practitioner (Nurse Practitioner)    Chief Complaint: Hyponatremia    Subjective     Patient is alert, denies any chest pain shortness of breath nausea or vomiting currently     history taken from: patient    Objective     Vital Sign Min/Max for last 24 hours  Temp  Min: 97.5 °F (36.4 °C)  Max: 98.1 °F (36.7 °C)   BP  Min: 114/70  Max: 123/81   Pulse  Min: 77  Max: 100   Resp  Min: 18  Max: 19   SpO2  Min: 90 %  Max: 100 %   Flow (L/min)  Min: 1  Max: 1   No data recorded     Flowsheet Rows    Flowsheet Row First Filed Value   Admission Height 165.1 cm (65\") Documented at 09/27/2022 1440   Admission Weight --  [unable to obtain weight due to no bed scale] Documented at 09/27/2022 1440          I/O this shift:  In: 360 [P.O.:360]  Out: -   I/O last 3 completed shifts:  In: 100 [IV Piggyback:100]  Out: 650 [Urine:650]      General: Patient is alert and oriented no acute distress  Cardiac: Regular rate and rhythm without a rub  Lungs: Fairly clear today   abdomen: Bowel sounds positive nontender soft  Extremities: Very trace lower extremity swelling no cyanosis  Neuro: Appears be intact with motor and sensory grossly  Skin: No diffuse rashes noted  : Deferred      Results Review:     I reviewed the patient's new clinical results.    WBC WBC   Date Value Ref Range Status   10/02/2022 11.90 (H) 3.40 - 10.80 10*3/mm3 Final   10/01/2022 15.86 (H) 3.40 - 10.80 10*3/mm3 Final   09/30/2022 15.04 (H) 3.40 - 10.80 10*3/mm3 Final      HGB Hemoglobin   Date Value Ref Range Status   10/02/2022 8.3 (L) 12.0 - 15.9 g/dL Final   10/01/2022 8.9 (L) 12.0 - 15.9 g/dL Final   09/30/2022 10.8 (L) 12.0 - 15.9 g/dL Final      HCT Hematocrit   Date Value Ref Range Status   10/02/2022 23.5 (L) 34.0 - 46.6 % Final   10/01/2022 24.3 (L) 34.0 - 46.6 % Final "   09/30/2022 30.2 (L) 34.0 - 46.6 % Final      Platlets No results found for: LABPLAT   MCV MCV   Date Value Ref Range Status   10/02/2022 93.6 79.0 - 97.0 fL Final   10/01/2022 89.3 79.0 - 97.0 fL Final   09/30/2022 90.7 79.0 - 97.0 fL Final          Sodium Sodium   Date Value Ref Range Status   10/02/2022 134 (L) 136 - 145 mmol/L Final   10/01/2022 129 (L) 136 - 145 mmol/L Final   10/01/2022 129 (L) 136 - 145 mmol/L Final   10/01/2022 128 (L) 136 - 145 mmol/L Final   09/30/2022 127 (L) 136 - 145 mmol/L Final   09/30/2022 127 (L) 136 - 145 mmol/L Final   09/30/2022 120 (L) 136 - 145 mmol/L Final   09/29/2022 124 (L) 136 - 145 mmol/L Final   09/29/2022 117 (C) 136 - 145 mmol/L Final     Sodium, Arterial   Date Value Ref Range Status   09/29/2022 117.4 (C) 136 - 146 mmol/L Final      Potassium Potassium   Date Value Ref Range Status   10/02/2022 4.2 3.5 - 5.2 mmol/L Final   10/01/2022 3.3 (L) 3.5 - 5.2 mmol/L Final   09/30/2022 4.1 3.5 - 5.2 mmol/L Final   09/30/2022 4.1 3.5 - 5.2 mmol/L Final      Chloride Chloride   Date Value Ref Range Status   10/02/2022 98 98 - 107 mmol/L Final   10/01/2022 93 (L) 98 - 107 mmol/L Final   09/30/2022 95 (L) 98 - 107 mmol/L Final   09/30/2022 92 (L) 98 - 107 mmol/L Final      CO2 CO2   Date Value Ref Range Status   10/02/2022 30.4 (H) 22.0 - 29.0 mmol/L Final   10/01/2022 28.3 22.0 - 29.0 mmol/L Final   09/30/2022 23.4 22.0 - 29.0 mmol/L Final   09/30/2022 16.8 (L) 22.0 - 29.0 mmol/L Final      BUN BUN   Date Value Ref Range Status   10/02/2022 13 8 - 23 mg/dL Final   10/01/2022 16 8 - 23 mg/dL Final   09/30/2022 22 8 - 23 mg/dL Final   09/30/2022 8 8 - 23 mg/dL Final      Creatinine Creatinine   Date Value Ref Range Status   10/02/2022 0.51 (L) 0.57 - 1.00 mg/dL Final   10/01/2022 0.56 (L) 0.57 - 1.00 mg/dL Final   09/30/2022 0.70 0.57 - 1.00 mg/dL Final   09/30/2022 0.78 0.57 - 1.00 mg/dL Final      Calcium Calcium   Date Value Ref Range Status   10/02/2022 7.4 (L) 8.6 - 10.5  mg/dL Final   10/01/2022 7.7 (L) 8.6 - 10.5 mg/dL Final   09/30/2022 7.9 (L) 8.6 - 10.5 mg/dL Final   09/30/2022 7.7 (L) 8.6 - 10.5 mg/dL Final      PO4 No results found for: CAPO4   Albumin Albumin   Date Value Ref Range Status   10/01/2022 1.90 (L) 3.50 - 5.20 g/dL Final   09/30/2022 1.80 (L) 3.50 - 5.20 g/dL Final      Magnesium Magnesium   Date Value Ref Range Status   10/02/2022 2.1 1.6 - 2.4 mg/dL Final   10/01/2022 2.0 1.6 - 2.4 mg/dL Final   09/30/2022 3.5 (H) 1.6 - 2.4 mg/dL Final      Uric Acid No results found for: URICACID     Medication Review:   ampicillin-sulbactam, 3 g, Intravenous, Q6H  apixaban, 10 mg, Oral, BID   Followed by  [START ON 10/9/2022] apixaban, 5 mg, Oral, BID  arformoterol, 15 mcg, Nebulization, BID - RT  budesonide, 0.5 mg, Nebulization, BID - RT  famotidine, 20 mg, Oral, BID AC  fludrocortisone, 50 mcg, Oral, Daily  guaiFENesin, 1,200 mg, Oral, Q12H  Hydrocortisone Sod Suc (PF), 50 mg, Intravenous, Q8H  [START ON 10/3/2022] Hydrocortisone Sod Suc (PF), 50 mg, Intravenous, Q12H  insulin lispro, 0-9 Units, Subcutaneous, TID AC  saccharomyces boulardii, 250 mg, Oral, BID  sodium chloride, 10 mL, Intravenous, Q12H  Urea, 15 g, Oral, BID          Assessment & Plan       Malignant neoplasm of upper lobe of left lung (HCC)    Weakness    Sepsis (HCC)      Assessment & Plan  - Euvolemic/hypovolemic hyponatremia 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 intake and profound adrenal insufficiency.  ACTH stim test was positive.  Currently on hydrocortisone.   Thyroid function okay.  Sodium is improved, will adjust her p.o. 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  Was on immunotherapy prior to admission.  She has poor nutritional status and hypoalbuminemia.  - Previous history of alcohol use.  - Metabolic acidosis, resolved,  now alkalotic  --Hypokalemia: Replaced yesterday and  improved  -- Hypophosphatemia: Improved, check in a.m.  -- Volume status: Patient received another dose of IV Lasix today, volume status appears to be improved,    Torin Higginbotham MD  10/02/22  14:26 EDT

## 2022-10-02 NOTE — PROGRESS NOTES
Cardiology Pulmonary / Critical Care  Progress Note      Patient Name: Linda Ortiz  : 1955  MRN: [id number]  Primary Care Physician:  Provider, No Known  Date of admission: 2022  ICU: 2d 1h      Subjective   Subjective        Reason for consult:  Shock secondary to Klebsiella Pneumonia and Adrenal insufficiency, metastatic none small cell lung cancer        Over the last 24 hours....  The patient was moved out of the ICU  Has been hemodynamically stable   Continues on Unasyn for Klebsiella Pneumonia      No acute events overnight     This morning....  Patient asking to go home, reports she is much better   Requesting a bath   Stable on 1L    Mentation improving       ROS  General: Denied complaints  HEENT: Denied complaints  Respiratory: Denied complaints  Cardiovascular: Denied complaints  GI: Denied complaints  MSK: Denied complaints  Neurologic: Denied complaints  Skin: Denied complaints    Objective   Objective     Vitals:   Temp:  [97.5 °F (36.4 °C)-97.7 °F (36.5 °C)] 97.5 °F (36.4 °C)  Heart Rate:  [] 100  Resp:  [18-24] 18  BP: (106-116)/(67-77) 115/69  Flow (L/min):  [1] 1    Physical Exam   Vital Signs Reviewed   Frail looking, pale female, with improved mental status, slightly improved cough strength  HEENT:  PERRL, EOMI.  OP, nares clear, no sinus tenderness  Neck:  Supple, no JVD, no thyromegaly  Lymph: no axillary, cervical, supraclavicular lymphadenopathy noted bilaterally  Chest:   Has poor cough strength, diminished breath sounds bilaterally, scattered rhonchi, no wheezing or crackles, resonant to percussion bilaterally  CV: Tachycardic, hypertensive, no MGR, pulses are feeble in extremities and  Abd:  Soft, NT, ND, + BS, no HSM  EXT:  no clubbing, no cyanosis, no edema, no joint tenderness  Neuro:    Weak, bedridden, cranial nerves II through XII are intact  Skin: No rashes or lesions noted      Result Review    Result Review:  I have personally reviewed the results from the time of this  admission to 10/2/2022 10:09 EDT and agree with these findings:  [x]  Laboratory  [x]  Microbiology  [x]  Radiology  []  EKG/Telemetry   []  Cardiology/Vascular   []  Pathology  []  Old records  []  Other:  Most notable findings include: CT chest 9/30 new pericardial effusion measuring 2.2 centimeters compared to 9/16 exam and new bilateral pleural effions measuring 4.5 cm on right and 4.8 on the left, CO2 30.4,   LAB RESULTS:      Lab 10/02/22  0525 10/01/22  0402 09/30/22  1627 09/30/22  0333 09/29/22  1902 09/29/22  0757 09/29/22  0101 09/28/22  0443 09/27/22  1814 09/27/22  1511   WBC 11.90* 15.86*  --  15.04*  --   --  4.53 5.36  --  6.96   HEMOGLOBIN 8.3* 8.9*  --  10.8*  --   --  10.3* 10.7*  --  12.2   HEMATOCRIT 23.5* 24.3*  --  30.2*  --   --  28.4* 29.7*  --  34.8   PLATELETS 310 294  --  390  --   --  321 367  --  396   NEUTROS ABS 10.36* 13.93*  --  13.03*  --   --   --  3.11  --  4.26   IMMATURE GRANS (ABS) 0.11* 0.14*  --  0.11*  --   --   --  0.02  --  0.06*   LYMPHS ABS 0.88 0.93  --  1.05  --   --   --  1.21  --  1.41   MONOS ABS 0.52 0.85  --  0.79  --   --   --  0.83  --  0.84   EOS ABS 0.02 0.00  --  0.03  --   --   --  0.15  --  0.34   MCV 93.6 89.3  --  90.7  --   --  91.6 91.7  --  93.5   CRP  --   --   --  18.45*  --   --   --   --   --   --    PROCALCITONIN  --   --   --  2.98*  --  0.21  --   --   --   --    LACTATE  --   --  3.0*  --   --   --   --   --  1.7 3.9*   LACTATE, ARTERIAL  --   --   --   --  1.24  --   --   --   --   --    D DIMER QUANT  --   --   --  4.05*  --   --   --   --   --   --          Lab 10/02/22  0525 10/01/22  0402 10/01/22  0003 09/30/22 2128 09/30/22  1627 09/30/22  0333 09/29/22  2132 09/29/22  1902 09/29/22  0437 09/29/22  0101 09/28/22  0146 09/27/22  1814 09/27/22  1511   0000   SODIUM 134* 129*  129* 128* 127* 127* 120*   < >  --    < > 120*   < > 119*  --   --    SODIUM, ARTERIAL  --   --   --   --   --   --   --  117.4*  --   --   --   --   --   --     POTASSIUM 4.2 3.3*  --   --  4.1 4.1  --   --   --  4.1  --  4.3  --    < >   CHLORIDE 98 93*  --   --  95* 92*  --   --   --  91*  --  85*  --    < >   CO2 30.4* 28.3  --   --  23.4 16.8*  --   --   --  22.6  --  23.6  --    < >   ANION GAP 5.6 7.7  --   --  8.6 11.2  --   --   --  6.4  --  10.4  --    < >   BUN 13 16  --   --  22 8  --   --   --  7*  --  13  --    < >   CREATININE 0.51* 0.56*  --   --  0.70 0.78  --   --   --  0.56*  --  0.72  --    < >   EGFR 103.1 100.8  --   --  95.5 83.9  --   --   --  100.8  --  92.3  --    < >   GLUCOSE 131* 268*  --   --  146* 264*  --   --   --  63*  --  69  --    < >   GLUCOSE, ARTERIAL  --   --   --   --   --   --   --  147*  --   --   --   --   --   --    CALCIUM 7.4* 7.7*  --   --  7.9* 7.7*  --   --   --  7.9*  --  8.6  --    < >   IONIZED CALCIUM  --   --   --   --   --   --   --  1.07*  --   --   --   --   --   --    MAGNESIUM 2.1 2.0  --   --   --  3.5*  --   --   --  1.4*  --   --  1.6  --    PHOSPHORUS 2.5 2.0*  --   --   --  4.2  --   --   --  3.3  --   --   --   --    TSH  --   --   --   --   --   --   --   --   --   --   --  0.593  --   --     < > = values in this interval not displayed.         Lab 10/01/22  0402 09/30/22  0333 09/29/22  5947 09/27/22  2758   TOTAL PROTEIN 6.8  --   --  7.9   ALBUMIN 1.90* 1.80* 2.20* 2.20*   GLOBULIN 4.9  --   --  5.7   ALT (SGPT) 16  --   --  17   AST (SGOT) 44*  --   --  54*   BILIRUBIN 0.3  --   --  0.5   ALK PHOS 76  --   --  81         Lab 09/30/22  0333 09/27/22  1814   PROBNP 3,300.0*  --    TROPONIN T  --  <0.010             Lab 10/01/22  0402   IRON 46   IRON SATURATION 58*   TIBC 79*   TRANSFERRIN 53*   FERRITIN 3,023.00*         Lab 09/29/22  1902   PH, ARTERIAL 7.347*   PCO2, ARTERIAL 31.2*   PO2 ART 77.7*   O2 SATURATION ART 93.7*   FIO2 <21   HCO3 ART 16.7*   BASE EXCESS ART -7.8*   CARBOXYHEMOGLOBIN 0.3     Brief Urine Lab Results  (Last result in the past 365 days)      Color   Clarity   Blood   Leuk Est    Nitrite   Protein   CREAT   Urine HCG        09/29/22 0333 Yellow   Clear   Small (1+)   Small (1+)   Negative   Negative               Microbiology Results (last 10 days)     Procedure Component Value - Date/Time    S. Pneumo Ag Urine or CSF - Urine, Urine, Clean Catch [[id number]]  (Normal) Collected: 09/30/22 1821    Lab Status: Final result Specimen: Urine, Clean Catch Updated: 09/30/22 1906     Strep Pneumo Ag Negative    Legionella Antigen, Urine - Urine, Urine, Clean Catch [[id number]]  (Normal) Collected: 09/30/22 1821    Lab Status: Final result Specimen: Urine, Clean Catch Updated: 09/30/22 1906     LEGIONELLA ANTIGEN, URINE Negative    Respiratory Culture - Wash, Bronchus [[id number]] Collected: 09/30/22 1536    Lab Status: Final result Specimen: Wash from Bronchus Updated: 10/02/22 0931     Respiratory Culture Moderate growth (3+) Normal respiratory reji. No S. aureus or Pseudomonas aeruginosa detected. Final report.     Gram Stain Few (2+) Gram positive cocci    AFB Culture - Lavage, Lung, Lingula [[id number]] Collected: 09/30/22 1533    Lab Status: Preliminary result Specimen: Lavage from Lung, Lingula Updated: 10/01/22 1259     AFB Stain No acid fast bacilli seen on direct smear    BAL Culture, Quantitative - Lavage, Lung, Lingula [[id number]] Collected: 09/30/22 1533    Lab Status: Final result Specimen: Lavage from Lung, Lingula Updated: 10/02/22 0935     BAL Culture >100,000 CFU/mL Normal respiratory reji. No S. aureus or Pseudomonas aeruginosa detected. Final report.     Gram Stain Few (2+) Gram positive cocci      Rare (1+) Gram positive bacilli    Pneumonia Panel - Lavage, Lung, Lingula [[id number]]  (Abnormal) Collected: 09/30/22 1533    Lab Status: Final result Specimen: Lavage from Lung, Lingula Updated: 09/30/22 1832     Escherichia coli PCR Not Detected     Acinetobacter calcoaceticus-baumannii complex PCR Not Detected     Enterobacter cloacae PCR Not Detected     Klebsiella oxytoca PCR Not  Detected     Klebsiella pneumoniae group PCR Not Detected     Klebsiella aerogenes PCR Detected     Moraxella catarrhalis PCR Not Detected     Proteus species PCR Not Detected     Pseudomonas aeroginosa PCR Not Detected     Serratia marcescens PCR Not Detected     Staphylococcus aureus PCR Not Detected     Streptococcus pyogenes PCR Not Detected     Haemophilus influenzae PCR Not Detected     Streptococcus agalactiae PCR Not Detected     Streptococcus pneumoniae PCR Not Detected     Chlamydophila pneumoniae PCR Not Detected     Legionella pneumophilia PCR Not Detected     Mycoplasma pneumo by PCR Not Detected     ADENOVIRUS, PCR Not Detected     CTX-M Gene Not Detected     IMP Gene Not Detected     KPC Gene Not Detected     mecA/C and MREJ Gene N/A     NDM Gene Not Detected     OXA-48-like Gene Not Detected     VIM Gene Not Detected     Coronavirus Not Detected     Human Metapneumovirus Not Detected     Human Rhinovirus/Enterovirus Not Detected     Influenza A PCR Not Detected     Influenza B PCR Not Detected     RSV, PCR Not Detected     Parainfluenza virus PCR Not Detected    MRSA Screen, PCR (Inpatient) - Swab, Nares [[id number]]  (Normal) Collected: 09/29/22 1807    Lab Status: Final result Specimen: Swab from Nares Updated: 09/29/22 1921     MRSA PCR No MRSA Detected    Narrative:      The negative predictive value of this diagnostic test is high and should only be used to consider de-escalating anti-MRSA therapy. A positive result may indicate colonization with MRSA and must be correlated clinically.    Urine Culture - Urine, Urine, Clean Catch [[id number]]  (Normal) Collected: 09/29/22 0333    Lab Status: Final result Specimen: Urine, Clean Catch Updated: 09/30/22 0929     Urine Culture No growth    Respiratory Culture - Sputum, Cough [[id number]] Collected: 09/28/22 2113    Lab Status: Final result Specimen: Sputum from Cough Updated: 09/30/22 1201     Respiratory Culture Scant growth (1+) Normal respiratory  reji. No S. aureus or Pseudomonas aeruginosa detected. Final report.     Gram Stain Few (2+) WBCs seen      Few (2+) Mucous strands      Few (2+) Gram positive cocci in pairs and chains    Blood Culture - Blood, Arm, Left [[id number]]  (Normal) Collected: 09/27/22 1511    Lab Status: Preliminary result Specimen: Blood from Arm, Left Updated: 10/01/22 1533     Blood Culture No growth at 4 days    Blood Culture - Blood, Arm, Left [[id number]]  (Normal) Collected: 09/27/22 1511    Lab Status: Preliminary result Specimen: Blood from Arm, Left Updated: 10/01/22 1533     Blood Culture No growth at 4 days          CT Head Without Contrast    Result Date: 9/30/2022    1. No acute intracranial finding.     SARMAD TRIVEDI MD       Electronically Signed and Approved By: SARMAD TRIVEDI MD on 9/30/2022 at 21:50             CT Chest With Contrast Diagnostic    Result Date: 9/30/2022    1. Mild anasarca. 2. New pericardial effusion measuring up to at least 2.2 centimeters in thickness new compared to prior CT 9/16/2022. 3. New bilateral pleural effusions also measuring up to 4.5 centimeters on the right and 4.8 centimeters on the left. 4. Bilateral axillary adenopathy greater on the right similar to previous exam. 5. AP window and precarinal adenopathy similar to previous exam. 6. Left upper lobe mass again seen measuring up to 5.5 centimeters similar to previous exam. 7. Mild reflux of contrast into the IVC. 8. Mild upper and lower lobe lung opacities could reflect pulmonary edema or pneumonia.  There is superimposed atelectasis. 9. Pulmonary embolus in distal segmental and proximal subsegmental right lower lobe pulmonary artery branches appears partly along the vessel wall may be subacute or chronic.  Similar findings seen on previous exam 9/16/2022.  Discussed with Dr. oHpe. 10. Bilateral adrenal masses compatible with metastatic disease again seen. 11. 0.8 centimeter right lower lobe nodule again seen.     SARMAD  MD FAROOQ       Electronically Signed and Approved By: SARMAD TRIVEDI MD on 9/30/2022 at 22:30                 Assessment & Plan   Assessment / Plan     Active Hospital Problems:  Active Hospital Problems    Diagnosis    • Weakness    • Sepsis (HCC)      Added automatically from request for surgery [id number]     • Malignant neoplasm of upper lobe of left lung (HCC)      Sepsis due to Klebsiella Pneumonia and Adrenal Crisis   Acute Hypoxic Respiratory Failure s/p Bronchoscopy   Pericardial Effusion  Bilateral Pleural Effusion  Metastatic Non-small cell lung cancer  Vocal Cord Adducted, partial abduction on the left   RLL Pulmonary Embolic subacute verus chronic   History of Left Adrenal Mass with history of biopsy, poorly differentiated carcinoma         Plan:   Continue supplemental O2 as needed to keep O2 saturations greater than 90%  Previously on vancomycin and ceftriaxone.  De-escalated to Unasyn  Underwent bronchoscopy 9/30.  Pneumonia panel positive for Klebsiella  CT chest with contrast revealed new pericardial effusion with bilateral pleural effusions, questionable PE   Would aggressively diurese now.   Primary service started on Eliquis   Continue stress dose steroids, IV hydrocortisone and fluorinef.   Trend renal panel and electrolytes  We will replace electrolytes as needed  Nephrology following.  Appreciate recommendations  Bowel regimen.  Stop bicarb drip   consulted to help patient obtain home medications    Reviewed labs, imaging and provider notes.   Discussed with bedside nurse and primary service.     DVT:DVT prophylaxis:  Medical DVT prophylaxis orders are present.  Diet:   Diet Order   Procedures   • Diet Dysphagia; IV - Mechanical Soft No Mixed Consistencies; Nectar / Syrup Thick; Extra Sauce / Gravy; Consistent Carbohydrate     CODE STATUS:   Code Status and Medical Interventions:   Ordered at: 09/27/22 1939     Code Status (Patient has no pulse and is not breathing):     CPR (Attempt to Resuscitate)     Medical Interventions (Patient has pulse or is breathing):    Full Support       The above plan is a product of multidisciplinary rounds performed this morning on 10/1/2022 at bedside with the critical care team consisting of the on-call physician, Dr. Leon, and myself Ameena Antoine PA-C, bedside RN, pharmacist, respiratory therapist, dietitian and other Allied health services.       This visit was performed by BOTH a physician and an APC. I personally evaluated and examined the patient. I performed all aspects of MDM as documented. , I have reviewed and confirmed the accuracy of the patient's history as documented in this note. and I have reexamined the patient and the results are consistent with the previously documented exam. I have updated the documentation as necessary.     Electronically signed by Fausto Leon MD, 10/02/22, 4:26 PM EDT.

## 2022-10-02 NOTE — THERAPY TREATMENT NOTE
Acute Care - Physical Therapy Treatment Note   Swetha     Patient Name: Linda Ortiz  : 1955  MRN: [id number]  Today's Date: 10/2/2022      Visit Dx:     ICD-10-CM ICD-9-CM   1. Weakness  R53.1 780.79   2. Hyponatremia  E87.1 276.1   3. Sepsis, due to unspecified organism, unspecified whether acute organ dysfunction present (HCC)  A41.9 038.9     995.91   4. Decreased activities of daily living (ADL)  Z78.9 V49.89   5. Difficulty walking  R26.2 719.7   6. Malignant neoplasm of upper lobe of left lung (HCC)  C34.12 162.3   7. Dysphagia, oropharyngeal  R13.12 787.22     Patient Active Problem List   Diagnosis   • Arthritis of right hand   • Localized edema   • Mass of right hand   • Malignant neoplasm of upper lobe of left lung (HCC)   • Loss of appetite   • Right arm pain   • Painful urination   • Encounter for adjustment or management of vascular access device   • Productive cough   • Hypocalcemia   • Anemia   • Weakness   • Sepsis (HCC)     Past Medical History:   Diagnosis Date   • Bone cancer (HCC) 2022   • Hypocalcemia 2022   • Malignant neoplasm of upper lobe of left lung (HCC) 2022   • Metastatic cancer (HCC)      Past Surgical History:   Procedure Laterality Date   • HIP SURGERY Bilateral    • KNEE ARTHROSCOPY     • VENOUS ACCESS DEVICE (PORT) INSERTION N/A 7/15/2022    Procedure: INSERTION VENOUS ACCESS DEVICE;  Surgeon: Eber Hull MD;  Location: [facility] [place] [facility] [place];  Service: General;  Laterality: N/A;     PT Assessment (last 12 hours)     PT Evaluation and Treatment     Row Name 10/02/22 1300          Physical Therapy Time and Intention    Subjective Information no complaints  -CS     Document Type therapy note (daily note)  -CS     Mode of Treatment individual therapy;physical therapy  -CS     Patient Effort good  -CS     Row Name 10/02/22 1300          Bed Mobility    Bed Mobility bed mobility (all) activities  -CS     All Activities, [place] (Bed Mobility) standby assist   -CS     Row Name 10/02/22 1300          Transfers    Transfers sit-stand transfer;stand-sit transfer;bed-chair transfer  -CS     Bed-Chair [place] (Transfers) verbal cues;standby assist  -CS     Assistive Device (Bed-Chair Transfers) walker, front-wheeled  -CS     Sit-Stand [place] (Transfers) verbal cues;standby assist  -CS     Stand-Sit [place] (Transfers) verbal cues;standby assist  -CS     Row Name 10/02/22 1300          Sit-Stand Transfer    Assistive Device (Sit-Stand Transfers) walker, front-wheeled  -CS     Comment, (Sit-Stand Transfer) x8 STS with education on safety to reduce falls risk and appropriate technique to improve LE strength and maximize functional independence.  -CS     Row Name 10/02/22 1300          Gait/Stairs (Locomotion)    Comment, (Gait/Stairs) Patient was ready to take her shower and did not want to continue with therapy session.  -CS     Row Name             Wound 07/15/22 1121 Right upper chest Incision    Wound - Properties Group Placement Date: 07/15/22  -AZ Placement Time: 1121  -AZ Side: Right  -AZ Orientation: upper  -AZ Location: chest  -AZ Primary Wound Type: Incision  -AZ     Retired Wound - Properties Group Placement Date: 07/15/22  -AZ Placement Time: 1121  -AZ Side: Right  -AZ Orientation: upper  -AZ Location: chest  -AZ Primary Wound Type: Incision  -AZ     Retired Wound - Properties Group Date first assessed: 07/15/22  -AZ Time first assessed: 1121  -AZ Side: Right  -AZ Location: chest  -AZ Primary Wound Type: Incision  -AZ     Row Name 10/02/22 1300          Plan of Care Review    Plan of Care Reviewed With patient  -CS     Progress improving  -CS     Outcome Evaluation Continue plan of care. Pt progressing towards LTGs  -CS     Row Name 10/02/22 1300          Progress Summary (PT)    Progress Toward Functional Goals (PT) progress toward functional goals is good  -CS           User Key  (r) = Recorded By, (t) = Taken By, (c) = Cosigned By    Initials Name  Provider Type    AZ Jayna Abreu, RN Registered Nurse    CS Merry Mooney, PT Physical Therapist                Physical Therapy Education                 Title: PT OT SLP Therapies (Done)     Topic: Physical Therapy (Done)     Point: Mobility training (Done)     Learning Progress Summary           Patient Acceptance, E,TB,D, VU,DU by  at 10/1/2022 0800    Acceptance, E, VU,DU by  at 9/28/2022 1305                   Point: Home exercise program (Done)     Learning Progress Summary           Patient Acceptance, E,TB,D, VU,DU by TB at 10/1/2022 0800    Acceptance, E, VU,DU by BN at 9/28/2022 1305                   Point: Body mechanics (Done)     Learning Progress Summary           Patient Acceptance, E,TB,D, VU,DU by  at 10/1/2022 0800    Acceptance, E, VU,DU by  at 9/28/2022 1305                   Point: Precautions (Done)     Learning Progress Summary           Patient Acceptance, E,TB,D, VU,DU by  at 10/1/2022 0800    Acceptance, E, VU,DU by  at 9/28/2022 1305                               User Key     Initials Effective Dates Name Provider Type Discipline     09/07/21 -  April Li, RN Registered Nurse Nurse     08/31/22 -  Carolyn Feliciano, DOMINICK Student PT Student PT              PT Recommendation and Plan     Progress Summary (PT)  Progress Toward Functional Goals (PT): progress toward functional goals is good  Plan of Care Reviewed With: patient  Progress: improving  Outcome Evaluation: Continue plan of care. Pt progressing towards LTGs       Time Calculation:    PT Charges     Row Name 10/02/22 1315             Time Calculation    PT Received On 10/02/22  -CS      PT Goal Re-Cert Due Date 10/07/22  -CS              Timed Charges    10835 - PT Therapeutic Activity Minutes 8  -CS              Total Minutes    Timed Charges Total Minutes 8  -CS       Total Minutes 8  -CS            User Key  (r) = Recorded By, (t) = Taken By, (c) = Cosigned By    Initials Name Provider Type    CS Merry Mooney, PT  Physical Therapist              Therapy Charges for Today     Code Description Service Date Service Provider Modifiers Qty    88404080691  PT THERAPEUTIC ACT EA 15 MIN 10/2/2022 Merry Mooney, PT GP 1          PT G-Codes  Outcome Measure Options: AM-PAC 6 Clicks Basic Mobility (PT)  AM-PAC 6 Clicks Score (PT): 23  AM-PAC 6 Clicks Score (OT): 21    Merry Mooney PT  10/2/2022

## 2022-10-02 NOTE — PROGRESS NOTES
CARDIOLOGY  INPATIENT PROGRESS NOTE                [facility] 4TH FLOOR MEDICAL TELEMETRY UNIT    10/2/2022      PATIENT IDENTIFICATION:   Name:  Linda Ortiz      MRN:  [id number]     67 y.o.  female                 SUBJECTIVE:   Patient had echocardiogram yesterday which showed small pericardial effusion with out any tamponade    OBJECTIVE:  Vitals:    10/01/22 2305 10/02/22 0700 10/02/22 0709 10/02/22 1211   BP: 115/69   123/81   BP Location: Left arm   Left arm   Patient Position: Lying   Lying   Pulse: 84  100 79   Resp: 18 18 18 18   Temp: 97.5 °F (36.4 °C)   98.1 °F (36.7 °C)   TempSrc: Oral Oral  Oral   SpO2: 98%  96% 95%   Weight:       Height:               Body mass index is 25.05 kg/m².    Intake/Output Summary (Last 24 hours) at 10/2/2022 1252  Last data filed at 10/2/2022 0800  Gross per 24 hour   Intake 360 ml   Output 300 ml   Net 60 ml       Telemetry:     Physical Exam  General Appearance:   · no acute distress  · Alert and oriented x3  HENT:   · lips not cyanotic  · Atraumatic  Neck:  · No jvd   · supple  Respiratory:  · no respiratory distress  · normal breath sounds  · no rales  Cardiovascular:    · regular rhythm  · no S3, no S4   · no murmur  · no rub  · lower extremity edema: none    Skin:   · warm, dry  · No rashes      Allergies   Allergen Reactions   • Hydrocodone Nausea And Vomiting and Dizziness       Scheduled meds:  ampicillin-sulbactam, 3 g, Intravenous, Q6H  apixaban, 10 mg, Oral, BID   Followed by  [START ON 10/9/2022] apixaban, 5 mg, Oral, BID  arformoterol, 15 mcg, Nebulization, BID - RT  budesonide, 0.5 mg, Nebulization, BID - RT  famotidine, 20 mg, Oral, BID AC  fludrocortisone, 50 mcg, Oral, Daily  guaiFENesin, 1,200 mg, Oral, Q12H  Hydrocortisone Sod Suc (PF), 50 mg, Intravenous, Q8H  [START ON 10/3/2022] Hydrocortisone Sod Suc (PF), 50 mg, Intravenous, Q12H  insulin lispro, 0-9 Units, Subcutaneous, TID AC  saccharomyces boulardii, 250 mg, Oral, BID  sodium  chloride, 10 mL, Intravenous, Q12H  Urea, 15 g, Oral, BID      IV meds:                           Data Review:  CBC    CBC 9/30/22 10/1/22 10/2/22   WBC 15.04 (A) 15.86 (A) 11.90 (A)   RBC 3.33 (A) 2.72 (A) 2.51 (A)   Hemoglobin 10.8 (A) 8.9 (A) 8.3 (A)   Hematocrit 30.2 (A) 24.3 (A) 23.5 (A)   MCV 90.7 89.3 93.6   MCH 32.4 32.7 33.1 (A)   MCHC 35.8 (A) 36.6 (A) 35.3   RDW 14.5 14.6 15.5 (A)   Platelets 390 294 310   (A) Abnormal value            CMP    CMP 9/30/22 9/30/22 9/30/22 10/1/22 10/1/22 10/1/22 10/2/22    0333 1627 2128 0003 0402 0402    Glucose 264 (A) 146 (A)   268 (A)  131 (A)   BUN 8 22   16  13   Creatinine 0.78 0.70   0.56 (A)  0.51 (A)   Sodium 120 (A) 127 (A) 127 (A) 128 (A) 129 (A) 129 (A) 134 (A)   Potassium 4.1 4.1   3.3 (A)  4.2   Chloride 92 (A) 95 (A)   93 (A)  98   Calcium 7.7 (A) 7.9 (A)   7.7 (A)  7.4 (A)   Albumin 1.80 (A)    1.90 (A)     Total Bilirubin     0.3     Alkaline Phosphatase     76     AST (SGOT)     44 (A)     ALT (SGPT)     16     (A) Abnormal value             CARDIAC LABS:      Lab 09/30/22  0333 09/27/22  1814   PROBNP 3,300.0*  --    TROPONIN T  --  <0.010        No results found for: DIGOXIN   Lab Results   Component Value Date    TSH 0.593 09/27/2022           Invalid input(s): LDLCALC  No results found for: POCTROP  Lab Results   Component Value Date    TROPONINT <0.010 09/27/2022   (  Lab Results   Component Value Date    MG 2.1 10/02/2022     Results for orders placed during the hospital encounter of 09/27/22    Adult Transthoracic Echo Complete W/ Cont if Necessary Per Protocol    Interpretation Summary  · Calculated left ventricular EF = 55.5% Estimated left ventricular EF was in agreement with the calculated left ventricular EF. Left ventricular systolic function is normal.  · Estimated right ventricular systolic pressure from tricuspid regurgitation is normal (<35 mmHg).  · There is a small (<1cm) circumferential pericardial effusion , no tamponade            ASSESSMENT:  Small pericardial effusion without any tamponade  Septic shock improving  Adrenal crisis  Carcinoma of the lung with metastasis to left adrenal gland  Hyponatremia  Question of aspiration pneumonia  Hypotension  Congestive heart failure acute diastolic      PLAN:   No active cardiac intervention needed for the small pericardial effusion            Alex Lundy MD  10/2/2022    12:52 EDT

## 2022-10-03 ENCOUNTER — APPOINTMENT (OUTPATIENT)
Dept: GENERAL RADIOLOGY | Facility: HOSPITAL | Age: 67
End: 2022-10-03

## 2022-10-03 LAB
ANION GAP SERPL CALCULATED.3IONS-SCNC: 5.6 MMOL/L (ref 5–15)
BASOPHILS # BLD AUTO: 0.01 10*3/MM3 (ref 0–0.2)
BASOPHILS NFR BLD AUTO: 0.1 % (ref 0–1.5)
BUN SERPL-MCNC: 23 MG/DL (ref 8–23)
BUN/CREAT SERPL: 39 (ref 7–25)
CALCIUM SPEC-SCNC: 7.8 MG/DL (ref 8.6–10.5)
CHLORIDE SERPL-SCNC: 97 MMOL/L (ref 98–107)
CO2 SERPL-SCNC: 31.4 MMOL/L (ref 22–29)
CREAT SERPL-MCNC: 0.59 MG/DL (ref 0.57–1)
DEPRECATED RDW RBC AUTO: 54.7 FL (ref 37–54)
EGFRCR SERPLBLD CKD-EPI 2021: 99.5 ML/MIN/1.73
EOSINOPHIL # BLD AUTO: 0.01 10*3/MM3 (ref 0–0.4)
EOSINOPHIL NFR BLD AUTO: 0.1 % (ref 0.3–6.2)
ERYTHROCYTE [DISTWIDTH] IN BLOOD BY AUTOMATED COUNT: 15.7 % (ref 12.3–15.4)
GLUCOSE BLDC GLUCOMTR-MCNC: 112 MG/DL (ref 70–99)
GLUCOSE BLDC GLUCOMTR-MCNC: 163 MG/DL (ref 70–99)
GLUCOSE BLDC GLUCOMTR-MCNC: 98 MG/DL (ref 70–99)
GLUCOSE SERPL-MCNC: 121 MG/DL (ref 65–99)
HCT VFR BLD AUTO: 25.8 % (ref 34–46.6)
HGB BLD-MCNC: 8.9 G/DL (ref 12–15.9)
IMM GRANULOCYTES # BLD AUTO: 0.15 10*3/MM3 (ref 0–0.05)
IMM GRANULOCYTES NFR BLD AUTO: 1.5 % (ref 0–0.5)
LYMPHOCYTES # BLD AUTO: 1.15 10*3/MM3 (ref 0.7–3.1)
LYMPHOCYTES NFR BLD AUTO: 11.5 % (ref 19.6–45.3)
MAGNESIUM SERPL-MCNC: 1.8 MG/DL (ref 1.6–2.4)
MCH RBC QN AUTO: 33.1 PG (ref 26.6–33)
MCHC RBC AUTO-ENTMCNC: 34.5 G/DL (ref 31.5–35.7)
MCV RBC AUTO: 95.9 FL (ref 79–97)
MONOCYTES # BLD AUTO: 0.56 10*3/MM3 (ref 0.1–0.9)
MONOCYTES NFR BLD AUTO: 5.6 % (ref 5–12)
NEUTROPHILS NFR BLD AUTO: 8.11 10*3/MM3 (ref 1.7–7)
NEUTROPHILS NFR BLD AUTO: 81.2 % (ref 42.7–76)
NRBC BLD AUTO-RTO: 0.2 /100 WBC (ref 0–0.2)
PHOSPHATE SERPL-MCNC: 2.3 MG/DL (ref 2.5–4.5)
PLATELET # BLD AUTO: 357 10*3/MM3 (ref 140–450)
PMV BLD AUTO: 9 FL (ref 6–12)
POTASSIUM SERPL-SCNC: 3.6 MMOL/L (ref 3.5–5.2)
RBC # BLD AUTO: 2.69 10*6/MM3 (ref 3.77–5.28)
SODIUM SERPL-SCNC: 134 MMOL/L (ref 136–145)
WBC NRBC COR # BLD: 9.99 10*3/MM3 (ref 3.4–10.8)

## 2022-10-03 PROCEDURE — 63710000001 INSULIN LISPRO (HUMAN) PER 5 UNITS: Performed by: FAMILY MEDICINE

## 2022-10-03 PROCEDURE — 94799 UNLISTED PULMONARY SVC/PX: CPT

## 2022-10-03 PROCEDURE — 85025 COMPLETE CBC W/AUTO DIFF WBC: CPT | Performed by: PHYSICIAN ASSISTANT

## 2022-10-03 PROCEDURE — 99233 SBSQ HOSP IP/OBS HIGH 50: CPT | Performed by: INTERNAL MEDICINE

## 2022-10-03 PROCEDURE — 25010000002 AMPICILLIN-SULBACTAM PER 1.5 G: Performed by: INTERNAL MEDICINE

## 2022-10-03 PROCEDURE — 25010000002 HYDROCORTISONE SOD SUC (PF) 100 MG RECONSTITUTED SOLUTION: Performed by: FAMILY MEDICINE

## 2022-10-03 PROCEDURE — 99232 SBSQ HOSP IP/OBS MODERATE 35: CPT | Performed by: FAMILY MEDICINE

## 2022-10-03 PROCEDURE — 84100 ASSAY OF PHOSPHORUS: CPT | Performed by: PHYSICIAN ASSISTANT

## 2022-10-03 PROCEDURE — 83735 ASSAY OF MAGNESIUM: CPT | Performed by: PHYSICIAN ASSISTANT

## 2022-10-03 PROCEDURE — 25010000002 HYDROCORTISONE SOD SUC (PF) 100 MG RECONSTITUTED SOLUTION: Performed by: INTERNAL MEDICINE

## 2022-10-03 PROCEDURE — 74230 X-RAY XM SWLNG FUNCJ C+: CPT

## 2022-10-03 PROCEDURE — 92611 MOTION FLUOROSCOPY/SWALLOW: CPT

## 2022-10-03 PROCEDURE — 82962 GLUCOSE BLOOD TEST: CPT

## 2022-10-03 PROCEDURE — 25010000002 FUROSEMIDE PER 20 MG: Performed by: FAMILY MEDICINE

## 2022-10-03 PROCEDURE — 80048 BASIC METABOLIC PNL TOTAL CA: CPT | Performed by: INTERNAL MEDICINE

## 2022-10-03 PROCEDURE — 25010000002 FUROSEMIDE PER 20 MG: Performed by: INTERNAL MEDICINE

## 2022-10-03 PROCEDURE — 25010000002 AMPICILLIN-SULBACTAM PER 1.5 G: Performed by: FAMILY MEDICINE

## 2022-10-03 RX ORDER — FENTANYL/ROPIVACAINE/NS/PF 2-625MCG/1
15 PLASTIC BAG, INJECTION (ML) EPIDURAL ONCE
Status: COMPLETED | OUTPATIENT
Start: 2022-10-03 | End: 2022-10-03

## 2022-10-03 RX ORDER — POTASSIUM CHLORIDE 1.5 G/1.77G
40 POWDER, FOR SOLUTION ORAL 2 TIMES DAILY
Status: COMPLETED | OUTPATIENT
Start: 2022-10-03 | End: 2022-10-04

## 2022-10-03 RX ADMIN — BARIUM SULFATE 50 ML: 400 SUSPENSION ORAL at 09:47

## 2022-10-03 RX ADMIN — AMPICILLIN SODIUM AND SULBACTAM SODIUM 3 G: 2; 1 INJECTION, POWDER, FOR SOLUTION INTRAMUSCULAR; INTRAVENOUS at 06:03

## 2022-10-03 RX ADMIN — APIXABAN 10 MG: 5 TABLET, FILM COATED ORAL at 08:50

## 2022-10-03 RX ADMIN — FLUDROCORTISONE ACETATE 50 MCG: 0.1 TABLET ORAL at 08:50

## 2022-10-03 RX ADMIN — Medication 10 ML: at 20:14

## 2022-10-03 RX ADMIN — BUDESONIDE 0.5 MG: 0.5 SUSPENSION RESPIRATORY (INHALATION) at 18:35

## 2022-10-03 RX ADMIN — Medication 15 G: at 08:51

## 2022-10-03 RX ADMIN — FAMOTIDINE 20 MG: 20 TABLET ORAL at 16:59

## 2022-10-03 RX ADMIN — POTASSIUM CHLORIDE 40 MEQ: 1.5 POWDER, FOR SOLUTION ORAL at 08:51

## 2022-10-03 RX ADMIN — GUAIFENESIN 1200 MG: 600 TABLET ORAL at 08:50

## 2022-10-03 RX ADMIN — POTASSIUM CHLORIDE 40 MEQ: 1.5 POWDER, FOR SOLUTION ORAL at 20:14

## 2022-10-03 RX ADMIN — Medication 250 MG: at 20:13

## 2022-10-03 RX ADMIN — BARIUM SULFATE 1 TEASPOON(S): 0.6 CREAM ORAL at 09:46

## 2022-10-03 RX ADMIN — ARFORMOTEROL TARTRATE 15 MCG: 15 SOLUTION RESPIRATORY (INHALATION) at 18:35

## 2022-10-03 RX ADMIN — ARFORMOTEROL TARTRATE 15 MCG: 15 SOLUTION RESPIRATORY (INHALATION) at 06:47

## 2022-10-03 RX ADMIN — Medication 10 ML: at 09:15

## 2022-10-03 RX ADMIN — FAMOTIDINE 20 MG: 20 TABLET ORAL at 08:51

## 2022-10-03 RX ADMIN — AMPICILLIN SODIUM AND SULBACTAM SODIUM 3 G: 2; 1 INJECTION, POWDER, FOR SOLUTION INTRAMUSCULAR; INTRAVENOUS at 12:29

## 2022-10-03 RX ADMIN — BARIUM SULFATE 55 ML: 0.81 POWDER, FOR SUSPENSION ORAL at 09:46

## 2022-10-03 RX ADMIN — ACETAMINOPHEN 650 MG: 325 TABLET ORAL at 16:59

## 2022-10-03 RX ADMIN — AMPICILLIN SODIUM AND SULBACTAM SODIUM 3 G: 2; 1 INJECTION, POWDER, FOR SOLUTION INTRAMUSCULAR; INTRAVENOUS at 16:59

## 2022-10-03 RX ADMIN — GUAIFENESIN 1200 MG: 600 TABLET ORAL at 20:13

## 2022-10-03 RX ADMIN — FUROSEMIDE 20 MG: 10 INJECTION, SOLUTION INTRAMUSCULAR; INTRAVENOUS at 16:59

## 2022-10-03 RX ADMIN — BUDESONIDE 0.5 MG: 0.5 SUSPENSION RESPIRATORY (INHALATION) at 06:47

## 2022-10-03 RX ADMIN — APIXABAN 10 MG: 5 TABLET, FILM COATED ORAL at 20:13

## 2022-10-03 RX ADMIN — HYDROCORTISONE SODIUM SUCCINATE 50 MG: 100 INJECTION, POWDER, FOR SOLUTION INTRAMUSCULAR; INTRAVENOUS at 20:13

## 2022-10-03 RX ADMIN — FUROSEMIDE 20 MG: 10 INJECTION, SOLUTION INTRAMUSCULAR; INTRAVENOUS at 06:04

## 2022-10-03 RX ADMIN — AMPICILLIN SODIUM AND SULBACTAM SODIUM 3 G: 2; 1 INJECTION, POWDER, FOR SOLUTION INTRAMUSCULAR; INTRAVENOUS at 22:08

## 2022-10-03 RX ADMIN — Medication 250 MG: at 08:51

## 2022-10-03 RX ADMIN — POTASSIUM PHOSPHATE, MONOBASIC AND POTASSIUM PHOSPHATE, DIBASIC 15 MMOL: 224; 236 INJECTION, SOLUTION, CONCENTRATE INTRAVENOUS at 10:19

## 2022-10-03 RX ADMIN — HYDROCORTISONE SODIUM SUCCINATE 50 MG: 100 INJECTION, POWDER, FOR SOLUTION INTRAMUSCULAR; INTRAVENOUS at 08:51

## 2022-10-03 RX ADMIN — INSULIN LISPRO 2 UNITS: 100 INJECTION, SOLUTION INTRAVENOUS; SUBCUTANEOUS at 17:22

## 2022-10-03 NOTE — PROGRESS NOTES
Pulmonary / Critical Care  Progress Note      Patient Name: Linda Ortiz  : 1955  MRN: [id number]  Primary Care Physician:  Provider, No Known  Date of admission: 2022  ICU: 2d 1h      Subjective   Subjective        Reason for consult:  Shock secondary to Klebsiella Pneumonia and Adrenal insufficiency, metastatic none small cell lung cancer        Over the last 24 hours....  The has been stable on the floor greater than 48 hours since ICU discharge  Has been hemodynamically stable   Continues on Unasyn for Klebsiella Pneumonia      No acute events overnight     This morning....  Patient asking to go home, reports she is much better   Requesting a bath however she is probably not safe at home.  Stable on 1L    Mentation improving       ROS  General: Denied complaints  HEENT: Denied complaints  Respiratory: Denied complaints  Cardiovascular: Denied complaints  GI: Denied complaints  MSK: Denied complaints  Neurologic: Denied complaints  Skin: Denied complaints    Objective   Objective     Vitals:   Temp:  [97.5 °F (36.4 °C)-98.1 °F (36.7 °C)] 98.1 °F (36.7 °C)  Heart Rate:  [] 66  Resp:  [18-24] 24  BP: (108-133)/(58-89) 128/78  Flow (L/min):  [1] 1    Physical Exam   Vital Signs Reviewed   Frail looking, pale female, with improved mental status, slightly improved cough strength  HEENT:  PERRL, EOMI.  OP, nares clear, no sinus tenderness  Neck:  Supple, no JVD, no thyromegaly  Lymph: no axillary, cervical, supraclavicular lymphadenopathy noted bilaterally  Chest:   Has poor cough strength, diminished breath sounds bilaterally, scattered rhonchi, no wheezing or crackles, resonant to percussion bilaterally  CV: Tachycardic, hypertensive, no MGR, pulses are feeble in extremities and  Abd:  Soft, NT, ND, + BS, no HSM  EXT:  no clubbing, no cyanosis, no edema, no joint tenderness  Neuro:    Weak, bedridden, cranial nerves II through XII are intact  Skin: No rashes or lesions noted      Result Review     Result Review:  I have personally reviewed the results from the time of this admission to 10/3/2022 07:43 EDT and agree with these findings:  [x]  Laboratory  [x]  Microbiology  [x]  Radiology  []  EKG/Telemetry   []  Cardiology/Vascular   []  Pathology  []  Old records  []  Other:  Most notable findings include: CT chest 9/30 new pericardial effusion measuring 2.2 centimeters compared to 9/16 exam and new bilateral pleural effions measuring 4.5 cm on right and 4.8 on the left, CO2 30.4,   LAB RESULTS:      Lab 10/03/22  0605 10/02/22  1735 10/02/22  0525 10/01/22  0402 09/30/22  1627 09/30/22  0333 09/29/22  1902 09/29/22  0757 09/29/22  0101 09/28/22  0443 09/27/22  1814 09/27/22  1511   WBC 9.99  --  11.90* 15.86*  --  15.04*  --   --  4.53 5.36  --  6.96   HEMOGLOBIN 8.9* 9.0* 8.3* 8.9*  --  10.8*  --   --  10.3* 10.7*  --  12.2   HEMATOCRIT 25.8* 26.1* 23.5* 24.3*  --  30.2*  --   --  28.4* 29.7*  --  34.8   PLATELETS 357  --  310 294  --  390  --   --  321 367  --  396   NEUTROS ABS 8.11*  --  10.36* 13.93*  --  13.03*  --   --   --  3.11  --  4.26   IMMATURE GRANS (ABS) 0.15*  --  0.11* 0.14*  --  0.11*  --   --   --  0.02  --  0.06*   LYMPHS ABS 1.15  --  0.88 0.93  --  1.05  --   --   --  1.21  --  1.41   MONOS ABS 0.56  --  0.52 0.85  --  0.79  --   --   --  0.83  --  0.84   EOS ABS 0.01  --  0.02 0.00  --  0.03  --   --   --  0.15  --  0.34   MCV 95.9  --  93.6 89.3  --  90.7  --   --  91.6 91.7  --  93.5   CRP  --   --   --   --   --  18.45*  --   --   --   --   --   --    PROCALCITONIN  --   --   --   --   --  2.98*  --  0.21  --   --   --   --    LACTATE  --   --   --   --  3.0*  --   --   --   --   --  1.7 3.9*   LACTATE, ARTERIAL  --   --   --   --   --   --  1.24  --   --   --   --   --    D DIMER QUANT  --   --   --   --   --  4.05*  --   --   --   --   --   --          Lab 10/03/22  0605 10/02/22  0525 10/01/22  0402 10/01/22  0003 09/30/22 2128 09/30/22  1627 09/30/22  0333 09/29/22 2132  09/29/22  1902 09/29/22  0437 09/29/22  0101 09/28/22  0146 09/27/22  1814   SODIUM 134* 134* 129*  129* 128* 127* 127* 120*   < >  --    < > 120*   < > 119*   SODIUM, ARTERIAL  --   --   --   --   --   --   --   --  117.4*  --   --   --   --    POTASSIUM 3.6 4.2 3.3*  --   --  4.1 4.1  --   --   --  4.1  --  4.3   CHLORIDE 97* 98 93*  --   --  95* 92*  --   --   --  91*  --  85*   CO2 31.4* 30.4* 28.3  --   --  23.4 16.8*  --   --   --  22.6  --  23.6   ANION GAP 5.6 5.6 7.7  --   --  8.6 11.2  --   --   --  6.4  --  10.4   BUN 23 13 16  --   --  22 8  --   --   --  7*  --  13   CREATININE 0.59 0.51* 0.56*  --   --  0.70 0.78  --   --   --  0.56*  --  0.72   EGFR 99.5 103.1 100.8  --   --  95.5 83.9  --   --   --  100.8  --  92.3   GLUCOSE 121* 131* 268*  --   --  146* 264*  --   --   --  63*  --  69   GLUCOSE, ARTERIAL  --   --   --   --   --   --   --   --  147*  --   --   --   --    CALCIUM 7.8* 7.4* 7.7*  --   --  7.9* 7.7*  --   --   --  7.9*  --  8.6   IONIZED CALCIUM  --   --   --   --   --   --   --   --  1.07*  --   --   --   --    MAGNESIUM 1.8 2.1 2.0  --   --   --  3.5*  --   --   --  1.4*  --   --    PHOSPHORUS 2.3* 2.5 2.0*  --   --   --  4.2  --   --   --  3.3  --   --    TSH  --   --   --   --   --   --   --   --   --   --   --   --  0.593    < > = values in this interval not displayed.         Lab 10/01/22  0402 09/30/22 0333 09/29/22  0757 09/27/22  1814   TOTAL PROTEIN 6.8  --   --  7.9   ALBUMIN 1.90* 1.80* 2.20* 2.20*   GLOBULIN 4.9  --   --  5.7   ALT (SGPT) 16  --   --  17   AST (SGOT) 44*  --   --  54*   BILIRUBIN 0.3  --   --  0.5   ALK PHOS 76  --   --  81         Lab 09/30/22  0333 09/27/22  1814   PROBNP 3,300.0*  --    TROPONIN T  --  <0.010             Lab 10/02/22  0525 10/01/22  0402   IRON  --  46   IRON SATURATION  --  58*   TIBC  --  79*   TRANSFERRIN  --  53*   FERRITIN  --  3,023.00*   FOLATE 11.60  --    VITAMIN B 12 1,092*  --          Lab 09/29/22  1902   PH, ARTERIAL  7.347*   PCO2, ARTERIAL 31.2*   PO2 ART 77.7*   O2 SATURATION ART 93.7*   FIO2 <21   HCO3 ART 16.7*   BASE EXCESS ART -7.8*   CARBOXYHEMOGLOBIN 0.3     Brief Urine Lab Results  (Last result in the past 365 days)      Color   Clarity   Blood   Leuk Est   Nitrite   Protein   CREAT   Urine HCG        09/29/22 0333 Yellow   Clear   Small (1+)   Small (1+)   Negative   Negative               Microbiology Results (last 10 days)     Procedure Component Value - Date/Time    S. Pneumo Ag Urine or CSF - Urine, Urine, Clean Catch [[id number]]  (Normal) Collected: 09/30/22 1821    Lab Status: Final result Specimen: Urine, Clean Catch Updated: 09/30/22 1906     Strep Pneumo Ag Negative    Legionella Antigen, Urine - Urine, Urine, Clean Catch [[id number]]  (Normal) Collected: 09/30/22 1821    Lab Status: Final result Specimen: Urine, Clean Catch Updated: 09/30/22 1906     LEGIONELLA ANTIGEN, URINE Negative    Respiratory Culture - Wash, Bronchus [[id number]] Collected: 09/30/22 1536    Lab Status: Final result Specimen: Wash from Bronchus Updated: 10/02/22 0931     Respiratory Culture Moderate growth (3+) Normal respiratory reji. No S. aureus or Pseudomonas aeruginosa detected. Final report.     Gram Stain Few (2+) Gram positive cocci    AFB Culture - Lavage, Lung, Lingula [[id number]] Collected: 09/30/22 1533    Lab Status: Preliminary result Specimen: Lavage from Lung, Lingula Updated: 10/01/22 1259     AFB Stain No acid fast bacilli seen on direct smear    BAL Culture, Quantitative - Lavage, Lung, Lingula [[id number]] Collected: 09/30/22 1533    Lab Status: Final result Specimen: Lavage from Lung, Lingula Updated: 10/02/22 0935     BAL Culture >100,000 CFU/mL Normal respiratory reji. No S. aureus or Pseudomonas aeruginosa detected. Final report.     Gram Stain Few (2+) Gram positive cocci      Rare (1+) Gram positive bacilli    Pneumonia Panel - Lavage, Lung, Lingula [[id number]]  (Abnormal) Collected: 09/30/22 1533    Lab Status:  Final result Specimen: Lavage from Lung, Lingula Updated: 09/30/22 1832     Escherichia coli PCR Not Detected     Acinetobacter calcoaceticus-baumannii complex PCR Not Detected     Enterobacter cloacae PCR Not Detected     Klebsiella oxytoca PCR Not Detected     Klebsiella pneumoniae group PCR Not Detected     Klebsiella aerogenes PCR Detected     Moraxella catarrhalis PCR Not Detected     Proteus species PCR Not Detected     Pseudomonas aeroginosa PCR Not Detected     Serratia marcescens PCR Not Detected     Staphylococcus aureus PCR Not Detected     Streptococcus pyogenes PCR Not Detected     Haemophilus influenzae PCR Not Detected     Streptococcus agalactiae PCR Not Detected     Streptococcus pneumoniae PCR Not Detected     Chlamydophila pneumoniae PCR Not Detected     Legionella pneumophilia PCR Not Detected     Mycoplasma pneumo by PCR Not Detected     ADENOVIRUS, PCR Not Detected     CTX-M Gene Not Detected     IMP Gene Not Detected     KPC Gene Not Detected     mecA/C and MREJ Gene N/A     NDM Gene Not Detected     OXA-48-like Gene Not Detected     VIM Gene Not Detected     Coronavirus Not Detected     Human Metapneumovirus Not Detected     Human Rhinovirus/Enterovirus Not Detected     Influenza A PCR Not Detected     Influenza B PCR Not Detected     RSV, PCR Not Detected     Parainfluenza virus PCR Not Detected    MRSA Screen, PCR (Inpatient) - Swab, Nares [[id number]]  (Normal) Collected: 09/29/22 1807    Lab Status: Final result Specimen: Swab from Nares Updated: 09/29/22 1921     MRSA PCR No MRSA Detected    Narrative:      The negative predictive value of this diagnostic test is high and should only be used to consider de-escalating anti-MRSA therapy. A positive result may indicate colonization with MRSA and must be correlated clinically.    Urine Culture - Urine, Urine, Clean Catch [[id number]]  (Normal) Collected: 09/29/22 0333    Lab Status: Final result Specimen: Urine, Clean Catch Updated: 09/30/22  0929     Urine Culture No growth    Respiratory Culture - Sputum, Cough [[id number]] Collected: 09/28/22 2113    Lab Status: Final result Specimen: Sputum from Cough Updated: 09/30/22 1201     Respiratory Culture Scant growth (1+) Normal respiratory reji. No S. aureus or Pseudomonas aeruginosa detected. Final report.     Gram Stain Few (2+) WBCs seen      Few (2+) Mucous strands      Few (2+) Gram positive cocci in pairs and chains    Blood Culture - Blood, Arm, Left [[id number]]  (Normal) Collected: 09/27/22 1511    Lab Status: Final result Specimen: Blood from Arm, Left Updated: 10/02/22 1533     Blood Culture No growth at 5 days    Blood Culture - Blood, Arm, Left [[id number]]  (Normal) Collected: 09/27/22 1511    Lab Status: Final result Specimen: Blood from Arm, Left Updated: 10/02/22 1533     Blood Culture No growth at 5 days          No radiology results for the last day      Assessment & Plan   Assessment / Plan     Active Hospital Problems:  Active Hospital Problems    Diagnosis    • Weakness    • Sepsis (HCC)      Added automatically from request for surgery [id number]     • Malignant neoplasm of upper lobe of left lung (HCC)      Sepsis due to Klebsiella Pneumonia and Adrenal Crisis   Acute Hypoxic Respiratory Failure s/p Bronchoscopy   Pericardial Effusion  Bilateral Pleural Effusion  Metastatic Non-small cell lung cancer  Vocal Cord Adducted, partial abduction on the left   RLL Pulmonary Embolic subacute verus chronic   History of Left Adrenal Mass with history of biopsy, poorly differentiated carcinoma         Plan:   Continue supplemental O2 as needed to keep O2 saturations greater than 90%  Previously on vancomycin and ceftriaxone.  De-escalated to Unasyn doing well   Underwent bronchoscopy 9/30.  Pneumonia panel positive for Klebsiella  CT chest with contrast revealed new pericardial effusion with bilateral pleural effusions, questionable PE   Would aggressively diurese now  With aggressive diuresis it  is  Important to replace potassium to allow renal clearance of HCO3  Recommend 40 M EQ potassium chloride  Primary service started on Eliquis   Continue stress dose steroids, IV hydrocortisone and fluorinef.   Trend renal panel and electrolytes  replace electrolytes as needed goal potassium is near 5 this will help the kidneys excrete bicarbonate  Nephrology following.  Appreciate recommendations  Bowel regimen.   consulted to help patient obtain home medications    Reviewed labs, imaging and provider notes.   Discussed with bedside nurse and primary service.     DVT:DVT prophylaxis:  Medical DVT prophylaxis orders are present.  Diet:   Diet Order   Procedures   • NPO Diet NPO Type: Sips with Meds     CODE STATUS:   Code Status and Medical Interventions:   Ordered at: 09/27/22 1939     Code Status (Patient has no pulse and is not breathing):    CPR (Attempt to Resuscitate)     Medical Interventions (Patient has pulse or is breathing):    Full Support         35 minutes critical care time spent on evaluating and managing this patient not including time spent in teaching or procedures.    SUNITHA COLE M.D. [facility] 16:38 EDT 10/03/22

## 2022-10-03 NOTE — PROGRESS NOTES
" [facility]     Nephrology Progress Note      Patient Name: Linda Ortiz  : 1955  MRN: [id number]  Primary Care Physician:  Provider, No Known  Date of admission: 2022    Subjective   Subjective     Interval History:  Patient Reports feeling a lot better.  No new complaints.  upbeat and somewhat \"hyper\"  Tolerated p.o.    Review of Systems   All systems were reviewed and negative except for: What is mentioned above    Objective   Objective     Vitals:   Temp:  [97.5 °F (36.4 °C)-98.1 °F (36.7 °C)] 98 °F (36.7 °C)  Heart Rate:  [] 94  Resp:  [18-24] 20  BP: (123-135)/(58-93) 123/83  Physical Exam:     General Appearance:  Comfortable, conversant and pleasant, hyper.    HEENT: Normal HEENT exam.    Lungs:  Normal effort and normal respiratory rate.  There are decreased breath sounds, wheezes and rhonchi.    Heart: Normal rate.  Regular rhythm.    Abdomen: Abdomen is soft.  Bowel sounds are normal.   There is no abdominal tenderness.  Extremities: Normal range of motion.    Pulses: Distal pulses are intact.    Neurological: Patient is alert and oriented to person, place and time.    Pupils:  Pupils are equal, round, and reactive to light.    Skin:  Warm and dry.      Result Review    Result Reviewed:  I have personally reviewed the results from the time of this admission to 10/3/2022 18:14 EDT and agree with these findings:  [x]  Laboratory  []  Microbiology  [x]  Radiology  []  EKG/Telemetry   []  Cardiology/Vascular   []  Pathology  []  Old records  []  Other:  Lab Results   Component Value Date    GLUCOSE 121 (H) 10/03/2022    CALCIUM 7.8 (L) 10/03/2022     (L) 10/03/2022    K 3.6 10/03/2022    CO2 31.4 (H) 10/03/2022    CL 97 (L) 10/03/2022    BUN 23 10/03/2022    CREATININE 0.59 10/03/2022    BCR 39.0 (H) 10/03/2022    ANIONGAP 5.6 10/03/2022     Lab Results   Component Value Date    CALCIUM 7.8 (L) 10/03/2022    PHOS 2.3 (L) 10/03/2022      Results from last 7 days   Lab Units " 10/03/22  0605   MAGNESIUM mg/dL 1.8          Most notable findings include: Creatinine 0.59, sodium 134.    Assessment & Plan   Assessment / Plan       Active Hospital Problems:  Active Hospital Problems    Diagnosis    • Weakness    • Sepsis (HCC)      Added automatically from request for surgery [id number]     • Malignant neoplasm of upper lobe of left lung (HCC)        Assessment and Plan:    - Euvolemic/hypovolemic hyponatremia 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 intake and profound adrenal insufficiency.  ACTH stim test was positive.  Currently on hydrocortisone and fludrocortisone. Thyroid function okay.  Sodium improved.  Continue  fluid restriction 1800 cc a day, will DC urea and monitor.  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  Was on immunotherapy prior to admission.  She has poor nutritional status and hypoalbuminemia.  - Previous history of alcohol use.  - Metabolic acidosis, resolved.  -- Hypokalemia: Replaced yesterday and improved  -- Hypophosphatemia: Improved, check in a.m.  -- Volume status: Status post IV Lasix yesterday.  Monitor for now.  Will follow.       Electronically signed by Adan Shelley MD, 10/03/22, 6:14 PM EDT.

## 2022-10-03 NOTE — PLAN OF CARE
Goal Outcome Evaluation:  Plan of Care Reviewed With: patient        Progress: no change   Vital signs stable, no acute changes at this time. Rachelle Hassan RN

## 2022-10-03 NOTE — PLAN OF CARE
Goal Outcome Evaluation:                   Transfer from 4 MTU this afternoon.  Patient is alert and oriented x4.  Vital signs stable.  Patient complained of pain in right wrist and legs, and was medicated per MAR.  Neuro and neurovascular checks per order.  Continuing plan of care.

## 2022-10-03 NOTE — PROGRESS NOTES
[facility]   Hospitalist Progress Note  Date: 10/3/2022  Patient Name: Linda Ortiz  : 1955  MRN: [id number]  Date of admission: 2022      Subjective   Subjective     Chief Complaint: Weakness      Summary: 67 y.o. female with metastatic lung cancer (prior radiation to right hand 2022) and mets to left adrenal with left adrenal mass biopsy 2022 with finding of poorly differentiated carcinoma) that follows with Dr. Angela is on therapy with Opdivo and Yervoy but appears to be in initiated July of this year.  Patient presented with weakness, fevers, chills and concerned that she is barely able to get up from the bathroom at home.  Found to have hyponatremia to 119 on admission as well klebsiella pneumonia, possible UTI and hypothermia. Additional testing found to have adrenal insufficiency (suspect primary from metastatic disease to adrenals) with adrenal crisis and shock/sepsis from pna with worsening hypotension requiring transfer to icu and levophed.  CT PE found to have PE, pericardial effusion and bilateral pleural effusions. Cardiology assisting.    Interval Followup: Patient seen and examined resting comfortably going for modified barium swallow study today remains on IV antibiotics with Unasyn.  Serum creatinine remained stable continuing with IV diuretics for now.  Continue with oral fluid restriction continuing with urea twice daily for now.  Remains on intravenous Solu-Cortef as well as p.o. fludrocortisone.  Overall appears to be improving hopefully home in the next 24 to 48 hours    Review of systems:  All systems reviewed negative except the following: Patient complains of generalized weakness fatigue    Objective   Objective     Vitals:   Temp:  [97.5 °F (36.4 °C)-98.1 °F (36.7 °C)] 97.8 °F (36.6 °C)  Heart Rate:  [] 78  Resp:  [18-24] 18  BP: (108-135)/(58-93) 135/93  Flow (L/min):  [1] 1        Physical Exam    Constitutional: Awake, alert, no acute distress resting in  bed   Eyes: Pupils equal, sclerae anicteric, no conjunctival injection   HENT: NCAT, mucous membranes moist   Neck: Supple, no thyromegaly, no lymphadenopathy, trachea midline   Respiratory: Mostly clear diminished at bases   Cardiovascular: RRR, no murmurs, rubs, or gallops   Gastrointestinal: Positive bowel sounds, soft, nontender, nondistended   Musculoskeletal: Trace bilateral ankle edema, no clubbing or cyanosis to extremities   Psychiatric: Appropriate affect, cooperative   Neurologic: Oriented x 3, strength symmetric in all extremities, Cranial Nerves grossly intact to confrontation, speech clear   Skin: No rashes     Result Review    Result Review:  I have personally reviewed the results from the time of this admission to 10/3/2022 12:49 EDT and agree with these findings:  []  Laboratory  []  Microbiology  []  Radiology  []  EKG/Telemetry   []  Cardiology/Vascular   []  Pathology  []  Old records  []  Other:    Assessment & Plan   Assessment / Plan   Assessment:    · Hyponatremia likely secondary to SIADH  · Adrenal crisis with primary adrenal insufficiency in the setting of metastatic carcinoma involving left adrenal gland  · Sepsis secondary to pneumonia  · Community-acquired pneumonia secondary to Klebsiella  · Question aspiration  · Bilateral pleural effusion  · Pericardial effusion small and without tamponade  · Right lower lobe pulmonary edema subacute versus chronic started on Eliquis  · Metastatic non-small cell lung cancer  · 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  · Acute hypoxic respiratory failure  · Status post bronchoscopy      Plan:    · Continuing IV antibiotics  · Continuing IV Lasix  · Checking modified barium swallow study today  · Continuing oral fluid restriction and urea packets twice daily  · Monitoring renal function sodium levels closely  · Continuing with fludrocortisone and Solu-Cortef previously d/w dr piedra, notes ideally if pt can be on prednisone 10mg or less in  the future would be ideal for not interfering with treatment; fludrocortisone should be ok for patient   · Continuing Eliquis 10 mg twice daily for 7 days then 5 mg twice daily for PE  · Nephrology, pulmonary, cardiology consultations appreciated continuing care per the recommendation  · PT OT speech therapy consultations  · Discussed with RN     Discussed plan with RN.    DVT prophylaxis:  Medical DVT prophylaxis orders are present.    CODE STATUS:   Code Status (Patient has no pulse and is not breathing): CPR (Attempt to Resuscitate)  Medical Interventions (Patient has pulse or is breathing): Full Support        Electronically signed by NOHEMI Leahy, 10/03/22, 12:49 PM EDT.      Attending documentation:  I reviewed the above documentation and independently reviewed and rounded and evaluated the patient and discussed the care plan with AGUSTIN Mike PA-C, I agree with his findings and plan as documented, what I have added to the care plan and modified is as follows in my documentation and my medical decision making; 67-year-old female hospitalized on 9/27/2022 with history of metastatic lung cancer, on Opdivo and Yervoy therapy, hospitalized with hyponatremia, Klebsiella pneumonia, concern for UTI and hypothermia, adrenal insufficiency likely from metastatic cancer to her adrenals, adrenal crisis, septic shock, transferred to the ICU, pressors weaned, found to have PE started on anticoagulation, pericardial effusion and bilateral pleural effusions, cardiology consulted, concern for aspiration, speech therapy evaluation.  Interval follow-up: Patient seen and examined this morning, no acute distress, no acute major night events, tolerated barium swallow study today, reported prior to study she remained weak and debilitated but had improving symptoms overall.  Her serum sodium is better this morning at 134.  Blood sugars are adequately controlled.  Hemoglobin stable at 8.9.  Denies chest pain or palpitations.   Review of systems obtained, all systems reviewed and negative except generalized fatigue, generalized weakness.  On physical exam, vitals reviewed, well-appearing female, no acute distress, regular rate rhythm, diminished breath sounds throughout, full range of motion neck, soft nontender abdomen, trace bilateral lower extremity edema, alert and oriented x3.  Assessment as above, plan is to continue Unasyn until she completes her full course, continued on Eliquis loading dose followed by maintenance dose, nebs, Lasix 20 mg IV twice daily, planning to wean off Solu-Cortef plan to switch to prednisone, continue fludrocortisone, continue urea, transfer to regular floor, a.m. labs, full code, DVT prophylaxis Eliquis, clinical course to dictate further management, discussed with nurse at the bedside.  More than 58% of the time of this patient's encounter was performed by me, this included face-to-face time, planning and coordinating, medical decision making and critical thinking personally done by me.  -YONNY TURPIN  Electronically signed by Jaye Jones MD, 10/03/22, 5:24 PM EDT.  Portions of this documentation were transcribed electronically from a voice recognition software.  I confirm all data accurately represents the service(s) I performed at today's visit.

## 2022-10-03 NOTE — PROGRESS NOTES
[facility]   Hospitalist Progress Note    Date of admission: 9/27/2022  Patient Name: Linda Ortiz  1955  Date: 10/2/2022      Subjective     Chief Complaint   Patient presents with   • Weakness - Generalized   • Numbness   • Chills       Summary: 67 y.o. female with metastatic lung cancer (prior radiation to right hand 6/21/2022) and mets to left adrenal with left adrenal mass biopsy 5/2022 with finding of poorly differentiated carcinoma) that follows with Dr. Angela is on therapy with Opdivo and Yervoy but appears to be in initiated July of this year.  Patient presented with weakness, fevers, chills and concerned that she is barely able to get up from the bathroom at home.  Found to have hyponatremia to 119 on admission as well klebsiella pneumonia, possible UTI and hypothermia. Additional testing found to have adrenal insufficiency (suspect primary from metastatic disease to adrenals) with adrenal crisis and shock/sepsis from pna with worsening hypotension requiring transfer to icu and levophed.  CT PE found to have PE, pericardial effusion and bilateral pleural effusions. Cardiology assisting.    9/30 bronchoscopy  Vocal cord adducted, partial abduction abnormality on the left side  Salivary secretions and slimy secretions in trachea and bilateral bronchial tubes, suctioned out in several attempts  Friable mucosa, easy bleeding with suction  Scattered purulent secretions     Interval Followup: resp status improving.  Feels 'gross' that she hasnt showered.  Denies any bleeding.  Dry cough.  No dizziness.     Review of Systems  No chest pain or palpitations  No fevers or chills    Objective     Vitals:   Temp:  [97.5 °F (36.4 °C)-98.1 °F (36.7 °C)] 97.7 °F (36.5 °C)  Heart Rate:  [] 82  Resp:  [18] 18  BP: (108-123)/(61-81) 108/61  Flow (L/min):  [1] 1    Physical Exam  Gen: awake, resting in bed, conversant  HENT: NCAT, mmm  Chest port without erythema   Resp: improving aeration in lung bases, no  acute wheezing, ronchi, crackles, 1l nc  CV: RRR, trace LE pitting edema improving  GI: Abdomen soft, NT, ND, no guarding, +BS  Psych: Fair mood and affect, aox3  Skin: warm, dry    Result Review:  Vital signs, labs and recent relevant imaging reviewed.      ampicillin-sulbactam, 3 g, Intravenous, Q6H  apixaban, 10 mg, Oral, BID   Followed by  [START ON 10/9/2022] apixaban, 5 mg, Oral, BID  arformoterol, 15 mcg, Nebulization, BID - RT  budesonide, 0.5 mg, Nebulization, BID - RT  famotidine, 20 mg, Oral, BID AC  fludrocortisone, 50 mcg, Oral, Daily  furosemide, 20 mg, Intravenous, Q12H  guaiFENesin, 1,200 mg, Oral, Q12H  Hydrocortisone Sod Suc (PF), 50 mg, Intravenous, Q8H  [START ON 10/3/2022] Hydrocortisone Sod Suc (PF), 50 mg, Intravenous, Q12H  insulin lispro, 0-9 Units, Subcutaneous, TID AC  saccharomyces boulardii, 250 mg, Oral, BID  sodium chloride, 10 mL, Intravenous, Q12H  Urea, 15 g, Oral, BID        •  acetaminophen **OR** acetaminophen **OR** acetaminophen  •  albuterol  •  dextrose  •  dextrose  •  glucagon (human recombinant)  •  ipratropium-albuterol  •  nitroglycerin  •  ondansetron  •  sodium chloride  •  sodium chloride      Component   Ref Range & Units 2 d ago   (9/29/22) 12 d ago   (9/19/22) 1 mo ago   (8/29/22) 1 mo ago   (8/9/22) 2 mo ago   (7/19/22)   ACTH   7.2 - 63.3 pg/mL 5.7 Low   47.0 CM  62.8 CM  48.1 CM  50.9 CM          Assessment / Plan     Assessment/Plan:  Hyponatremia hypovolemic/borderline euvolemic with question SIADH in addition to low solute intake as well as medication side effect from her chemotherapy and primary adrenal insufficiency  Adrenal crisis with primary adrenal insufficiency in setting of metastatic carcinoma involving left adrenal gland but also low acth   Sepsis with shock requiring levophed, multifactorial including bacterial pneumonia  CAP, question aspiration, secondary to Klebsiella aerogenes   Vocal cord adducted, partial abduction abnormality on the left  side  Question UTI  Acute Hypoxic respiratory failure requiring 4L nc s/p bronchoscopy 9/30  Metastatic non-small cell lung cancer on outpatient treatment with Opdivo and Yervoy  Pericardial effusion  Bilateral Pleural Effusions  RLL pulmonary emboli (question subacute vs chronic and reportedly seen 9/16 as well)   Anemia, acute on chronic  Hx of radiation to right hand 6/21/2022  Hx of left adrenal mass biopsy 5/2022 with finding of poorly differentiated carcinoma    Telemetry: nsr   *dc tele    -sodium continues to improve, 134 today, liberalizing fluid restriction, cont urea, apprec nephro assistance  -tapering down HC/steroids, monitor bp closely, cont fludrocortistone; acth was 5.7, cortisol stim test without notable response to stimulus     *have d/w dr piedra, notes ideally if pt can be on prednisone 10mg or less in the future would be ideal for not interfering with treatment; fludrocortisone should be ok for patient   -cont unasyn for klebsiella pna, florastor, wbc and resp status improving; wean down oxygen  -iv lasix  -hb decreased, repeat in evening showing improvement, fluctuating some, b12/fol reasonable, appears to have chronic disease component     *10/2 apixaban 7d 10mg bid then 5mg bid for pe's, monitor hb closely   -MBS and speech eval; dysphagia diet    *testing pending Monday now, radiology unable to do today, npo after midnight   -cards consult for 2.2cm thick pericardial effusion seen on ct chest, 2d echo shows minimal pericardial effusion no tamponade, suspect cancer/chemo related  -monitor electrolytes  -nebs, oxygen, guaifenasin/resp clearance measures   - PT OT    Pt wants to go home when stable for discharge.  dispo pending stability in labs, blood pressure, steroid taper and mbs eval potentially 2-3d      DVT prophylaxis:  Medical DVT prophylaxis orders are present.    Code Status (Patient has no pulse and is not breathing): CPR (Attempt to Resuscitate)  Medical Interventions (Patient has  pulse or is breathing): Full Support        CBC    CBC 9/30/22 10/1/22 10/2/22 10/2/22      0525 1735   WBC 15.04 (A) 15.86 (A) 11.90 (A)    RBC 3.33 (A) 2.72 (A) 2.51 (A)    Hemoglobin 10.8 (A) 8.9 (A) 8.3 (A) 9.0 (A)   Hematocrit 30.2 (A) 24.3 (A) 23.5 (A) 26.1 (A)   MCV 90.7 89.3 93.6    MCH 32.4 32.7 33.1 (A)    MCHC 35.8 (A) 36.6 (A) 35.3    RDW 14.5 14.6 15.5 (A)    Platelets 390 294 310    (A) Abnormal value              CMP    CMP 9/30/22 9/30/22 9/30/22 10/1/22 10/1/22 10/1/22 10/2/22    0333 1627 2128 0003 0402 0402    Glucose 264 (A) 146 (A)   268 (A)  131 (A)   BUN 8 22   16  13   Creatinine 0.78 0.70   0.56 (A)  0.51 (A)   Sodium 120 (A) 127 (A) 127 (A) 128 (A) 129 (A) 129 (A) 134 (A)   Potassium 4.1 4.1   3.3 (A)  4.2   Chloride 92 (A) 95 (A)   93 (A)  98   Calcium 7.7 (A) 7.9 (A)   7.7 (A)  7.4 (A)   Albumin 1.80 (A)    1.90 (A)     Total Bilirubin     0.3     Alkaline Phosphatase     76     AST (SGOT)     44 (A)     ALT (SGPT)     16     (A) Abnormal value

## 2022-10-03 NOTE — MBS/VFSS/FEES
Acute Care - Speech Language Pathology   Swallow Modified Barium Swallow  Swetha     Patient Name: Linda Ortiz  : 1955  MRN: [id number]  Today's Date: 10/3/2022               Admit Date: 2022    Visit Dx:     ICD-10-CM ICD-9-CM   1. Weakness  R53.1 780.79   2. Hyponatremia  E87.1 276.1   3. Sepsis, due to unspecified organism, unspecified whether acute organ dysfunction present (HCC)  A41.9 038.9     995.91   4. Decreased activities of daily living (ADL)  Z78.9 V49.89   5. Difficulty walking  R26.2 719.7   6. Malignant neoplasm of upper lobe of left lung (HCC)  C34.12 162.3   7. Dysphagia, oropharyngeal  R13.12 787.22     Patient Active Problem List   Diagnosis   • Arthritis of right hand   • Localized edema   • Mass of right hand   • Malignant neoplasm of upper lobe of left lung (HCC)   • Loss of appetite   • Right arm pain   • Painful urination   • Encounter for adjustment or management of vascular access device   • Productive cough   • Hypocalcemia   • Anemia   • Weakness   • Sepsis (HCC)     Past Medical History:   Diagnosis Date   • Bone cancer (HCC) 2022   • Hypocalcemia 2022   • Malignant neoplasm of upper lobe of left lung (HCC) 2022   • Metastatic cancer (HCC)      Past Surgical History:   Procedure Laterality Date   • BRONCHOSCOPY N/A 2022    Procedure: BRONCHOSCOPY WITH BAL, WASHINGS;  Surgeon: Fausto Leon MD;  Location: [facility] ENDOSCOPY;  Service: Pulmonary;  Laterality: N/A;  PNEUMONIA, PERSISTANT COUGH   • HIP SURGERY Bilateral    • KNEE ARTHROSCOPY     • VENOUS ACCESS DEVICE (PORT) INSERTION N/A 7/15/2022    Procedure: INSERTION VENOUS ACCESS DEVICE;  Surgeon: Eber Hull MD;  Location: [facility] OR [facility];  Service: General;  Laterality: N/A;     PERTINENT INFORMATION:  This patient is a 67year old female referred for modified barium swallow due to concern for aspiration with paralyzed vocal cord noted on bronchoscopy..    Patient was referred for an MBSS by   Carolyn to rule out aspiration as well as to determine appropriate treatment plan for this patient.      PROCEDURE:    Patient was alert and cooperative.  The patient was viewed in lateral projection.  The following Ba consistencies were administered: Thin liquids from spoon cup and straw, purée consistencies, nectar thick liquids and regular solids.  The following compensatory swallowing strategies were performed: N/A      RESULTS:    1.  Oral stage is characterized by good bolus preparation and control.  Timely oral transit.  Pharyngeal phase is timely with good laryngeal elevation, base of tongue retraction and epiglottic retroflexion.  No penetration or aspiration noted with any trial presented.  No pharyngeal residue noted after the swallow.      IMPRESSIONS:    Patient demonstrated functional oropharyngeal swallow characterized by no penetration or aspiration noted.     FUNCTIONAL DEFICIT: Patient scored level 7 of 7 on Functional Communication Measures for swallowing indicating a 0% limitation in function for current status, goal status, and discharge status.      RECOMMENDATIONS:   1.  Regular diet-thin liquids  2.  General swallow precautions      YES: Patient/responsible party agrees with the plan of care and has been informed of all alternatives, risks and benefits.    Thank you for this referral.        EDUCATION  The patient has been educated in the following areas:   Communication Impairment.       Ankita Alvarado, SLP  10/3/2022

## 2022-10-04 ENCOUNTER — TELEPHONE (OUTPATIENT)
Dept: ONCOLOGY | Facility: HOSPITAL | Age: 67
End: 2022-10-04

## 2022-10-04 LAB
ALBUMIN SERPL-MCNC: 2.2 G/DL (ref 3.5–5.2)
ALP SERPL-CCNC: 56 U/L (ref 39–117)
ALT SERPL W P-5'-P-CCNC: 17 U/L (ref 1–33)
ANION GAP SERPL CALCULATED.3IONS-SCNC: 7.1 MMOL/L (ref 5–15)
AST SERPL-CCNC: 39 U/L (ref 1–32)
BASOPHILS # BLD AUTO: 0.01 10*3/MM3 (ref 0–0.2)
BASOPHILS NFR BLD AUTO: 0.1 % (ref 0–1.5)
BILIRUB CONJ SERPL-MCNC: <0.2 MG/DL (ref 0–0.3)
BILIRUB INDIRECT SERPL-MCNC: ABNORMAL MG/DL
BILIRUB SERPL-MCNC: 0.2 MG/DL (ref 0–1.2)
BUN SERPL-MCNC: 16 MG/DL (ref 8–23)
BUN/CREAT SERPL: 28.1 (ref 7–25)
CALCIUM SPEC-SCNC: 7.3 MG/DL (ref 8.6–10.5)
CHLORIDE SERPL-SCNC: 97 MMOL/L (ref 98–107)
CO2 SERPL-SCNC: 29.9 MMOL/L (ref 22–29)
CREAT SERPL-MCNC: 0.57 MG/DL (ref 0.57–1)
CYTO UR: NORMAL
DEPRECATED RDW RBC AUTO: 52.9 FL (ref 37–54)
EGFRCR SERPLBLD CKD-EPI 2021: 100.4 ML/MIN/1.73
EOSINOPHIL # BLD AUTO: 0 10*3/MM3 (ref 0–0.4)
EOSINOPHIL NFR BLD AUTO: 0 % (ref 0.3–6.2)
ERYTHROCYTE [DISTWIDTH] IN BLOOD BY AUTOMATED COUNT: 15.1 % (ref 12.3–15.4)
GLUCOSE BLDC GLUCOMTR-MCNC: 105 MG/DL (ref 70–99)
GLUCOSE BLDC GLUCOMTR-MCNC: 112 MG/DL (ref 70–99)
GLUCOSE BLDC GLUCOMTR-MCNC: 152 MG/DL (ref 70–99)
GLUCOSE SERPL-MCNC: 110 MG/DL (ref 65–99)
HCT VFR BLD AUTO: 26 % (ref 34–46.6)
HGB BLD-MCNC: 8.7 G/DL (ref 12–15.9)
IMM GRANULOCYTES # BLD AUTO: 0.12 10*3/MM3 (ref 0–0.05)
IMM GRANULOCYTES NFR BLD AUTO: 1.3 % (ref 0–0.5)
LAB AP CASE REPORT: NORMAL
LAB AP CLINICAL INFORMATION: NORMAL
LAB AP DIAGNOSIS COMMENT: NORMAL
LAB AP SPECIAL STAINS: NORMAL
LYMPHOCYTES # BLD AUTO: 1.46 10*3/MM3 (ref 0.7–3.1)
LYMPHOCYTES NFR BLD AUTO: 16.2 % (ref 19.6–45.3)
MAGNESIUM SERPL-MCNC: 1.5 MG/DL (ref 1.6–2.4)
MCH RBC QN AUTO: 32 PG (ref 26.6–33)
MCHC RBC AUTO-ENTMCNC: 33.5 G/DL (ref 31.5–35.7)
MCV RBC AUTO: 95.6 FL (ref 79–97)
MONOCYTES # BLD AUTO: 0.66 10*3/MM3 (ref 0.1–0.9)
MONOCYTES NFR BLD AUTO: 7.3 % (ref 5–12)
NEUTROPHILS NFR BLD AUTO: 6.77 10*3/MM3 (ref 1.7–7)
NEUTROPHILS NFR BLD AUTO: 75.1 % (ref 42.7–76)
NRBC BLD AUTO-RTO: 0.2 /100 WBC (ref 0–0.2)
NT-PROBNP SERPL-MCNC: ABNORMAL PG/ML (ref 0–900)
PATH REPORT.FINAL DX SPEC: NORMAL
PATH REPORT.GROSS SPEC: NORMAL
PHOSPHATE SERPL-MCNC: 2.6 MG/DL (ref 2.5–4.5)
PLATELET # BLD AUTO: 389 10*3/MM3 (ref 140–450)
PMV BLD AUTO: 9 FL (ref 6–12)
POTASSIUM SERPL-SCNC: 3.3 MMOL/L (ref 3.5–5.2)
PROT SERPL-MCNC: 6.7 G/DL (ref 6–8.5)
RBC # BLD AUTO: 2.72 10*6/MM3 (ref 3.77–5.28)
SODIUM SERPL-SCNC: 134 MMOL/L (ref 136–145)
STAT OF ADQ CVX/VAG CYTO-IMP: NORMAL
WBC NRBC COR # BLD: 9.02 10*3/MM3 (ref 3.4–10.8)

## 2022-10-04 PROCEDURE — 84100 ASSAY OF PHOSPHORUS: CPT | Performed by: FAMILY MEDICINE

## 2022-10-04 PROCEDURE — 82962 GLUCOSE BLOOD TEST: CPT

## 2022-10-04 PROCEDURE — 25010000002 AMPICILLIN-SULBACTAM PER 1.5 G: Performed by: FAMILY MEDICINE

## 2022-10-04 PROCEDURE — 94799 UNLISTED PULMONARY SVC/PX: CPT

## 2022-10-04 PROCEDURE — 83735 ASSAY OF MAGNESIUM: CPT | Performed by: FAMILY MEDICINE

## 2022-10-04 PROCEDURE — 99233 SBSQ HOSP IP/OBS HIGH 50: CPT | Performed by: INTERNAL MEDICINE

## 2022-10-04 PROCEDURE — 25010000002 FUROSEMIDE PER 20 MG: Performed by: FAMILY MEDICINE

## 2022-10-04 PROCEDURE — 85025 COMPLETE CBC W/AUTO DIFF WBC: CPT | Performed by: FAMILY MEDICINE

## 2022-10-04 PROCEDURE — 25010000002 HYDROCORTISONE SOD SUC (PF) 100 MG RECONSTITUTED SOLUTION: Performed by: FAMILY MEDICINE

## 2022-10-04 PROCEDURE — 83880 ASSAY OF NATRIURETIC PEPTIDE: CPT | Performed by: PHYSICIAN ASSISTANT

## 2022-10-04 PROCEDURE — 0 MAGNESIUM SULFATE 4 GM/100ML SOLUTION: Performed by: FAMILY MEDICINE

## 2022-10-04 PROCEDURE — 80048 BASIC METABOLIC PNL TOTAL CA: CPT | Performed by: FAMILY MEDICINE

## 2022-10-04 PROCEDURE — 80076 HEPATIC FUNCTION PANEL: CPT | Performed by: FAMILY MEDICINE

## 2022-10-04 PROCEDURE — 25010000002 HYDROCORTISONE SOD SUC (PF) 100 MG RECONSTITUTED SOLUTION: Performed by: PHYSICIAN ASSISTANT

## 2022-10-04 PROCEDURE — 63710000001 INSULIN LISPRO (HUMAN) PER 5 UNITS: Performed by: FAMILY MEDICINE

## 2022-10-04 PROCEDURE — 99232 SBSQ HOSP IP/OBS MODERATE 35: CPT | Performed by: FAMILY MEDICINE

## 2022-10-04 PROCEDURE — 0 POTASSIUM CHLORIDE 10 MEQ/100ML SOLUTION: Performed by: FAMILY MEDICINE

## 2022-10-04 RX ORDER — MAGNESIUM SULFATE HEPTAHYDRATE 40 MG/ML
4 INJECTION, SOLUTION INTRAVENOUS ONCE
Status: COMPLETED | OUTPATIENT
Start: 2022-10-04 | End: 2022-10-05

## 2022-10-04 RX ORDER — CALCIUM CARBONATE 200(500)MG
1 TABLET,CHEWABLE ORAL 3 TIMES DAILY PRN
Status: DISCONTINUED | OUTPATIENT
Start: 2022-10-04 | End: 2022-10-05 | Stop reason: HOSPADM

## 2022-10-04 RX ORDER — POTASSIUM CHLORIDE 7.45 MG/ML
10 INJECTION INTRAVENOUS
Status: COMPLETED | OUTPATIENT
Start: 2022-10-04 | End: 2022-10-05

## 2022-10-04 RX ORDER — PREDNISONE 10 MG/1
10 TABLET ORAL
Status: DISCONTINUED | OUTPATIENT
Start: 2022-10-05 | End: 2022-10-05 | Stop reason: HOSPADM

## 2022-10-04 RX ADMIN — AMPICILLIN SODIUM AND SULBACTAM SODIUM 3 G: 2; 1 INJECTION, POWDER, FOR SOLUTION INTRAMUSCULAR; INTRAVENOUS at 10:33

## 2022-10-04 RX ADMIN — ARFORMOTEROL TARTRATE 15 MCG: 15 SOLUTION RESPIRATORY (INHALATION) at 19:25

## 2022-10-04 RX ADMIN — FUROSEMIDE 20 MG: 10 INJECTION, SOLUTION INTRAMUSCULAR; INTRAVENOUS at 05:20

## 2022-10-04 RX ADMIN — APIXABAN 10 MG: 5 TABLET, FILM COATED ORAL at 08:16

## 2022-10-04 RX ADMIN — GUAIFENESIN 1200 MG: 600 TABLET ORAL at 21:09

## 2022-10-04 RX ADMIN — AMPICILLIN SODIUM AND SULBACTAM SODIUM 3 G: 2; 1 INJECTION, POWDER, FOR SOLUTION INTRAMUSCULAR; INTRAVENOUS at 05:20

## 2022-10-04 RX ADMIN — FLUDROCORTISONE ACETATE 50 MCG: 0.1 TABLET ORAL at 08:16

## 2022-10-04 RX ADMIN — CALCIUM CARBONATE 1 TABLET: 500 TABLET, CHEWABLE ORAL at 17:03

## 2022-10-04 RX ADMIN — INSULIN LISPRO 2 UNITS: 100 INJECTION, SOLUTION INTRAVENOUS; SUBCUTANEOUS at 17:52

## 2022-10-04 RX ADMIN — Medication 250 MG: at 08:16

## 2022-10-04 RX ADMIN — ALBUTEROL SULFATE 2.5 MG: 2.5 SOLUTION RESPIRATORY (INHALATION) at 22:33

## 2022-10-04 RX ADMIN — BUDESONIDE 0.5 MG: 0.5 SUSPENSION RESPIRATORY (INHALATION) at 08:39

## 2022-10-04 RX ADMIN — HYDROCORTISONE SODIUM SUCCINATE 50 MG: 100 INJECTION, POWDER, FOR SOLUTION INTRAMUSCULAR; INTRAVENOUS at 08:15

## 2022-10-04 RX ADMIN — ACETAMINOPHEN 650 MG: 325 TABLET ORAL at 21:09

## 2022-10-04 RX ADMIN — BUDESONIDE 0.5 MG: 0.5 SUSPENSION RESPIRATORY (INHALATION) at 19:25

## 2022-10-04 RX ADMIN — MAGNESIUM SULFATE HEPTAHYDRATE 4 G: 4 INJECTION, SOLUTION INTRAVENOUS at 08:54

## 2022-10-04 RX ADMIN — POTASSIUM CHLORIDE 40 MEQ: 1.5 POWDER, FOR SOLUTION ORAL at 08:16

## 2022-10-04 RX ADMIN — CALCIUM CARBONATE 1 TABLET: 500 TABLET, CHEWABLE ORAL at 22:09

## 2022-10-04 RX ADMIN — FAMOTIDINE 20 MG: 20 TABLET ORAL at 17:03

## 2022-10-04 RX ADMIN — Medication 250 MG: at 21:09

## 2022-10-04 RX ADMIN — FAMOTIDINE 20 MG: 20 TABLET ORAL at 08:16

## 2022-10-04 RX ADMIN — POTASSIUM CHLORIDE 10 MEQ: 7.46 INJECTION, SOLUTION INTRAVENOUS at 08:54

## 2022-10-04 RX ADMIN — GUAIFENESIN 1200 MG: 600 TABLET ORAL at 08:16

## 2022-10-04 RX ADMIN — ACETAMINOPHEN 650 MG: 325 TABLET ORAL at 01:20

## 2022-10-04 RX ADMIN — Medication 10 ML: at 08:16

## 2022-10-04 RX ADMIN — ARFORMOTEROL TARTRATE 15 MCG: 15 SOLUTION RESPIRATORY (INHALATION) at 08:38

## 2022-10-04 RX ADMIN — HYDROCORTISONE SODIUM SUCCINATE 50 MG: 100 INJECTION, POWDER, FOR SOLUTION INTRAMUSCULAR; INTRAVENOUS at 21:09

## 2022-10-04 RX ADMIN — APIXABAN 10 MG: 5 TABLET, FILM COATED ORAL at 21:08

## 2022-10-04 RX ADMIN — POTASSIUM CHLORIDE 40 MEQ: 1.5 POWDER, FOR SOLUTION ORAL at 21:08

## 2022-10-04 RX ADMIN — Medication 10 ML: at 21:09

## 2022-10-04 NOTE — PROGRESS NOTES
[facility]     Nephrology Progress Note      Patient Name: Linda Ortiz  : 1955  MRN: [id number]  Primary Care Physician:  Provider, No Known  Date of admission: 2022    Subjective   Subjective     Interval History:  Patient Reports feeling a lot better.  No new complaints.    Tolerated p.o.    Review of Systems   All systems were reviewed and negative except for: What is mentioned above    Objective   Objective     Vitals:   Temp:  [97.5 °F (36.4 °C)-98.3 °F (36.8 °C)] 97.5 °F (36.4 °C)  Heart Rate:  [70-96] 96  Resp:  [18-20] 18  BP: (115-135)/(47-93) 118/86  Physical Exam:     General Appearance:  Comfortable, conversant and pleasant, hyper.    HEENT: Normal HEENT exam.    Lungs:  Normal effort and normal respiratory rate.  There are decreased breath sounds, wheezes and rhonchi.    Heart: Normal rate.  Regular rhythm.    Abdomen: Abdomen is soft.  Bowel sounds are normal.   There is no abdominal tenderness.  Extremities: Normal range of motion.    Pulses: Distal pulses are intact.    Neurological: Patient is alert and oriented to person, place and time.    Pupils:  Pupils are equal, round, and reactive to light.    Skin:  Warm and dry.      Result Review    Result Reviewed:  I have personally reviewed the results from the time of this admission to 10/4/2022 10:33 EDT and agree with these findings:  [x]  Laboratory  []  Microbiology  [x]  Radiology  []  EKG/Telemetry   []  Cardiology/Vascular   []  Pathology  []  Old records  []  Other:  Lab Results   Component Value Date    GLUCOSE 110 (H) 10/04/2022    CALCIUM 7.3 (L) 10/04/2022     (L) 10/04/2022    K 3.3 (L) 10/04/2022    CO2 29.9 (H) 10/04/2022    CL 97 (L) 10/04/2022    BUN 16 10/04/2022    CREATININE 0.57 10/04/2022    BCR 28.1 (H) 10/04/2022    ANIONGAP 7.1 10/04/2022     Lab Results   Component Value Date    CALCIUM 7.3 (L) 10/04/2022    PHOS 2.6 10/04/2022      Results from last 7 days   Lab Units 10/04/22  0521   MAGNESIUM mg/dL  1.5*            Assessment & Plan   Assessment / Plan       Active Hospital Problems:  Active Hospital Problems    Diagnosis    • Weakness    • Sepsis (HCC)      Added automatically from request for surgery [id number]     • Malignant neoplasm of upper lobe of left lung (HCC)        Assessment and Plan:    - Euvolemic/hypovolemic hyponatremia 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 intake and profound adrenal insufficiency.  ACTH stim test was positive.  Currently on hydrocortisone and fludrocortisone. Thyroid function okay.  Sodium improved.  Continue  fluid restriction 1800 cc a day.  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  Was on immunotherapy prior to admission.  She has poor nutritional status and hypoalbuminemia.  - Previous history of alcohol use.  - Metabolic acidosis, resolved.  -- Hypokalemia: Replaced this morning.    -- Hypophosphatemia: Improved, check in a.m.  --Adrenal insufficiency-  On fludrocortisone/hydrocortisone now.

## 2022-10-04 NOTE — PROGRESS NOTES
Pulmonary / Critical Care  Progress Note      Patient Name: Linda Ortiz  : 1955  MRN: [id number]  Primary Care Physician:  Provider, No Known  Date of admission: 2022  ICU: 2d 1h      Subjective   Subjective        Reason for consult:  Shock secondary to Klebsiella Pneumonia and Adrenal insufficiency, metastatic none small cell lung cancer        Over the last 24 hours....  The has been stable on the floor greater than 48 hours since ICU discharge  Has been hemodynamically stable   Continues on Unasyn for Klebsiella Pneumonia      No acute events overnight     This morning....  Patient asking to go home, reports she is much better   Requesting a bath however she is probably not safe at home.  Stable on room air.    Mentation improving       ROS  General: Denied complaints  HEENT: Denied complaints  Respiratory: Denied complaints  Cardiovascular: Denied complaints  GI: Denied complaints  MSK: Denied complaints  Neurologic: Denied complaints  Skin: Denied complaints    Objective   Objective     Vitals:   Temp:  [97.5 °F (36.4 °C)-98.3 °F (36.8 °C)] 97.9 °F (36.6 °C)  Heart Rate:  [70-97] 97  Resp:  [18] 18  BP: (107-129)/(47-90) 113/82    Physical Exam   Vital Signs Reviewed   Frail looking, pale female, with improved mental status, slightly improved cough strength  HEENT:  PERRL, EOMI.  OP, nares clear, no sinus tenderness  Neck:  Supple, no JVD, no thyromegaly  Lymph: no axillary, cervical, supraclavicular lymphadenopathy noted bilaterally  Chest:   Has poor cough strength, diminished breath sounds bilaterally, scattered rhonchi, no wheezing or crackles, resonant to percussion bilaterally  CV: Tachycardic, hypertensive, no MGR, pulses are feeble in extremities and  Abd:  Soft, NT, ND, + BS, no HSM  EXT:  no clubbing, no cyanosis, no edema, no joint tenderness  Neuro:    Weak, bedridden, cranial nerves II through XII are intact  Skin: No rashes or lesions noted      Result Review    Result Review:  I  have personally reviewed the results from the time of this admission to 10/4/2022 15:15 EDT and agree with these findings:  [x]  Laboratory  [x]  Microbiology  [x]  Radiology  []  EKG/Telemetry   []  Cardiology/Vascular   []  Pathology  []  Old records  []  Other:  Most notable findings include: CT chest 9/30 new pericardial effusion measuring 2.2 centimeters compared to 9/16 exam and new bilateral pleural effions measuring 4.5 cm on right and 4.8 on the left, CO2 30.4,   LAB RESULTS:      Lab 10/04/22  0521 10/03/22  0605 10/02/22  1735 10/02/22  0525 10/01/22  0402 09/30/22  1627 09/30/22  0333 09/29/22  1902 09/29/22  0757 09/28/22  0443 09/27/22  1814   WBC 9.02 9.99  --  11.90* 15.86*  --  15.04*  --   --    < >  --    HEMOGLOBIN 8.7* 8.9* 9.0* 8.3* 8.9*  --  10.8*  --   --    < >  --    HEMATOCRIT 26.0* 25.8* 26.1* 23.5* 24.3*  --  30.2*  --   --    < >  --    PLATELETS 389 357  --  310 294  --  390  --   --    < >  --    NEUTROS ABS 6.77 8.11*  --  10.36* 13.93*  --  13.03*  --   --    < >  --    IMMATURE GRANS (ABS) 0.12* 0.15*  --  0.11* 0.14*  --  0.11*  --   --    < >  --    LYMPHS ABS 1.46 1.15  --  0.88 0.93  --  1.05  --   --    < >  --    MONOS ABS 0.66 0.56  --  0.52 0.85  --  0.79  --   --    < >  --    EOS ABS 0.00 0.01  --  0.02 0.00  --  0.03  --   --    < >  --    MCV 95.6 95.9  --  93.6 89.3  --  90.7  --   --    < >  --    CRP  --   --   --   --   --   --  18.45*  --   --   --   --    PROCALCITONIN  --   --   --   --   --   --  2.98*  --  0.21  --   --    LACTATE  --   --   --   --   --  3.0*  --   --   --   --  1.7   LACTATE, ARTERIAL  --   --   --   --   --   --   --  1.24  --   --   --    D DIMER QUANT  --   --   --   --   --   --  4.05*  --   --   --   --     < > = values in this interval not displayed.         Lab 10/04/22  0521 10/03/22  0605 10/02/22  0525 10/01/22  0402 10/01/22  0003 09/30/22 2128 09/30/22  1627 09/30/22  0333 09/29/22  2132 09/29/22  1902 09/28/22  0146  09/27/22  1814   SODIUM 134* 134* 134* 129*  129* 128*   < > 127* 120*   < >  --    < > 119*   SODIUM, ARTERIAL  --   --   --   --   --   --   --   --   --  117.4*  --   --    POTASSIUM 3.3* 3.6 4.2 3.3*  --   --  4.1 4.1  --   --    < > 4.3   CHLORIDE 97* 97* 98 93*  --   --  95* 92*  --   --    < > 85*   CO2 29.9* 31.4* 30.4* 28.3  --   --  23.4 16.8*  --   --    < > 23.6   ANION GAP 7.1 5.6 5.6 7.7  --   --  8.6 11.2  --   --    < > 10.4   BUN 16 23 13 16  --   --  22 8  --   --    < > 13   CREATININE 0.57 0.59 0.51* 0.56*  --   --  0.70 0.78  --   --    < > 0.72   EGFR 100.4 99.5 103.1 100.8  --   --  95.5 83.9  --   --    < > 92.3   GLUCOSE 110* 121* 131* 268*  --   --  146* 264*  --   --    < > 69   GLUCOSE, ARTERIAL  --   --   --   --   --   --   --   --   --  147*  --   --    CALCIUM 7.3* 7.8* 7.4* 7.7*  --   --  7.9* 7.7*  --   --    < > 8.6   IONIZED CALCIUM  --   --   --   --   --   --   --   --   --  1.07*  --   --    MAGNESIUM 1.5* 1.8 2.1 2.0  --   --   --  3.5*  --   --    < >  --    PHOSPHORUS 2.6 2.3* 2.5 2.0*  --   --   --  4.2  --   --    < >  --    TSH  --   --   --   --   --   --   --   --   --   --   --  0.593    < > = values in this interval not displayed.         Lab 10/04/22  0521 10/01/22  0402 09/30/22  0333 09/29/22  0757 09/27/22  1814   TOTAL PROTEIN 6.7 6.8  --   --  7.9   ALBUMIN 2.20* 1.90* 1.80* 2.20* 2.20*   GLOBULIN  --  4.9  --   --  5.7   ALT (SGPT) 17 16  --   --  17   AST (SGOT) 39* 44*  --   --  54*   BILIRUBIN 0.2 0.3  --   --  0.5   BILIRUBIN DIRECT <0.2  --   --   --   --    ALK PHOS 56 76  --   --  81         Lab 10/04/22  0521 09/30/22  0333 09/27/22  1814   PROBNP 25,700.0* 3,300.0*  --    TROPONIN T  --   --  <0.010             Lab 10/02/22  0525 10/01/22  0402   IRON  --  46   IRON SATURATION  --  58*   TIBC  --  79*   TRANSFERRIN  --  53*   FERRITIN  --  3,023.00*   FOLATE 11.60  --    VITAMIN B 12 1,092*  --          Lab 09/29/22  1902   PH, ARTERIAL 7.347*   PCO2,  ARTERIAL 31.2*   PO2 ART 77.7*   O2 SATURATION ART 93.7*   FIO2 <21   HCO3 ART 16.7*   BASE EXCESS ART -7.8*   CARBOXYHEMOGLOBIN 0.3     Brief Urine Lab Results  (Last result in the past 365 days)      Color   Clarity   Blood   Leuk Est   Nitrite   Protein   CREAT   Urine HCG        09/29/22 0333 Yellow   Clear   Small (1+)   Small (1+)   Negative   Negative               Microbiology Results (last 10 days)     Procedure Component Value - Date/Time    S. Pneumo Ag Urine or CSF - Urine, Urine, Clean Catch [[id number]]  (Normal) Collected: 09/30/22 1821    Lab Status: Final result Specimen: Urine, Clean Catch Updated: 09/30/22 1906     Strep Pneumo Ag Negative    Legionella Antigen, Urine - Urine, Urine, Clean Catch [[id number]]  (Normal) Collected: 09/30/22 1821    Lab Status: Final result Specimen: Urine, Clean Catch Updated: 09/30/22 1906     LEGIONELLA ANTIGEN, URINE Negative    Respiratory Culture - Wash, Bronchus [[id number]] Collected: 09/30/22 1536    Lab Status: Final result Specimen: Wash from Bronchus Updated: 10/02/22 0931     Respiratory Culture Moderate growth (3+) Normal respiratory reji. No S. aureus or Pseudomonas aeruginosa detected. Final report.     Gram Stain Few (2+) Gram positive cocci    AFB Culture - Lavage, Lung, Lingula [[id number]] Collected: 09/30/22 1533    Lab Status: Preliminary result Specimen: Lavage from Lung, Lingula Updated: 10/03/22 1250     AFB Stain No acid fast bacilli seen on direct smear      No acid fast bacilli seen on concentrated smear    BAL Culture, Quantitative - Lavage, Lung, Lingula [[id number]] Collected: 09/30/22 1533    Lab Status: Final result Specimen: Lavage from Lung, Lingula Updated: 10/02/22 0935     BAL Culture >100,000 CFU/mL Normal respiratory reji. No S. aureus or Pseudomonas aeruginosa detected. Final report.     Gram Stain Few (2+) Gram positive cocci      Rare (1+) Gram positive bacilli    Pneumonia Panel - Lavage, Lung, Lingula [[id number]]  (Abnormal)  Collected: 09/30/22 1533    Lab Status: Final result Specimen: Lavage from Lung, Lingula Updated: 09/30/22 1832     Escherichia coli PCR Not Detected     Acinetobacter calcoaceticus-baumannii complex PCR Not Detected     Enterobacter cloacae PCR Not Detected     Klebsiella oxytoca PCR Not Detected     Klebsiella pneumoniae group PCR Not Detected     Klebsiella aerogenes PCR Detected     Moraxella catarrhalis PCR Not Detected     Proteus species PCR Not Detected     Pseudomonas aeroginosa PCR Not Detected     Serratia marcescens PCR Not Detected     Staphylococcus aureus PCR Not Detected     Streptococcus pyogenes PCR Not Detected     Haemophilus influenzae PCR Not Detected     Streptococcus agalactiae PCR Not Detected     Streptococcus pneumoniae PCR Not Detected     Chlamydophila pneumoniae PCR Not Detected     Legionella pneumophilia PCR Not Detected     Mycoplasma pneumo by PCR Not Detected     ADENOVIRUS, PCR Not Detected     CTX-M Gene Not Detected     IMP Gene Not Detected     KPC Gene Not Detected     mecA/C and MREJ Gene N/A     NDM Gene Not Detected     OXA-48-like Gene Not Detected     VIM Gene Not Detected     Coronavirus Not Detected     Human Metapneumovirus Not Detected     Human Rhinovirus/Enterovirus Not Detected     Influenza A PCR Not Detected     Influenza B PCR Not Detected     RSV, PCR Not Detected     Parainfluenza virus PCR Not Detected    MRSA Screen, PCR (Inpatient) - Swab, Nares [[id number]]  (Normal) Collected: 09/29/22 1807    Lab Status: Final result Specimen: Swab from Nares Updated: 09/29/22 1921     MRSA PCR No MRSA Detected    Narrative:      The negative predictive value of this diagnostic test is high and should only be used to consider de-escalating anti-MRSA therapy. A positive result may indicate colonization with MRSA and must be correlated clinically.    Urine Culture - Urine, Urine, Clean Catch [[id number]]  (Normal) Collected: 09/29/22 7617    Lab Status: Final result  Specimen: Urine, Clean Catch Updated: 09/30/22 0929     Urine Culture No growth    Respiratory Culture - Sputum, Cough [[id number]] Collected: 09/28/22 2113    Lab Status: Final result Specimen: Sputum from Cough Updated: 09/30/22 1201     Respiratory Culture Scant growth (1+) Normal respiratory reji. No S. aureus or Pseudomonas aeruginosa detected. Final report.     Gram Stain Few (2+) WBCs seen      Few (2+) Mucous strands      Few (2+) Gram positive cocci in pairs and chains    Blood Culture - Blood, Arm, Left [[id number]]  (Normal) Collected: 09/27/22 1511    Lab Status: Final result Specimen: Blood from Arm, Left Updated: 10/02/22 1533     Blood Culture No growth at 5 days    Blood Culture - Blood, Arm, Left [[id number]]  (Normal) Collected: 09/27/22 1511    Lab Status: Final result Specimen: Blood from Arm, Left Updated: 10/02/22 1533     Blood Culture No growth at 5 days          FL Video Swallow With Speech Single Contrast    Result Date: 10/3/2022   Please refer to speech pathology report for further details.     SUJATHA COURTNEY MD       Electronically Signed and Approved By: SUJATHA COURTNEY MD on 10/03/2022 at 9:57                 Assessment & Plan   Assessment / Plan     Active Hospital Problems:  Active Hospital Problems    Diagnosis    • Weakness    • Sepsis (HCC)      Added automatically from request for surgery [id number]     • Malignant neoplasm of upper lobe of left lung (HCC)      Sepsis due to Klebsiella Pneumonia and Adrenal Crisis   Acute Hypoxic Respiratory Failure s/p Bronchoscopy   Pericardial Effusion  Bilateral Pleural Effusion  Metastatic Non-small cell lung cancer  Vocal Cord Adducted, 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  Previously on vancomycin and ceftriaxone.  De-escalated to Unasyn doing well   Underwent bronchoscopy 9/30.   Pneumonia panel positive for Klebsiella  CT chest with contrast revealed new pericardial effusion with bilateral pleural effusions, questionable PE   Recommend aggressive diuresis with loop diuretic.  It is important to maintain adequate potassium and chloride levels in order to excrete bicarbonate and avoid contraction alkalemia. Goal K is 5.0  Additionally aldosterone antagonism may be important spironolactone 50 to 100 mg once or twice daily may be required  Am J Kidney Dis. 2022 Oct;80(4):536-551.     Primary service started on Eliquis   Continue stress dose steroids, IV hydrocortisone and fluorinef.   Trend renal panel and electrolytes    Nephrology following.  Appreciate recommendations  Bowel regimen.   consulted to help patient obtain home medications    Reviewed labs, imaging and provider notes.   Discussed with bedside nurse and primary service.     The patient is stable on room air pulmonary will sign off. Please call with questions.       DVT:DVT prophylaxis:  Medical DVT prophylaxis orders are present.  Diet:   Diet Order   Procedures   • Diet Regular; Thin; Daily Fluid Restriction; Other; 1,800     CODE STATUS:   Code Status and Medical Interventions:   Ordered at: 09/27/22 1939     Code Status (Patient has no pulse and is not breathing):    CPR (Attempt to Resuscitate)     Medical Interventions (Patient has pulse or is breathing):    Full Support         35 minutes care time spent on evaluating and managing this patient not including time spent in teaching or procedures.    SUNITHA COLE M.D. [facility] 15:15 EDT 10/04/22

## 2022-10-04 NOTE — PROGRESS NOTES
[facility]   Hospitalist Progress Note  Date: 10/4/2022  Patient Name: Linda Ortiz  : 1955  MRN: [id number]  Date of admission: 2022      Subjective   Subjective     Chief Complaint: Weakness      Summary: 67 y.o. female with metastatic lung cancer (prior radiation to right hand 2022) and mets to left adrenal with left adrenal mass biopsy 2022 with finding of poorly differentiated carcinoma) that follows with Dr. Angela is on therapy with Opdivo and Yervoy but appears to be in initiated July of this year.  Patient presented with weakness, fevers, chills and concerned that she is barely able to get up from the bathroom at home.  Found to have hyponatremia to 119 on admission as well klebsiella pneumonia, possible UTI and hypothermia. Additional testing found to have adrenal insufficiency (suspect primary from metastatic disease to adrenals) with adrenal crisis and shock/sepsis from pna with worsening hypotension requiring transfer to icu and levophed.  CT PE found to have PE, pericardial effusion and bilateral pleural effusions. Cardiology assisting.    Interval Followup: Patient seen and examined resting comfortably in bed denies any shortness of air chest pains or palpitations brain atretic peptide markedly elevated continuing with Lasix 20 mg IV every 12 hours.  Blood pressures are stable will transition from Solu-Cortef to oral prednisone 10 mg daily tomorrow continue with fludrocortisone 50 mcg daily.  Continuing with Eliquis.  Patient does not want to go to rehab she is to return home with the aid of home health with Rollator walker will arrange at time of discharge.  Multiple electrolyte abnormalities noted replacing orally and intravenously    Review of systems:  All systems reviewed negative except the following: Patient complains of generalized weakness fatigue    Objective   Objective     Vitals:   Temp:  [97.5 °F (36.4 °C)-98.3 °F (36.8 °C)] 97.6 °F (36.4 °C)  Heart Rate:   [70-96] 85  Resp:  [18-20] 18  BP: (107-129)/(47-90) 107/78        Physical Exam    Constitutional: Awake, alert, no acute distress resting in bed comfortably   Eyes: Pupils equal, sclerae anicteric, no conjunctival injection   HENT: NCAT, mucous membranes moist   Neck: Supple, no thyromegaly, no lymphadenopathy, trachea midline   Respiratory: Mostly clear diminished at bases   Cardiovascular: RRR, no murmurs, rubs, or gallops some lower extremity edema   Gastrointestinal: Positive bowel sounds, soft, nontender, nondistended   Musculoskeletal: Trace bilateral ankle edema, no clubbing or cyanosis to extremities   Psychiatric: Appropriate affect, cooperative   Neurologic: Oriented x 3, strength symmetric in all extremities, Cranial Nerves grossly intact to confrontation, speech clear   Skin: No rashes     Result Review    Result Review:  I have personally reviewed the results from the time of this admission to 10/4/2022 11:30 EDT and agree with these findings:  []  Laboratory  []  Microbiology  []  Radiology  []  EKG/Telemetry   []  Cardiology/Vascular   []  Pathology  []  Old records  []  Other:    Assessment & Plan   Assessment / Plan   Assessment:    · Hyponatremia likely secondary to SIADH  · Adrenal crisis with primary adrenal insufficiency in the setting of metastatic carcinoma involving left adrenal gland  · Sepsis secondary to pneumonia  · Community-acquired pneumonia secondary to Klebsiella  · Question aspiration  · Bilateral pleural effusion  · Pericardial effusion small and without tamponade  · Right lower lobe pulmonary edema subacute versus chronic started on Eliquis  · Metastatic non-small cell lung cancer  · 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  · Acute hypoxic respiratory failure  · Status post bronchoscopy  · Hypomagnesemia  · Hypokalemia      Plan:    · Multiple electrolyte abnormalities placing both orally and intravenously  · Brain atretic peptide markedly elevated continuing IV  Lasix 20 mg twice daily  · Modified barium swallow study completed no penetration or aspiration noted continuing regular diet with thin liquids  · Continue oral fluid restriction 1800 cc daily urea packets discontinued  · Continuing with fludrocortisone and Solu-Cortef previously d/w dr piedra, notes ideally if pt can be on prednisone 10mg or less in the future would be ideal for not interfering with treatment; fludrocortisone should be ok for patient   · Transition to p.o. prednisone 10 mg daily starting tomorrow  · Continuing Eliquis 10 mg twice daily for 7 days then 5 mg twice daily for PE  · Nephrology, pulmonary, cardiology consultations appreciated continuing care per the recommendation  · PT OT speech therapy consultations  · Patient does not wish to discharge to rehab facility prefers to discharge home with home health and rolling walker will arrange at time of discharge  aware  · Discussed with RN     Discussed plan with RN.    DVT prophylaxis:  Medical DVT prophylaxis orders are present.    CODE STATUS:   Code Status (Patient has no pulse and is not breathing): CPR (Attempt to Resuscitate)  Medical Interventions (Patient has pulse or is breathing): Full Support      Electronically signed by NOHEMI Leahy, 10/04/22, 11:34 AM EDT.      Attending documentation:  I reviewed the above documentation and independently reviewed and rounded and evaluated the patient and discussed the care plan with AGUSTIN Mike PA-C, I agree with his findings and plan as documented, what I have added to the care plan and modified is as follows in my documentation and my medical decision making; 67-year-old female hospitalized on 9/27/2022 with history of metastatic lung cancer, on Opdivo and Yervoy therapy, hospitalized with hyponatremia, Klebsiella pneumonia, concern for UTI and hypothermia, adrenal insufficiency likely from metastatic cancer to her adrenals, adrenal crisis, septic shock, transferred to the  ICU, pressors weaned, found to have PE started on anticoagulation, pericardial effusion and bilateral pleural effusions, cardiology consulted, concern for aspiration, speech therapy evaluation, diet adjusted after modified barium swallow.  Interval follow-up: Patient seen and examined this morning, no acute distress, no acute major night events, doing better remains weak and debilitated, declines rehab, wants to go home to take care of her animal, BNP elevated 25,000 though does not appear to be volume overload, low potassium and magnesium, both ordered for replacement. Review of systems obtained, all systems reviewed and negative except generalized fatigue, generalized weakness, anxiety.  On physical exam, vitals reviewed, well-appearing female, no acute distress, regular rate rhythm, diminished breath sounds throughout, full range of motion neck, soft nontender abdomen, no bilateral lower extremity edema, alert and oriented x3.  Assessment as above, plan is to continue Unasyn until she completes her full course, continued on Eliquis loading dose followed by maintenance dose, nebs, Lasix 20 mg IV twice daily, replacement potassium and magnesium, switched to prednisone 10 mg daily, continue fludrocortisone, discontinued urea, a.m. labs, full code, DVT prophylaxis Eliquis, clinical course to dictate further management, discussed with nurse at the bedside.  More than 55% of the time of this patient's encounter was performed by me, this included face-to-face time, planning and coordinating, medical decision making and critical thinking personally done by me.  -YONNY TURPIN  Electronically signed by Jaye Jones MD, 10/04/22, 4:26 PM EDT.  Portions of this documentation were transcribed electronically from a voice recognition software.  I confirm all data accurately represents the service(s) I performed at today's visit.

## 2022-10-04 NOTE — TELEPHONE ENCOUNTER
Provider: DR FLOWERS  Caller: LAURA  Relationship to Patient: [email or web address]    Reason for Call: LAURA IS WANTING DR FLOWERS TO SIGN ORDERS FOR HOME HEALTH     WILL TAKE A VERBAL    PLEASE ADVISE

## 2022-10-04 NOTE — PLAN OF CARE
Goal Outcome Evaluation:                   No significant changes this shift.  Patient is alert and oriented x4.  Vital signs stable.  Patient complained of pain in right wrist, but did not want to be medicated at the time.  Continuing plan of care.

## 2022-10-04 NOTE — PLAN OF CARE
Goal Outcome Evaluation:  Plan of Care Reviewed With: patient           Outcome Evaluation: medicated x1 for right hand/arm pain. continue IVAB. Patient hoping to discharge today.

## 2022-10-05 ENCOUNTER — READMISSION MANAGEMENT (OUTPATIENT)
Dept: CALL CENTER | Facility: HOSPITAL | Age: 67
End: 2022-10-05

## 2022-10-05 VITALS
WEIGHT: 145.94 LBS | RESPIRATION RATE: 20 BRPM | HEART RATE: 90 BPM | DIASTOLIC BLOOD PRESSURE: 69 MMHG | BODY MASS INDEX: 24.92 KG/M2 | TEMPERATURE: 98.6 F | HEIGHT: 64 IN | SYSTOLIC BLOOD PRESSURE: 96 MMHG | OXYGEN SATURATION: 99 %

## 2022-10-05 PROBLEM — A41.9 SEPSIS: Status: RESOLVED | Noted: 2022-09-27 | Resolved: 2022-10-05

## 2022-10-05 LAB
ALBUMIN SERPL-MCNC: 2.3 G/DL (ref 3.5–5.2)
ALP SERPL-CCNC: 62 U/L (ref 39–117)
ALT SERPL W P-5'-P-CCNC: 16 U/L (ref 1–33)
ANION GAP SERPL CALCULATED.3IONS-SCNC: 4.7 MMOL/L (ref 5–15)
AST SERPL-CCNC: 33 U/L (ref 1–32)
BASOPHILS # BLD AUTO: 0.02 10*3/MM3 (ref 0–0.2)
BASOPHILS NFR BLD AUTO: 0.1 % (ref 0–1.5)
BILIRUB CONJ SERPL-MCNC: <0.2 MG/DL (ref 0–0.3)
BILIRUB INDIRECT SERPL-MCNC: ABNORMAL MG/DL
BILIRUB SERPL-MCNC: 0.2 MG/DL (ref 0–1.2)
BUN SERPL-MCNC: 11 MG/DL (ref 8–23)
BUN/CREAT SERPL: 17.5 (ref 7–25)
CALCIUM SPEC-SCNC: 7.9 MG/DL (ref 8.6–10.5)
CHLORIDE SERPL-SCNC: 97 MMOL/L (ref 98–107)
CO2 SERPL-SCNC: 29.3 MMOL/L (ref 22–29)
CREAT SERPL-MCNC: 0.63 MG/DL (ref 0.57–1)
DEPRECATED RDW RBC AUTO: 53.2 FL (ref 37–54)
EGFRCR SERPLBLD CKD-EPI 2021: 98 ML/MIN/1.73
EOSINOPHIL # BLD AUTO: 0 10*3/MM3 (ref 0–0.4)
EOSINOPHIL NFR BLD AUTO: 0 % (ref 0.3–6.2)
ERYTHROCYTE [DISTWIDTH] IN BLOOD BY AUTOMATED COUNT: 15.2 % (ref 12.3–15.4)
GLUCOSE BLDC GLUCOMTR-MCNC: 113 MG/DL (ref 70–99)
GLUCOSE BLDC GLUCOMTR-MCNC: 95 MG/DL (ref 70–99)
GLUCOSE SERPL-MCNC: 128 MG/DL (ref 65–99)
HCT VFR BLD AUTO: 26.5 % (ref 34–46.6)
HGB BLD-MCNC: 9.1 G/DL (ref 12–15.9)
IMM GRANULOCYTES # BLD AUTO: 0.17 10*3/MM3 (ref 0–0.05)
IMM GRANULOCYTES NFR BLD AUTO: 1.3 % (ref 0–0.5)
LYMPHOCYTES # BLD AUTO: 1.7 10*3/MM3 (ref 0.7–3.1)
LYMPHOCYTES NFR BLD AUTO: 12.7 % (ref 19.6–45.3)
MAGNESIUM SERPL-MCNC: 2.3 MG/DL (ref 1.6–2.4)
MCH RBC QN AUTO: 33.2 PG (ref 26.6–33)
MCHC RBC AUTO-ENTMCNC: 34.3 G/DL (ref 31.5–35.7)
MCV RBC AUTO: 96.7 FL (ref 79–97)
MONOCYTES # BLD AUTO: 0.86 10*3/MM3 (ref 0.1–0.9)
MONOCYTES NFR BLD AUTO: 6.4 % (ref 5–12)
NEUTROPHILS NFR BLD AUTO: 10.62 10*3/MM3 (ref 1.7–7)
NEUTROPHILS NFR BLD AUTO: 79.5 % (ref 42.7–76)
NRBC BLD AUTO-RTO: 0.1 /100 WBC (ref 0–0.2)
PHOSPHATE SERPL-MCNC: 3.1 MG/DL (ref 2.5–4.5)
PLATELET # BLD AUTO: 420 10*3/MM3 (ref 140–450)
PMV BLD AUTO: 9 FL (ref 6–12)
POTASSIUM SERPL-SCNC: 3.9 MMOL/L (ref 3.5–5.2)
PROT SERPL-MCNC: 6.8 G/DL (ref 6–8.5)
RBC # BLD AUTO: 2.74 10*6/MM3 (ref 3.77–5.28)
SODIUM SERPL-SCNC: 131 MMOL/L (ref 136–145)
WBC NRBC COR # BLD: 13.37 10*3/MM3 (ref 3.4–10.8)

## 2022-10-05 PROCEDURE — 94664 DEMO&/EVAL PT USE INHALER: CPT

## 2022-10-05 PROCEDURE — 80076 HEPATIC FUNCTION PANEL: CPT | Performed by: FAMILY MEDICINE

## 2022-10-05 PROCEDURE — 83735 ASSAY OF MAGNESIUM: CPT | Performed by: FAMILY MEDICINE

## 2022-10-05 PROCEDURE — 94799 UNLISTED PULMONARY SVC/PX: CPT

## 2022-10-05 PROCEDURE — 63710000001 PREDNISONE PER 5 MG: Performed by: PHYSICIAN ASSISTANT

## 2022-10-05 PROCEDURE — 85025 COMPLETE CBC W/AUTO DIFF WBC: CPT | Performed by: FAMILY MEDICINE

## 2022-10-05 PROCEDURE — 80048 BASIC METABOLIC PNL TOTAL CA: CPT | Performed by: FAMILY MEDICINE

## 2022-10-05 PROCEDURE — 99239 HOSP IP/OBS DSCHRG MGMT >30: CPT | Performed by: FAMILY MEDICINE

## 2022-10-05 PROCEDURE — 94618 PULMONARY STRESS TESTING: CPT

## 2022-10-05 PROCEDURE — 82962 GLUCOSE BLOOD TEST: CPT

## 2022-10-05 PROCEDURE — 84100 ASSAY OF PHOSPHORUS: CPT | Performed by: FAMILY MEDICINE

## 2022-10-05 RX ORDER — FLUDROCORTISONE ACETATE 0.1 MG/1
0.05 TABLET ORAL DAILY
Qty: 15 TABLET | Refills: 0 | Status: SHIPPED | OUTPATIENT
Start: 2022-10-06 | End: 2022-10-14 | Stop reason: SDUPTHER

## 2022-10-05 RX ORDER — HYDROXYZINE HYDROCHLORIDE 25 MG/1
25 TABLET, FILM COATED ORAL 3 TIMES DAILY PRN
Qty: 30 TABLET | Refills: 0 | Status: SHIPPED | OUTPATIENT
Start: 2022-10-05 | End: 2022-10-15

## 2022-10-05 RX ORDER — GUAIFENESIN 600 MG/1
1200 TABLET, EXTENDED RELEASE ORAL EVERY 12 HOURS SCHEDULED
Qty: 120 TABLET | Refills: 0 | Status: SHIPPED | OUTPATIENT
Start: 2022-10-05 | End: 2022-11-04

## 2022-10-05 RX ORDER — SACCHAROMYCES BOULARDII 250 MG
250 CAPSULE ORAL 2 TIMES DAILY
Qty: 60 CAPSULE | Refills: 0 | Status: SHIPPED | OUTPATIENT
Start: 2022-10-05 | End: 2022-11-04

## 2022-10-05 RX ORDER — FUROSEMIDE 20 MG/1
20 TABLET ORAL DAILY
Qty: 10 TABLET | Refills: 0 | Status: SHIPPED | OUTPATIENT
Start: 2022-10-05 | End: 2022-11-04

## 2022-10-05 RX ORDER — FLUTICASONE PROPIONATE AND SALMETEROL XINAFOATE 115; 21 UG/1; UG/1
2 AEROSOL, METERED RESPIRATORY (INHALATION)
Qty: 1 EACH | Refills: 0 | Status: SHIPPED | OUTPATIENT
Start: 2022-10-05 | End: 2022-10-14 | Stop reason: SDUPTHER

## 2022-10-05 RX ORDER — FAMOTIDINE 20 MG/1
20 TABLET, FILM COATED ORAL
Qty: 60 TABLET | Refills: 0 | Status: SHIPPED | OUTPATIENT
Start: 2022-10-05 | End: 2022-10-26 | Stop reason: SDUPTHER

## 2022-10-05 RX ORDER — PREDNISONE 10 MG/1
10 TABLET ORAL
Qty: 30 TABLET | Refills: 0 | Status: SHIPPED | OUTPATIENT
Start: 2022-10-06 | End: 2022-10-14 | Stop reason: SDUPTHER

## 2022-10-05 RX ORDER — HYDROXYZINE HYDROCHLORIDE 25 MG/1
25 TABLET, FILM COATED ORAL ONCE
Status: COMPLETED | OUTPATIENT
Start: 2022-10-05 | End: 2022-10-05

## 2022-10-05 RX ADMIN — GUAIFENESIN 1200 MG: 600 TABLET ORAL at 08:37

## 2022-10-05 RX ADMIN — PREDNISONE 10 MG: 10 TABLET ORAL at 08:37

## 2022-10-05 RX ADMIN — IPRATROPIUM BROMIDE AND ALBUTEROL SULFATE 3 ML: 2.5; .5 SOLUTION RESPIRATORY (INHALATION) at 07:21

## 2022-10-05 RX ADMIN — BUDESONIDE 0.5 MG: 0.5 SUSPENSION RESPIRATORY (INHALATION) at 07:21

## 2022-10-05 RX ADMIN — Medication 10 ML: at 08:38

## 2022-10-05 RX ADMIN — APIXABAN 10 MG: 5 TABLET, FILM COATED ORAL at 08:37

## 2022-10-05 RX ADMIN — HYDROXYZINE HYDROCHLORIDE 25 MG: 25 TABLET, FILM COATED ORAL at 12:45

## 2022-10-05 RX ADMIN — Medication 250 MG: at 08:37

## 2022-10-05 RX ADMIN — ARFORMOTEROL TARTRATE 15 MCG: 15 SOLUTION RESPIRATORY (INHALATION) at 07:21

## 2022-10-05 RX ADMIN — IPRATROPIUM BROMIDE AND ALBUTEROL SULFATE 3 ML: 2.5; .5 SOLUTION RESPIRATORY (INHALATION) at 11:17

## 2022-10-05 RX ADMIN — FAMOTIDINE 20 MG: 20 TABLET ORAL at 08:36

## 2022-10-05 RX ADMIN — FLUDROCORTISONE ACETATE 50 MCG: 0.1 TABLET ORAL at 08:36

## 2022-10-05 NOTE — DISCHARGE SUMMARY
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  Adrenal crisis with primary adrenal insufficiency in the setting of metastatic carcinoma involving left adrenal gland  Sepsis secondary to pneumonia  Community-acquired pneumonia secondary to Klebsiella  Question aspiration; diet modified  Bilateral pleural effusion  Pericardial effusion small and without tamponade  Right lower lobe pulmonary edema subacute versus chronic started on Eliquis  Metastatic non-small cell lung cancer  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  Acute hypoxic respiratory failure  Status post bronchoscopy  Hypomagnesemia  Hypokalemia  Active Hospital Problems    Diagnosis POA   • Weakness [R53.1] Yes   • Malignant neoplasm of upper lobe of left lung (HCC) [C34.12] Yes      Resolved Hospital Problems    Diagnosis POA   • Sepsis (HCC) [A41.9] Unknown       Hospital Course     Hospital Course:  67-year-old female hospitalized on 2022 with history of metastatic lung cancer, on Opdivo and Yervoy therapy, hospitalized with hyponatremia, Klebsiella pneumonia, concern for UTI and hypothermia, adrenal insufficiency likely from metastatic cancer to her adrenals, adrenal crisis, septic shock, transferred to the ICU, pressors weaned, found to have PE started on anticoagulation, pericardial effusion and bilateral pleural effusions, cardiology consulted, concern for  aspiration, speech therapy evaluation, diet adjusted after modified barium swallow, profound adrenal insufficiency treated with prednisone and fludrocortisone, as of 10/5/2022 she was discharged in hemodynamically stable condition with home oxygen and medications including prednisone, fludrocortisone, Eliquis, supplemental oxygen, to follow-up with cardiology, nephrology, pulmonary, hematology/oncology, and PCP within the next 1 to 4 weeks.  Resumed on Lasix as outpatient, high risk for readmission due to complexity of medical care and poor prognosis overall and chances for rapid decline in the outpatient setting.      Day of Discharge     Vital Signs:  Temp:  [97.3 °F (36.3 °C)-98.6 °F (37 °C)] 98.6 °F (37 °C)  Heart Rate:  [] 90  Resp:  [18-20] 20  BP: ()/(60-89) 96/69  Flow (L/min):  [1-2] 1    Review of systems:  All systems reviewed negative except for generalized fatigue, shortness of breath with exertion    Physical Exam                         Constitutional: Awake, alert, no acute distress resting in bed comfortably              Eyes: Pupils equal, sclerae anicteric, no conjunctival injection              HENT: NCAT, mucous membranes moist              Neck: Supple, full range of motion              Respiratory: Mostly clear diminished at bases              Cardiovascular: RRR, no murmurs, rubs, or gallops some lower extremity edema              Gastrointestinal: Positive bowel sounds, soft, nontender, nondistended              Musculoskeletal: Trace bilateral ankle edema, no clubbing or cyanosis to extremities              Psychiatric: Appropriate affect, cooperative              Neurologic: Oriented x 3, strength symmetric in all extremities, Cranial Nerves grossly intact to confrontation, speech clear              Skin: No rashes         Discharge Details        Discharge Medications      New Medications      Instructions Start Date   Advair -21 MCG/ACT inhaler  Generic drug:  fluticasone-salmeterol   2 puffs, Inhalation, 2 Times Daily - RT      apixaban 5 MG tablet tablet  Commonly known as: ELIQUIS   10 mg, Oral, 2 Times Daily      apixaban 5 MG tablet tablet  Commonly known as: ELIQUIS   5 mg, Oral, 2 Times Daily   Start Date: October 9, 2022     famotidine 20 MG tablet  Commonly known as: PEPCID   20 mg, Oral, 2 Times Daily Before Meals      fludrocortisone 0.1 MG tablet   0.05 mg, Oral, Daily   Start Date: October 6, 2022     furosemide 20 MG tablet  Commonly known as: Lasix   20 mg, Oral, Daily      guaiFENesin 600 MG 12 hr tablet  Commonly known as: MUCINEX   1,200 mg, Oral, Every 12 Hours Scheduled      hydrOXYzine 25 MG tablet  Commonly known as: ATARAX   25 mg, Oral, 3 Times Daily PRN      predniSONE 10 MG tablet  Commonly known as: DELTASONE   10 mg, Oral, Daily With Breakfast   Start Date: October 6, 2022     saccharomyces boulardii 250 MG capsule  Commonly known as: FLORASTOR   250 mg, Oral, 2 Times Daily         Continue These Medications      Instructions Start Date   acetaminophen 325 MG tablet  Commonly known as: TYLENOL   650 mg, Oral, Every 6 Hours PRN             Allergies   Allergen Reactions   • Hydrocodone Nausea And Vomiting and Dizziness       Discharge Disposition:  [facility]Health Care [facility]    Diet:  Hospital:  Diet Order   Procedures   • Diet Regular; Thin; Daily Fluid Restriction; Other; 1,800       Discharge Activity: as tolerates      CODE STATUS:  Code Status and Medical Interventions:   Ordered at: 09/27/22 1939     Code Status (Patient has no pulse and is not breathing):    CPR (Attempt to Resuscitate)     Medical Interventions (Patient has pulse or is breathing):    Full Support         Future Appointments   Date Time Provider Department Center   10/10/2022  1:15 PM INJ ROOM 01 [facility] OP INFU  [facility] OPIF [facility]   10/10/2022  2:00 PM Phani Hernández MD [facility] ONC E521 [facility]   10/11/2022  9:00 AM Anuradha Hope MD [facility] PC ETOWN [facility]   10/11/2022 10:15 AM CHAIR 03 [facility] OP  INFU BH [facility] OPIF [facility]   10/14/2022  8:45 AM Nadiya Hendrxi APRN [facility] PCC ETW [facility]   11/7/2022 10:45 AM INJ ROOM 01 [facility] OP INFU BH [facility] OPIF [facility]   11/7/2022 11:30 AM Phani Hernández MD [facility] ONC E521 [facility]   11/8/2022  8:45 AM CHAIR 03 [facility] OP INFU BH [facility] OPIF [facility]   12/5/2022 10:45 AM INJ ROOM 01 [facility] OP INFU BH [facility] OPIF [facility]   12/5/2022 11:30 AM Phani Hernández MD [facility] ONC E521 [facility]   12/6/2022  9:30 AM CHAIR 05 [facility] OP INFU BH [facility] OPIF [facility]   3/23/2023  2:00 PM Torin Salcido MD [facility] RO [facility]       Additional Instructions for the Follow-ups that You Need to Schedule     Discharge Follow-up with PCP   As directed       Currently Documented PCP:    Provider, No Known    PCP Phone Number:    None     Follow Up Details: 3 to 7 days         Discharge Follow-up with Specified Provider: Dr Hernández as scheduled, Dr. Leon clinic in 2 weeks, Dr. Shelley office in 2 weeks,  in 2 weeks   As directed      To: Dr Hernández as scheduled, Dr. Leon clinic in 2 weeks, Dr. Shelley office in 2 weeks,  in 2 weeks               Pertinent  and/or Most Recent Results     PROCEDURES:   Adult Transthoracic Echo Complete W/ Cont if Necessary Per Protocol    Result Date: 10/1/2022  · Calculated left ventricular EF = 55.5% Estimated left ventricular EF was in agreement with the calculated left ventricular EF. Left ventricular systolic function is normal. · Estimated right ventricular systolic pressure from tricuspid regurgitation is normal (<35 mmHg). · There is a small (<1cm) circumferential pericardial effusion , no tamponade      CT Head Without Contrast    Result Date: 9/30/2022  PROCEDURE: CT HEAD WO CONTRAST  COMPARISON:  [facility], MR, MRI BRAIN W WO CONTRAST, 7/01/2022, 8:59.  [facility], PET, NM PET/CT SKULL BASE TO MID THIGH, 7/08/2022, 12:52. INDICATIONS: AMS, hx of lung cancer  PROTOCOL:   Standard imaging protocol performed    RADIATION:   DLP: 892mGy*cm   MA and/or KV was  adjusted to minimize radiation dose.     TECHNIQUE: After obtaining the patient's consent, CT images were obtained without non-ionic intravenous contrast material.  FINDINGS:  No calvarial fracture.  Small sclerotic area right parietal bone similar to previous MRI.  Mastoid air cells are clear.  Sinuses otherwise are clear.  No acute intracranial hemorrhage.  No mass effect or midline shift.  Mild small vessel ischemic change.  Mild vascular calcifications.        1. No acute intracranial finding.     SARMAD TRIVEDI MD       Electronically Signed and Approved By: SARMAD TRIVEDI MD on 9/30/2022 at 21:50             CT Chest With Contrast Diagnostic    Result Date: 9/30/2022  PROCEDURE: CT CHEST W CONTRAST DIAGNOSTIC  COMPARISON:  [facility], CT, CT HEAD WO CONTRAST, 9/30/2022, 18:06.  [facility], CT, CT ABDOMEN PELVIS W CONTRAST, 9/16/2022, 14:45.  [facility], CR, XR CHEST 1 VW, 9/30/2022, 0:03.  [facility], CT, CT CHEST W CONTRAST DIAGNOSTIC, 9/16/2022, 14:45. INDICATIONS: hypoxia, elevated d-dimer, hx of met lung cancer, adrernal insufficiency,  TECHNIQUE: After obtaining the patient's consent, CT images were obtained with non-ionic intravenous contrast material.   PROTOCOL:   Pulmonary embolism imaging protocol performed    RADIATION:   DLP: 129mGy*cm   Automated exposure control was utilized to minimize radiation dose. CONTRAST: 60cc Isovue 370 I.V. LABS:   eGFR: >60ml/min/1.73m2  FINDINGS:  There is prominent axillary lymph nodes.  Right subclavian venous catheter projects over the cavoatrial junction.  Enlarged right axillary lymph nodes similar to prior CT.  There may be anasarca.  Pericardial effusion measures 2.2 centimeters in thickness which appears new compared 9/16/2022.  Precarinal and AP window adenopathy is again seen.  There is new bilateral effusions measuring 4.5 centimeters on the right and 4.8 centimeters on the left.  Left upper lobe  mass again seen measuring about 5.5 x 2.8 centimeters previously 5.5 x 3.4 centimeters centimeters.  Aorta not well opacified.  Reflux of contrast into IVC.  There is atherosclerosis.  Emphysematous changes.  There is mild upper and lower lobe opacities some which are dependent.  Redemonstration of nodule in the right lower lobe near the major fissure measuring up to 0.8 centimeters.  Bilateral adrenal masses again seen.  There is a pulmonary embolism along the wall the segmental right lower lobe pulmonary artery.  Similar finding was probably seen on the previous exam.  Discussion        1. Mild anasarca. 2. New pericardial effusion measuring up to at least 2.2 centimeters in thickness new compared to prior CT 9/16/2022. 3. New bilateral pleural effusions also measuring up to 4.5 centimeters on the right and 4.8 centimeters on the left. 4. Bilateral axillary adenopathy greater on the right similar to previous exam. 5. AP window and precarinal adenopathy similar to previous exam. 6. Left upper lobe mass again seen measuring up to 5.5 centimeters similar to previous exam. 7. Mild reflux of contrast into the IVC. 8. Mild upper and lower lobe lung opacities could reflect pulmonary edema or pneumonia.  There is superimposed atelectasis. 9. Pulmonary embolus in distal segmental and proximal subsegmental right lower lobe pulmonary artery branches appears partly along the vessel wall may be subacute or chronic.  Similar findings seen on previous exam 9/16/2022.  Discussed with Dr. Hope. 10. Bilateral adrenal masses compatible with metastatic disease again seen. 11. 0.8 centimeter right lower lobe nodule again seen.     SARMAD TRIVEDI MD       Electronically Signed and Approved By: SARMAD TRIVEDI MD on 9/30/2022 at 22:30             CT Chest With Contrast Diagnostic    Result Date: 9/16/2022  PROCEDURE: CT CHEST W CONTRAST DIAGNOSTIC  COMPARISON:  [facility], PET, NM PET/CT SKULL BASE TO MID THIGH,  7/08/2022, 12:52. INDICATIONS: surveillance  TECHNIQUE: After obtaining the patient's consent, CT images were obtained with non-ionic intravenous contrast material.   PROTOCOL:   Standard imaging protocol performed    RADIATION:   DLP: 656mGy*cm   Automated exposure control was utilized to minimize radiation dose. CONTRAST: 100cc Isovue 370 I.V. LABS:   eGFR: >60ml/min/1.73m2  FINDINGS:  Again seen is mediastinal adenopathy including precarinal and AP window adenopathy.  Precarinal node now measures 3.0 x 1.5 cm, previously 3.4 x 2.2 cm.  No discrete hilar adenopathy identified.  There is right axillary adenopathy, improved from the prior study.  Right axillary node now measures 2.6 x 1.5 cm, previously 4.1 x 3.0 cm.  No left axillary adenopathy.  There are no pleural effusions.  Again seen is a left upper lobe mass now measuring 4.7 x 3.4 cm, previously 8.3 x 5.6 cm.  There is a 1 cm noncalcified pulmonary nodule within the right lower lobe near the major fissure.  This nodule measures approximately 8 mm on the prior exam.  No new pulmonary nodule.  No other focal airspace consolidation.  There are large bilateral adrenal masses consistent with metastatic disease.  These are decreased in size from the prior study.  No lytic or sclerotic bony lesions identified        1. Good response to therapy with interval decrease in size and left upper lobe mass and decrease in size and mediastinal on right axillary adenopathy.  There has also been interval decrease in size of bilateral adrenal masses. 2. Slight increase in size of noncalcified nodule within the right lower lobe now measuring 10 mm, previously 8 mm.     SHAYLEE WILDE MD       Electronically Signed and Approved By: SHAYLEE WILDE MD on 9/16/2022 at 15:39             CT Abdomen Pelvis With Contrast    Result Date: 9/16/2022  PROCEDURE: CT ABDOMEN PELVIS W CONTRAST  COMPARISON: [facility], PET, NM PET/CT SKULL BASE TO MID THIGH, 7/08/2022, 12:52.   INDICATIONS: surveillance  TECHNIQUE: After obtaining the patient's consent, CT images were created with non-ionic intravenous contrast material.   PROTOCOL:   Standard imaging protocol performed    RADIATION:   DLP: 656mGy*cm   Automated exposure control was utilized to minimize radiation dose. CONTRAST: 100cc Isovue 370 I.V.  FINDINGS:  Visualized lung bases are clear.  The liver, pancreas, and spleen are within normal limits.  Again seen are lobular heterogeneous bilateral adrenal masses consistent with metastatic disease.  Right adrenal mass now measures 8.0 x 5.5 cm, previously 11.2 x 0.5 cm.  Left adrenal mass has also decreased in size.  Low-density masses noted along the posterior cortex of the upper pole the right kidney compatible with cyst.  Kidneys are otherwise unremarkable.  Small additional cyst is noted of the upper pole the left kidney.  There has been decrease in size of retroperitoneal adenopathy now measuring 3.9 x 2.6 cm, previously 5.2 x 3.7 cm peer other retroperitoneal nodes have similarly decreased in size.  No new or enlarging retroperitoneal nodes identified.  No mesenteric adenopathy is seen.  Upper GI tract is within normal limits.  Gallbladder appears normal.  Pelvis:  Multiple common iliac nodes appear similar in size.  No external iliac or inguinal adenopathy identified.  Urinary bladder is within normal limits.  Uterus and ovaries appear normal.  The appendix is normal.  GI tract appears within normal limits.  Bones:  There are degenerative changes of the spine.  No lytic or sclerotic bony lesions are identified.        1. Good interval response to therapy with decrease in size of bilateral adrenal masses and retroperitoneal adenopathy. 2. No acute process seen within the abdomen or pelvis.     SHAYLEE WILDE MD       Electronically Signed and Approved By: SHAYLEE WILDE MD on 9/16/2022 at 15:47             FL Video Swallow With Speech Single Contrast    Result Date:  10/3/2022  PROCEDURE: FL VIDEO SWALLOW W SPEECH SINGLE-CONTRAST  COMPARISON: None  INDICATIONS: aspiration and left vocal cord paralysis concerns based on bronchoscopy; just need MBS for now  TECHNIQUE: Examination was performed in conjunction with the speech pathologist. The patient was given both thin and thick liquid barium, applesauce with barium, contrast and vida cracker with Esophotrast. The patient's medication list was reviewed and documented in the medical record.  FINDINGS:  Please refer to speech pathology report for further details.       Please refer to speech pathology report for further details.     SUJATHA COURTNEY MD       Electronically Signed and Approved By: SUJATHA COURTNEY MD on 10/03/2022 at 9:57             XR Chest 1 View    Result Date: 9/30/2022  PROCEDURE: XR CHEST 1 VW  COMPARISONS: 9/27/2022; 9/16/2022.  INDICATIONS: WET COUGH; HISTORY OF LUNG CANCER.  FINDINGS:  A single AP upright portable chest radiograph is provided for review.  Bilateral infiltrates are seen, which are new or increased in degree since the prior study from 9/27/2022; they are greater on the left than the right.  Again demonstrated is a left upper lobe mass, which is possibly as large as 6 cm.  There is a right-sided central venous line in place with its distal tip at the expected cavoatrial junction.  There may be borderline cardiac enlargement.  The thoracic aorta is atherosclerotic.  External artifacts obscure detail.  Chronic calcified granulomatous disease involves the chest.  No pneumothorax is seen.  No definite pleural effusion.  The right lower lobe pulmonary nodule is better seen on the recent chest CT study from 9/16/2022 at 1645 hours.        Worsening progression is suggested radiographically with new or increased bilateral infiltrates since 9/27/2022 at 1501 hours.  The findings may represent infectious multifocal pneumonia.  Aspiration pneumonia is possible.  Pulmonary edema cannot be excluded.   Treatment effect would be in the differential diagnosis.     Please note that portions of this note were completed with a voice recognition program.  RINA PÉREZ JR, MD       Electronically Signed and Approved By: RINA PÉREZ JR, MD on 9/30/2022 at 0:39              XR Chest 1 View    Result Date: 9/27/2022  PROCEDURE: XR CHEST 1 VW  COMPARISON: [facility], CR, XR CHEST 1 VW, 7/15/2022, 11:37.  INDICATIONS: Weak/Dizzy/AMS triage protocol  FINDINGS:   The lungs are well-expanded. The heart and pulmonary vasculature are within normal limits. No pleural effusions are identified.  The left perihilar mass is smaller on today's examination measuring 4 cm x 2.5 cm.  Right IJ Port-A-Cath tip is in the distal SVC.  IMPRESSION:  Left perihilar mass is smaller on today's examination measuring 4 cm x 2.5 cm.  ROHINI LEO MD       Electronically Signed and Approved By: ROHINI LEO MD on 9/27/2022 at 15:21               LAB RESULTS:      Lab 10/05/22  0526 10/04/22  0521 10/03/22  0605 10/02/22  1735 10/02/22  0525 10/01/22  0402 09/30/22  1627 09/30/22  0333 09/29/22  1902 09/29/22  0757 09/29/22  0101   WBC 13.37* 9.02 9.99  --  11.90* 15.86*  --  15.04*  --   --   --    HEMOGLOBIN 9.1* 8.7* 8.9* 9.0* 8.3* 8.9*  --  10.8*  --   --   --    HEMATOCRIT 26.5* 26.0* 25.8* 26.1* 23.5* 24.3*  --  30.2*  --   --   --    PLATELETS 420 389 357  --  310 294  --  390  --   --   --    NEUTROS ABS 10.62* 6.77 8.11*  --  10.36* 13.93*  --  13.03*  --   --    < >   IMMATURE GRANS (ABS) 0.17* 0.12* 0.15*  --  0.11* 0.14*  --  0.11*  --   --    < >   LYMPHS ABS 1.70 1.46 1.15  --  0.88 0.93  --  1.05  --   --    < >   MONOS ABS 0.86 0.66 0.56  --  0.52 0.85  --  0.79  --   --    < >   EOS ABS 0.00 0.00 0.01  --  0.02 0.00  --  0.03  --   --    < >   MCV 96.7 95.6 95.9  --  93.6 89.3  --  90.7  --   --   --    CRP  --   --   --   --   --   --   --  18.45*  --   --   --    PROCALCITONIN  --   --   --   --   --   --    --  2.98*  --  0.21  --    LACTATE  --   --   --   --   --   --  3.0*  --   --   --   --    LACTATE, ARTERIAL  --   --   --   --   --   --   --   --  1.24  --   --    D DIMER QUANT  --   --   --   --   --   --   --  4.05*  --   --   --     < > = values in this interval not displayed.         Lab 10/05/22  0526 10/04/22  0521 10/03/22  0605 10/02/22  0525 10/01/22  0402 09/29/22  2132 09/29/22  1902   SODIUM 131* 134* 134* 134* 129*  129*   < >  --    SODIUM, ARTERIAL  --   --   --   --   --   --  117.4*   POTASSIUM 3.9 3.3* 3.6 4.2 3.3*   < >  --    CHLORIDE 97* 97* 97* 98 93*   < >  --    CO2 29.3* 29.9* 31.4* 30.4* 28.3   < >  --    ANION GAP 4.7* 7.1 5.6 5.6 7.7   < >  --    BUN 11 16 23 13 16   < >  --    CREATININE 0.63 0.57 0.59 0.51* 0.56*   < >  --    EGFR 98.0 100.4 99.5 103.1 100.8   < >  --    GLUCOSE 128* 110* 121* 131* 268*   < >  --    GLUCOSE, ARTERIAL  --   --   --   --   --   --  147*   CALCIUM 7.9* 7.3* 7.8* 7.4* 7.7*   < >  --    IONIZED CALCIUM  --   --   --   --   --   --  1.07*   MAGNESIUM 2.3 1.5* 1.8 2.1 2.0   < >  --    PHOSPHORUS 3.1 2.6 2.3* 2.5 2.0*   < >  --     < > = values in this interval not displayed.         Lab 10/05/22  0526 10/04/22  0521 10/01/22  0402 09/30/22  0333 09/29/22  0757   TOTAL PROTEIN 6.8 6.7 6.8  --   --    ALBUMIN 2.30* 2.20* 1.90* 1.80* 2.20*   GLOBULIN  --   --  4.9  --   --    ALT (SGPT) 16 17 16  --   --    AST (SGOT) 33* 39* 44*  --   --    BILIRUBIN 0.2 0.2 0.3  --   --    BILIRUBIN DIRECT <0.2 <0.2  --   --   --    ALK PHOS 62 56 76  --   --          Lab 10/04/22  0521 09/30/22  0333   PROBNP 25,700.0* 3,300.0*             Lab 10/02/22  0525 10/01/22  0402   IRON  --  46   IRON SATURATION  --  58*   TIBC  --  79*   TRANSFERRIN  --  53*   FERRITIN  --  3,023.00*   FOLATE 11.60  --    VITAMIN B 12 1,092*  --          Lab 09/29/22  1902   PH, ARTERIAL 7.347*   PCO2, ARTERIAL 31.2*   PO2 ART 77.7*   O2 SATURATION ART 93.7*   FIO2 <21   HCO3 ART 16.7*   BASE  EXCESS ART -7.8*   CARBOXYHEMOGLOBIN 0.3     Brief Urine Lab Results  (Last result in the past 365 days)      Color   Clarity   Blood   Leuk Est   Nitrite   Protein   CREAT   Urine HCG        09/29/22 0333 Yellow   Clear   Small (1+)   Small (1+)   Negative   Negative               Microbiology Results (last 10 days)     Procedure Component Value - Date/Time    S. Pneumo Ag Urine or CSF - Urine, Urine, Clean Catch [[id number]]  (Normal) Collected: 09/30/22 1821    Lab Status: Final result Specimen: Urine, Clean Catch Updated: 09/30/22 1906     Strep Pneumo Ag Negative    Legionella Antigen, Urine - Urine, Urine, Clean Catch [[id number]]  (Normal) Collected: 09/30/22 1821    Lab Status: Final result Specimen: Urine, Clean Catch Updated: 09/30/22 1906     LEGIONELLA ANTIGEN, URINE Negative    Respiratory Culture - Wash, Bronchus [[id number]] Collected: 09/30/22 1536    Lab Status: Final result Specimen: Wash from Bronchus Updated: 10/02/22 0931     Respiratory Culture Moderate growth (3+) Normal respiratory reji. No S. aureus or Pseudomonas aeruginosa detected. Final report.     Gram Stain Few (2+) Gram positive cocci    Fungus Culture - Lavage, Lung, Lingula [[id number]]  (Abnormal) Collected: 09/30/22 1533    Lab Status: Preliminary result Specimen: Lavage from Lung, Lingula Updated: 10/05/22 0656     Fungus Culture Heavy growth (4+) Candida albicans    AFB Culture - Lavage, Lung, Lingula [[id number]] Collected: 09/30/22 1533    Lab Status: Preliminary result Specimen: Lavage from Lung, Lingula Updated: 10/03/22 1250     AFB Stain No acid fast bacilli seen on direct smear      No acid fast bacilli seen on concentrated smear    BAL Culture, Quantitative - Lavage, Lung, Lingula [[id number]] Collected: 09/30/22 1533    Lab Status: Final result Specimen: Lavage from Lung, Lingula Updated: 10/02/22 0935     BAL Culture >100,000 CFU/mL Normal respiratory reji. No S. aureus or Pseudomonas aeruginosa detected. Final report.      Gram Stain Few (2+) Gram positive cocci      Rare (1+) Gram positive bacilli    Pneumonia Panel - Lavage, Lung, Lingula [[id number]]  (Abnormal) Collected: 09/30/22 1533    Lab Status: Final result Specimen: Lavage from Lung, Lingula Updated: 09/30/22 1832     Escherichia coli PCR Not Detected     Acinetobacter calcoaceticus-baumannii complex PCR Not Detected     Enterobacter cloacae PCR Not Detected     Klebsiella oxytoca PCR Not Detected     Klebsiella pneumoniae group PCR Not Detected     Klebsiella aerogenes PCR Detected     Moraxella catarrhalis PCR Not Detected     Proteus species PCR Not Detected     Pseudomonas aeroginosa PCR Not Detected     Serratia marcescens PCR Not Detected     Staphylococcus aureus PCR Not Detected     Streptococcus pyogenes PCR Not Detected     Haemophilus influenzae PCR Not Detected     Streptococcus agalactiae PCR Not Detected     Streptococcus pneumoniae PCR Not Detected     Chlamydophila pneumoniae PCR Not Detected     Legionella pneumophilia PCR Not Detected     Mycoplasma pneumo by PCR Not Detected     ADENOVIRUS, PCR Not Detected     CTX-M Gene Not Detected     IMP Gene Not Detected     KPC Gene Not Detected     mecA/C and MREJ Gene N/A     NDM Gene Not Detected     OXA-48-like Gene Not Detected     VIM Gene Not Detected     Coronavirus Not Detected     Human Metapneumovirus Not Detected     Human Rhinovirus/Enterovirus Not Detected     Influenza A PCR Not Detected     Influenza B PCR Not Detected     RSV, PCR Not Detected     Parainfluenza virus PCR Not Detected    MRSA Screen, PCR (Inpatient) - Swab, Nares [[id number]]  (Normal) Collected: 09/29/22 1807    Lab Status: Final result Specimen: Swab from Nares Updated: 09/29/22 1921     MRSA PCR No MRSA Detected    Narrative:      The negative predictive value of this diagnostic test is high and should only be used to consider de-escalating anti-MRSA therapy. A positive result may indicate colonization with MRSA and must be  correlated clinically.    Urine Culture - Urine, Urine, Clean Catch [[id number]]  (Normal) Collected: 09/29/22 0333    Lab Status: Final result Specimen: Urine, Clean Catch Updated: 09/30/22 0929     Urine Culture No growth    Respiratory Culture - Sputum, Cough [[id number]] Collected: 09/28/22 2113    Lab Status: Final result Specimen: Sputum from Cough Updated: 09/30/22 1201     Respiratory Culture Scant growth (1+) Normal respiratory reji. No S. aureus or Pseudomonas aeruginosa detected. Final report.     Gram Stain Few (2+) WBCs seen      Few (2+) Mucous strands      Few (2+) Gram positive cocci in pairs and chains    Blood Culture - Blood, Arm, Left [[id number]]  (Normal) Collected: 09/27/22 1511    Lab Status: Final result Specimen: Blood from Arm, Left Updated: 10/02/22 1533     Blood Culture No growth at 5 days    Blood Culture - Blood, Arm, Left [[id number]]  (Normal) Collected: 09/27/22 1511    Lab Status: Final result Specimen: Blood from Arm, Left Updated: 10/02/22 1533     Blood Culture No growth at 5 days          CT Head Without Contrast    Result Date: 9/30/2022  Impression:   1. No acute intracranial finding.     SARMAD TRIVEDI MD       Electronically Signed and Approved By: SARMAD TRIVEDI MD on 9/30/2022 at 21:50             CT Chest With Contrast Diagnostic    Result Date: 9/30/2022  Impression:   1. Mild anasarca. 2. New pericardial effusion measuring up to at least 2.2 centimeters in thickness new compared to prior CT 9/16/2022. 3. New bilateral pleural effusions also measuring up to 4.5 centimeters on the right and 4.8 centimeters on the left. 4. Bilateral axillary adenopathy greater on the right similar to previous exam. 5. AP window and precarinal adenopathy similar to previous exam. 6. Left upper lobe mass again seen measuring up to 5.5 centimeters similar to previous exam. 7. Mild reflux of contrast into the IVC. 8. Mild upper and lower lobe lung opacities could reflect pulmonary edema  or pneumonia.  There is superimposed atelectasis. 9. Pulmonary embolus in distal segmental and proximal subsegmental right lower lobe pulmonary artery branches appears partly along the vessel wall may be subacute or chronic.  Similar findings seen on previous exam 9/16/2022.  Discussed with Dr. Hope. 10. Bilateral adrenal masses compatible with metastatic disease again seen. 11. 0.8 centimeter right lower lobe nodule again seen.     SARMAD TRIVEDI MD       Electronically Signed and Approved By: SARMAD TRIVEDI MD on 9/30/2022 at 22:30             CT Chest With Contrast Diagnostic    Result Date: 9/16/2022  Impression:   1. Good response to therapy with interval decrease in size and left upper lobe mass and decrease in size and mediastinal on right axillary adenopathy.  There has also been interval decrease in size of bilateral adrenal masses. 2. Slight increase in size of noncalcified nodule within the right lower lobe now measuring 10 mm, previously 8 mm.     SHAYLEE WILDE MD       Electronically Signed and Approved By: SHAYLEE WILDE MD on 9/16/2022 at 15:39             CT Abdomen Pelvis With Contrast    Result Date: 9/16/2022  Impression:   1. Good interval response to therapy with decrease in size of bilateral adrenal masses and retroperitoneal adenopathy. 2. No acute process seen within the abdomen or pelvis.     SHAYLEE WILDE MD       Electronically Signed and Approved By: SHAYLEE WILDE MD on 9/16/2022 at 15:47             FL Video Swallow With Speech Single Contrast    Result Date: 10/3/2022  Impression:  Please refer to speech pathology report for further details.     SUJATHA COURTNEY MD       Electronically Signed and Approved By: SUJATHA COURTNEY MD on 10/03/2022 at 9:57             XR Chest 1 View    Result Date: 9/30/2022  Impression:   Worsening progression is suggested radiographically with new or increased bilateral infiltrates since 9/27/2022 at 1501 hours.  The findings may represent  infectious multifocal pneumonia.  Aspiration pneumonia is possible.  Pulmonary edema cannot be excluded.  Treatment effect would be in the differential diagnosis.     Please note that portions of this note were completed with a voice recognition program.  RINA PÉREZ JR, MD       Electronically Signed and Approved By: RINA PÉREZ JR, MD on 9/30/2022 at 0:39                Results for orders placed during the hospital encounter of 06/21/22    Duplex Venous Upper Extremity - Right CAR    Interpretation Summary  · Normal right upper extremity venous duplex scan.      Results for orders placed during the hospital encounter of 06/21/22    Duplex Venous Upper Extremity - Right CAR    Interpretation Summary  · Normal right upper extremity venous duplex scan.      Results for orders placed during the hospital encounter of 09/27/22    Adult Transthoracic Echo Complete W/ Cont if Necessary Per Protocol    Interpretation Summary  · Calculated left ventricular EF = 55.5% Estimated left ventricular EF was in agreement with the calculated left ventricular EF. Left ventricular systolic function is normal.  · Estimated right ventricular systolic pressure from tricuspid regurgitation is normal (<35 mmHg).  · There is a small (<1cm) circumferential pericardial effusion , no tamponade      Labs Pending at Discharge:  Pending Labs     Order Current Status    AFB Culture - Lavage, Lung, Lingula Preliminary result    Fungus Culture - Lavage, Lung, Lingula Preliminary result            Time spent on Discharge including face to face service:  40 minutes    Electronically signed by Jaye Jones MD, 10/05/22, 4:04 PM EDT.

## 2022-10-05 NOTE — PLAN OF CARE
Goal Outcome Evaluation:         Patient ready for discharge. Cab provided by hospital for  and to take to [facility] and home. HH notified.

## 2022-10-05 NOTE — PROGRESS NOTES
[facility]     Nephrology Progress Note      Patient Name: Linda Ortiz  : 1955  MRN: [id number]  Primary Care Physician:  Anuradha Hope MD  Date of admission: 2022    Subjective   Subjective     Interval History:  Patient Reports feeling a lot better.  No new complaints.  Legs are weak.  Tolerated p.o.    Review of Systems   All systems were reviewed and negative except for: What is mentioned above    Objective   Objective     Vitals:   Temp:  [97.3 °F (36.3 °C)-98.6 °F (37 °C)] 98.6 °F (37 °C)  Heart Rate:  [] 90  Resp:  [18-20] 20  BP: ()/(60-89) 96/69  Flow (L/min):  [1-2] 1  Physical Exam:     General Appearance:  Comfortable, conversant and pleasant, hyper.    HEENT: Normal HEENT exam.    Lungs:  Normal effort and normal respiratory rate.  There are decreased breath sounds, wheezes and rhonchi.    Heart: Normal rate.  Regular rhythm.    Abdomen: Abdomen is soft.  Bowel sounds are normal.   There is no abdominal tenderness.  Extremities: Normal range of motion.    Pulses: Distal pulses are intact.    Neurological: Patient is alert and oriented to person, place and time.    Pupils:  Pupils are equal, round, and reactive to light.    Skin:  Warm and dry.      Result Review    Result Reviewed:  I have personally reviewed the results from the time of this admission to 10/5/2022 15:19 EDT and agree with these findings:  [x]  Laboratory  []  Microbiology  [x]  Radiology  []  EKG/Telemetry   []  Cardiology/Vascular   []  Pathology  []  Old records  []  Other:  Lab Results   Component Value Date    GLUCOSE 128 (H) 10/05/2022    CALCIUM 7.9 (L) 10/05/2022     (L) 10/05/2022    K 3.9 10/05/2022    CO2 29.3 (H) 10/05/2022    CL 97 (L) 10/05/2022    BUN 11 10/05/2022    CREATININE 0.63 10/05/2022    BCR 17.5 10/05/2022    ANIONGAP 4.7 (L) 10/05/2022     Lab Results   Component Value Date    CALCIUM 7.9 (L) 10/05/2022    PHOS 3.1 10/05/2022      Results from last 7 days   Lab Units  10/05/22  0526   MAGNESIUM mg/dL 2.3            Assessment & Plan   Assessment / Plan       Active Hospital Problems:  Active Hospital Problems    Diagnosis    • Weakness    • Malignant neoplasm of upper lobe of left lung (HCC)        Assessment and Plan:    - Euvolemic/hypovolemic hyponatremia 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 intake and profound adrenal insufficiency.  ACTH stim test positive.  Currently on prednisone and fludrocortisone. Thyroid function okay.  Sodium improved.  Continue  fluid restriction 1800 cc a day.  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  Was on immunotherapy prior to admission.  She has poor nutritional status and hypoalbuminemia.  - Previous history of alcohol use.  - Metabolic acidosis, resolved.  -- Hypokalemia: Replaced this morning.    -- Hypophosphatemia: Improved, check in a.m.  -- Adrenal insufficiency-  On fludrocortisone/prednisone now.  Continue after discharge.  Discussed with nursing.

## 2022-10-05 NOTE — NURSING NOTE
Exercise Oximetry    Patient Name:Linda Ortiz   MRN: [id number]   Date: 10/05/22             ROOM AIR BASELINE   SpO2% 99   Heart Rate 92   Blood Pressure 112/76     EXERCISE ON ROOM AIR SpO2% EXERCISE ON O2 @ 1 LPM SpO2%   1 MINUTE 100 1 MINUTE 100   2 MINUTES 99 2 MINUTES 99   3 MINUTES 96 3 MINUTES 96   4 MINUTES 97 4 MINUTES 97   5 MINUTES 95 5 MINUTES 95   6 MINUTES 93 6 MINUTES 95              Distance Walked  50 Distance Walked   Dyspnea (Isabel Scale)  4 Dyspnea (Isabel Scale)   Fatigue (Isabel Scale)  4 Fatigue (Isabel Scale)   SpO2% Post Exercise  96 SpO2% Post Exercise   HR Post Exercise  91 HR Post Exercise   Time to Recovery 1-2 MIN Time to Recovery     Comments: PATIENT SLIGHTLY FATIGUED, BUT STATES HER LEGS ARE JUST WEAK. RESPIRATIONS EVEN AND UNLABORED DURING WALK.

## 2022-10-05 NOTE — PLAN OF CARE
Goal Outcome Evaluation:  Plan of Care Reviewed With: patient           Outcome Evaluation: no changes hoping to dc today.

## 2022-10-05 NOTE — NURSING NOTE
Spoke with  with patient's concerns about transportation.  set up cab service for patient to  prescriptions and drop off at apartment.

## 2022-10-06 ENCOUNTER — TRANSITIONAL CARE MANAGEMENT TELEPHONE ENCOUNTER (OUTPATIENT)
Dept: CALL CENTER | Facility: HOSPITAL | Age: 67
End: 2022-10-06

## 2022-10-06 NOTE — OUTREACH NOTE
Call Center TCM Note    Flowsheet Row Responses   [facility] [place] patient discharged from? [facility]   Does the patient have one of the following disease processes/diagnoses(primary or secondary)? Pneumonia   TCM attempt successful? Yes   Call start time 1405   Discharge diagnosis Sepsis due to PNA   Meds reviewed with patient/caregiver? Yes   Is the patient having any side effects they believe may be caused by any medication additions or changes? No   Does the patient have all medications ordered at discharge? No   What is keeping the patient from filling the prescriptions? --  [She has to  3 more medications. They had to order them. She reports they will be in today. ]   Nursing Interventions No intervention needed, Nurse provided patient education   Is the patient taking all medications as directed (includes completed medication regime)? Yes   Comments Patient has a hospital followup(new patient)  on 10/11/2022 with Dr Hope.    What is the Home health agency?  [facility]   Has home health visited the patient within 72 hours of discharge? Call prior to 72 hours   Has all DME been delivered? No   DME comments K[facility] reports she did not qualify for 02 at last min. So the order was Dc/d . Her sats were 92 and above on room air, She states her SOB was related to anxiety and today she is not having any issues.    Psychosocial issues? No   Is the patient/caregiver able to teach back signs and symptoms of worsening condition: Fever/chills, Shortness of breath, Chest pain   Is the patient/caregiver able to teach back importance of completing antibiotic course of treatment? Yes   TCM call completed? Yes   Wrap up additional comments She has no questions or concerns.           Jesus Vallejo RN    10/6/2022, 14:18 EDT

## 2022-10-06 NOTE — OUTREACH NOTE
Prep Survey    Flowsheet Row Responses   [facility] discharged from? [facility]   Is LACE score < 7 ? No   Emergency Room discharge w/ pulse ox? No   Eligibility [facility]   Date of Admission 09/27/22   Date of Discharge 10/05/22   Discharge Disposition Home-Health Care Svc   Discharge diagnosis Sepsis due to PNA   Does the patient have one of the following disease processes/diagnoses(primary or secondary)? Pneumonia   Does the patient have Home health ordered? Yes   What is the Home health agency?  [facility]   Is there a DME ordered? No   Prep survey completed? Yes          JAYME HERNANDEZ - Registered Nurse

## 2022-10-10 ENCOUNTER — HOSPITAL ENCOUNTER (OUTPATIENT)
Dept: ONCOLOGY | Facility: HOSPITAL | Age: 67
Setting detail: INFUSION SERIES
Discharge: HOME | End: 2022-10-10

## 2022-10-10 ENCOUNTER — OFFICE VISIT (OUTPATIENT)
Dept: ONCOLOGY | Facility: HOSPITAL | Age: 67
End: 2022-10-10

## 2022-10-10 VITALS
WEIGHT: 159.83 LBS | BODY MASS INDEX: 27.44 KG/M2 | TEMPERATURE: 98.6 F | OXYGEN SATURATION: 99 % | HEART RATE: 76 BPM | SYSTOLIC BLOOD PRESSURE: 109 MMHG | DIASTOLIC BLOOD PRESSURE: 58 MMHG | RESPIRATION RATE: 18 BRPM

## 2022-10-10 DIAGNOSIS — Z45.2 ENCOUNTER FOR ADJUSTMENT OR MANAGEMENT OF VASCULAR ACCESS DEVICE: ICD-10-CM

## 2022-10-10 DIAGNOSIS — C34.12 MALIGNANT NEOPLASM OF UPPER LOBE OF LEFT LUNG: Primary | ICD-10-CM

## 2022-10-10 DIAGNOSIS — I26.94 MULTIPLE SUBSEGMENTAL PULMONARY EMBOLI WITHOUT ACUTE COR PULMONALE: ICD-10-CM

## 2022-10-10 DIAGNOSIS — E27.40 HYPOADRENALISM: ICD-10-CM

## 2022-10-10 LAB
ALBUMIN SERPL-MCNC: 2.64 G/DL (ref 3.5–5.2)
ALBUMIN/GLOB SERPL: 0.7 G/DL
ALP SERPL-CCNC: 63 U/L (ref 39–117)
ALT SERPL W P-5'-P-CCNC: 13 U/L (ref 1–33)
ANION GAP SERPL CALCULATED.3IONS-SCNC: 5.1 MMOL/L (ref 5–15)
AST SERPL-CCNC: 28 U/L (ref 1–32)
BASOPHILS # BLD AUTO: 0.02 10*3/MM3 (ref 0–0.2)
BASOPHILS NFR BLD AUTO: 0.2 % (ref 0–1.5)
BILIRUB SERPL-MCNC: 0.2 MG/DL (ref 0–1.2)
BUN SERPL-MCNC: 6 MG/DL (ref 8–23)
BUN/CREAT SERPL: 9 (ref 7–25)
CALCIUM SPEC-SCNC: 7.5 MG/DL (ref 8.6–10.5)
CHLORIDE SERPL-SCNC: 101 MMOL/L (ref 98–107)
CO2 SERPL-SCNC: 28.9 MMOL/L (ref 22–29)
CREAT SERPL-MCNC: 0.67 MG/DL (ref 0.57–1)
DEPRECATED RDW RBC AUTO: 57.2 FL (ref 37–54)
EGFRCR SERPLBLD CKD-EPI 2021: 96.5 ML/MIN/1.73
EOSINOPHIL # BLD AUTO: 0.02 10*3/MM3 (ref 0–0.4)
EOSINOPHIL NFR BLD AUTO: 0.2 % (ref 0.3–6.2)
ERYTHROCYTE [DISTWIDTH] IN BLOOD BY AUTOMATED COUNT: 16.9 % (ref 12.3–15.4)
GLOBULIN UR ELPH-MCNC: 3.7 GM/DL
GLUCOSE SERPL-MCNC: 106 MG/DL (ref 65–99)
HCT VFR BLD AUTO: 27 % (ref 34–46.6)
HGB BLD-MCNC: 9 G/DL (ref 12–15.9)
IMM GRANULOCYTES # BLD AUTO: 0.08 10*3/MM3 (ref 0–0.05)
IMM GRANULOCYTES NFR BLD AUTO: 0.7 % (ref 0–0.5)
LYMPHOCYTES # BLD AUTO: 0.93 10*3/MM3 (ref 0.7–3.1)
LYMPHOCYTES NFR BLD AUTO: 8 % (ref 19.6–45.3)
MCH RBC QN AUTO: 33.3 PG (ref 26.6–33)
MCHC RBC AUTO-ENTMCNC: 33.3 G/DL (ref 31.5–35.7)
MCV RBC AUTO: 100 FL (ref 79–97)
MONOCYTES # BLD AUTO: 0.64 10*3/MM3 (ref 0.1–0.9)
MONOCYTES NFR BLD AUTO: 5.5 % (ref 5–12)
NEUTROPHILS NFR BLD AUTO: 85.4 % (ref 42.7–76)
NEUTROPHILS NFR BLD AUTO: 9.89 10*3/MM3 (ref 1.7–7)
PLATELET # BLD AUTO: 524 10*3/MM3 (ref 140–450)
PMV BLD AUTO: 8.8 FL (ref 6–12)
POTASSIUM SERPL-SCNC: 3.8 MMOL/L (ref 3.5–5.2)
PROT SERPL-MCNC: 6.3 G/DL (ref 6–8.5)
RBC # BLD AUTO: 2.7 10*6/MM3 (ref 3.77–5.28)
SODIUM SERPL-SCNC: 135 MMOL/L (ref 136–145)
T4 FREE SERPL-MCNC: 0.96 NG/DL (ref 0.93–1.7)
TSH SERPL DL<=0.05 MIU/L-ACNC: 2.63 UIU/ML (ref 0.27–4.2)
WBC NRBC COR # BLD: 11.58 10*3/MM3 (ref 3.4–10.8)

## 2022-10-10 PROCEDURE — 84439 ASSAY OF FREE THYROXINE: CPT | Performed by: INTERNAL MEDICINE

## 2022-10-10 PROCEDURE — 85025 COMPLETE CBC W/AUTO DIFF WBC: CPT | Performed by: INTERNAL MEDICINE

## 2022-10-10 PROCEDURE — 36591 DRAW BLOOD OFF VENOUS DEVICE: CPT

## 2022-10-10 PROCEDURE — 80053 COMPREHEN METABOLIC PANEL: CPT | Performed by: INTERNAL MEDICINE

## 2022-10-10 PROCEDURE — 84443 ASSAY THYROID STIM HORMONE: CPT | Performed by: INTERNAL MEDICINE

## 2022-10-10 PROCEDURE — G0463 HOSPITAL OUTPT CLINIC VISIT: HCPCS

## 2022-10-10 PROCEDURE — 99214 OFFICE O/P EST MOD 30 MIN: CPT | Performed by: INTERNAL MEDICINE

## 2022-10-10 PROCEDURE — 25010000002 HEPARIN LOCK FLUSH PER 10 UNITS: Performed by: INTERNAL MEDICINE

## 2022-10-10 RX ORDER — SODIUM CHLORIDE 0.9 % (FLUSH) 0.9 %
20 SYRINGE (ML) INJECTION AS NEEDED
Status: CANCELLED | OUTPATIENT
Start: 2022-10-10

## 2022-10-10 RX ORDER — HEPARIN SODIUM (PORCINE) LOCK FLUSH IV SOLN 100 UNIT/ML 100 UNIT/ML
500 SOLUTION INTRAVENOUS AS NEEDED
Status: CANCELLED | OUTPATIENT
Start: 2022-10-10

## 2022-10-10 RX ORDER — SODIUM CHLORIDE 0.9 % (FLUSH) 0.9 %
20 SYRINGE (ML) INJECTION AS NEEDED
Status: DISCONTINUED | OUTPATIENT
Start: 2022-10-10 | End: 2022-10-11 | Stop reason: HOSPADM

## 2022-10-10 RX ORDER — SODIUM CHLORIDE 9 MG/ML
250 INJECTION, SOLUTION INTRAVENOUS ONCE
Status: CANCELLED | OUTPATIENT
Start: 2022-10-11

## 2022-10-10 RX ORDER — HEPARIN SODIUM (PORCINE) LOCK FLUSH IV SOLN 100 UNIT/ML 100 UNIT/ML
500 SOLUTION INTRAVENOUS AS NEEDED
Status: DISCONTINUED | OUTPATIENT
Start: 2022-10-10 | End: 2022-10-11 | Stop reason: HOSPADM

## 2022-10-10 RX ADMIN — HEPARIN 500 UNITS: 100 SYRINGE at 13:08

## 2022-10-10 RX ADMIN — Medication 20 ML: at 13:08

## 2022-10-10 NOTE — ASSESSMENT & PLAN NOTE
Secondary to bilateral adrenal metastatic disease.  She is on Florinef and prednisone.  Blood pressure is better as is the sodium.  Continue both medications indefinitely.

## 2022-10-10 NOTE — ASSESSMENT & PLAN NOTE
Patient started on Eliquis in the hospital.  Tolerating well.  She does have some financial concerns regarding the Eliquis and this office will put her in touch with financial assistance.  Given her underlying active bleed NC, I would continue anticoagulation indefinitely

## 2022-10-10 NOTE — ASSESSMENT & PLAN NOTE
Metastatic.  Patient is on immunotherapy.  Good response to treatment thus far based on her most recent scans.  She was hospitalized recently for Klebsiella pneumonia but is now completed a course of antibiotics.  She is anxious to resume treatment.  She was found to have low cortisol levels from bilateral adrenal involvement.  She is on low-dose prednisone and Florinef daily.  This dose is not high enough to interfere with immunotherapy.  Lab work is adequate for treatment.  Proceed with nivolumab as scheduled.  I will see her back in 4 weeks for OV, nivolumab with lab work prior to monitor for toxicities.

## 2022-10-10 NOTE — PROGRESS NOTES
Chief Complaint  Lung Cancer    Anuradha Hope MD Patel, Jahin, MD    Subjective          Linda Ortiz presents to [facility]EMAT[facility] for consideration of ongoing therapy for her metastatic lung cancer.  She is on immunotherapy.  Since her last visit, she was hospitalized for Klebsiella pneumonia.  During that hospitalization, also found to have pulmonary emboli as well as hypoadrenalism.  She was started on Florinef and prednisone.  She is taking her medication as prescribed.  She denies excessive bruising or bleeding.  She does note that the Eliquis was quite expensive and she will have trouble paying for it.  She notes that her appetite is better.  Her energy level is improving.  She is anxious to resume cancer treatment.    Oncology/Hematology History Overview Note   5/19/2022 Left Adrenal mass biopsy at [facility] revealed: poorly differentiated non-small cell carcinoma, high grade. Positive fo AE1/3 and Cam5.2. Immunohistochemistry was positive for PAX8, HeParlGlypican3, Vimentin, focal positive for chromagranin, GATA3.  The case was sent to [facility] for second opinion with a final diagnosis of poorly differentiated carcinoma with diffuse reduced BRG-1 expression.    6/21/2022 XRT initiated to Right hand     7/19/2022 Opdivo + Yervoy initiated x 4 doses  10/11/2022 Opdivo only          Malignant neoplasm of upper lobe of left lung (HCC)   6/9/2022 Initial Diagnosis    Lung cancer (HCC)     7/19/2022 -  Chemotherapy    OP LUNG Nivolumab 1 mg/kg / Ipilimumab 3 mg/kg / Nivolumab 480 mg      Secondary carcinoid tumor of bone (HCC) (Resolved)   6/14/2022 Initial Diagnosis    Secondary carcinoid tumor of bone (HCC)     6/20/2022 -  Radiation    RADIATION THERAPY Treatment Details (Noted on 6/14/2022)  Site: Right Hand  Technique: 3D CRT  Goal: No goal specified  Planned Treatment Start Date: 6/20/2022         Review of Systems   Constitutional: Positive for fatigue. Negative for  appetite change, diaphoresis, fever, unexpected weight gain and unexpected weight loss.   HENT: Negative for hearing loss, mouth sores, sore throat, swollen glands, trouble swallowing and voice change.    Eyes: Negative for blurred vision.   Respiratory: Negative for cough, shortness of breath and wheezing.    Cardiovascular: Negative for chest pain and palpitations.   Gastrointestinal: Negative for abdominal pain, blood in stool, constipation, diarrhea, nausea and vomiting.   Endocrine: Negative for cold intolerance and heat intolerance.   Genitourinary: Negative for difficulty urinating, dysuria, frequency, hematuria and urinary incontinence.   Musculoskeletal: Negative for arthralgias, back pain and myalgias.   Skin: Negative for rash, skin lesions and wound.   Neurological: Positive for weakness. Negative for dizziness, seizures, numbness and headache.   Hematological: Does not bruise/bleed easily.   Psychiatric/Behavioral: Negative for depressed mood. The patient is not nervous/anxious.      Current Outpatient Medications on File Prior to Visit   Medication Sig Dispense Refill   • acetaminophen (TYLENOL) 325 MG tablet Take 650 mg by mouth Every 6 (Six) Hours As Needed for Mild Pain .     • apixaban (ELIQUIS) 5 MG tablet tablet Take 1 tablet by mouth 2 (Two) Times a Day. Indications: DVT/PE (active thrombosis) 60 tablet 0   • famotidine (PEPCID) 20 MG tablet Take 1 tablet by mouth 2 (Two) Times a Day Before Meals for 30 days. 60 tablet 0   • fludrocortisone 0.1 MG tablet Take 0.5 tablets by mouth Daily for 30 days. 15 tablet 0   • fluticasone-salmeterol (Advair HFA) 115-21 MCG/ACT inhaler Inhale 2 puffs 2 (Two) Times a Day. 1 each 0   • furosemide (Lasix) 20 MG tablet Take 1 tablet by mouth Daily for 10 days. 10 tablet 0   • guaiFENesin (MUCINEX) 600 MG 12 hr tablet Take 2 tablets by mouth Every 12 (Twelve) Hours for 30 days. 120 tablet 0   • hydrOXYzine (ATARAX) 25 MG tablet Take 1 tablet by mouth 3 (Three)  Times a Day As Needed for Anxiety for up to 10 days. 30 tablet 0   • predniSONE (DELTASONE) 10 MG tablet Take 1 tablet by mouth Daily With Breakfast for 30 days. 30 tablet 0   • saccharomyces boulardii (FLORASTOR) 250 MG capsule Take 1 capsule by mouth 2 (Two) Times a Day for 30 days. 60 capsule 0   • [] apixaban (ELIQUIS) 5 MG tablet tablet Take 2 tablets by mouth 2 (Two) Times a Day for 7 doses. Indications: DVT/PE (active thrombosis) 14 tablet 0     Current Facility-Administered Medications on File Prior to Visit   Medication Dose Route Frequency Provider Last Rate Last Admin   • heparin injection 500 Units  500 Units Intravenous PRN Phani Hernández MD   500 Units at 10/10/22 1308   • sodium chloride 0.9 % flush 20 mL  20 mL Intravenous PRN Phani Hernández MD   20 mL at 10/10/22 1308       Allergies   Allergen Reactions   • Hydrocodone Nausea And Vomiting and Dizziness     Past Medical History:   Diagnosis Date   • Bone cancer (HCC) 2022   • 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   • Hypocalcemia 2022   • Malignant neoplasm of upper lobe of left lung (HCC) 2022   • Metastatic cancer (HCC)    • Multiple subsegmental pulmonary emboli without acute cor pulmonale (HCC) 10/10/2022     Past Surgical History:   Procedure Laterality Date   • BRONCHOSCOPY N/A 2022    Procedure: BRONCHOSCOPY WITH BAL, WASHINGS;  Surgeon: Fausto Leon MD;  Location: [facility] ENDOSCOPY;  Service: Pulmonary;  Laterality: N/A;  PNEUMONIA, PERSISTANT COUGH   • HIP SURGERY Bilateral    • KNEE ARTHROSCOPY     • VENOUS ACCESS DEVICE (PORT) INSERTION N/A 7/15/2022    Procedure: INSERTION VENOUS ACCESS DEVICE;  Surgeon: Eber Hull MD;  Location: [facility] OR [facility];  Service: General;  Laterality: N/A;     Social History     Socioeconomic History   • Marital status: Single   Tobacco Use   • Smoking status: Former     Packs/day: 1.00     Types: Cigarettes     Start date:      Quit date: 2022     Years since quitting:  0.6   • Smokeless tobacco: Never   Vaping Use   • Vaping Use: Former   • Start date: 5/30/2013   • Quit date: 5/30/2014   • Substances: Nicotine, Flavoring   Substance and Sexual Activity   • Alcohol use: Not Currently   • Drug use: Never   • Sexual activity: Defer     Family History   Problem Relation Age of Onset   • Breast cancer Mother    • Lung cancer Father    • Thyroid cancer Maternal Aunt    • Breast cancer Maternal Aunt    • Lung cancer Paternal Aunt    • COPD Paternal Uncle        Objective   Physical Exam  Vitals reviewed. Exam conducted with a chaperone present.   Constitutional:       General: She is not in acute distress.     Appearance: Normal appearance.   Cardiovascular:      Rate and Rhythm: Normal rate and regular rhythm.      Heart sounds: Normal heart sounds. No murmur heard.    No gallop.   Pulmonary:      Effort: Pulmonary effort is normal.      Breath sounds: Normal breath sounds.      Comments: Port-A-Cath  Abdominal:      General: Abdomen is flat. Bowel sounds are normal. There is no distension.      Palpations: Abdomen is soft.      Tenderness: There is no abdominal tenderness.   Musculoskeletal:      Cervical back: Neck supple.      Right lower leg: No edema.      Left lower leg: No edema.   Lymphadenopathy:      Cervical: No cervical adenopathy.   Neurological:      Mental Status: She is alert and oriented to person, place, and time.   Psychiatric:         Mood and Affect: Mood normal.         Behavior: Behavior normal.         Vitals:    10/10/22 1316   BP: 109/58   Pulse: 76   Resp: 18   Temp: 98.6 °F (37 °C)   SpO2: 99%   Weight: 72.5 kg (159 lb 13.3 oz)   PainSc:   2   PainLoc: Hand     ECOG score: 2         PHQ-9 Total Score:                    Result Review :   The following data was reviewed by: Phani Hernández MD on 10/10/2022:  Lab Results   Component Value Date    HGB 9.0 (L) 10/10/2022    HCT 27.0 (L) 10/10/2022    .0 (H) 10/10/2022     (H) 10/10/2022    WBC  11.58 (H) 10/10/2022    NEUTROABS 9.89 (H) 10/10/2022    LYMPHSABS 0.93 10/10/2022    MONOSABS 0.64 10/10/2022    EOSABS 0.02 10/10/2022    BASOSABS 0.02 10/10/2022     Lab Results   Component Value Date    GLUCOSE 106 (H) 10/10/2022    BUN 6 (L) 10/10/2022    CREATININE 0.67 10/10/2022     (L) 10/10/2022    K 3.8 10/10/2022     10/10/2022    CO2 28.9 10/10/2022    CALCIUM 7.5 (L) 10/10/2022    PROTEINTOT 6.3 10/10/2022    ALBUMIN 2.64 (L) 10/10/2022    BILITOT 0.2 10/10/2022    ALKPHOS 63 10/10/2022    AST 28 10/10/2022    ALT 13 10/10/2022     Lab Results   Component Value Date    MG 2.3 10/05/2022    PHOS 3.1 10/05/2022    FREET4 0.59 (L) 09/19/2022    TSH 0.593 09/27/2022       Data reviewed: Inpatient records reviewed including H&P, discharge summary, consultation reports, scans and lab studies      Assessment and Plan    Diagnoses and all orders for this visit:    1. Malignant neoplasm of upper lobe of left lung (HCC) (Primary)  Assessment & Plan:  Metastatic.  Patient is on immunotherapy.  Good response to treatment thus far based on her most recent scans.  She was hospitalized recently for Klebsiella pneumonia but is now completed a course of antibiotics.  She is anxious to resume treatment.  She was found to have low cortisol levels from bilateral adrenal involvement.  She is on low-dose prednisone and Florinef daily.  This dose is not high enough to interfere with immunotherapy.  Lab work is adequate for treatment.  Proceed with nivolumab as scheduled.  I will see her back in 4 weeks for OV, nivolumab with lab work prior to monitor for toxicities.      2. Multiple subsegmental pulmonary emboli without acute cor pulmonale (HCC)  Assessment & Plan:  Patient started on Eliquis in the hospital.  Tolerating well.  She does have some financial concerns regarding the Eliquis and this office will put her in touch with financial assistance.  Given her underlying active bleed NC, I would continue  anticoagulation indefinitely      3. Hypoadrenalism (HCC)  Assessment & Plan:  Secondary to bilateral adrenal metastatic disease.  She is on Florinef and prednisone.  Blood pressure is better as is the sodium.  Continue both medications indefinitely.        Other orders  -     sodium chloride 0.9 % infusion 250 mL  -     Nivolumab (OPDIVO) 480 mg in sodium chloride 0.9 % 148 mL chemo IVPB          Patient Follow Up: 4 weeks    Patient was given instructions and counseling regarding her condition or for health maintenance advice. Please see specific information pulled into the AVS if appropriate.     Phani Hernández MD    10/10/2022

## 2022-10-11 ENCOUNTER — OFFICE VISIT (OUTPATIENT)
Dept: FAMILY MEDICINE CLINIC | Facility: CLINIC | Age: 67
End: 2022-10-11

## 2022-10-11 ENCOUNTER — HOSPITAL ENCOUNTER (OUTPATIENT)
Dept: ONCOLOGY | Facility: HOSPITAL | Age: 67
Setting detail: INFUSION SERIES
Discharge: HOME | End: 2022-10-11

## 2022-10-11 VITALS
HEIGHT: 64 IN | SYSTOLIC BLOOD PRESSURE: 130 MMHG | RESPIRATION RATE: 19 BRPM | HEART RATE: 65 BPM | OXYGEN SATURATION: 97 % | BODY MASS INDEX: 27.47 KG/M2 | WEIGHT: 160.9 LBS | TEMPERATURE: 97.1 F | DIASTOLIC BLOOD PRESSURE: 74 MMHG

## 2022-10-11 VITALS
SYSTOLIC BLOOD PRESSURE: 124 MMHG | HEIGHT: 64 IN | RESPIRATION RATE: 18 BRPM | HEART RATE: 89 BPM | TEMPERATURE: 98.4 F | WEIGHT: 159.61 LBS | DIASTOLIC BLOOD PRESSURE: 62 MMHG | BODY MASS INDEX: 27.25 KG/M2 | OXYGEN SATURATION: 100 %

## 2022-10-11 DIAGNOSIS — C34.90 METASTATIC NON-SMALL CELL LUNG CANCER: ICD-10-CM

## 2022-10-11 DIAGNOSIS — I26.99 OTHER ACUTE PULMONARY EMBOLISM, UNSPECIFIED WHETHER ACUTE COR PULMONALE PRESENT: ICD-10-CM

## 2022-10-11 DIAGNOSIS — Z76.89 ENCOUNTER TO ESTABLISH CARE: Primary | ICD-10-CM

## 2022-10-11 DIAGNOSIS — C34.12 MALIGNANT NEOPLASM OF UPPER LOBE OF LEFT LUNG: Primary | ICD-10-CM

## 2022-10-11 DIAGNOSIS — E27.40 HYPOADRENALISM: ICD-10-CM

## 2022-10-11 DIAGNOSIS — R60.1 ANASARCA: ICD-10-CM

## 2022-10-11 DIAGNOSIS — E27.40 ADRENAL INSUFFICIENCY: ICD-10-CM

## 2022-10-11 DIAGNOSIS — R73.01 IMPAIRED FASTING BLOOD SUGAR: ICD-10-CM

## 2022-10-11 DIAGNOSIS — E78.2 MIXED HYPERLIPIDEMIA: ICD-10-CM

## 2022-10-11 DIAGNOSIS — Z45.2 ENCOUNTER FOR ADJUSTMENT OR MANAGEMENT OF VASCULAR ACCESS DEVICE: ICD-10-CM

## 2022-10-11 DIAGNOSIS — J90 PLEURAL EFFUSION: ICD-10-CM

## 2022-10-11 PROCEDURE — 25010000002 NIVOLUMAB 240 MG/24ML SOLUTION 24 ML VIAL: Performed by: INTERNAL MEDICINE

## 2022-10-11 PROCEDURE — 96413 CHEMO IV INFUSION 1 HR: CPT

## 2022-10-11 PROCEDURE — 25010000002 HEPARIN LOCK FLUSH PER 10 UNITS: Performed by: INTERNAL MEDICINE

## 2022-10-11 PROCEDURE — 99204 OFFICE O/P NEW MOD 45 MIN: CPT | Performed by: STUDENT IN AN ORGANIZED HEALTH CARE EDUCATION/TRAINING PROGRAM

## 2022-10-11 RX ORDER — HEPARIN SODIUM (PORCINE) LOCK FLUSH IV SOLN 100 UNIT/ML 100 UNIT/ML
500 SOLUTION INTRAVENOUS AS NEEDED
Status: CANCELLED | OUTPATIENT
Start: 2022-10-11

## 2022-10-11 RX ORDER — SODIUM CHLORIDE 0.9 % (FLUSH) 0.9 %
20 SYRINGE (ML) INJECTION AS NEEDED
Status: CANCELLED | OUTPATIENT
Start: 2022-10-11

## 2022-10-11 RX ORDER — HEPARIN SODIUM (PORCINE) LOCK FLUSH IV SOLN 100 UNIT/ML 100 UNIT/ML
500 SOLUTION INTRAVENOUS AS NEEDED
Status: DISCONTINUED | OUTPATIENT
Start: 2022-10-11 | End: 2022-10-12 | Stop reason: HOSPADM

## 2022-10-11 RX ORDER — SODIUM CHLORIDE 0.9 % (FLUSH) 0.9 %
20 SYRINGE (ML) INJECTION AS NEEDED
Status: DISCONTINUED | OUTPATIENT
Start: 2022-10-11 | End: 2022-10-12 | Stop reason: HOSPADM

## 2022-10-11 RX ORDER — SODIUM CHLORIDE 9 MG/ML
250 INJECTION, SOLUTION INTRAVENOUS ONCE
Status: COMPLETED | OUTPATIENT
Start: 2022-10-11 | End: 2022-10-11

## 2022-10-11 RX ADMIN — SODIUM CHLORIDE 250 ML: 9 INJECTION, SOLUTION INTRAVENOUS at 14:26

## 2022-10-11 RX ADMIN — Medication 20 ML: at 15:22

## 2022-10-11 RX ADMIN — HEPARIN 500 UNITS: 100 SYRINGE at 15:22

## 2022-10-11 RX ADMIN — SODIUM CHLORIDE 480 MG: 9 INJECTION, SOLUTION INTRAVENOUS at 14:45

## 2022-10-11 NOTE — PROGRESS NOTES
"Chief Complaint  Establishing care for comorbidities/lung cancer/pleural effusion/pneumonia and hospital discharge    Subjective         Linda Ortiz is a 67 y.o. female who presents to [facility]    67 years old female comes to the clinic today to establish care and follow-up on multiple concerns/hospital discharge.    Patient was recently hospitalized due to respiratory insufficiency.  Patient was found to have pneumonia/PE/adrenal insufficiency.    Patient is following up with hematologist for metastatic non-small cell lung cancer, anticoagulation management and adrenal insufficiency.    Patient has pending appointment with pulmonary/nephro and possibly cardiologist.    Patient currently asymptomatic, breathing without any oxygen.  Reports no acute concerns and doing well after discharge.    Tolerating p.o. intake without any difficulties, still has +1 lower extremity edema but denies any acute changes.  Tolerating Eliquis and other medications very well.    Objective   Vital Signs:   Vitals:    10/11/22 0818   BP: 130/74   Pulse: 65   Resp: 19   Temp: 97.1 °F (36.2 °C)   SpO2: 97%   Weight: 73 kg (160 lb 14.4 oz)   Height: 162.6 cm (64\")      Body mass index is 27.62 kg/m².   Wt Readings from Last 3 Encounters:   10/11/22 73 kg (160 lb 14.4 oz)   10/10/22 72.5 kg (159 lb 13.3 oz)   09/27/22 66.2 kg (145 lb 15.1 oz)      BP Readings from Last 3 Encounters:   10/11/22 130/74   10/10/22 109/58   10/05/22 96/69        Patient Care Team:  Anuradha Hope MD as PCP - General (Family Medicine)  Phani Hernández MD as Consulting Physician (Hematology and Oncology)  Kia Morrison APRN as Nurse Practitioner (Nurse Practitioner)     Physical Exam  Vitals reviewed.   Constitutional:       Appearance: Normal appearance. She is well-developed.   HENT:      Head: Normocephalic and atraumatic.      Right Ear: External ear normal.      Left Ear: External ear normal.      Mouth/Throat: "      Pharynx: No oropharyngeal exudate.   Eyes:      Conjunctiva/sclera: Conjunctivae normal.      Pupils: Pupils are equal, round, and reactive to light.   Cardiovascular:      Rate and Rhythm: Normal rate and regular rhythm.      Heart sounds: No murmur heard.    No friction rub. No gallop.   Pulmonary:      Effort: Pulmonary effort is normal.      Breath sounds: Normal breath sounds. No wheezing or rhonchi.   Abdominal:      General: Bowel sounds are normal. There is no distension.      Palpations: Abdomen is soft.      Tenderness: There is no abdominal tenderness.   Skin:     General: Skin is warm and dry.      Comments: +1 lower extremity edema   Neurological:      Mental Status: She is alert and oriented to person, place, and time.      Cranial Nerves: No cranial nerve deficit.   Psychiatric:         Mood and Affect: Mood and affect normal.         Behavior: Behavior normal.         Thought Content: Thought content normal.         Judgment: Judgment normal.                       Assessment and Plan   Diagnoses and all orders for this visit:    1. Encounter to establish care (Primary)  -     TSH Rfx On Abnormal To Free T4; Future  -     CBC & Differential; Future  -     Comprehensive Metabolic Panel; Future  -     Hemoglobin A1c; Future  -     Lipid Panel; Future  -     Vitamin B12; Future  -     Folate; Future    2. Adrenal insufficiency (HCC)  Comments:  Continue with fludrocortisone and prednisone, stable blood pressure today  Orders:  -     TSH Rfx On Abnormal To Free T4; Future  -     CBC & Differential; Future  -     Comprehensive Metabolic Panel; Future  -     Hemoglobin A1c; Future  -     Lipid Panel; Future  -     Vitamin B12; Future  -     Folate; Future    3. Metastatic non-small cell lung cancer (HCC)  Comments:  Hematologist recommendations reviewed and appreciated;  Orders:  -     TSH Rfx On Abnormal To Free T4; Future  -     CBC & Differential; Future  -     Comprehensive Metabolic Panel;  Future  -     Hemoglobin A1c; Future  -     Lipid Panel; Future  -     Vitamin B12; Future  -     Folate; Future    4. Pleural effusion  Comments:  Asymptomatic, patient has appointment with pulmonary this week; further management as per pulmonary  Orders:  -     TSH Rfx On Abnormal To Free T4; Future  -     CBC & Differential; Future  -     Comprehensive Metabolic Panel; Future  -     Hemoglobin A1c; Future  -     Lipid Panel; Future  -     Vitamin B12; Future  -     Folate; Future    5. Other acute pulmonary embolism, unspecified whether acute cor pulmonale present (HCC)  Comments:  On Eliquis, will continue indefinitely as recommended by hematologist  Orders:  -     TSH Rfx On Abnormal To Free T4; Future  -     CBC & Differential; Future  -     Comprehensive Metabolic Panel; Future  -     Hemoglobin A1c; Future  -     Lipid Panel; Future  -     Vitamin B12; Future  -     Folate; Future    6. Anasarca  Comments:  Lifestyle modifications discussed, patient did have hyponatremia in the past as well,  Orders:  -     TSH Rfx On Abnormal To Free T4; Future  -     CBC & Differential; Future  -     Comprehensive Metabolic Panel; Future  -     Hemoglobin A1c; Future  -     Lipid Panel; Future  -     Vitamin B12; Future  -     Folate; Future    7. Hypoadrenalism (HCC)  -     TSH Rfx On Abnormal To Free T4; Future  -     CBC & Differential; Future  -     Comprehensive Metabolic Panel; Future  -     Hemoglobin A1c; Future  -     Lipid Panel; Future  -     Vitamin B12; Future  -     Folate; Future    8. Impaired fasting blood sugar  -     Hemoglobin A1c; Future  -     Lipid Panel; Future    9. Mixed hyperlipidemia  -     Lipid Panel; Future          Tobacco Use: Medium Risk   • Smoking Tobacco Use: Former   • Smokeless Tobacco Use: Never   • Passive Exposure: Not on file            Follow Up   Return in about 22 days (around 11/2/2022) for Medicare Wellness.  Patient was given instructions and counseling regarding her  condition or for health maintenance advice. Please see specific information pulled into the AVS if appropriate.

## 2022-10-14 ENCOUNTER — OFFICE VISIT (OUTPATIENT)
Dept: PULMONOLOGY | Facility: CLINIC | Age: 67
End: 2022-10-14

## 2022-10-14 VITALS
RESPIRATION RATE: 16 BRPM | TEMPERATURE: 98.2 F | BODY MASS INDEX: 27.14 KG/M2 | SYSTOLIC BLOOD PRESSURE: 137 MMHG | HEIGHT: 64 IN | DIASTOLIC BLOOD PRESSURE: 76 MMHG | HEART RATE: 86 BPM | WEIGHT: 159 LBS | OXYGEN SATURATION: 99 %

## 2022-10-14 DIAGNOSIS — A41.9 SEPTIC SHOCK: ICD-10-CM

## 2022-10-14 DIAGNOSIS — J96.01 ACUTE RESPIRATORY FAILURE WITH HYPOXIA: ICD-10-CM

## 2022-10-14 DIAGNOSIS — F17.211 NICOTINE DEPENDENCE, CIGARETTES, IN REMISSION: ICD-10-CM

## 2022-10-14 DIAGNOSIS — R05.8 PRODUCTIVE COUGH: ICD-10-CM

## 2022-10-14 DIAGNOSIS — R06.09 DYSPNEA ON EXERTION: ICD-10-CM

## 2022-10-14 DIAGNOSIS — E27.40 HYPOADRENALISM: ICD-10-CM

## 2022-10-14 DIAGNOSIS — J90 PLEURAL EFFUSION: ICD-10-CM

## 2022-10-14 DIAGNOSIS — C34.12 MALIGNANT NEOPLASM OF UPPER LOBE OF LEFT LUNG: ICD-10-CM

## 2022-10-14 DIAGNOSIS — R53.1 WEAKNESS: ICD-10-CM

## 2022-10-14 DIAGNOSIS — J15.0 PNEUMONIA DUE TO KLEBSIELLA PNEUMONIAE, UNSPECIFIED LATERALITY, UNSPECIFIED PART OF LUNG: ICD-10-CM

## 2022-10-14 DIAGNOSIS — J44.9 CHRONIC OBSTRUCTIVE PULMONARY DISEASE, UNSPECIFIED COPD TYPE: ICD-10-CM

## 2022-10-14 DIAGNOSIS — R65.21 SEPTIC SHOCK: ICD-10-CM

## 2022-10-14 DIAGNOSIS — I26.94 MULTIPLE SUBSEGMENTAL PULMONARY EMBOLI WITHOUT ACUTE COR PULMONALE: Primary | ICD-10-CM

## 2022-10-14 DIAGNOSIS — M79.89 LEG SWELLING: ICD-10-CM

## 2022-10-14 PROCEDURE — 1111F DSCHRG MED/CURRENT MED MERGE: CPT | Performed by: NURSE PRACTITIONER

## 2022-10-14 PROCEDURE — 99215 OFFICE O/P EST HI 40 MIN: CPT | Performed by: NURSE PRACTITIONER

## 2022-10-14 RX ORDER — FLUTICASONE PROPIONATE AND SALMETEROL XINAFOATE 115; 21 UG/1; UG/1
2 AEROSOL, METERED RESPIRATORY (INHALATION)
Qty: 1 EACH | Refills: 11 | Status: SHIPPED | OUTPATIENT
Start: 2022-10-14 | End: 2023-01-31 | Stop reason: SDUPTHER

## 2022-10-14 RX ORDER — GUAIFENESIN 600 MG/1
1200 TABLET, EXTENDED RELEASE ORAL
COMMUNITY
Start: 2022-10-05 | End: 2022-10-14 | Stop reason: SDUPTHER

## 2022-10-14 RX ORDER — IPRATROPIUM BROMIDE AND ALBUTEROL SULFATE 2.5; .5 MG/3ML; MG/3ML
3 SOLUTION RESPIRATORY (INHALATION) 4 TIMES DAILY PRN
Qty: 360 ML | Refills: 3 | Status: SHIPPED | OUTPATIENT
Start: 2022-10-14 | End: 2023-01-31 | Stop reason: SDUPTHER

## 2022-10-14 RX ORDER — FLUDROCORTISONE ACETATE 0.1 MG/1
0.05 TABLET ORAL DAILY
Qty: 15 TABLET | Refills: 2 | Status: SHIPPED | OUTPATIENT
Start: 2022-10-14 | End: 2023-01-03

## 2022-10-14 RX ORDER — FLUTICASONE PROPIONATE AND SALMETEROL XINAFOATE 115; 21 UG/1; UG/1
2 AEROSOL, METERED RESPIRATORY (INHALATION) 2 TIMES DAILY
COMMUNITY
Start: 2022-10-05 | End: 2022-10-14 | Stop reason: SDUPTHER

## 2022-10-14 RX ORDER — PREDNISONE 10 MG/1
10 TABLET ORAL DAILY
Qty: 30 TABLET | Refills: 0 | COMMUNITY
Start: 2022-10-06 | End: 2022-10-14 | Stop reason: SDUPTHER

## 2022-10-14 RX ORDER — PREDNISONE 10 MG/1
10 TABLET ORAL
Qty: 30 TABLET | Refills: 2 | Status: SHIPPED | OUTPATIENT
Start: 2022-10-14 | End: 2023-01-31

## 2022-10-14 RX ORDER — HYDROXYZINE HYDROCHLORIDE 25 MG/1
25 TABLET, FILM COATED ORAL
COMMUNITY
Start: 2022-10-05 | End: 2022-10-14 | Stop reason: SDUPTHER

## 2022-10-14 RX ORDER — ACETAMINOPHEN 325 MG/1
650 TABLET ORAL
COMMUNITY
End: 2022-10-14 | Stop reason: SDUPTHER

## 2022-10-14 RX ORDER — FAMOTIDINE 20 MG/1
20 TABLET, FILM COATED ORAL
COMMUNITY
Start: 2022-10-05 | End: 2022-10-14 | Stop reason: SDUPTHER

## 2022-10-14 NOTE — PROGRESS NOTES
Primary Care Provider  Anuradha Hope MD     Referring Provider  No ref. provider found     Chief Complaint  Cough and Hospital Follow Up Visit (weakness)    Subjective          Linda Ortiz presents to [facility] PULMONARY & CRITICAL CARE MEDICINE  History of Present Illness  Linda Ortiz is a 67 y.o. female patient recently admitted to [facility] on 9/27/2022 and discharged on 10/5/2022.  She is here for a post hospital follow-up visit.    Per patient's discharge summary she has a medical history last consisting of metastatic lung cancer on Opdivo and Yervoy therapy.  She was hospitalized for hyponatremia, Klebsiella pneumonia, adrenal insufficiency, adrenal crisis, septic shock, hypothermia and concern for UTI.  Patient was transferred to the intensive care unit for septic shock management and to have vasopressors weaned.  Patient had a bronchoscopy with Dr. Leon on 9/30/2022.  Cytology was negative for malignant cells but pneumonia panel did show Klebsiella.  A chest CT was performed on 9/30/2022 and it was found the patient did have a pulmonary embolism in the distal segmental and proximal subsegmental right lower lobe and she was started on anticoagulation.  She also had pericardial and bilateral pleural effusions.  There is also some concern for aspiration and speech therapy was consulted for further evaluation.  Patient's diet was adjusted after a modified barium swallow study was performed.  Patient was found to have profound adrenal insufficiency and she was treated with prednisone and fludrocortisone.  Patient was hemodynamically stable with home oxygen at time of discharge on 10/5/2022.  She was given medications of prednisone, fludrocortisone, Eliquis and supplemental oxygen.  She has had follow-ups with cardiology, nephrology, pulmonology, hematology/oncology and primary care within the next 1 to 4 weeks.  Patient's outpatient Lasix was resumed.  Patient is considered a  high risk for readmission due to poor prognosis and complexity of medical care.    Patient states she is doing okay since discharge from the hospital.  She states that things are starting to slowly get better.  She has been using her Advair as prescribed at discharge.  Her shortness of breath is mild in severity, worse with exertion and improved with rest.  She has a productive cough with she would like to continue nebulizer treatments that she was given in the hospital, she states that it helped to bring up sputum easier.  Patient states that she has never had a diagnosis of COPD, however she does have a 47-pack-year history of smoking and quit in 2022.  States that she also has a family history of COPD and lung cancer.  She continues to follow-up with oncology for management of left upper lobe lung cancer.  She states that she did complete Yervoy, but continues to take Opdivo.  After reviewing Dr. Hernández's last office note, he would continue her on anticoagulation indefinitely.  He would also continue patient's Florinef and prednisone indefinitely for her hypoadrenalism.  She has also seen Dr. Hope with primary care since discharge.  Due to patient's pleural effusion and pneumonia, we will repeat a chest CT in 6 weeks to evaluate for resolution.  Patient states that she believes her primary care will be refilling her medications.  Given the patient was only given a 1 month supply of each of her medications, I will send refills until she is able to get back into her primary care provider's office.  Patient states that she is also working with Dr. Hernández's office to seek financial assistance for her Eliquis.  Overall, patient states that she is doing okay and has no additional concerns at this time.  She is scheduled to see her cardiologist on Monday and the nephrologist on Thursday.  She is able to perform her ADLs as long as she paces herself.  She refuses vaccines at this time.     Her history of smoking is    Tobacco Use: Medium Risk   • Smoking Tobacco Use: Former   • Smokeless Tobacco Use: Never   • Passive Exposure: Not on file   .    Review of Systems   Constitutional: Negative for chills, fatigue, fever, unexpected weight gain and unexpected weight loss.   HENT: Negative for congestion (Nasal), hearing loss, mouth sores, nosebleeds, postnasal drip, sore throat and trouble swallowing.    Eyes: Negative for blurred vision and visual disturbance.   Respiratory: Positive for cough and shortness of breath. Negative for apnea and wheezing.         Negative for Hemoptysis     Cardiovascular: Positive for leg swelling. Negative for chest pain and palpitations.   Gastrointestinal: Negative for abdominal pain, constipation, diarrhea, nausea, vomiting and GERD.        Negative for Jaundice  Negative for Bloating  Negative for Melena   Musculoskeletal: Negative for joint swelling and myalgias.        Negative for Joint pain  Negative for Joint stiffness   Skin: Negative for color change.        Negative for cyanosis   Neurological: Positive for weakness. Negative for syncope, numbness and headache.      Sleep: Negative for Excessive daytime sleepiness  Negative for morning headaches  Negative for Snoring    Family History   Problem Relation Age of Onset   • Breast cancer Mother    • Lung cancer Father    • Thyroid cancer Maternal Aunt    • Breast cancer Maternal Aunt    • Lung cancer Paternal Aunt    • COPD Paternal Uncle         Social History     Socioeconomic History   • Marital status: Single   Tobacco Use   • Smoking status: Former     Packs/day: 1.00     Years: 47.00     Pack years: 47.00     Types: Cigarettes     Start date:      Quit date: 2022     Years since quittin.6   • Smokeless tobacco: Never   Vaping Use   • Vaping Use: Former   • Start date: 2013   • Quit date: 2014   Substance and Sexual Activity   • Alcohol use: Not Currently   • Drug use: Never   • Sexual activity: Defer        Past  Medical History:   Diagnosis Date   • Bone cancer (HCC) 05/2022   • 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   • Hypocalcemia 8/9/2022   • Malignant neoplasm of upper lobe of left lung (HCC) 06/09/2022   • Metastatic cancer (HCC)    • Multiple subsegmental pulmonary emboli without acute cor pulmonale (HCC) 10/10/2022          There is no immunization history on file for this patient.      Allergies   Allergen Reactions   • Hydrocodone Nausea And Vomiting and Dizziness          Current Outpatient Medications:   •  acetaminophen (TYLENOL) 325 MG tablet, Take 650 mg by mouth Every 6 (Six) Hours As Needed for Mild Pain ., Disp: , Rfl:   •  apixaban (ELIQUIS) 5 MG tablet tablet, Take 1 tablet by mouth Daily. Indications: DVT/PE (active thrombosis), Disp: 30 tablet, Rfl: 2  •  famotidine (PEPCID) 20 MG tablet, Take 1 tablet by mouth 2 (Two) Times a Day Before Meals for 30 days., Disp: 60 tablet, Rfl: 0  •  fludrocortisone 0.1 MG tablet, Take 0.5 tablets by mouth Daily., Disp: 15 tablet, Rfl: 2  •  fluticasone-salmeterol (Advair HFA) 115-21 MCG/ACT inhaler, Inhale 2 puffs 2 (Two) Times a Day., Disp: 1 each, Rfl: 11  •  guaiFENesin (MUCINEX) 600 MG 12 hr tablet, Take 2 tablets by mouth Every 12 (Twelve) Hours for 30 days., Disp: 120 tablet, Rfl: 0  •  hydrOXYzine (ATARAX) 25 MG tablet, Take 1 tablet by mouth 3 (Three) Times a Day As Needed for Anxiety for up to 10 days., Disp: 30 tablet, Rfl: 0  •  predniSONE (DELTASONE) 10 MG tablet, Take 1 tablet by mouth Daily With Breakfast., Disp: 30 tablet, Rfl: 2  •  saccharomyces boulardii (FLORASTOR) 250 MG capsule, Take 1 capsule by mouth 2 (Two) Times a Day for 30 days., Disp: 60 capsule, Rfl: 0  •  furosemide (Lasix) 20 MG tablet, Take 1 tablet by mouth Daily for 10 days., Disp: 10 tablet, Rfl: 0  •  ipratropium-albuterol (DUO-NEB) 0.5-2.5 mg/3 ml nebulizer, Take 3 mL by nebulization 4 (Four) Times a Day As Needed for Wheezing., Disp: 360 mL, Rfl: 3     Objective   Physical  "Exam  Constitutional:       General: She is not in acute distress.     Appearance: Normal appearance. She is normal weight.   HENT:      Right Ear: Hearing normal.      Left Ear: Hearing normal.      Nose: No nasal tenderness or congestion.      Mouth/Throat:      Mouth: Mucous membranes are moist. No oral lesions.   Eyes:      Extraocular Movements: Extraocular movements intact.      Pupils: Pupils are equal, round, and reactive to light.   Neck:      Thyroid: No thyroid mass or thyromegaly.   Cardiovascular:      Rate and Rhythm: Normal rate and regular rhythm.      Pulses: Normal pulses.      Heart sounds: Normal heart sounds. No murmur heard.  Pulmonary:      Effort: Pulmonary effort is normal.      Breath sounds: Examination of the left-lower field reveals decreased breath sounds. Decreased breath sounds present. No wheezing, rhonchi or rales.   Abdominal:      General: Bowel sounds are normal. There is no distension.      Palpations: Abdomen is soft.      Tenderness: There is no abdominal tenderness.   Musculoskeletal:      Cervical back: Neck supple.      Right lower le+ Pitting Edema present.      Left lower le+ Pitting Edema present.   Lymphadenopathy:      Cervical: No cervical adenopathy.      Upper Body:      Right upper body: No axillary adenopathy.   Skin:     General: Skin is warm and dry.      Findings: No lesion or rash.      Nails: There is clubbing.   Neurological:      General: No focal deficit present.      Mental Status: She is alert and oriented to person, place, and time.      Cranial Nerves: Cranial nerves are intact.   Psychiatric:         Mood and Affect: Affect normal. Mood is not anxious or depressed.         Vital Signs:   /76 (BP Location: Left arm, Patient Position: Sitting, Cuff Size: Adult)   Pulse 86   Temp 98.2 °F (36.8 °C) (Temporal)   Resp 16   Ht 162.6 cm (64\")   Wt 72.1 kg (159 lb)   SpO2 99% Comment: room air  BMI 27.29 kg/m²        Result Review : "     CMP    CMP 10/4/22 10/4/22 10/5/22 10/5/22 10/10/22    0521 0521 0526 0526    Glucose  110 (A)  128 (A) 106 (A)   BUN  16  11 6 (A)   Creatinine  0.57  0.63 0.67   Sodium  134 (A)  131 (A) 135 (A)   Potassium  3.3 (A)  3.9 3.8   Chloride  97 (A)  97 (A) 101   Calcium  7.3 (A)  7.9 (A) 7.5 (A)   Albumin 2.20 (A)  2.30 (A)  2.64 (A)   Total Bilirubin 0.2  0.2  0.2   Alkaline Phosphatase 56  62  63   AST (SGOT) 39 (A)  33 (A)  28   ALT (SGPT) 17  16  13   (A) Abnormal value            CBC w/diff    CBC w/Diff 10/4/22 10/5/22 10/10/22   WBC 9.02 13.37 (A) 11.58 (A)   RBC 2.72 (A) 2.74 (A) 2.70 (A)   Hemoglobin 8.7 (A) 9.1 (A) 9.0 (A)   Hematocrit 26.0 (A) 26.5 (A) 27.0 (A)   MCV 95.6 96.7 100.0 (A)   MCH 32.0 33.2 (A) 33.3 (A)   MCHC 33.5 34.3 33.3   RDW 15.1 15.2 16.9 (A)   Platelets 389 420 524 (A)   Neutrophil Rel % 75.1 79.5 (A) 85.4 (A)   Immature Granulocyte Rel % 1.3 (A) 1.3 (A) 0.7 (A)   Lymphocyte Rel % 16.2 (A) 12.7 (A) 8.0 (A)   Monocyte Rel % 7.3 6.4 5.5   Eosinophil Rel % 0.0 (A) 0.0 (A) 0.2 (A)   Basophil Rel % 0.1 0.1 0.2   (A) Abnormal value          Personally reviewed respiratory culture, bronchoscopy wash, pneumonia panel, BAL culture, AFB culture, fungus culture and cytology from 9/30/2022    Data reviewed: Radiologic studies Chest x-ray 9/30/2022, chest CT 9/30/2022, Cardiology studies Echocardiogram 10/1/2022, Consultant notes Dr. Leon consult note 9/29/2022 and Recent hospitalization notes Dr. Leon bronchoscopy operative note 9/30/2022 and hospital discharge summary 10/5/2022   Procedures        Assessment and Plan    Diagnoses and all orders for this visit:    1. Multiple subsegmental pulmonary emboli without acute cor pulmonale (HCC) (Primary)  -     apixaban (ELIQUIS) 5 MG tablet tablet; Take 1 tablet by mouth Daily. Indications: DVT/PE (active thrombosis)  Dispense: 30 tablet; Refill: 2    2. Chronic obstructive pulmonary disease, unspecified COPD type (HCC)  -     Home Nebulizer  -      ipratropium-albuterol (DUO-NEB) 0.5-2.5 mg/3 ml nebulizer; Take 3 mL by nebulization 4 (Four) Times a Day As Needed for Wheezing.  Dispense: 360 mL; Refill: 3  -     fluticasone-salmeterol (Advair HFA) 115-21 MCG/ACT inhaler; Inhale 2 puffs 2 (Two) Times a Day.  Dispense: 1 each; Refill: 11  -     Full Pulmonary Function Test With Bronchodilator; Future    3. Hypoadrenalism (HCC)  -     fludrocortisone 0.1 MG tablet; Take 0.5 tablets by mouth Daily.  Dispense: 15 tablet; Refill: 2  -     predniSONE (DELTASONE) 10 MG tablet; Take 1 tablet by mouth Daily With Breakfast.  Dispense: 30 tablet; Refill: 2    4. Malignant neoplasm of upper lobe of left lung ([facility])  -     Full Pulmonary Function Test With Bronchodilator; Future    5. Nicotine dependence, cigarettes, in remission  -     fluticasone-salmeterol (Advair HFA) 115-21 MCG/ACT inhaler; Inhale 2 puffs 2 (Two) Times a Day.  Dispense: 1 each; Refill: 11  -     Full Pulmonary Function Test With Bronchodilator; Future    6. Dyspnea on exertion  -     fluticasone-salmeterol (Advair HFA) 115-21 MCG/ACT inhaler; Inhale 2 puffs 2 (Two) Times a Day.  Dispense: 1 each; Refill: 11  -     Full Pulmonary Function Test With Bronchodilator; Future    7. Septic shock ([facility])    8. Pneumonia due to Klebsiella pneumoniae, unspecified laterality, unspecified part of lung ([facility])  -     CT Chest Without Contrast; Future  -     fluticasone-salmeterol (Advair HFA) 115-21 MCG/ACT inhaler; Inhale 2 puffs 2 (Two) Times a Day.  Dispense: 1 each; Refill: 11    9. Acute respiratory failure with hypoxia ([facility])    10. Pleural effusion  -     CT Chest Without Contrast; Future    11. Productive cough  -     Home Nebulizer  -     ipratropium-albuterol (DUO-NEB) 0.5-2.5 mg/3 ml nebulizer; Take 3 mL by nebulization 4 (Four) Times a Day As Needed for Wheezing.  Dispense: 360 mL; Refill: 3  -     fluticasone-salmeterol (Advair HFA) 115-21 MCG/ACT inhaler; Inhale 2 puffs 2 (Two) Times a Day.  Dispense: 1  each; Refill: 11    12. Leg swelling    13. Weakness    14.  Continue Advair as prescribed.  Rinse mouth after each use.  15.  Continue prednisone and fludrocortisone per primary services or oncology.  16.  Continue Eliquis indefinitely per oncology.  17.  Repeat chest CT for 6 weeks to evaluate pleural effusion and pneumonia.  18.  Pulmonary function test ordered to evaluate for COPD.  19.  Follow-up in 2 3 months with Dr. Leon, sooner if needed    I spent 61 minutes caring for Linda on this date of service. This time includes time spent by me in the following activities:preparing for the visit, reviewing tests, obtaining and/or reviewing a separately obtained history, performing a medically appropriate examination and/or evaluation , counseling and educating the patient/family/caregiver, ordering medications, tests, or procedures and documenting information in the medical record    Follow Up   Return in about 2 months (around 12/14/2022) for Recheck with Dr. Leon*.  Patient was given instructions and counseling regarding her condition or for health maintenance advice. Please see specific information pulled into the AVS if appropriate.

## 2022-10-18 ENCOUNTER — TELEPHONE (OUTPATIENT)
Dept: PULMONOLOGY | Facility: CLINIC | Age: 67
End: 2022-10-18

## 2022-10-18 NOTE — TELEPHONE ENCOUNTER
Patient had called and left a voicemail asking for clarification on her nebulizer treatment. She said it was 4 times and day and wanted to know if that was every 4 hours or every 6 hours. I told the patient per the order it states 4 times daily PRN for wheezing. So that would be every 6 hours as needed. Patient said med was helping her and had no further questions.

## 2022-10-21 ENCOUNTER — TELEPHONE (OUTPATIENT)
Dept: PULMONOLOGY | Facility: CLINIC | Age: 67
End: 2022-10-21

## 2022-10-21 NOTE — TELEPHONE ENCOUNTER
"Patient left a voicemail regarding the Eliquis refill you sent over for the patient to  11/1/22.  Patient states the prescription sent over is supposed to be \"Take 1 tablet twice daily, 5 MG\".   It looks like you sent it in for 1 tablet once daily.     Please advise, thank you   "

## 2022-10-24 ENCOUNTER — TELEPHONE (OUTPATIENT)
Dept: ONCOLOGY | Facility: HOSPITAL | Age: 67
End: 2022-10-24

## 2022-10-24 DIAGNOSIS — I26.94 MULTIPLE SUBSEGMENTAL PULMONARY EMBOLI WITHOUT ACUTE COR PULMONALE: ICD-10-CM

## 2022-10-24 DIAGNOSIS — B37.9 CANDIDA ALBICANS INFECTION: Primary | ICD-10-CM

## 2022-10-24 LAB — FUNGUS WND CULT: ABNORMAL

## 2022-10-24 RX ORDER — FLUCONAZOLE 100 MG/1
100 TABLET ORAL DAILY
Qty: 14 TABLET | Refills: 0 | Status: SHIPPED | OUTPATIENT
Start: 2022-10-24 | End: 2022-11-07

## 2022-10-24 NOTE — TELEPHONE ENCOUNTER
Caller: Linda Ortiz    Relationship: Self    Best call back number: [phone]    What was the call regarding: PATIENT CALLED WANTED TO CONFIRM WITH DR FLOWERS ABOUT HER ELIQUIS, PULMONARY OFFICE WANTED HER TO TAKE 1 A DAY FOR HER ELIQUIS AND PATIENT TAKES 2 A DAY.  PATIENT WANTED TO CONFIRM WITH DR FLOWERS    Do you require a callback: YES

## 2022-10-26 RX ORDER — FAMOTIDINE 20 MG/1
20 TABLET, FILM COATED ORAL
Qty: 60 TABLET | Refills: 0 | Status: SHIPPED | OUTPATIENT
Start: 2022-10-26 | End: 2022-11-30

## 2022-10-26 NOTE — TELEPHONE ENCOUNTER
Caller: Linda Ortiz    Relationship: Self    Best call back number: [phone]    Requested Prescriptions:   Requested Prescriptions     Pending Prescriptions Disp Refills   • famotidine (PEPCID) 20 MG tablet 60 tablet 0     Sig: Take 1 tablet by mouth 2 (Two) Times a Day Before Meals for 30 days.        Pharmacy where request should be sent: [facility]/PHARMACY #[id number] - [place], [place] - [address] [facility] [phone] [facility] [phone] FX         Does the patient have less than a 3 day supply:  [x] Yes  [] No    Esdras Tam Rep   10/26/22 12:55 EDT

## 2022-11-04 ENCOUNTER — OFFICE VISIT (OUTPATIENT)
Dept: FAMILY MEDICINE CLINIC | Facility: CLINIC | Age: 67
End: 2022-11-04

## 2022-11-04 VITALS
OXYGEN SATURATION: 100 % | DIASTOLIC BLOOD PRESSURE: 62 MMHG | RESPIRATION RATE: 19 BRPM | HEART RATE: 96 BPM | BODY MASS INDEX: 23.49 KG/M2 | WEIGHT: 137.6 LBS | TEMPERATURE: 97.8 F | HEIGHT: 64 IN | SYSTOLIC BLOOD PRESSURE: 100 MMHG

## 2022-11-04 DIAGNOSIS — C34.90 METASTATIC NON-SMALL CELL LUNG CANCER: ICD-10-CM

## 2022-11-04 DIAGNOSIS — Z00.00 MEDICARE ANNUAL WELLNESS VISIT, SUBSEQUENT: Primary | ICD-10-CM

## 2022-11-04 DIAGNOSIS — E27.40 ADRENAL INSUFFICIENCY: ICD-10-CM

## 2022-11-04 DIAGNOSIS — R60.1 ANASARCA: ICD-10-CM

## 2022-11-04 DIAGNOSIS — J90 PLEURAL EFFUSION: ICD-10-CM

## 2022-11-04 DIAGNOSIS — E27.40 HYPOADRENALISM: ICD-10-CM

## 2022-11-04 PROCEDURE — G0439 PPPS, SUBSEQ VISIT: HCPCS | Performed by: STUDENT IN AN ORGANIZED HEALTH CARE EDUCATION/TRAINING PROGRAM

## 2022-11-04 PROCEDURE — 1160F RVW MEDS BY RX/DR IN RCRD: CPT | Performed by: STUDENT IN AN ORGANIZED HEALTH CARE EDUCATION/TRAINING PROGRAM

## 2022-11-04 PROCEDURE — 1170F FXNL STATUS ASSESSED: CPT | Performed by: STUDENT IN AN ORGANIZED HEALTH CARE EDUCATION/TRAINING PROGRAM

## 2022-11-04 RX ORDER — FUROSEMIDE 40 MG/1
TABLET ORAL
COMMUNITY
Start: 2022-10-19

## 2022-11-04 NOTE — PROGRESS NOTES
The ABCs of the Annual Wellness Visit  Subsequent Medicare Wellness Visit    No chief complaint on file.     Subjective    History of Present Illness:  Linda Ortiz is a 67 y.o. female who presents for a Subsequent Medicare Wellness Visit.    The following portions of the patient's history were reviewed and   updated as appropriate: allergies, current medications, past family history, past medical history, past social history, past surgical history and problem list.    Compared to one year ago, the patient feels her physical   health is worse.    Compared to one year ago, the patient feels her mental   health is the same.    Recent Hospitalizations:  This patient has had a [facility] admission record on file within the last 365 days.    Current Medical Providers:  Patient Care Team:  Anuradha Hope MD as PCP - General (Family Medicine)  Phani Hernández MD as Consulting Physician (Hematology and Oncology)  Kia Morrison APRN as Nurse Practitioner (Nurse Practitioner)    Outpatient Medications Prior to Visit   Medication Sig Dispense Refill   • acetaminophen (TYLENOL) 325 MG tablet Take 650 mg by mouth Every 6 (Six) Hours As Needed for Mild Pain .     • apixaban (ELIQUIS) 5 MG tablet tablet Take 1 tablet by mouth Every 12 (Twelve) Hours. Indications: DVT/PE (active thrombosis) 60 tablet 2   • famotidine (PEPCID) 20 MG tablet Take 1 tablet by mouth 2 (Two) Times a Day Before Meals for 30 days. 60 tablet 0   • fluconazole (Diflucan) 100 MG tablet Take 1 tablet by mouth Daily for 14 days. 14 tablet 0   • fludrocortisone 0.1 MG tablet Take 0.5 tablets by mouth Daily. 15 tablet 2   • fluticasone-salmeterol (Advair HFA) 115-21 MCG/ACT inhaler Inhale 2 puffs 2 (Two) Times a Day. 1 each 11   • furosemide (LASIX) 40 MG tablet TAKE 1 TABLET (40 MG TOTAL) BY MOUTH IN THE MORNING AND 1 TABLET (40 MG TOTAL) IN THE EVENING.     • guaiFENesin (MUCINEX) 600 MG 12 hr tablet Take 2 tablets by mouth Every 12 (Twelve)  "Hours for 30 days. 120 tablet 0   • ipratropium-albuterol (DUO-NEB) 0.5-2.5 mg/3 ml nebulizer Take 3 mL by nebulization 4 (Four) Times a Day As Needed for Wheezing. 360 mL 3   • predniSONE (DELTASONE) 10 MG tablet Take 1 tablet by mouth Daily With Breakfast. 30 tablet 2   • saccharomyces boulardii (FLORASTOR) 250 MG capsule Take 1 capsule by mouth 2 (Two) Times a Day for 30 days. 60 capsule 0   • furosemide (Lasix) 20 MG tablet Take 1 tablet by mouth Daily for 10 days. 10 tablet 0     No facility-administered medications prior to visit.       No opioid medication identified on active medication list. I have reviewed chart for other potential  high risk medication/s and harmful drug interactions in the elderly.          Aspirin is not on active medication list.  Aspirin use is not indicated based on review of current medical condition/s. Risk of harm outweighs potential benefits.  .    Patient Active Problem List   Diagnosis   • Arthritis of right hand   • Localized edema   • Mass of right hand   • Malignant neoplasm of upper lobe of left lung (HCC)   • Loss of appetite   • Right arm pain   • Painful urination   • Encounter for adjustment or management of vascular access device   • Productive cough   • Hypocalcemia   • Anemia   • Weakness   • Multiple subsegmental pulmonary emboli without acute cor pulmonale (HCC)   • Hypoadrenalism (HCC)     Advance Care Planning  Advance Directive is not on file.  ACP discussion was held with the patient during this visit. Patient does not have an advance directive, information provided.          Objective    Vitals:    11/04/22 1022   BP: 100/62   Pulse: 96   Resp: 19   Temp: 97.8 °F (36.6 °C)   SpO2: 100%   Weight: 62.4 kg (137 lb 9.6 oz)   Height: 162.6 cm (64\")     Estimated body mass index is 23.62 kg/m² as calculated from the following:    Height as of this encounter: 162.6 cm (64\").    Weight as of this encounter: 62.4 kg (137 lb 9.6 oz).    BMI is within normal parameters. " No other follow-up for BMI required.      Does the patient have evidence of cognitive impairment? No    Physical Exam            HEALTH RISK ASSESSMENT    Smoking Status:  Social History     Tobacco Use   Smoking Status Former   • Packs/day: 1.00   • Years: 47.00   • Pack years: 47.00   • Types: Cigarettes   • Start date:    • Quit date: 2022   • Years since quittin.7   Smokeless Tobacco Never     Alcohol Consumption:  Social History     Substance and Sexual Activity   Alcohol Use Not Currently     Fall Risk Screen:    STEADI Fall Risk Assessment was completed, and patient is at LOW risk for falls.Assessment completed on:2022    Depression Screening:  PHQ-2/PHQ-9 Depression Screening 2022   Little Interest or Pleasure in Doing Things 0-->not at all   Feeling Down, Depressed or Hopeless 0-->not at all   Trouble Falling or Staying Asleep, or Sleeping Too Much -   Feeling Tired or Having Little Energy -   Poor Appetite or Overeating -   Feeling Bad about Yourself - or that You are a Failure or Have Let Yourself or Your Family Down -   Trouble Concentrating on Things, Such as Reading the Newspaper or Watching Television -   Moving or Speaking So Slowly that Other People Could Have Noticed? Or the Opposite - Being So Fidgety -   Thoughts that You Would be Better Off Dead or of Hurting Yourself in Some Way -   PHQ-9: Brief Depression Severity Measure Score 0       Health Habits and Functional and Cognitive Screening:  Functional & Cognitive Status 2022   Do you have difficulty preparing food and eating? No   Do you have difficulty bathing yourself, getting dressed or grooming yourself? No   Do you have difficulty using the toilet? No   Do you have difficulty moving around from place to place? No   Do you have trouble with steps or getting out of a bed or a chair? No   Current Diet Well Balanced Diet   Dental Exam Other   Eye Exam Not up to date   Exercise (times per week) 4 times per week    Current Exercises Include Other;Walking;House Cleaning   Do you need help using the phone?  No   Are you deaf or do you have serious difficulty hearing?  No   Do you need help with transportation? No   Do you need help shopping? No   Do you need help preparing meals?  No   Do you need help with housework?  No   Do you need help with laundry? No   Do you need help taking your medications? No   Do you need help managing money? No   Do you ever drive or ride in a car without wearing a seat belt? No   Have you felt unusual stress, anger or loneliness in the last month? No   Who do you live with? Alone   If you need help, do you have trouble finding someone available to you? No   Have you been bothered in the last four weeks by sexual problems? No   Do you have difficulty concentrating, remembering or making decisions? No       Age-appropriate Screening Schedule:  Refer to the list below for future screening recommendations based on patient's age, sex and/or medical conditions. Orders for these recommended tests are listed in the plan section. The patient has been provided with a written plan.    Health Maintenance   Topic Date Due   • DXA SCAN  Never done   • LIPID PANEL  Never done   • ZOSTER VACCINE (1 of 2) 11/04/2022 (Originally 2/17/1974)   • INFLUENZA VACCINE  03/31/2023 (Originally 8/1/2022)   • MAMMOGRAM  10/11/2023 (Originally 1955)   • TDAP/TD VACCINES (1 - Tdap) 10/11/2023 (Originally 2/17/1974)              Assessment & Plan   CMS Preventative Services Quick Reference  Risk Factors Identified During Encounter  Immunizations Discussed/Encouraged (specific Immunizations; Influenza and Prevnar 20 (Pneumococcal 20-valent conjugate)  The above risks/problems have been discussed with the patient.  Follow up actions/plans if indicated are seen below in the Assessment/Plan Section.  Pertinent information has been shared with the patient in the After Visit Summary.    Diagnoses and all orders for this  visit:    1. Medicare annual wellness visit, subsequent (Primary)    2. Adrenal insufficiency (HCC)    3. Metastatic non-small cell lung cancer (HCC)    4. Pleural effusion    5. Anasarca    6. Hypoadrenalism (HCC)    Other orders  -     Cancel: DEXA Bone Density Axial        Follow Up:   Return in about 6 months (around 5/4/2023) for Next scheduled follow up, Recheck.     An After Visit Summary and PPPS were made available to the patient.

## 2022-11-07 ENCOUNTER — HOSPITAL ENCOUNTER (OUTPATIENT)
Dept: ONCOLOGY | Facility: HOSPITAL | Age: 67
Setting detail: INFUSION SERIES
Discharge: HOME | End: 2022-11-07

## 2022-11-07 ENCOUNTER — TELEPHONE (OUTPATIENT)
Dept: FAMILY MEDICINE CLINIC | Facility: CLINIC | Age: 67
End: 2022-11-07

## 2022-11-07 ENCOUNTER — CLINICAL SUPPORT (OUTPATIENT)
Dept: FAMILY MEDICINE CLINIC | Facility: CLINIC | Age: 67
End: 2022-11-07

## 2022-11-07 ENCOUNTER — OFFICE VISIT (OUTPATIENT)
Dept: ONCOLOGY | Facility: HOSPITAL | Age: 67
End: 2022-11-07

## 2022-11-07 VITALS
BODY MASS INDEX: 23.8 KG/M2 | OXYGEN SATURATION: 100 % | RESPIRATION RATE: 18 BRPM | WEIGHT: 138.67 LBS | DIASTOLIC BLOOD PRESSURE: 68 MMHG | SYSTOLIC BLOOD PRESSURE: 116 MMHG | HEART RATE: 68 BPM | TEMPERATURE: 98.5 F

## 2022-11-07 DIAGNOSIS — L60.2 OVERGROWN TOENAILS: Primary | ICD-10-CM

## 2022-11-07 DIAGNOSIS — Z45.2 ENCOUNTER FOR ADJUSTMENT OR MANAGEMENT OF VASCULAR ACCESS DEVICE: ICD-10-CM

## 2022-11-07 DIAGNOSIS — E27.40 HYPOADRENALISM: ICD-10-CM

## 2022-11-07 DIAGNOSIS — L60.0 INGROWN TOENAIL: Primary | ICD-10-CM

## 2022-11-07 DIAGNOSIS — Z23 NEED FOR PNEUMOCOCCAL VACCINATION: Primary | ICD-10-CM

## 2022-11-07 DIAGNOSIS — C34.12 MALIGNANT NEOPLASM OF UPPER LOBE OF LEFT LUNG: Primary | ICD-10-CM

## 2022-11-07 DIAGNOSIS — R79.89 DECREASED THYROID STIMULATING HORMONE (TSH) LEVEL: ICD-10-CM

## 2022-11-07 DIAGNOSIS — I26.94 MULTIPLE SUBSEGMENTAL PULMONARY EMBOLI WITHOUT ACUTE COR PULMONALE: ICD-10-CM

## 2022-11-07 DIAGNOSIS — Z23 NEED FOR INFLUENZA VACCINATION: ICD-10-CM

## 2022-11-07 LAB
ALBUMIN SERPL-MCNC: 3.85 G/DL (ref 3.5–5.2)
ALBUMIN/GLOB SERPL: 0.9 G/DL
ALP SERPL-CCNC: 57 U/L (ref 39–117)
ALT SERPL W P-5'-P-CCNC: 15 U/L (ref 1–33)
ANION GAP SERPL CALCULATED.3IONS-SCNC: 7.4 MMOL/L (ref 5–15)
AST SERPL-CCNC: 33 U/L (ref 1–32)
BASOPHILS # BLD AUTO: 0.04 10*3/MM3 (ref 0–0.2)
BASOPHILS NFR BLD AUTO: 0.5 % (ref 0–1.5)
BILIRUB SERPL-MCNC: 0.3 MG/DL (ref 0–1.2)
BUN SERPL-MCNC: 23 MG/DL (ref 8–23)
BUN/CREAT SERPL: 26.1 (ref 7–25)
CALCIUM SPEC-SCNC: 9.3 MG/DL (ref 8.6–10.5)
CHLORIDE SERPL-SCNC: 96 MMOL/L (ref 98–107)
CO2 SERPL-SCNC: 29.6 MMOL/L (ref 22–29)
CREAT SERPL-MCNC: 0.88 MG/DL (ref 0.57–1)
DEPRECATED RDW RBC AUTO: 55.6 FL (ref 37–54)
EGFRCR SERPLBLD CKD-EPI 2021: 72.6 ML/MIN/1.73
EOSINOPHIL # BLD AUTO: 0.07 10*3/MM3 (ref 0–0.4)
EOSINOPHIL NFR BLD AUTO: 0.8 % (ref 0.3–6.2)
ERYTHROCYTE [DISTWIDTH] IN BLOOD BY AUTOMATED COUNT: 14.7 % (ref 12.3–15.4)
GLOBULIN UR ELPH-MCNC: 4.2 GM/DL
GLUCOSE SERPL-MCNC: 102 MG/DL (ref 65–99)
HCT VFR BLD AUTO: 33.5 % (ref 34–46.6)
HGB BLD-MCNC: 10.9 G/DL (ref 12–15.9)
IMM GRANULOCYTES # BLD AUTO: 0.03 10*3/MM3 (ref 0–0.05)
IMM GRANULOCYTES NFR BLD AUTO: 0.3 % (ref 0–0.5)
LYMPHOCYTES # BLD AUTO: 1.09 10*3/MM3 (ref 0.7–3.1)
LYMPHOCYTES NFR BLD AUTO: 12.3 % (ref 19.6–45.3)
MCH RBC QN AUTO: 32.8 PG (ref 26.6–33)
MCHC RBC AUTO-ENTMCNC: 32.5 G/DL (ref 31.5–35.7)
MCV RBC AUTO: 100.9 FL (ref 79–97)
MONOCYTES # BLD AUTO: 0.92 10*3/MM3 (ref 0.1–0.9)
MONOCYTES NFR BLD AUTO: 10.4 % (ref 5–12)
NEUTROPHILS NFR BLD AUTO: 6.69 10*3/MM3 (ref 1.7–7)
NEUTROPHILS NFR BLD AUTO: 75.7 % (ref 42.7–76)
PLATELET # BLD AUTO: 467 10*3/MM3 (ref 140–450)
PMV BLD AUTO: 8.8 FL (ref 6–12)
POTASSIUM SERPL-SCNC: 4.5 MMOL/L (ref 3.5–5.2)
PROT SERPL-MCNC: 8 G/DL (ref 6–8.5)
RBC # BLD AUTO: 3.32 10*6/MM3 (ref 3.77–5.28)
SODIUM SERPL-SCNC: 133 MMOL/L (ref 136–145)
T4 FREE SERPL-MCNC: 1.81 NG/DL (ref 0.93–1.7)
TSH SERPL DL<=0.05 MIU/L-ACNC: 0.05 UIU/ML (ref 0.27–4.2)
WBC NRBC COR # BLD: 8.84 10*3/MM3 (ref 3.4–10.8)

## 2022-11-07 PROCEDURE — 84439 ASSAY OF FREE THYROXINE: CPT | Performed by: INTERNAL MEDICINE

## 2022-11-07 PROCEDURE — 80053 COMPREHEN METABOLIC PANEL: CPT | Performed by: INTERNAL MEDICINE

## 2022-11-07 PROCEDURE — 84443 ASSAY THYROID STIM HORMONE: CPT | Performed by: INTERNAL MEDICINE

## 2022-11-07 PROCEDURE — 90677 PCV20 VACCINE IM: CPT | Performed by: NURSE PRACTITIONER

## 2022-11-07 PROCEDURE — 25010000002 HEPARIN LOCK FLUSH PER 10 UNITS: Performed by: INTERNAL MEDICINE

## 2022-11-07 PROCEDURE — 99214 OFFICE O/P EST MOD 30 MIN: CPT | Performed by: INTERNAL MEDICINE

## 2022-11-07 PROCEDURE — G0009 ADMIN PNEUMOCOCCAL VACCINE: HCPCS | Performed by: NURSE PRACTITIONER

## 2022-11-07 PROCEDURE — 36591 DRAW BLOOD OFF VENOUS DEVICE: CPT

## 2022-11-07 PROCEDURE — 85025 COMPLETE CBC W/AUTO DIFF WBC: CPT | Performed by: INTERNAL MEDICINE

## 2022-11-07 PROCEDURE — 90662 IIV NO PRSV INCREASED AG IM: CPT | Performed by: NURSE PRACTITIONER

## 2022-11-07 PROCEDURE — G0463 HOSPITAL OUTPT CLINIC VISIT: HCPCS | Performed by: INTERNAL MEDICINE

## 2022-11-07 PROCEDURE — G0008 ADMIN INFLUENZA VIRUS VAC: HCPCS | Performed by: NURSE PRACTITIONER

## 2022-11-07 RX ORDER — HEPARIN SODIUM (PORCINE) LOCK FLUSH IV SOLN 100 UNIT/ML 100 UNIT/ML
500 SOLUTION INTRAVENOUS AS NEEDED
Status: DISCONTINUED | OUTPATIENT
Start: 2022-11-07 | End: 2022-11-08 | Stop reason: HOSPADM

## 2022-11-07 RX ORDER — SODIUM CHLORIDE 0.9 % (FLUSH) 0.9 %
20 SYRINGE (ML) INJECTION AS NEEDED
Status: CANCELLED | OUTPATIENT
Start: 2022-11-07

## 2022-11-07 RX ORDER — HEPARIN SODIUM (PORCINE) LOCK FLUSH IV SOLN 100 UNIT/ML 100 UNIT/ML
500 SOLUTION INTRAVENOUS AS NEEDED
Status: CANCELLED | OUTPATIENT
Start: 2022-11-07

## 2022-11-07 RX ORDER — SODIUM CHLORIDE 0.9 % (FLUSH) 0.9 %
20 SYRINGE (ML) INJECTION AS NEEDED
Status: DISCONTINUED | OUTPATIENT
Start: 2022-11-07 | End: 2022-11-08 | Stop reason: HOSPADM

## 2022-11-07 RX ADMIN — HEPARIN SODIUM (PORCINE) LOCK FLUSH IV SOLN 100 UNIT/ML 500 UNITS: 100 SOLUTION at 10:34

## 2022-11-07 RX ADMIN — Medication 20 ML: at 10:34

## 2022-11-07 NOTE — TELEPHONE ENCOUNTER
Pt is requesting a referral to podiatry. She stated that she would like help clipping her toenails.

## 2022-11-07 NOTE — ASSESSMENT & PLAN NOTE
Patient is on adrenal support with prednisone and Florinef.  Blood pressure and sodium are better.  Continue same.

## 2022-11-07 NOTE — ASSESSMENT & PLAN NOTE
Patient is on anticoagulation with Eliquis.  Tolerating well.  No excessive bruising or bleeding.  Continue same.

## 2022-11-07 NOTE — PROGRESS NOTES
Chief Complaint  Lung Cancer    Anuradha Hope MD    Subjective          Linda Ortiz presents to [facility] for ongoing treatment of her metastatic lung cancer.  She is on immunotherapy.  Tolerating well.  She denies any issues or side effects.  The right hand is slowly improving and she is able to use her  more.  She denies other new masses or adenopathy, no unusual aches or pains.  She is eating well and has gained a couple of pounds.  She denies any issues from her Port-A-Cath.  She is on Eliquis blood thinner and denies excessive bruising or bleeding.  She is taking her Florinef and prednisone as prescribed.    Oncology/Hematology History Overview Note   5/19/2022 Left Adrenal mass biopsy at [facility] revealed: poorly differentiated non-small cell carcinoma, high grade. Positive fo AE1/3 and Cam5.2. Immunohistochemistry was positive for PAX8, HeParlGlypican3, Vimentin, focal positive for chromagranin, GATA3.  The case was sent to [facility] for second opinion with a final diagnosis of poorly differentiated carcinoma with diffuse reduced BRG-1 expression.    6/21/2022 XRT initiated to Right hand     7/19/2022 Opdivo + Yervoy initiated x 4 doses  10/11/2022 Opdivo only          Malignant neoplasm of upper lobe of left lung (HCC)   6/9/2022 Initial Diagnosis    Lung cancer (HCC)     7/19/2022 -  Chemotherapy    OP LUNG Nivolumab 1 mg/kg / Ipilimumab 3 mg/kg / Nivolumab 480 mg      Secondary carcinoid tumor of bone (HCC) (Resolved)   6/14/2022 Initial Diagnosis    Secondary carcinoid tumor of bone (HCC)     6/20/2022 -  Radiation    RADIATION THERAPY Treatment Details (Noted on 6/14/2022)  Site: Right Hand  Technique: 3D CRT  Goal: No goal specified  Planned Treatment Start Date: 6/20/2022         Review of Systems   Constitutional: Negative for appetite change, diaphoresis, fatigue, fever, unexpected weight gain and unexpected weight loss.   HENT: Negative for hearing  loss, mouth sores, sore throat, swollen glands, trouble swallowing and voice change.    Eyes: Negative for blurred vision.   Respiratory: Negative for cough, shortness of breath and wheezing.    Cardiovascular: Negative for chest pain and palpitations.   Gastrointestinal: Negative for abdominal pain, blood in stool, constipation, diarrhea, nausea and vomiting.   Endocrine: Negative for cold intolerance and heat intolerance.   Genitourinary: Negative for difficulty urinating, dysuria, frequency, hematuria and urinary incontinence.   Musculoskeletal: Negative for arthralgias, back pain and myalgias.   Skin: Negative for rash, skin lesions and wound.   Neurological: Negative for dizziness, seizures, weakness, numbness and headache.   Hematological: Does not bruise/bleed easily.   Psychiatric/Behavioral: Negative for depressed mood. The patient is not nervous/anxious.      Current Outpatient Medications on File Prior to Visit   Medication Sig Dispense Refill   • acetaminophen (TYLENOL) 325 MG tablet Take 650 mg by mouth Every 6 (Six) Hours As Needed for Mild Pain .     • apixaban (ELIQUIS) 5 MG tablet tablet Take 1 tablet by mouth Every 12 (Twelve) Hours. Indications: DVT/PE (active thrombosis) 60 tablet 2   • famotidine (PEPCID) 20 MG tablet Take 1 tablet by mouth 2 (Two) Times a Day Before Meals for 30 days. 60 tablet 0   • fluconazole (Diflucan) 100 MG tablet Take 1 tablet by mouth Daily for 14 days. 14 tablet 0   • fludrocortisone 0.1 MG tablet Take 0.5 tablets by mouth Daily. 15 tablet 2   • fluticasone-salmeterol (Advair HFA) 115-21 MCG/ACT inhaler Inhale 2 puffs 2 (Two) Times a Day. 1 each 11   • furosemide (LASIX) 40 MG tablet TAKE 1 TABLET (40 MG TOTAL) BY MOUTH IN THE MORNING AND 1 TABLET (40 MG TOTAL) IN THE EVENING.     • ipratropium-albuterol (DUO-NEB) 0.5-2.5 mg/3 ml nebulizer Take 3 mL by nebulization 4 (Four) Times a Day As Needed for Wheezing. 360 mL 3   • predniSONE (DELTASONE) 10 MG tablet Take 1  tablet by mouth Daily With Breakfast. 30 tablet 2     Current Facility-Administered Medications on File Prior to Visit   Medication Dose Route Frequency Provider Last Rate Last Admin   • heparin injection 500 Units  500 Units Intravenous PRN Phani Hernández MD   500 Units at 22 1034   • sodium chloride 0.9 % flush 20 mL  20 mL Intravenous PRN Phani Hernández MD   20 mL at 22 1034       Allergies   Allergen Reactions   • Hydrocodone Nausea And Vomiting and Dizziness     Past Medical History:   Diagnosis Date   • Bone cancer (HCC) 2022   • Decreased thyroid stimulating hormone (TSH) level 2022   • 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   • Hypocalcemia 2022   • Malignant neoplasm of upper lobe of left lung (HCC) 2022   • Metastatic cancer (HCC)    • Multiple subsegmental pulmonary emboli without acute cor pulmonale (HCC) 10/10/2022     Past Surgical History:   Procedure Laterality Date   • BRONCHOSCOPY N/A 2022    Procedure: BRONCHOSCOPY WITH BAL, WASHINGS;  Surgeon: Fausto Leon MD;  Location: [facility] ENDOSCOPY;  Service: Pulmonary;  Laterality: N/A;  PNEUMONIA, PERSISTANT COUGH   • HIP SURGERY Bilateral    • KNEE ARTHROSCOPY     • VENOUS ACCESS DEVICE (PORT) INSERTION N/A 7/15/2022    Procedure: INSERTION VENOUS ACCESS DEVICE;  Surgeon: Eber Hull MD;  [place] [facility] [place] [facility]  Service: General;  Laterality: N/A;     Social History     Socioeconomic History   • Marital status: Single   Tobacco Use   • Smoking status: Former     Packs/day: 1.00     Years: 47.00     Pack years: 47.00     Types: Cigarettes     Start date:      Quit date: 2022     Years since quittin.7   • Smokeless tobacco: Never   Vaping Use   • Vaping Use: Former   • Start date: 2013   • Quit date: 2014   Substance and Sexual Activity   • Alcohol use: Not Currently   • Drug use: Never   • Sexual activity: Defer     Family History   Problem Relation Age of Onset   • Breast cancer Mother     • Lung cancer Father    • Thyroid cancer Maternal Aunt    • Breast cancer Maternal Aunt    • Lung cancer Paternal Aunt    • COPD Paternal Uncle        Objective   Physical Exam  Vitals reviewed. Exam conducted with a chaperone present.   Constitutional:       General: She is not in acute distress.     Appearance: Normal appearance.   Cardiovascular:      Rate and Rhythm: Normal rate and regular rhythm.      Heart sounds: Normal heart sounds. No murmur heard.    No gallop.   Pulmonary:      Effort: Pulmonary effort is normal.      Breath sounds: Normal breath sounds.      Comments: Port-A-Cath  Abdominal:      General: Abdomen is flat. Bowel sounds are normal.   Musculoskeletal:      Cervical back: Neck supple.      Right lower leg: No edema.      Left lower leg: No edema.   Lymphadenopathy:      Cervical: No cervical adenopathy.   Neurological:      Mental Status: She is alert and oriented to person, place, and time.   Psychiatric:         Behavior: Behavior normal.         Vitals:    11/07/22 1036   BP: 116/68   Pulse: 68   Resp: 18   Temp: 98.5 °F (36.9 °C)   SpO2: 100%   Weight: 62.9 kg (138 lb 10.7 oz)   PainSc: 0-No pain     ECOG score: 1         PHQ-9 Total Score:                    Result Review :   The following data was reviewed by: Phani Hernández MD on 11/07/2022:  Lab Results   Component Value Date    HGB 10.9 (L) 11/07/2022    HCT 33.5 (L) 11/07/2022    .9 (H) 11/07/2022     (H) 11/07/2022    WBC 8.84 11/07/2022    NEUTROABS 6.69 11/07/2022    LYMPHSABS 1.09 11/07/2022    MONOSABS 0.92 (H) 11/07/2022    EOSABS 0.07 11/07/2022    BASOSABS 0.04 11/07/2022     Lab Results   Component Value Date    GLUCOSE 102 (H) 11/07/2022    BUN 23 11/07/2022    CREATININE 0.88 11/07/2022     (L) 11/07/2022    K 4.5 11/07/2022    CL 96 (L) 11/07/2022    CO2 29.6 (H) 11/07/2022    CALCIUM 9.3 11/07/2022    PROTEINTOT 8.0 11/07/2022    ALBUMIN 3.85 11/07/2022    BILITOT 0.3 11/07/2022    ALKPHOS 57  11/07/2022    AST 33 (H) 11/07/2022    ALT 15 11/07/2022     Lab Results   Component Value Date    MG 2.3 10/05/2022    PHOS 3.1 10/05/2022    FREET4 1.81 (H) 11/07/2022    TSH 0.050 (L) 11/07/2022             Assessment and Plan    Diagnoses and all orders for this visit:    1. Malignant neoplasm of upper lobe of left lung (HCC) (Primary)  Assessment & Plan:  Patient is on treatment with single agent nivolumab.  Tolerating well.  Good response to treatment thus far.  Lab work is adequate for therapy.  Proceed with cycle 6 as planned.  I will see her back for cycle 7-day 1 with lab work prior to monitor for toxicities.      2. Multiple subsegmental pulmonary emboli without acute cor pulmonale (HCC)  Assessment & Plan:  Patient is on anticoagulation with Eliquis.  Tolerating well.  No excessive bruising or bleeding.  Continue same.      3. Hypoadrenalism (HCC)  Assessment & Plan:  Patient is on adrenal support with prednisone and Florinef.  Blood pressure and sodium are better.  Continue same.      4. Decreased thyroid stimulating hormone (TSH) level  Assessment & Plan:  TSH is suppressed and T4 is slightly elevated.  Repeat lab work with next cycle.            Patient Follow Up: Cycle 7-day 1     patient was given instructions and counseling regarding her condition or for health maintenance advice. Please see specific information pulled into the AVS if appropriate.     Pahni Hernández MD    11/7/2022

## 2022-11-07 NOTE — ASSESSMENT & PLAN NOTE
Patient is on treatment with single agent nivolumab.  Tolerating well.  Good response to treatment thus far.  Lab work is adequate for therapy.  Proceed with cycle 6 as planned.  I will see her back for cycle 7-day 1 with lab work prior to monitor for toxicities.

## 2022-11-08 ENCOUNTER — HOSPITAL ENCOUNTER (OUTPATIENT)
Dept: ONCOLOGY | Facility: HOSPITAL | Age: 67
Setting detail: INFUSION SERIES
Discharge: HOME | End: 2022-11-08

## 2022-11-08 VITALS
SYSTOLIC BLOOD PRESSURE: 109 MMHG | RESPIRATION RATE: 20 BRPM | BODY MASS INDEX: 23.9 KG/M2 | HEART RATE: 76 BPM | DIASTOLIC BLOOD PRESSURE: 65 MMHG | TEMPERATURE: 96.8 F | HEIGHT: 64 IN | OXYGEN SATURATION: 99 % | WEIGHT: 139.99 LBS

## 2022-11-08 DIAGNOSIS — Z45.2 ENCOUNTER FOR ADJUSTMENT OR MANAGEMENT OF VASCULAR ACCESS DEVICE: Primary | ICD-10-CM

## 2022-11-08 DIAGNOSIS — C34.12 MALIGNANT NEOPLASM OF UPPER LOBE OF LEFT LUNG: ICD-10-CM

## 2022-11-08 PROCEDURE — 25010000002 HEPARIN LOCK FLUSH PER 10 UNITS: Performed by: INTERNAL MEDICINE

## 2022-11-08 PROCEDURE — 96413 CHEMO IV INFUSION 1 HR: CPT

## 2022-11-08 PROCEDURE — 25010000002 NIVOLUMAB 240 MG/24ML SOLUTION 24 ML VIAL: Performed by: INTERNAL MEDICINE

## 2022-11-08 RX ORDER — SODIUM CHLORIDE 9 MG/ML
250 INJECTION, SOLUTION INTRAVENOUS ONCE
Status: COMPLETED | OUTPATIENT
Start: 2022-11-08 | End: 2022-11-08

## 2022-11-08 RX ORDER — HEPARIN SODIUM (PORCINE) LOCK FLUSH IV SOLN 100 UNIT/ML 100 UNIT/ML
500 SOLUTION INTRAVENOUS AS NEEDED
Status: CANCELLED | OUTPATIENT
Start: 2022-11-08

## 2022-11-08 RX ORDER — HEPARIN SODIUM (PORCINE) LOCK FLUSH IV SOLN 100 UNIT/ML 100 UNIT/ML
500 SOLUTION INTRAVENOUS AS NEEDED
Status: DISCONTINUED | OUTPATIENT
Start: 2022-11-08 | End: 2022-11-09 | Stop reason: HOSPADM

## 2022-11-08 RX ORDER — SODIUM CHLORIDE 0.9 % (FLUSH) 0.9 %
20 SYRINGE (ML) INJECTION AS NEEDED
Status: DISCONTINUED | OUTPATIENT
Start: 2022-11-08 | End: 2022-11-09 | Stop reason: HOSPADM

## 2022-11-08 RX ORDER — SODIUM CHLORIDE 0.9 % (FLUSH) 0.9 %
20 SYRINGE (ML) INJECTION AS NEEDED
Status: CANCELLED | OUTPATIENT
Start: 2022-11-08

## 2022-11-08 RX ORDER — SODIUM CHLORIDE 9 MG/ML
250 INJECTION, SOLUTION INTRAVENOUS ONCE
Status: CANCELLED | OUTPATIENT
Start: 2022-11-08

## 2022-11-08 RX ADMIN — HEPARIN SODIUM (PORCINE) LOCK FLUSH IV SOLN 100 UNIT/ML 500 UNITS: 100 SOLUTION at 09:18

## 2022-11-08 RX ADMIN — SODIUM CHLORIDE 250 ML: 9 INJECTION, SOLUTION INTRAVENOUS at 08:40

## 2022-11-08 RX ADMIN — SODIUM CHLORIDE 480 MG: 9 INJECTION, SOLUTION INTRAVENOUS at 08:40

## 2022-11-08 RX ADMIN — Medication 20 ML: at 09:18

## 2022-11-08 NOTE — ADDENDUM NOTE
Encounter addended by: Henrietta Avina RD on: 11/8/2022 10:40 AM   Actions taken: Clinical Note Signed

## 2022-11-08 NOTE — PROGRESS NOTES
Outpatient Nutrition Oncology Follow Up    Patient Name: Linda Ortiz  YOB: 1955  MRN: [id number]      Today's Weight:  63.5 kg    Weight Change:  12.4% difference in wt over the past 4-6 weeks, however pt has gained back 2# in the past 1-2 weeks    Consult or Intervention:  Pt was here for infusion earlier this morning & due to timing of infusion (very brief) Oncology RD missed pt.  Very familiar w/ pt from previous encounters at the infusion center & radiation oncology.  Per records, pt hast lost wt recently but was also inpatient for hyponatremia/SIADH, acute resp failure, CAP, and sepsis from 9/27/22-10/5/22.  Pt has been gaining wt since she was d/c'd from the hospital.  No further intervention at this time.    Nutrition Diagnosis: Increased nutrient needs related to increased nutrient needs due to catabolic disease as evidenced by physiological causes increasing nutrient needs.    Plan: Will follow up per oncology nutrition protocol

## 2022-11-11 LAB
MYCOBACTERIUM SPEC CULT: NORMAL
NIGHT BLUE STAIN TISS: NORMAL
NIGHT BLUE STAIN TISS: NORMAL

## 2022-11-14 PROCEDURE — 82962 GLUCOSE BLOOD TEST: CPT

## 2022-11-30 RX ORDER — FAMOTIDINE 20 MG/1
20 TABLET, FILM COATED ORAL
Qty: 60 TABLET | Refills: 0 | Status: SHIPPED | OUTPATIENT
Start: 2022-11-30 | End: 2022-12-27

## 2022-12-05 ENCOUNTER — OFFICE VISIT (OUTPATIENT)
Dept: ONCOLOGY | Facility: HOSPITAL | Age: 67
End: 2022-12-05

## 2022-12-05 ENCOUNTER — TELEPHONE (OUTPATIENT)
Dept: ONCOLOGY | Facility: HOSPITAL | Age: 67
End: 2022-12-05

## 2022-12-05 ENCOUNTER — HOSPITAL ENCOUNTER (OUTPATIENT)
Dept: ONCOLOGY | Facility: HOSPITAL | Age: 67
Setting detail: INFUSION SERIES
Discharge: HOME | End: 2022-12-05

## 2022-12-05 VITALS
BODY MASS INDEX: 24.7 KG/M2 | TEMPERATURE: 97.9 F | SYSTOLIC BLOOD PRESSURE: 93 MMHG | OXYGEN SATURATION: 97 % | WEIGHT: 143.96 LBS | HEART RATE: 98 BPM | DIASTOLIC BLOOD PRESSURE: 53 MMHG | RESPIRATION RATE: 16 BRPM

## 2022-12-05 DIAGNOSIS — C34.12 MALIGNANT NEOPLASM OF UPPER LOBE OF LEFT LUNG: Primary | ICD-10-CM

## 2022-12-05 DIAGNOSIS — Z45.2 ENCOUNTER FOR ADJUSTMENT OR MANAGEMENT OF VASCULAR ACCESS DEVICE: ICD-10-CM

## 2022-12-05 LAB
ALBUMIN SERPL-MCNC: 3.93 G/DL (ref 3.5–5.2)
ALBUMIN/GLOB SERPL: 1 G/DL
ALP SERPL-CCNC: 65 U/L (ref 39–117)
ALT SERPL W P-5'-P-CCNC: 17 U/L (ref 1–33)
ANION GAP SERPL CALCULATED.3IONS-SCNC: 9.7 MMOL/L (ref 5–15)
AST SERPL-CCNC: 33 U/L (ref 1–32)
BASOPHILS # BLD AUTO: 0.03 10*3/MM3 (ref 0–0.2)
BASOPHILS NFR BLD AUTO: 0.4 % (ref 0–1.5)
BILIRUB SERPL-MCNC: 0.6 MG/DL (ref 0–1.2)
BUN SERPL-MCNC: 21 MG/DL (ref 8–23)
BUN/CREAT SERPL: 18.8 (ref 7–25)
CALCIUM SPEC-SCNC: 9.6 MG/DL (ref 8.6–10.5)
CHLORIDE SERPL-SCNC: 90 MMOL/L (ref 98–107)
CO2 SERPL-SCNC: 28.3 MMOL/L (ref 22–29)
CREAT SERPL-MCNC: 1.12 MG/DL (ref 0.57–1)
DEPRECATED RDW RBC AUTO: 44.6 FL (ref 37–54)
EGFRCR SERPLBLD CKD-EPI 2021: 54.3 ML/MIN/1.73
EOSINOPHIL # BLD AUTO: 0.02 10*3/MM3 (ref 0–0.4)
EOSINOPHIL NFR BLD AUTO: 0.2 % (ref 0.3–6.2)
ERYTHROCYTE [DISTWIDTH] IN BLOOD BY AUTOMATED COUNT: 12.5 % (ref 12.3–15.4)
GLOBULIN UR ELPH-MCNC: 3.8 GM/DL
GLUCOSE SERPL-MCNC: 98 MG/DL (ref 65–99)
HCT VFR BLD AUTO: 36.1 % (ref 34–46.6)
HGB BLD-MCNC: 12.5 G/DL (ref 12–15.9)
IMM GRANULOCYTES # BLD AUTO: 0.04 10*3/MM3 (ref 0–0.05)
IMM GRANULOCYTES NFR BLD AUTO: 0.5 % (ref 0–0.5)
LYMPHOCYTES # BLD AUTO: 1.1 10*3/MM3 (ref 0.7–3.1)
LYMPHOCYTES NFR BLD AUTO: 13 % (ref 19.6–45.3)
MCH RBC QN AUTO: 33.2 PG (ref 26.6–33)
MCHC RBC AUTO-ENTMCNC: 34.6 G/DL (ref 31.5–35.7)
MCV RBC AUTO: 95.8 FL (ref 79–97)
MONOCYTES # BLD AUTO: 1.25 10*3/MM3 (ref 0.1–0.9)
MONOCYTES NFR BLD AUTO: 14.7 % (ref 5–12)
NEUTROPHILS NFR BLD AUTO: 6.04 10*3/MM3 (ref 1.7–7)
NEUTROPHILS NFR BLD AUTO: 71.2 % (ref 42.7–76)
PLATELET # BLD AUTO: 338 10*3/MM3 (ref 140–450)
PMV BLD AUTO: 9 FL (ref 6–12)
POTASSIUM SERPL-SCNC: 4.3 MMOL/L (ref 3.5–5.2)
PROT SERPL-MCNC: 7.7 G/DL (ref 6–8.5)
RBC # BLD AUTO: 3.77 10*6/MM3 (ref 3.77–5.28)
SODIUM SERPL-SCNC: 128 MMOL/L (ref 136–145)
T4 FREE SERPL-MCNC: 1.5 NG/DL (ref 0.93–1.7)
TSH SERPL DL<=0.05 MIU/L-ACNC: 0.03 UIU/ML (ref 0.27–4.2)
WBC NRBC COR # BLD: 8.48 10*3/MM3 (ref 3.4–10.8)

## 2022-12-05 PROCEDURE — 85025 COMPLETE CBC W/AUTO DIFF WBC: CPT | Performed by: INTERNAL MEDICINE

## 2022-12-05 PROCEDURE — 80053 COMPREHEN METABOLIC PANEL: CPT | Performed by: INTERNAL MEDICINE

## 2022-12-05 PROCEDURE — 99214 OFFICE O/P EST MOD 30 MIN: CPT | Performed by: INTERNAL MEDICINE

## 2022-12-05 PROCEDURE — 84439 ASSAY OF FREE THYROXINE: CPT | Performed by: INTERNAL MEDICINE

## 2022-12-05 PROCEDURE — 36591 DRAW BLOOD OFF VENOUS DEVICE: CPT

## 2022-12-05 PROCEDURE — 25010000002 HEPARIN LOCK FLUSH PER 10 UNITS: Performed by: INTERNAL MEDICINE

## 2022-12-05 PROCEDURE — 84443 ASSAY THYROID STIM HORMONE: CPT | Performed by: INTERNAL MEDICINE

## 2022-12-05 RX ORDER — SODIUM CHLORIDE 0.9 % (FLUSH) 0.9 %
20 SYRINGE (ML) INJECTION AS NEEDED
Status: CANCELLED | OUTPATIENT
Start: 2022-12-05

## 2022-12-05 RX ORDER — HEPARIN SODIUM (PORCINE) LOCK FLUSH IV SOLN 100 UNIT/ML 100 UNIT/ML
500 SOLUTION INTRAVENOUS AS NEEDED
Status: CANCELLED | OUTPATIENT
Start: 2022-12-05

## 2022-12-05 RX ORDER — SODIUM CHLORIDE 9 MG/ML
250 INJECTION, SOLUTION INTRAVENOUS ONCE
Status: CANCELLED | OUTPATIENT
Start: 2022-12-06

## 2022-12-05 RX ORDER — HEPARIN SODIUM (PORCINE) LOCK FLUSH IV SOLN 100 UNIT/ML 100 UNIT/ML
500 SOLUTION INTRAVENOUS AS NEEDED
Status: DISCONTINUED | OUTPATIENT
Start: 2022-12-05 | End: 2022-12-06 | Stop reason: HOSPADM

## 2022-12-05 RX ORDER — SODIUM CHLORIDE 0.9 % (FLUSH) 0.9 %
20 SYRINGE (ML) INJECTION AS NEEDED
Status: DISCONTINUED | OUTPATIENT
Start: 2022-12-05 | End: 2022-12-06 | Stop reason: HOSPADM

## 2022-12-05 RX ADMIN — Medication 20 ML: at 10:24

## 2022-12-05 RX ADMIN — HEPARIN SODIUM (PORCINE) LOCK FLUSH IV SOLN 100 UNIT/ML 500 UNITS: 100 SOLUTION at 10:24

## 2022-12-05 NOTE — PROGRESS NOTES
Chief Complaint  Chemotherapy    Anuradha Hope MD Patel, Jahin, MD    Jessica Ortiz presents to [facility] HEMATOLOGY & ONCOLOGY for ongoing treatment of her metastatic lung cancer.  She is on single agent nivolumab.  She is tolerating infusions well.  No issues reported.  The right thumb area which received prior radiation remains painful but she is able to use it some.  She denies worsened pain.  She has any masses or adenopathy.  She reports adequate appetite and her weight is maintained.  She does note that she has had sinus congestion and postnasal drip for the last several days.  She is taking Mucinex.    Oncology/Hematology History Overview Note   5/19/2022 Left Adrenal mass biopsy at [facility] revealed: poorly differentiated non-small cell carcinoma, high grade. Positive fo AE1/3 and Cam5.2. Immunohistochemistry was positive for PAX8, HeParlGlypican3, Vimentin, focal positive for chromagranin, GATA3.  The case was sent to [facility] for second opinion with a final diagnosis of poorly differentiated carcinoma with diffuse reduced BRG-1 expression.    6/21/2022 XRT initiated to Right hand     7/19/2022 Opdivo + Yervoy initiated x 4 doses  10/11/2022 Opdivo only          Malignant neoplasm of upper lobe of left lung (HCC)   6/9/2022 Initial Diagnosis    Lung cancer (HCC)     7/19/2022 -  Chemotherapy    OP LUNG Nivolumab 1 mg/kg / Ipilimumab 3 mg/kg / Nivolumab 480 mg      Secondary carcinoid tumor of bone (HCC) (Resolved)   6/14/2022 Initial Diagnosis    Secondary carcinoid tumor of bone (HCC)     6/20/2022 -  Radiation    RADIATION THERAPY Treatment Details (Noted on 6/14/2022)  Site: Right Hand  Technique: 3D CRT  Goal: No goal specified  Planned Treatment Start Date: 6/20/2022         Review of Systems   Constitutional: Negative for appetite change, diaphoresis, fatigue, fever, unexpected weight gain and unexpected weight loss.   HENT: Negative for hearing loss, mouth  sores, sore throat, swollen glands, trouble swallowing and voice change.    Eyes: Negative for blurred vision.   Respiratory: Negative for cough, shortness of breath and wheezing.    Cardiovascular: Negative for chest pain and palpitations.   Gastrointestinal: Negative for abdominal pain, blood in stool, constipation, diarrhea, nausea and vomiting.   Endocrine: Negative for cold intolerance and heat intolerance.   Genitourinary: Negative for difficulty urinating, dysuria, frequency, hematuria and urinary incontinence.   Musculoskeletal: Negative for arthralgias, back pain and myalgias.   Skin: Negative for rash, skin lesions and wound.   Neurological: Negative for dizziness, seizures, weakness, numbness and headache.   Hematological: Does not bruise/bleed easily.   Psychiatric/Behavioral: Negative for depressed mood. The patient is not nervous/anxious.      Current Outpatient Medications on File Prior to Visit   Medication Sig Dispense Refill   • acetaminophen (TYLENOL) 325 MG tablet Take 650 mg by mouth Every 6 (Six) Hours As Needed for Mild Pain .     • apixaban (ELIQUIS) 5 MG tablet tablet Take 1 tablet by mouth Every 12 (Twelve) Hours. Indications: DVT/PE (active thrombosis) 60 tablet 2   • famotidine (PEPCID) 20 MG tablet TAKE 1 TABLET BY MOUTH 2 (TWO) TIMES A DAY BEFORE MEALS FOR 30 DAYS. 60 tablet 0   • fludrocortisone 0.1 MG tablet Take 0.5 tablets by mouth Daily. 15 tablet 2   • fluticasone-salmeterol (Advair HFA) 115-21 MCG/ACT inhaler Inhale 2 puffs 2 (Two) Times a Day. 1 each 11   • furosemide (LASIX) 40 MG tablet TAKE 1 TABLET (40 MG TOTAL) BY MOUTH IN THE MORNING AND 1 TABLET (40 MG TOTAL) IN THE EVENING.     • ipratropium-albuterol (DUO-NEB) 0.5-2.5 mg/3 ml nebulizer Take 3 mL by nebulization 4 (Four) Times a Day As Needed for Wheezing. 360 mL 3   • predniSONE (DELTASONE) 10 MG tablet Take 1 tablet by mouth Daily With Breakfast. 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   • Hypocalcemia 2022   • Malignant neoplasm of upper lobe of left lung (HCC) 2022   • Metastatic cancer (HCC)    • Multiple subsegmental pulmonary emboli without acute cor pulmonale (HCC) 10/10/2022     Past Surgical History:   Procedure Laterality Date   • BRONCHOSCOPY N/A 2022    Procedure: BRONCHOSCOPY WITH BAL, WASHINGS;  Surgeon: Fausto Leon MD;  Location: [facility] ENDOSCOPY;  Service: Pulmonary;  Laterality: N/A;  PNEUMONIA, PERSISTANT COUGH   • HIP SURGERY Bilateral    • KNEE ARTHROSCOPY     • VENOUS ACCESS DEVICE (PORT) INSERTION N/A 7/15/2022    Procedure: INSERTION VENOUS ACCESS DEVICE;  Surgeon: Eber Hull MD;  [place] [facility] [place] [facility] [place]  Service: General;  Laterality: N/A;     Social History     Socioeconomic History   • Marital status: Single   Tobacco Use   • Smoking status: Former     Packs/day: 1.00     Years: 47.00     Pack years: 47.00     Types: Cigarettes     Start date:      Quit date: 2022     Years since quittin.8   • Smokeless tobacco: Never   Vaping Use   • Vaping Use: Former   • Start date: 2013   • Quit date: 2014   Substance and Sexual Activity   • Alcohol use: Not Currently   • Drug use: Never   • Sexual activity: Defer     Family History   Problem Relation Age of Onset   • Breast cancer Mother    • Lung cancer Father    • Thyroid cancer Maternal Aunt    • Breast cancer Maternal Aunt    • Lung cancer  Paternal Aunt    • COPD Paternal Uncle        Objective   Physical Exam  Vitals reviewed. Exam conducted with a chaperone present.   Constitutional:       General: She is not in acute distress.     Appearance: Normal appearance.   Cardiovascular:      Rate and Rhythm: Normal rate and regular rhythm.      Heart sounds: Normal heart sounds. No murmur heard.    No gallop.   Pulmonary:      Effort: Pulmonary effort is normal.      Breath sounds: Normal breath sounds.      Comments: Port-A-Cath  Abdominal:      General: Abdomen is flat. Bowel sounds are normal.      Palpations: Abdomen is soft.   Musculoskeletal:      Cervical back: Neck supple.      Right lower leg: No edema.      Left lower leg: No edema.   Lymphadenopathy:      Cervical: No cervical adenopathy.   Neurological:      Mental Status: She is alert and oriented to person, place, and time.   Psychiatric:         Mood and Affect: Mood normal.         Behavior: Behavior normal.         Vitals:    12/05/22 1100   BP: 93/53   Pulse: 98   Resp: 16   Temp: 97.9 °F (36.6 °C)   SpO2: 97%   Weight: 65.3 kg (143 lb 15.4 oz)   PainSc:   2   PainLoc: Hand     ECOG score: 1         PHQ-9 Total Score:                    Result Review :   The following data was reviewed by: Phani Hernández MD on 12/05/2022:  Lab Results   Component Value Date    HGB 12.5 12/05/2022    HCT 36.1 12/05/2022    MCV 95.8 12/05/2022     12/05/2022    WBC 8.48 12/05/2022    NEUTROABS 6.04 12/05/2022    LYMPHSABS 1.10 12/05/2022    MONOSABS 1.25 (H) 12/05/2022    EOSABS 0.02 12/05/2022    BASOSABS 0.03 12/05/2022     Lab Results   Component Value Date    GLUCOSE 98 12/05/2022    BUN 21 12/05/2022    CREATININE 1.12 (H) 12/05/2022     (L) 12/05/2022    K 4.3 12/05/2022    CL 90 (L) 12/05/2022    CO2 28.3 12/05/2022    CALCIUM 9.6 12/05/2022    PROTEINTOT 7.7 12/05/2022    ALBUMIN 3.93 12/05/2022    BILITOT 0.6 12/05/2022    ALKPHOS 65 12/05/2022    AST 33 (H) 12/05/2022    ALT 17  12/05/2022     Lab Results   Component Value Date    MG 2.3 10/05/2022    PHOS 3.1 10/05/2022    FREET4 1.50 12/05/2022    TSH 0.026 (L) 12/05/2022             Assessment and Plan    Diagnoses and all orders for this visit:    1. Malignant neoplasm of upper lobe of left lung (HCC) (Primary)  Assessment & Plan:  Patient is on single agent nivolumab.  She is tolerating infusions well.  Lab work is notable for a suppressed TSH but the T4 is normal.  This will continue to be monitored.  Proceed with nivolumab as planned.  I will see her back in 4 weeks for OV, nivolumab with lab work prior to monitor for toxicities and restaging CT and bone scans to assess response to therapy.    Orders:  -     CT chest w contrast; Future  -     CT abdomen pelvis w contrast; Future  -     NM Bone Scan Whole Body; Future  -     CBC and Differential; Future  -     Comprehensive metabolic panel; Future  -     TSH; Future  -     T4, free; Future    Other orders  -     sodium chloride 0.9 % infusion 250 mL  -     Nivolumab (OPDIVO) 480 mg in sodium chloride 0.9 % 148 mL chemo IVPB          Patient Follow Up: 4 weeks    Patient was given instructions and counseling regarding her condition or for health maintenance advice. Please see specific information pulled into the AVS if appropriate.     Phani Hernández MD    12/5/2022

## 2022-12-05 NOTE — ASSESSMENT & PLAN NOTE
Patient is on single agent nivolumab.  She is tolerating infusions well.  Lab work is notable for a suppressed TSH but the T4 is normal.  This will continue to be monitored.  Proceed with nivolumab as planned.  I will see her back in 4 weeks for OV, nivolumab with lab work prior to monitor for toxicities and restaging CT and bone scans to assess response to therapy.

## 2022-12-06 ENCOUNTER — HOSPITAL ENCOUNTER (OUTPATIENT)
Dept: ONCOLOGY | Facility: HOSPITAL | Age: 67
Setting detail: INFUSION SERIES
Discharge: HOME | End: 2022-12-06

## 2022-12-06 VITALS
BODY MASS INDEX: 24.46 KG/M2 | DIASTOLIC BLOOD PRESSURE: 56 MMHG | OXYGEN SATURATION: 97 % | TEMPERATURE: 96.6 F | HEIGHT: 64 IN | WEIGHT: 143.3 LBS | SYSTOLIC BLOOD PRESSURE: 93 MMHG | HEART RATE: 77 BPM | RESPIRATION RATE: 18 BRPM

## 2022-12-06 DIAGNOSIS — C34.12 MALIGNANT NEOPLASM OF UPPER LOBE OF LEFT LUNG: Primary | ICD-10-CM

## 2022-12-06 DIAGNOSIS — Z45.2 ENCOUNTER FOR ADJUSTMENT OR MANAGEMENT OF VASCULAR ACCESS DEVICE: ICD-10-CM

## 2022-12-06 PROCEDURE — 25010000002 NIVOLUMAB 240 MG/24ML SOLUTION 24 ML VIAL: Performed by: INTERNAL MEDICINE

## 2022-12-06 PROCEDURE — 96413 CHEMO IV INFUSION 1 HR: CPT

## 2022-12-06 PROCEDURE — 25010000002 HEPARIN LOCK FLUSH PER 10 UNITS: Performed by: INTERNAL MEDICINE

## 2022-12-06 RX ORDER — SODIUM CHLORIDE 0.9 % (FLUSH) 0.9 %
20 SYRINGE (ML) INJECTION AS NEEDED
Status: DISCONTINUED | OUTPATIENT
Start: 2022-12-06 | End: 2022-12-07 | Stop reason: HOSPADM

## 2022-12-06 RX ORDER — SODIUM CHLORIDE 0.9 % (FLUSH) 0.9 %
20 SYRINGE (ML) INJECTION AS NEEDED
Status: CANCELLED | OUTPATIENT
Start: 2022-12-06

## 2022-12-06 RX ORDER — SODIUM CHLORIDE 9 MG/ML
250 INJECTION, SOLUTION INTRAVENOUS ONCE
Status: COMPLETED | OUTPATIENT
Start: 2022-12-06 | End: 2022-12-06

## 2022-12-06 RX ORDER — HEPARIN SODIUM (PORCINE) LOCK FLUSH IV SOLN 100 UNIT/ML 100 UNIT/ML
500 SOLUTION INTRAVENOUS AS NEEDED
Status: CANCELLED | OUTPATIENT
Start: 2022-12-06

## 2022-12-06 RX ORDER — HEPARIN SODIUM (PORCINE) LOCK FLUSH IV SOLN 100 UNIT/ML 100 UNIT/ML
500 SOLUTION INTRAVENOUS AS NEEDED
Status: DISCONTINUED | OUTPATIENT
Start: 2022-12-06 | End: 2022-12-07 | Stop reason: HOSPADM

## 2022-12-06 RX ADMIN — SODIUM CHLORIDE 250 ML: 9 INJECTION, SOLUTION INTRAVENOUS at 09:27

## 2022-12-06 RX ADMIN — HEPARIN SODIUM (PORCINE) LOCK FLUSH IV SOLN 100 UNIT/ML 500 UNITS: 100 SOLUTION at 10:21

## 2022-12-06 RX ADMIN — Medication 20 ML: at 10:20

## 2022-12-06 RX ADMIN — SODIUM CHLORIDE 480 MG: 9 INJECTION, SOLUTION INTRAVENOUS at 09:45

## 2022-12-19 ENCOUNTER — HOSPITAL ENCOUNTER (OUTPATIENT)
Dept: NUCLEAR MEDICINE | Facility: HOSPITAL | Age: 67
Discharge: HOME | End: 2022-12-19

## 2022-12-19 DIAGNOSIS — C34.12 MALIGNANT NEOPLASM OF UPPER LOBE OF LEFT LUNG: ICD-10-CM

## 2022-12-19 PROCEDURE — 78306 BONE IMAGING WHOLE BODY: CPT

## 2022-12-19 PROCEDURE — A9503 TC99M MEDRONATE: HCPCS | Performed by: INTERNAL MEDICINE

## 2022-12-19 PROCEDURE — 0 TECHNETIUM MEDRONATE KIT: Performed by: INTERNAL MEDICINE

## 2022-12-19 RX ORDER — TC 99M MEDRONATE 20 MG/10ML
22 INJECTION, POWDER, LYOPHILIZED, FOR SOLUTION INTRAVENOUS
Status: COMPLETED | OUTPATIENT
Start: 2022-12-19 | End: 2022-12-19

## 2022-12-19 RX ADMIN — TC 99M MEDRONATE 22 MILLICURIE: 20 INJECTION, POWDER, LYOPHILIZED, FOR SOLUTION INTRAVENOUS at 08:50

## 2022-12-21 ENCOUNTER — HOSPITAL ENCOUNTER (OUTPATIENT)
Dept: CT IMAGING | Facility: HOSPITAL | Age: 67
Discharge: HOME | End: 2022-12-21

## 2022-12-21 ENCOUNTER — HOSPITAL ENCOUNTER (OUTPATIENT)
Dept: RESPIRATORY THERAPY | Facility: HOSPITAL | Age: 67
Discharge: HOME | End: 2022-12-21

## 2022-12-21 ENCOUNTER — APPOINTMENT (OUTPATIENT)
Dept: CT IMAGING | Facility: HOSPITAL | Age: 67
End: 2022-12-21

## 2022-12-21 DIAGNOSIS — J15.0 PNEUMONIA DUE TO KLEBSIELLA PNEUMONIAE, UNSPECIFIED LATERALITY, UNSPECIFIED PART OF LUNG: ICD-10-CM

## 2022-12-21 DIAGNOSIS — R06.09 DYSPNEA ON EXERTION: ICD-10-CM

## 2022-12-21 DIAGNOSIS — J44.9 CHRONIC OBSTRUCTIVE PULMONARY DISEASE, UNSPECIFIED COPD TYPE: ICD-10-CM

## 2022-12-21 DIAGNOSIS — J90 PLEURAL EFFUSION: ICD-10-CM

## 2022-12-21 DIAGNOSIS — F17.211 NICOTINE DEPENDENCE, CIGARETTES, IN REMISSION: ICD-10-CM

## 2022-12-21 DIAGNOSIS — C34.12 MALIGNANT NEOPLASM OF UPPER LOBE OF LEFT LUNG: ICD-10-CM

## 2022-12-21 PROCEDURE — 0 IOPAMIDOL PER 1 ML: Performed by: INTERNAL MEDICINE

## 2022-12-21 PROCEDURE — 94729 DIFFUSING CAPACITY: CPT

## 2022-12-21 PROCEDURE — 74177 CT ABD & PELVIS W/CONTRAST: CPT

## 2022-12-21 PROCEDURE — 94726 PLETHYSMOGRAPHY LUNG VOLUMES: CPT | Performed by: INTERNAL MEDICINE

## 2022-12-21 PROCEDURE — 94060 EVALUATION OF WHEEZING: CPT | Performed by: INTERNAL MEDICINE

## 2022-12-21 PROCEDURE — 94729 DIFFUSING CAPACITY: CPT | Performed by: INTERNAL MEDICINE

## 2022-12-21 PROCEDURE — 94726 PLETHYSMOGRAPHY LUNG VOLUMES: CPT

## 2022-12-21 PROCEDURE — 94060 EVALUATION OF WHEEZING: CPT

## 2022-12-21 PROCEDURE — 71260 CT THORAX DX C+: CPT

## 2022-12-21 RX ORDER — ALBUTEROL SULFATE 2.5 MG/3ML
2.5 SOLUTION RESPIRATORY (INHALATION) ONCE
Status: COMPLETED | OUTPATIENT
Start: 2022-12-21 | End: 2022-12-21

## 2022-12-21 RX ORDER — HEPARIN SODIUM (PORCINE) LOCK FLUSH IV SOLN 100 UNIT/ML 100 UNIT/ML
SOLUTION INTRAVENOUS
Status: DISPENSED
Start: 2022-12-21 | End: 2022-12-21

## 2022-12-21 RX ADMIN — ALBUTEROL SULFATE 2.5 MG: 2.5 SOLUTION RESPIRATORY (INHALATION) at 10:37

## 2022-12-21 RX ADMIN — IOPAMIDOL 100 ML: 755 INJECTION, SOLUTION INTRAVENOUS at 10:25

## 2022-12-27 RX ORDER — FAMOTIDINE 20 MG/1
20 TABLET, FILM COATED ORAL
Qty: 60 TABLET | Refills: 0 | Status: SHIPPED | OUTPATIENT
Start: 2022-12-27 | End: 2023-01-26

## 2022-12-31 DIAGNOSIS — E27.40 HYPOADRENALISM: ICD-10-CM

## 2023-01-01 ENCOUNTER — TELEPHONE (OUTPATIENT)
Dept: ONCOLOGY | Facility: HOSPITAL | Age: 68
End: 2023-01-01

## 2023-01-03 ENCOUNTER — HOSPITAL ENCOUNTER (OUTPATIENT)
Dept: ONCOLOGY | Facility: HOSPITAL | Age: 68
Setting detail: INFUSION SERIES
Discharge: HOME | End: 2023-01-03
Payer: MEDICARE

## 2023-01-03 ENCOUNTER — OFFICE VISIT (OUTPATIENT)
Dept: ONCOLOGY | Facility: HOSPITAL | Age: 68
End: 2023-01-03
Payer: MEDICARE

## 2023-01-03 VITALS
BODY MASS INDEX: 26.25 KG/M2 | RESPIRATION RATE: 18 BRPM | DIASTOLIC BLOOD PRESSURE: 75 MMHG | TEMPERATURE: 98.5 F | SYSTOLIC BLOOD PRESSURE: 119 MMHG | OXYGEN SATURATION: 100 % | HEART RATE: 73 BPM | WEIGHT: 153 LBS

## 2023-01-03 VITALS
BODY MASS INDEX: 26.12 KG/M2 | WEIGHT: 153 LBS | HEART RATE: 73 BPM | HEIGHT: 64 IN | TEMPERATURE: 98.5 F | OXYGEN SATURATION: 100 % | DIASTOLIC BLOOD PRESSURE: 75 MMHG | SYSTOLIC BLOOD PRESSURE: 119 MMHG | RESPIRATION RATE: 18 BRPM

## 2023-01-03 DIAGNOSIS — Z45.2 ENCOUNTER FOR ADJUSTMENT OR MANAGEMENT OF VASCULAR ACCESS DEVICE: ICD-10-CM

## 2023-01-03 DIAGNOSIS — C34.12 MALIGNANT NEOPLASM OF UPPER LOBE OF LEFT LUNG: Primary | ICD-10-CM

## 2023-01-03 LAB
ALBUMIN SERPL-MCNC: 3.9 G/DL (ref 3.5–5.2)
ALBUMIN/GLOB SERPL: 1.1 G/DL
ALP SERPL-CCNC: 64 U/L (ref 39–117)
ALT SERPL W P-5'-P-CCNC: 19 U/L (ref 1–33)
ANION GAP SERPL CALCULATED.3IONS-SCNC: 8.3 MMOL/L (ref 5–15)
AST SERPL-CCNC: 28 U/L (ref 1–32)
BASOPHILS # BLD AUTO: 0.03 10*3/MM3 (ref 0–0.2)
BASOPHILS NFR BLD AUTO: 0.4 % (ref 0–1.5)
BILIRUB SERPL-MCNC: <0.2 MG/DL (ref 0–1.2)
BUN SERPL-MCNC: 32 MG/DL (ref 8–23)
BUN/CREAT SERPL: 32.3 (ref 7–25)
CALCIUM SPEC-SCNC: 9.3 MG/DL (ref 8.6–10.5)
CHLORIDE SERPL-SCNC: 97 MMOL/L (ref 98–107)
CO2 SERPL-SCNC: 28.7 MMOL/L (ref 22–29)
CREAT SERPL-MCNC: 0.99 MG/DL (ref 0.57–1)
DEPRECATED RDW RBC AUTO: 43.9 FL (ref 37–54)
EGFRCR SERPLBLD CKD-EPI 2021: 63 ML/MIN/1.73
EOSINOPHIL # BLD AUTO: 0.1 10*3/MM3 (ref 0–0.4)
EOSINOPHIL NFR BLD AUTO: 1.4 % (ref 0.3–6.2)
ERYTHROCYTE [DISTWIDTH] IN BLOOD BY AUTOMATED COUNT: 12.2 % (ref 12.3–15.4)
GLOBULIN UR ELPH-MCNC: 3.7 GM/DL
GLUCOSE SERPL-MCNC: 70 MG/DL (ref 65–99)
HCT VFR BLD AUTO: 35.7 % (ref 34–46.6)
HGB BLD-MCNC: 11.8 G/DL (ref 12–15.9)
IMM GRANULOCYTES # BLD AUTO: 0.03 10*3/MM3 (ref 0–0.05)
IMM GRANULOCYTES NFR BLD AUTO: 0.4 % (ref 0–0.5)
LYMPHOCYTES # BLD AUTO: 2.18 10*3/MM3 (ref 0.7–3.1)
LYMPHOCYTES NFR BLD AUTO: 30.4 % (ref 19.6–45.3)
MCH RBC QN AUTO: 32.2 PG (ref 26.6–33)
MCHC RBC AUTO-ENTMCNC: 33.1 G/DL (ref 31.5–35.7)
MCV RBC AUTO: 97.3 FL (ref 79–97)
MONOCYTES # BLD AUTO: 0.7 10*3/MM3 (ref 0.1–0.9)
MONOCYTES NFR BLD AUTO: 9.8 % (ref 5–12)
NEUTROPHILS NFR BLD AUTO: 4.12 10*3/MM3 (ref 1.7–7)
NEUTROPHILS NFR BLD AUTO: 57.6 % (ref 42.7–76)
PLATELET # BLD AUTO: 416 10*3/MM3 (ref 140–450)
PMV BLD AUTO: 8.6 FL (ref 6–12)
POTASSIUM SERPL-SCNC: 4.3 MMOL/L (ref 3.5–5.2)
PROT SERPL-MCNC: 7.6 G/DL (ref 6–8.5)
RBC # BLD AUTO: 3.67 10*6/MM3 (ref 3.77–5.28)
SODIUM SERPL-SCNC: 134 MMOL/L (ref 136–145)
T4 FREE SERPL-MCNC: 0.93 NG/DL (ref 0.93–1.7)
TSH SERPL DL<=0.05 MIU/L-ACNC: 2.38 UIU/ML (ref 0.27–4.2)
WBC NRBC COR # BLD: 7.16 10*3/MM3 (ref 3.4–10.8)

## 2023-01-03 PROCEDURE — 1159F MED LIST DOCD IN RCRD: CPT | Performed by: INTERNAL MEDICINE

## 2023-01-03 PROCEDURE — 84443 ASSAY THYROID STIM HORMONE: CPT | Performed by: INTERNAL MEDICINE

## 2023-01-03 PROCEDURE — 99214 OFFICE O/P EST MOD 30 MIN: CPT | Performed by: INTERNAL MEDICINE

## 2023-01-03 PROCEDURE — 96413 CHEMO IV INFUSION 1 HR: CPT

## 2023-01-03 PROCEDURE — 85025 COMPLETE CBC W/AUTO DIFF WBC: CPT | Performed by: INTERNAL MEDICINE

## 2023-01-03 PROCEDURE — 84439 ASSAY OF FREE THYROXINE: CPT | Performed by: INTERNAL MEDICINE

## 2023-01-03 PROCEDURE — 25010000002 NIVOLUMAB 240 MG/24ML SOLUTION 24 ML VIAL: Performed by: INTERNAL MEDICINE

## 2023-01-03 PROCEDURE — 25010000002 HEPARIN LOCK FLUSH PER 10 UNITS: Performed by: INTERNAL MEDICINE

## 2023-01-03 PROCEDURE — 1126F AMNT PAIN NOTED NONE PRSNT: CPT | Performed by: INTERNAL MEDICINE

## 2023-01-03 PROCEDURE — 1160F RVW MEDS BY RX/DR IN RCRD: CPT | Performed by: INTERNAL MEDICINE

## 2023-01-03 PROCEDURE — 80053 COMPREHEN METABOLIC PANEL: CPT | Performed by: INTERNAL MEDICINE

## 2023-01-03 RX ORDER — SODIUM CHLORIDE 9 MG/ML
250 INJECTION, SOLUTION INTRAVENOUS ONCE
Status: COMPLETED | OUTPATIENT
Start: 2023-01-03 | End: 2023-01-03

## 2023-01-03 RX ORDER — FLUDROCORTISONE ACETATE 0.1 MG/1
TABLET ORAL
Qty: 15 TABLET | Refills: 0 | Status: SHIPPED | OUTPATIENT
Start: 2023-01-03 | End: 2023-01-31

## 2023-01-03 RX ORDER — HEPARIN SODIUM (PORCINE) LOCK FLUSH IV SOLN 100 UNIT/ML 100 UNIT/ML
500 SOLUTION INTRAVENOUS AS NEEDED
Status: CANCELLED | OUTPATIENT
Start: 2023-01-03

## 2023-01-03 RX ORDER — HEPARIN SODIUM (PORCINE) LOCK FLUSH IV SOLN 100 UNIT/ML 100 UNIT/ML
500 SOLUTION INTRAVENOUS AS NEEDED
Status: DISCONTINUED | OUTPATIENT
Start: 2023-01-03 | End: 2023-01-04 | Stop reason: HOSPADM

## 2023-01-03 RX ORDER — SODIUM CHLORIDE 9 MG/ML
250 INJECTION, SOLUTION INTRAVENOUS ONCE
Status: CANCELLED | OUTPATIENT
Start: 2023-01-03

## 2023-01-03 RX ORDER — SODIUM CHLORIDE 0.9 % (FLUSH) 0.9 %
20 SYRINGE (ML) INJECTION AS NEEDED
Status: DISCONTINUED | OUTPATIENT
Start: 2023-01-03 | End: 2023-01-04 | Stop reason: HOSPADM

## 2023-01-03 RX ORDER — SODIUM CHLORIDE 0.9 % (FLUSH) 0.9 %
20 SYRINGE (ML) INJECTION AS NEEDED
Status: CANCELLED | OUTPATIENT
Start: 2023-01-03

## 2023-01-03 RX ADMIN — Medication 20 ML: at 12:11

## 2023-01-03 RX ADMIN — HEPARIN SODIUM (PORCINE) LOCK FLUSH IV SOLN 100 UNIT/ML 500 UNITS: 100 SOLUTION at 12:11

## 2023-01-03 RX ADMIN — SODIUM CHLORIDE 480 MG: 9 INJECTION, SOLUTION INTRAVENOUS at 11:27

## 2023-01-03 RX ADMIN — SODIUM CHLORIDE 250 ML: 9 INJECTION, SOLUTION INTRAVENOUS at 10:55

## 2023-01-03 NOTE — PROGRESS NOTES
Chief Complaint  Chemotherapy    Anuradha Hope MD    Subjective          Linda Ortiz presents to [facility] HEMATOLOGY & ONCOLOGY for ongoing treatment of her metastatic lung cancer.  She is on immunotherapy as outlined below, currently single agent Opdivo.  Tolerating her infusions well.  She has issues from her Port-A-Cath.  She has chronic pain in the right hand from soft tissue involvement which was treated with radiation.  Overall she notes the hand is better but still somewhat painful.  She is able to use it more.  She denies any other new masses or adenopathy, no unusual aches or pains.  She notes adequate appetite and her weight is maintained.  She has good energy which is adequate for all of her ADLs.    Oncology/Hematology History Overview Note   5/19/2022 Left Adrenal mass biopsy at [facility] revealed: poorly differentiated non-small cell carcinoma, high grade. Positive fo AE1/3 and Cam5.2. Immunohistochemistry was positive for PAX8, HeParlGlypican3, Vimentin, focal positive for chromagranin, GATA3.  The case was sent to [facility] for second opinion with a final diagnosis of poorly differentiated carcinoma with diffuse reduced BRG-1 expression.    6/21/2022 XRT initiated to Right hand     7/19/2022 Opdivo + Yervoy initiated x 4 doses  10/11/2022 Opdivo only          Malignant neoplasm of upper lobe of left lung (HCC)   6/9/2022 Initial Diagnosis    Lung cancer (HCC)     7/19/2022 -  Chemotherapy    OP LUNG Nivolumab 1 mg/kg / Ipilimumab 3 mg/kg / Nivolumab 480 mg      1/3/2023 Cancer Staged    Staging form: Lung, AJCC 8th Edition  - Clinical: Stage IV (cM1) - Signed by Phani Hernández MD on 1/3/2023     Secondary carcinoid tumor of bone (HCC) (Resolved)   6/14/2022 Initial Diagnosis    Secondary carcinoid tumor of bone (HCC)     6/20/2022 -  Radiation    RADIATION THERAPY Treatment Details (Noted on 6/14/2022)  Site: Right Hand  Technique: 3D CRT  Goal: No goal specified  Planned  Treatment Start Date: 6/20/2022         Review of Systems   Constitutional: Negative for appetite change, diaphoresis, fatigue, fever, unexpected weight gain and unexpected weight loss.   HENT: Negative for hearing loss, mouth sores, sore throat, swollen glands, trouble swallowing and voice change.    Eyes: Negative for blurred vision.   Respiratory: Negative for cough, shortness of breath and wheezing.    Cardiovascular: Negative for chest pain and palpitations.   Gastrointestinal: Negative for abdominal pain, blood in stool, constipation, diarrhea, nausea and vomiting.   Endocrine: Negative for cold intolerance and heat intolerance.   Genitourinary: Negative for difficulty urinating, dysuria, frequency, hematuria and urinary incontinence.   Musculoskeletal: Negative for arthralgias, back pain and myalgias.   Skin: Negative for rash, skin lesions and wound.   Neurological: Negative for dizziness, seizures, weakness, numbness and headache.   Hematological: Does not bruise/bleed easily.   Psychiatric/Behavioral: Negative for depressed mood. The patient is not nervous/anxious.      Current Outpatient Medications on File Prior to Visit   Medication Sig Dispense Refill   • acetaminophen (TYLENOL) 325 MG tablet Take 650 mg by mouth Every 6 (Six) Hours As Needed for Mild Pain .     • apixaban (ELIQUIS) 5 MG tablet tablet Take 1 tablet by mouth Every 12 (Twelve) Hours. Indications: DVT/PE (active thrombosis) 60 tablet 2   • famotidine (PEPCID) 20 MG tablet TAKE 1 TABLET BY MOUTH 2 (TWO) TIMES A DAY BEFORE MEALS FOR 30 DAYS. 60 tablet 0   • fludrocortisone 0.1 MG tablet TAKE 1/2 TABLET BY MOUTH EVERY DAY 15 tablet 0   • fluticasone-salmeterol (Advair HFA) 115-21 MCG/ACT inhaler Inhale 2 puffs 2 (Two) Times a Day. 1 each 11   • furosemide (LASIX) 40 MG tablet TAKE 1 TABLET (40 MG TOTAL) BY MOUTH IN THE MORNING AND 1 TABLET (40 MG TOTAL) IN THE EVENING.     • ipratropium-albuterol (DUO-NEB) 0.5-2.5 mg/3 ml nebulizer Take 3 mL  by nebulization 4 (Four) Times a Day As Needed for Wheezing. 360 mL 3   • predniSONE (DELTASONE) 10 MG tablet Take 1 tablet by mouth Daily With Breakfast. 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   • Hypocalcemia 2022   • Malignant neoplasm of upper lobe of left lung (HCC) 2022   • Metastatic cancer (HCC)    • Multiple subsegmental pulmonary emboli without acute cor pulmonale (HCC) 10/10/2022     Past Surgical History:   Procedure Laterality Date   • BRONCHOSCOPY N/A 2022    Procedure: BRONCHOSCOPY WITH BAL, WASHINGS;  Surgeon: Fausto Leon MD;  Location: [facility] ENDOSCOPY;  Service: Pulmonary;  Laterality: N/A;  PNEUMONIA, PERSISTANT COUGH   • HIP SURGERY Bilateral    • KNEE ARTHROSCOPY     • VENOUS ACCESS DEVICE (PORT) INSERTION N/A 7/15/2022    Procedure: INSERTION VENOUS ACCESS DEVICE;  Surgeon: Eber Hull MD;  [place]: [facility] [place];  Service: General;  Laterality: N/A;     Social History     Socioeconomic History   • Marital status: Single   Tobacco Use   • Smoking status: Former     Packs/day: 1.00     Years: 47.00     Pack years: 47.00     Types: Cigarettes     Start date:      Quit date: 2022     Years since quittin.9   • Smokeless tobacco: Never   Vaping Use   • Vaping Use: Former   • Start date: 2013   • Quit date: 2014   Substance and Sexual Activity   •  Alcohol use: Not Currently   • Drug use: Never   • Sexual activity: Defer     Family History   Problem Relation Age of Onset   • Breast cancer Mother    • Lung cancer Father    • Thyroid cancer Maternal Aunt    • Breast cancer Maternal Aunt    • Lung cancer Paternal Aunt    • COPD Paternal Uncle        Objective   Physical Exam  Vitals reviewed. Exam conducted with a chaperone present.   Constitutional:       General: She is not in acute distress.     Appearance: Normal appearance.   Cardiovascular:      Rate and Rhythm: Normal rate and regular rhythm.      Heart sounds: Normal heart sounds. No murmur heard.    No gallop.   Pulmonary:      Effort: Pulmonary effort is normal.      Breath sounds: Normal breath sounds.      Comments: Port-A-Cath  Abdominal:      General: Abdomen is flat. Bowel sounds are normal.      Palpations: Abdomen is soft.   Musculoskeletal:      Cervical back: Neck supple.      Right lower leg: No edema.      Left lower leg: No edema.   Lymphadenopathy:      Cervical: No cervical adenopathy.   Neurological:      Mental Status: She is alert and oriented to person, place, and time.   Psychiatric:         Mood and Affect: Mood normal.         Behavior: Behavior normal.         Vitals:    01/03/23 1009   BP: 119/75   Pulse: 73   Resp: 18   Temp: 98.5 °F (36.9 °C)   SpO2: 100%   Weight: 69.4 kg (153 lb)   PainSc: 0-No pain     ECOG score: 1         PHQ-9 Total Score:                    Result Review :   The following data was reviewed by: Phani Hernández MD on 01/03/2023:  Lab Results   Component Value Date    HGB 11.8 (L) 01/03/2023    HCT 35.7 01/03/2023    MCV 97.3 (H) 01/03/2023     01/03/2023    WBC 7.16 01/03/2023    NEUTROABS 4.12 01/03/2023    LYMPHSABS 2.18 01/03/2023    MONOSABS 0.70 01/03/2023    EOSABS 0.10 01/03/2023    BASOSABS 0.03 01/03/2023     Lab Results   Component Value Date    GLUCOSE 70 01/03/2023    BUN 32 (H) 01/03/2023    CREATININE 0.99 01/03/2023     (L)  01/03/2023    K 4.3 01/03/2023    CL 97 (L) 01/03/2023    CO2 28.7 01/03/2023    CALCIUM 9.3 01/03/2023    PROTEINTOT 7.6 01/03/2023    ALBUMIN 3.9 01/03/2023    BILITOT <0.2 01/03/2023    ALKPHOS 64 01/03/2023    AST 28 01/03/2023    ALT 19 01/03/2023     Lab Results   Component Value Date    MG 2.3 10/05/2022    PHOS 3.1 10/05/2022    FREET4 0.93 01/03/2023    TSH 2.380 01/03/2023       Data reviewed: Radiologic studies CT chest, abdomen, pelvis and bone scan reviewed demonstrating stable to decreased disease with no new or worsened findings      Assessment and Plan    Diagnoses and all orders for this visit:    1. Malignant neoplasm of upper lobe of left lung (HCC) (Primary)  Assessment & Plan:  Metastatic.  Patient is on palliative treatment immunotherapy, nivolumab.  Tolerating well.  Restaging CT and bone scans reviewed demonstrating decrease in many of the previously identified lesions and stability of others.  There is no new or worsened findings.  Lab work today is notable for slight elevation of the AST and mildly decreased sodium.  Not enough to require dose adjustments at this time.  Proceed with cycle 8 as planned.  RTC 4 weeks for OV, cycle 9 with lab work prior to monitor for toxicities.    Orders:  -     Cancel: sodium chloride 0.9 % infusion 250 mL  -     Cancel: Nivolumab (OPDIVO) 480 mg in sodium chloride 0.9 % 148 mL chemo IVPB          Patient Follow Up: 4 weeks    Patient was given instructions and counseling regarding her condition or for health maintenance advice. Please see specific information pulled into the AVS if appropriate.     Phani Hernández MD    1/4/2023

## 2023-01-04 NOTE — ASSESSMENT & PLAN NOTE
Metastatic.  Patient is on palliative treatment immunotherapy, nivolumab.  Tolerating well.  Restaging CT and bone scans reviewed demonstrating decrease in many of the previously identified lesions and stability of others.  There is no new or worsened findings.  Lab work today is notable for slight elevation of the AST and mildly decreased sodium.  Not enough to require dose adjustments at this time.  Proceed with cycle 8 as planned.  RTC 4 weeks for OV, cycle 9 with lab work prior to monitor for toxicities.

## 2023-01-12 DIAGNOSIS — I26.94 MULTIPLE SUBSEGMENTAL PULMONARY EMBOLI WITHOUT ACUTE COR PULMONALE: ICD-10-CM

## 2023-01-12 RX ORDER — APIXABAN 5 MG/1
TABLET, FILM COATED ORAL
Qty: 60 TABLET | Refills: 2 | Status: SHIPPED | OUTPATIENT
Start: 2023-01-12

## 2023-01-26 NOTE — TELEPHONE ENCOUNTER
DELETE AFTER REVIEWING: Telephone encounter to be sent to the clinical pool     Caller: Linda Ortiz    Relationship to patient: Self    Best call back number:[phone]    Chief complaint: PT NEEDS TO RESCHEDULE BUT IF SHE CANNOT GET AROUND 10:30 SHE WILL KEEP ORIGINAL TIME      Type of visit: INFUSION     Requested date: 1-31-23 AROUND 10:30 IF SHE CAN

## 2023-01-27 PROBLEM — Z79.899 ENCOUNTER FOR LONG-TERM (CURRENT) USE OF HIGH-RISK MEDICATION: Status: ACTIVE | Noted: 2023-01-27

## 2023-01-30 ENCOUNTER — OFFICE VISIT (OUTPATIENT)
Dept: ONCOLOGY | Facility: HOSPITAL | Age: 68
End: 2023-01-30
Payer: MEDICARE

## 2023-01-30 ENCOUNTER — HOSPITAL ENCOUNTER (OUTPATIENT)
Dept: ONCOLOGY | Facility: HOSPITAL | Age: 68
Setting detail: INFUSION SERIES
Discharge: HOME | End: 2023-01-30
Payer: MEDICARE

## 2023-01-30 VITALS
HEART RATE: 72 BPM | SYSTOLIC BLOOD PRESSURE: 123 MMHG | OXYGEN SATURATION: 99 % | BODY MASS INDEX: 28.44 KG/M2 | RESPIRATION RATE: 16 BRPM | WEIGHT: 165.79 LBS | DIASTOLIC BLOOD PRESSURE: 69 MMHG | TEMPERATURE: 96.4 F

## 2023-01-30 DIAGNOSIS — I26.94 MULTIPLE SUBSEGMENTAL PULMONARY EMBOLI WITHOUT ACUTE COR PULMONALE: ICD-10-CM

## 2023-01-30 DIAGNOSIS — C34.12 MALIGNANT NEOPLASM OF UPPER LOBE OF LEFT LUNG: Primary | ICD-10-CM

## 2023-01-30 DIAGNOSIS — Z79.899 ENCOUNTER FOR LONG-TERM (CURRENT) USE OF HIGH-RISK MEDICATION: Primary | ICD-10-CM

## 2023-01-30 DIAGNOSIS — Z45.2 ENCOUNTER FOR ADJUSTMENT OR MANAGEMENT OF VASCULAR ACCESS DEVICE: ICD-10-CM

## 2023-01-30 DIAGNOSIS — E03.9 HYPOTHYROIDISM, UNSPECIFIED TYPE: ICD-10-CM

## 2023-01-30 DIAGNOSIS — K29.70 GASTRITIS WITHOUT BLEEDING, UNSPECIFIED CHRONICITY, UNSPECIFIED GASTRITIS TYPE: ICD-10-CM

## 2023-01-30 DIAGNOSIS — C34.12 MALIGNANT NEOPLASM OF UPPER LOBE OF LEFT LUNG: ICD-10-CM

## 2023-01-30 PROBLEM — R79.89 DECREASED THYROID STIMULATING HORMONE (TSH) LEVEL: Status: RESOLVED | Noted: 2022-11-07 | Resolved: 2023-01-30

## 2023-01-30 PROBLEM — E87.1 HYPO-OSMOLALITY AND HYPONATREMIA: Status: ACTIVE | Noted: 2023-01-04

## 2023-01-30 PROBLEM — I10 HYPERTENSION: Status: ACTIVE | Noted: 2023-01-04

## 2023-01-30 PROBLEM — N18.32 STAGE 3B CHRONIC KIDNEY DISEASE: Status: ACTIVE | Noted: 2023-01-04

## 2023-01-30 LAB
ALBUMIN SERPL-MCNC: 4.2 G/DL (ref 3.5–5.2)
ALBUMIN/GLOB SERPL: 1.3 G/DL
ALP SERPL-CCNC: 68 U/L (ref 39–117)
ALT SERPL W P-5'-P-CCNC: 22 U/L (ref 1–33)
ANION GAP SERPL CALCULATED.3IONS-SCNC: 12.2 MMOL/L (ref 5–15)
AST SERPL-CCNC: 37 U/L (ref 1–32)
BASOPHILS # BLD AUTO: 0.03 10*3/MM3 (ref 0–0.2)
BASOPHILS NFR BLD AUTO: 0.5 % (ref 0–1.5)
BILIRUB SERPL-MCNC: 0.2 MG/DL (ref 0–1.2)
BUN SERPL-MCNC: 27 MG/DL (ref 8–23)
BUN/CREAT SERPL: 24.5 (ref 7–25)
CALCIUM SPEC-SCNC: 9 MG/DL (ref 8.6–10.5)
CHLORIDE SERPL-SCNC: 96 MMOL/L (ref 98–107)
CO2 SERPL-SCNC: 26.8 MMOL/L (ref 22–29)
CREAT SERPL-MCNC: 1.1 MG/DL (ref 0.57–1)
DEPRECATED RDW RBC AUTO: 45.6 FL (ref 37–54)
EGFRCR SERPLBLD CKD-EPI 2021: 55.5 ML/MIN/1.73
EOSINOPHIL # BLD AUTO: 0.1 10*3/MM3 (ref 0–0.4)
EOSINOPHIL NFR BLD AUTO: 1.6 % (ref 0.3–6.2)
ERYTHROCYTE [DISTWIDTH] IN BLOOD BY AUTOMATED COUNT: 12.8 % (ref 12.3–15.4)
GLOBULIN UR ELPH-MCNC: 3.2 GM/DL
GLUCOSE SERPL-MCNC: 80 MG/DL (ref 65–99)
HCT VFR BLD AUTO: 38 % (ref 34–46.6)
HGB BLD-MCNC: 12.7 G/DL (ref 12–15.9)
IMM GRANULOCYTES # BLD AUTO: 0.03 10*3/MM3 (ref 0–0.05)
IMM GRANULOCYTES NFR BLD AUTO: 0.5 % (ref 0–0.5)
LYMPHOCYTES # BLD AUTO: 2.25 10*3/MM3 (ref 0.7–3.1)
LYMPHOCYTES NFR BLD AUTO: 35.1 % (ref 19.6–45.3)
MCH RBC QN AUTO: 32.4 PG (ref 26.6–33)
MCHC RBC AUTO-ENTMCNC: 33.4 G/DL (ref 31.5–35.7)
MCV RBC AUTO: 96.9 FL (ref 79–97)
MONOCYTES # BLD AUTO: 0.7 10*3/MM3 (ref 0.1–0.9)
MONOCYTES NFR BLD AUTO: 10.9 % (ref 5–12)
NEUTROPHILS NFR BLD AUTO: 3.3 10*3/MM3 (ref 1.7–7)
NEUTROPHILS NFR BLD AUTO: 51.4 % (ref 42.7–76)
PLATELET # BLD AUTO: 254 10*3/MM3 (ref 140–450)
PMV BLD AUTO: 8.9 FL (ref 6–12)
POTASSIUM SERPL-SCNC: 4.8 MMOL/L (ref 3.5–5.2)
PROT SERPL-MCNC: 7.4 G/DL (ref 6–8.5)
RBC # BLD AUTO: 3.92 10*6/MM3 (ref 3.77–5.28)
SODIUM SERPL-SCNC: 135 MMOL/L (ref 136–145)
T4 FREE SERPL-MCNC: 0.8 NG/DL (ref 0.93–1.7)
TSH SERPL DL<=0.05 MIU/L-ACNC: 8.3 UIU/ML (ref 0.27–4.2)
WBC NRBC COR # BLD: 6.41 10*3/MM3 (ref 3.4–10.8)

## 2023-01-30 PROCEDURE — 36591 DRAW BLOOD OFF VENOUS DEVICE: CPT

## 2023-01-30 PROCEDURE — 85025 COMPLETE CBC W/AUTO DIFF WBC: CPT | Performed by: INTERNAL MEDICINE

## 2023-01-30 PROCEDURE — G0463 HOSPITAL OUTPT CLINIC VISIT: HCPCS | Performed by: INTERNAL MEDICINE

## 2023-01-30 PROCEDURE — 84443 ASSAY THYROID STIM HORMONE: CPT | Performed by: INTERNAL MEDICINE

## 2023-01-30 PROCEDURE — 80053 COMPREHEN METABOLIC PANEL: CPT | Performed by: INTERNAL MEDICINE

## 2023-01-30 PROCEDURE — 99214 OFFICE O/P EST MOD 30 MIN: CPT | Performed by: INTERNAL MEDICINE

## 2023-01-30 PROCEDURE — 25010000002 HEPARIN LOCK FLUSH PER 10 UNITS: Performed by: INTERNAL MEDICINE

## 2023-01-30 PROCEDURE — 84439 ASSAY OF FREE THYROXINE: CPT | Performed by: INTERNAL MEDICINE

## 2023-01-30 RX ORDER — FAMOTIDINE 20 MG/1
TABLET, FILM COATED ORAL
COMMUNITY
Start: 2023-01-27 | End: 2023-01-30 | Stop reason: SDUPTHER

## 2023-01-30 RX ORDER — SODIUM CHLORIDE 0.9 % (FLUSH) 0.9 %
20 SYRINGE (ML) INJECTION AS NEEDED
Status: DISCONTINUED | OUTPATIENT
Start: 2023-01-30 | End: 2023-01-31 | Stop reason: HOSPADM

## 2023-01-30 RX ORDER — SODIUM CHLORIDE 9 MG/ML
250 INJECTION, SOLUTION INTRAVENOUS ONCE
Status: CANCELLED | OUTPATIENT
Start: 2023-01-31

## 2023-01-30 RX ORDER — SODIUM CHLORIDE 0.9 % (FLUSH) 0.9 %
20 SYRINGE (ML) INJECTION AS NEEDED
Status: CANCELLED | OUTPATIENT
Start: 2023-01-30

## 2023-01-30 RX ORDER — FAMOTIDINE 20 MG/1
20 TABLET, FILM COATED ORAL 2 TIMES DAILY
Qty: 180 TABLET | Refills: 1 | Status: SHIPPED | OUTPATIENT
Start: 2023-01-30 | End: 2023-04-30

## 2023-01-30 RX ORDER — LEVOTHYROXINE SODIUM 0.05 MG/1
50 TABLET ORAL DAILY
Qty: 30 TABLET | Refills: 1 | Status: SHIPPED | OUTPATIENT
Start: 2023-01-30 | End: 2023-02-21

## 2023-01-30 RX ORDER — HEPARIN SODIUM (PORCINE) LOCK FLUSH IV SOLN 100 UNIT/ML 100 UNIT/ML
500 SOLUTION INTRAVENOUS AS NEEDED
Status: DISCONTINUED | OUTPATIENT
Start: 2023-01-30 | End: 2023-01-31 | Stop reason: HOSPADM

## 2023-01-30 RX ORDER — HEPARIN SODIUM (PORCINE) LOCK FLUSH IV SOLN 100 UNIT/ML 100 UNIT/ML
500 SOLUTION INTRAVENOUS AS NEEDED
Status: CANCELLED | OUTPATIENT
Start: 2023-01-30

## 2023-01-30 RX ADMIN — Medication 20 ML: at 08:34

## 2023-01-30 RX ADMIN — HEPARIN SODIUM (PORCINE) LOCK FLUSH IV SOLN 100 UNIT/ML 500 UNITS: 100 SOLUTION at 08:34

## 2023-01-30 NOTE — PROGRESS NOTES
Chief Complaint  Lung Cancer    Anuradha Hope MD    Subjective          Linda Ortiz presents to [facility]EMAT[facility] for ongoing treatment of her metastatic lung cancer.  She is on immunotherapy.  She is tolerating her infusions well.  No issues from Port-A-Cath.  She denies new masses or adenopathy.  No unusual aches or pains.  Some shortness of breath, cough, mops this.  She feels like she is eating and drinking well.  Patient reports some dyspepsia with no nausea or vomiting.  Pepcid helps control her symptoms.  She requests refill.  Patient is taking Eliquis long-term for anticoagulation.  She denies excessive bruising or bleeding.    Oncology/Hematology History Overview Note   5/19/2022 Left Adrenal mass biopsy at [facility] revealed: poorly differentiated non-small cell carcinoma, high grade. Positive fo AE1/3 and Cam5.2. Immunohistochemistry was positive for PAX8, HeParlGlypican3, Vimentin, focal positive for chromagranin, GATA3.  The case was sent to [facility] for second opinion with a final diagnosis of poorly differentiated carcinoma with diffuse reduced BRG-1 expression.    6/21/2022 XRT initiated to Right hand     7/19/2022 Opdivo + Yervoy initiated x 4 doses  10/11/2022 Opdivo only          Malignant neoplasm of upper lobe of left lung (HCC)   6/9/2022 Initial Diagnosis    Lung cancer (HCC)     7/19/2022 -  Chemotherapy    OP LUNG Nivolumab 1 mg/kg / Ipilimumab 3 mg/kg / Nivolumab 480 mg      1/3/2023 Cancer Staged    Staging form: Lung, AJCC 8th Edition  - Clinical: Stage IV (cM1) - Signed by Phani Hernández MD on 1/3/2023     Secondary carcinoid tumor of bone (HCC) (Resolved)   6/14/2022 Initial Diagnosis    Secondary carcinoid tumor of bone (HCC)     6/20/2022 -  Radiation    RADIATION THERAPY Treatment Details (Noted on 6/14/2022)  Site: Right Hand  Technique: 3D CRT  Goal: No goal specified  Planned Treatment Start Date: 6/20/2022         Review of Systems    Constitutional: Negative for appetite change, diaphoresis, fatigue, fever, unexpected weight gain and unexpected weight loss.   HENT: Negative for hearing loss, mouth sores, sore throat, swollen glands, trouble swallowing and voice change.    Eyes: Negative for blurred vision.   Respiratory: Negative for cough, shortness of breath and wheezing.    Cardiovascular: Negative for chest pain and palpitations.   Gastrointestinal: Negative for abdominal pain, blood in stool, constipation, diarrhea, nausea and vomiting.   Endocrine: Negative for cold intolerance and heat intolerance.   Genitourinary: Negative for difficulty urinating, dysuria, frequency, hematuria and urinary incontinence.   Musculoskeletal: Negative for arthralgias, back pain and myalgias.   Skin: Negative for rash, skin lesions and wound.   Neurological: Negative for dizziness, seizures, weakness, numbness and headache.   Hematological: Does not bruise/bleed easily.   Psychiatric/Behavioral: Negative for depressed mood. The patient is not nervous/anxious.      Current Outpatient Medications on File Prior to Visit   Medication Sig Dispense Refill   • acetaminophen (TYLENOL) 325 MG tablet Take 650 mg by mouth Every 6 (Six) Hours As Needed for Mild Pain .     • Eliquis 5 MG tablet tablet TAKE 1 TABLET BY MOUTH TWICE A DAY 60 tablet 2   • fludrocortisone 0.1 MG tablet TAKE 1/2 TABLET BY MOUTH EVERY DAY 15 tablet 0   • fluticasone-salmeterol (Advair HFA) 115-21 MCG/ACT inhaler Inhale 2 puffs 2 (Two) Times a Day. 1 each 11   • furosemide (LASIX) 40 MG tablet TAKE 1 TABLET (40 MG TOTAL) BY MOUTH IN THE MORNING AND 1 TABLET (40 MG TOTAL) IN THE EVENING.     • ipratropium-albuterol (DUO-NEB) 0.5-2.5 mg/3 ml nebulizer Take 3 mL by nebulization 4 (Four) Times a Day As Needed for Wheezing. 360 mL 3   • predniSONE (DELTASONE) 10 MG tablet Take 1 tablet by mouth Daily With Breakfast. 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   • Hypocalcemia 2022   • Malignant neoplasm of upper lobe of left lung (HCC) 2022   • Metastatic cancer (HCC)    • Multiple subsegmental pulmonary emboli without acute cor pulmonale (HCC) 10/10/2022     Past Surgical History:   Procedure Laterality Date   • BRONCHOSCOPY N/A 2022    Procedure: BRONCHOSCOPY WITH BAL, WASHINGS;  Surgeon: Fausto Leon MD;  Location: [facility] ENDOSCOPY;  Service: Pulmonary;  Laterality: N/A;  PNEUMONIA, PERSISTANT COUGH   • HIP SURGERY Bilateral    • KNEE ARTHROSCOPY     • VENOUS ACCESS DEVICE (PORT) INSERTION N/A 7/15/2022    Procedure: INSERTION VENOUS ACCESS DEVICE;  Surgeon: Eber Hull MD;  [place] [facility] [place] [facility] [place]  Service: General;  Laterality: N/A;     Social History     Socioeconomic History   • Marital status: Single   Tobacco Use   • Smoking status: Former     Packs/day: 1.00     Years: 47.00     Pack years: 47.00     Types: Cigarettes     Start date:      Quit date: 2022     Years since quittin.9   • Smokeless tobacco: Never   Vaping Use   • Vaping Use: Former   • Start date: 2013   • Quit date: 2014   Substance and Sexual Activity   • Alcohol use: Not Currently   • Drug use: Never   • Sexual activity: Defer     Family History   Problem Relation Age of Onset   • Breast cancer Mother    • Lung cancer Father    • Thyroid cancer Maternal Aunt    • Breast cancer Maternal Aunt     • Lung cancer Paternal Aunt    • COPD Paternal Uncle        Objective   Physical Exam  Vitals reviewed. Exam conducted with a chaperone present.   Constitutional:       General: She is not in acute distress.     Appearance: Normal appearance.   Cardiovascular:      Rate and Rhythm: Normal rate and regular rhythm.      Heart sounds: Normal heart sounds. No murmur heard.    No gallop.   Pulmonary:      Effort: Pulmonary effort is normal.      Breath sounds: Normal breath sounds.      Comments: Port-A-Cath  Abdominal:      General: Abdomen is flat. Bowel sounds are normal.      Palpations: Abdomen is soft.   Musculoskeletal:      Cervical back: Neck supple.      Right lower leg: No edema.      Left lower leg: No edema.   Lymphadenopathy:      Cervical: No cervical adenopathy.   Neurological:      Mental Status: She is alert and oriented to person, place, and time.   Psychiatric:         Mood and Affect: Mood normal.         Behavior: Behavior normal.         Vitals:    01/30/23 0937   BP: 123/69   Pulse: 72   Resp: 16   Temp: 96.4 °F (35.8 °C)   TempSrc: Temporal   SpO2: 99%   Weight: 75.2 kg (165 lb 12.6 oz)   PainSc: 0-No pain     ECOG score: 0         PHQ-9 Total Score:                    Result Review :   The following data was reviewed by: Phani Hernández MD on 01/30/2023:  Lab Results   Component Value Date    HGB 12.7 01/30/2023    HCT 38.0 01/30/2023    MCV 96.9 01/30/2023     01/30/2023    WBC 6.41 01/30/2023    NEUTROABS 3.30 01/30/2023    LYMPHSABS 2.25 01/30/2023    MONOSABS 0.70 01/30/2023    EOSABS 0.10 01/30/2023    BASOSABS 0.03 01/30/2023     Lab Results   Component Value Date    GLUCOSE 80 01/30/2023    BUN 27 (H) 01/30/2023    CREATININE 1.10 (H) 01/30/2023     (L) 01/30/2023    K 4.8 01/30/2023    CL 96 (L) 01/30/2023    CO2 26.8 01/30/2023    CALCIUM 9.0 01/30/2023    PROTEINTOT 7.4 01/30/2023    ALBUMIN 4.2 01/30/2023    BILITOT 0.2 01/30/2023    ALKPHOS 68 01/30/2023    AST 37  (H) 01/30/2023    ALT 22 01/30/2023     Lab Results   Component Value Date    MG 2.3 10/05/2022    PHOS 3.1 10/05/2022    FREET4 0.80 (L) 01/30/2023    TSH 8.300 (H) 01/30/2023             Assessment and Plan    Diagnoses and all orders for this visit:    1. Malignant neoplasm of upper lobe of left lung (HCC) (Primary)  Assessment & Plan:  Metastatic.  Patient is on palliative treatment immunotherapy.  She is tolerating nivolumab well.  Lab work is adequate for treatment.  Proceed with nivolumab as planned.  RTC 4 weeks for OV, nivolumab with lab work prior to monitor for toxicities.      2. Hypothyroidism, unspecified type  Assessment & Plan:  TSH is increased and T4 is suppressed.  Begin thyroid replacement with levothyroxine 50 mcg daily.  Repeat lab work next visit.    Orders:  -     levothyroxine (SYNTHROID, LEVOTHROID) 50 MCG tablet; Take 1 tablet by mouth Daily.  Dispense: 30 tablet; Refill: 1    3. Gastritis without bleeding, unspecified chronicity, unspecified gastritis type  Assessment & Plan:  Refill famotidine today.    Orders:  -     famotidine (PEPCID) 20 MG tablet; Take 1 tablet by mouth 2 (Two) Times a Day for 90 days.  Dispense: 180 tablet; Refill: 1    4. Multiple subsegmental pulmonary emboli without acute cor pulmonale (HCC)  Assessment & Plan:  Cont eliquis        Other orders  -     sodium chloride 0.9 % infusion 250 mL  -     Nivolumab (OPDIVO) 480 mg in sodium chloride 0.9 % 148 mL chemo IVPB          Patient Follow Up: 4 weeks    Patient was given instructions and counseling regarding her condition or for health maintenance advice. Please see specific information pulled into the AVS if appropriate.     Phani Hernández MD    1/31/2023

## 2023-01-30 NOTE — ADDENDUM NOTE
Encounter addended by: Shannon Larios RN on: 1/30/2023 8:35 AM   Actions taken: Charge Capture section accepted

## 2023-01-31 ENCOUNTER — HOSPITAL ENCOUNTER (OUTPATIENT)
Dept: ONCOLOGY | Facility: HOSPITAL | Age: 68
Setting detail: INFUSION SERIES
Discharge: HOME | End: 2023-01-31
Payer: MEDICARE

## 2023-01-31 ENCOUNTER — OFFICE VISIT (OUTPATIENT)
Dept: PULMONOLOGY | Facility: CLINIC | Age: 68
End: 2023-01-31
Payer: MEDICARE

## 2023-01-31 VITALS
WEIGHT: 164.68 LBS | HEIGHT: 64 IN | DIASTOLIC BLOOD PRESSURE: 67 MMHG | SYSTOLIC BLOOD PRESSURE: 135 MMHG | HEART RATE: 69 BPM | OXYGEN SATURATION: 97 % | BODY MASS INDEX: 28.12 KG/M2 | TEMPERATURE: 98.7 F | RESPIRATION RATE: 18 BRPM

## 2023-01-31 VITALS
HEART RATE: 65 BPM | SYSTOLIC BLOOD PRESSURE: 117 MMHG | BODY MASS INDEX: 28.22 KG/M2 | HEIGHT: 64 IN | DIASTOLIC BLOOD PRESSURE: 79 MMHG | OXYGEN SATURATION: 97 % | WEIGHT: 165.3 LBS | RESPIRATION RATE: 18 BRPM | TEMPERATURE: 97.7 F

## 2023-01-31 DIAGNOSIS — R53.1 WEAKNESS: ICD-10-CM

## 2023-01-31 DIAGNOSIS — Z79.899 ENCOUNTER FOR LONG-TERM (CURRENT) USE OF HIGH-RISK MEDICATION: Primary | ICD-10-CM

## 2023-01-31 DIAGNOSIS — D64.9 ANEMIA, UNSPECIFIED TYPE: ICD-10-CM

## 2023-01-31 DIAGNOSIS — R05.8 PRODUCTIVE COUGH: ICD-10-CM

## 2023-01-31 DIAGNOSIS — Z45.2 ENCOUNTER FOR ADJUSTMENT OR MANAGEMENT OF VASCULAR ACCESS DEVICE: ICD-10-CM

## 2023-01-31 DIAGNOSIS — C34.12 MALIGNANT NEOPLASM OF UPPER LOBE OF LEFT LUNG: ICD-10-CM

## 2023-01-31 DIAGNOSIS — F17.211 NICOTINE DEPENDENCE, CIGARETTES, IN REMISSION: ICD-10-CM

## 2023-01-31 DIAGNOSIS — J44.9 CHRONIC OBSTRUCTIVE PULMONARY DISEASE, UNSPECIFIED COPD TYPE: ICD-10-CM

## 2023-01-31 DIAGNOSIS — I26.94 MULTIPLE SUBSEGMENTAL PULMONARY EMBOLI WITHOUT ACUTE COR PULMONALE: Primary | ICD-10-CM

## 2023-01-31 DIAGNOSIS — R06.09 DYSPNEA ON EXERTION: ICD-10-CM

## 2023-01-31 DIAGNOSIS — J15.0 PNEUMONIA DUE TO KLEBSIELLA PNEUMONIAE, UNSPECIFIED LATERALITY, UNSPECIFIED PART OF LUNG: ICD-10-CM

## 2023-01-31 DIAGNOSIS — E27.40 HYPOADRENALISM: ICD-10-CM

## 2023-01-31 PROCEDURE — 25010000002 HEPARIN LOCK FLUSH PER 10 UNITS: Performed by: INTERNAL MEDICINE

## 2023-01-31 PROCEDURE — 96413 CHEMO IV INFUSION 1 HR: CPT

## 2023-01-31 PROCEDURE — 99214 OFFICE O/P EST MOD 30 MIN: CPT | Performed by: INTERNAL MEDICINE

## 2023-01-31 PROCEDURE — 25010000002 NIVOLUMAB 240 MG/24ML SOLUTION 24 ML VIAL: Performed by: INTERNAL MEDICINE

## 2023-01-31 RX ORDER — FLUDROCORTISONE ACETATE 0.1 MG/1
0.2 TABLET ORAL DAILY
Qty: 60 TABLET | Refills: 6 | Status: SHIPPED | OUTPATIENT
Start: 2023-01-31

## 2023-01-31 RX ORDER — SODIUM CHLORIDE 0.9 % (FLUSH) 0.9 %
20 SYRINGE (ML) INJECTION AS NEEDED
Status: DISCONTINUED | OUTPATIENT
Start: 2023-01-31 | End: 2023-02-01 | Stop reason: HOSPADM

## 2023-01-31 RX ORDER — CEFDINIR 300 MG/1
300 CAPSULE ORAL 2 TIMES DAILY
Qty: 20 CAPSULE | Refills: 0 | Status: SHIPPED | OUTPATIENT
Start: 2023-01-31 | End: 2023-03-03

## 2023-01-31 RX ORDER — HEPARIN SODIUM (PORCINE) LOCK FLUSH IV SOLN 100 UNIT/ML 100 UNIT/ML
500 SOLUTION INTRAVENOUS AS NEEDED
Status: CANCELLED | OUTPATIENT
Start: 2023-01-31

## 2023-01-31 RX ORDER — IPRATROPIUM BROMIDE AND ALBUTEROL SULFATE 2.5; .5 MG/3ML; MG/3ML
3 SOLUTION RESPIRATORY (INHALATION) 4 TIMES DAILY PRN
Qty: 360 ML | Refills: 3 | Status: SHIPPED | OUTPATIENT
Start: 2023-01-31 | End: 2023-02-03 | Stop reason: SDUPTHER

## 2023-01-31 RX ORDER — HEPARIN SODIUM (PORCINE) LOCK FLUSH IV SOLN 100 UNIT/ML 100 UNIT/ML
500 SOLUTION INTRAVENOUS AS NEEDED
Status: DISCONTINUED | OUTPATIENT
Start: 2023-01-31 | End: 2023-02-01 | Stop reason: HOSPADM

## 2023-01-31 RX ORDER — SODIUM CHLORIDE 0.9 % (FLUSH) 0.9 %
20 SYRINGE (ML) INJECTION AS NEEDED
Status: CANCELLED | OUTPATIENT
Start: 2023-01-31

## 2023-01-31 RX ORDER — FLUTICASONE PROPIONATE AND SALMETEROL XINAFOATE 115; 21 UG/1; UG/1
2 AEROSOL, METERED RESPIRATORY (INHALATION)
Qty: 1 EACH | Refills: 11 | Status: SHIPPED | OUTPATIENT
Start: 2023-01-31

## 2023-01-31 RX ORDER — PREDNISONE 2.5 MG
2.5 TABLET ORAL DAILY
Qty: 15 TABLET | Refills: 0 | Status: SHIPPED | OUTPATIENT
Start: 2023-01-31

## 2023-01-31 RX ORDER — TIOTROPIUM BROMIDE INHALATION SPRAY 3.12 UG/1
2 SPRAY, METERED RESPIRATORY (INHALATION)
Qty: 2 EACH | Refills: 0 | COMMUNITY
Start: 2023-01-31 | End: 2023-02-08

## 2023-01-31 RX ORDER — SODIUM CHLORIDE 9 MG/ML
250 INJECTION, SOLUTION INTRAVENOUS ONCE
Status: COMPLETED | OUTPATIENT
Start: 2023-01-31 | End: 2023-01-31

## 2023-01-31 RX ORDER — GUAIFENESIN 600 MG/1
600 TABLET, EXTENDED RELEASE ORAL 2 TIMES DAILY
Qty: 60 TABLET | Refills: 2 | Status: SHIPPED | OUTPATIENT
Start: 2023-01-31

## 2023-01-31 RX ADMIN — Medication 20 ML: at 10:55

## 2023-01-31 RX ADMIN — SODIUM CHLORIDE 480 MG: 9 INJECTION, SOLUTION INTRAVENOUS at 10:17

## 2023-01-31 RX ADMIN — SODIUM CHLORIDE 250 ML: 9 INJECTION, SOLUTION INTRAVENOUS at 10:16

## 2023-01-31 RX ADMIN — HEPARIN SODIUM (PORCINE) LOCK FLUSH IV SOLN 100 UNIT/ML 500 UNITS: 100 SOLUTION at 10:56

## 2023-01-31 NOTE — ASSESSMENT & PLAN NOTE
TSH is increased and T4 is suppressed.  Begin thyroid replacement with levothyroxine 50 mcg daily.  Repeat lab work next visit.

## 2023-01-31 NOTE — ASSESSMENT & PLAN NOTE
Metastatic.  Patient is on palliative treatment immunotherapy.  She is tolerating nivolumab well.  Lab work is adequate for treatment.  Proceed with nivolumab as planned.  RTC 4 weeks for OV, nivolumab with lab work prior to monitor for toxicities.

## 2023-01-31 NOTE — PROGRESS NOTES
Primary Care Provider  Anuradha Hope MD     Referring Provider  No ref. provider found     Chief Complaint  Malignant neoplasm of upper lobe of left lung, Pulmonary Embolism, COPD, Cough, and Follow-up (2 month follow up)    Subjective          Linda Ortiz presents to [facility]  History of Present Illness  Linda Ortiz is a 67 y.o. female patient with history of hospitalization in late September 2022 with history of metastatic lung cancer on Opdivo and Yervoy therapy she was found to have hyponatremia, Klebsiella pneumonia, adrenal insufficiency, adrenal crisis, septic shock, hypothermia and had bronchoscopy while she was in hospital.  She was also found to have pulmonary embolism in distal segmental and proximal subsegmental right lower lobe and was started on anticoagulation.  She was then discharged home on prednisone, fludrocortisone, Eliquis and supplemental oxygen.  She has 47-pack-year smoking history, quit smoking in 2022.  She has mild to moderate obstructive airway disease, emphysema with low diffusion capacity.  She is currently using Mucinex twice daily.  She is using Advair twice daily, albuterol as needed.  She does have cough with light yellow sputum production.  She had repeat CT scan of the chest recently which was reviewed with her today.  Shows bilateral basilar infiltrate, concerning for possible ongoing pneumonia, possible aspiration.  However pleural effusion has resolved.  She is currently on prednisone at 5 mg once daily and on fludrocortisone 0.1 mg daily.  She was seen by Dr. Angela yesterday and had Opdivo infusion yesterday.  She gets once a month immunotherapy.    Review of Systems     General:  Fatigue, No Fever No weight loss or loss of appetite  HEENT: No dysphagia, No Visual Changes, no rhinorrhea  Respiratory:  + cough,+Dyspnea, intermittent mucoid phlegm, No Pleuritic Pain, no wheezing, no hemoptysis.  Cardiovascular: Denies  chest pain, denies palpitations,+FOOTE, No Chest Pressure  Gastrointestinal:  No Abdominal Pain, No Nausea, No Vomiting, No Diarrhea  Genitourinary:  No Dysuria, No Frequency, No Hesitancy  Musculoskeletal: No muscle pain or swelling  Endocrine:  No Heat Intolerance, No Cold Intolerance  Hematologic:  No Bleeding, No Bruising  Psychiatric:  No Anxiety, No Depression  Neurologic:  No Confusion, no Dysarthria, No Headaches  Skin:  No Rash, No Open Wounds    Family History   Problem Relation Age of Onset   • Breast cancer Mother    • Lung cancer Father    • Thyroid cancer Maternal Aunt    • Breast cancer Maternal Aunt    • Lung cancer Paternal Aunt    • COPD Paternal Uncle         Social History     Socioeconomic History   • Marital status: Single   Tobacco Use   • Smoking status: Former     Packs/day: 1.00     Years: 47.00     Pack years: 47.00     Types: Cigarettes     Start date:      Quit date: 2022     Years since quittin.9   • Smokeless tobacco: Never   Vaping Use   • Vaping Use: Former   • Start date: 2013   • Quit date: 2014   Substance and Sexual Activity   • Alcohol use: Not Currently   • Drug use: Never   • Sexual activity: Defer        Past Medical History:   Diagnosis Date   • Bone cancer (HCC) 2022   • Decreased thyroid stimulating hormone (TSH) level 2022   • 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   • Hypocalcemia 2022   • Malignant neoplasm of upper lobe of left lung (HCC) 2022   • Metastatic cancer (HCC)    • Multiple subsegmental pulmonary emboli without acute cor pulmonale (HCC) 10/10/2022        Immunization History   Administered Date(s) Administered   • Fluzone High-Dose 65+yrs 2022   • Pneumococcal Conjugate 20-Valent (PCV20) 2022         Allergies   Allergen Reactions   • Hydrocodone Nausea And Vomiting and Dizziness          Current Outpatient Medications:   •  acetaminophen (TYLENOL) 325 MG tablet, Take 650 mg by mouth Every 6 (Six) Hours As Needed  for Mild Pain ., Disp: , Rfl:   •  Eliquis 5 MG tablet tablet, TAKE 1 TABLET BY MOUTH TWICE A DAY, Disp: 60 tablet, Rfl: 2  •  famotidine (PEPCID) 20 MG tablet, Take 1 tablet by mouth 2 (Two) Times a Day for 90 days., Disp: 180 tablet, Rfl: 1  •  fludrocortisone 0.1 MG tablet, Take 2 tablets by mouth Daily., Disp: 60 tablet, Rfl: 6  •  fluticasone-salmeterol (Advair HFA) 115-21 MCG/ACT inhaler, Inhale 2 puffs 2 (Two) Times a Day., Disp: 1 each, Rfl: 11  •  furosemide (LASIX) 40 MG tablet, TAKE 1 TABLET (40 MG TOTAL) BY MOUTH IN THE MORNING AND 1 TABLET (40 MG TOTAL) IN THE EVENING., Disp: , Rfl:   •  ipratropium-albuterol (DUO-NEB) 0.5-2.5 mg/3 ml nebulizer, Take 3 mL by nebulization 4 (Four) Times a Day As Needed for Wheezing., Disp: 360 mL, Rfl: 3  •  levothyroxine (SYNTHROID, LEVOTHROID) 50 MCG tablet, Take 1 tablet by mouth Daily., Disp: 30 tablet, Rfl: 1  •  cefdinir (OMNICEF) 300 MG capsule, Take 1 capsule by mouth 2 (Two) Times a Day., Disp: 20 capsule, Rfl: 0  •  guaiFENesin (Mucinex) 600 MG 12 hr tablet, Take 1 tablet by mouth 2 (Two) Times a Day., Disp: 60 tablet, Rfl: 2  •  predniSONE (DELTASONE) 2.5 MG tablet, Take 1 tablet by mouth Daily. Take one tab daily for a week, then 1 tab every other day for a week, then 1 tab every 3rd day till complete., Disp: 15 tablet, Rfl: 0  •  tiotropium bromide monohydrate (SPIRIVA RESPIMAT) 2.5 MCG/ACT aerosol solution inhaler, Inhale 2 puffs Daily., Disp: 1 each, Rfl: 11  •  tiotropium bromide monohydrate (Spiriva Respimat) 2.5 MCG/ACT aerosol solution inhaler, Inhale 2 puffs Daily for 2 days., Disp: 2 each, Rfl: 0  No current facility-administered medications for this visit.    Facility-Administered Medications Ordered in Other Visits:   •  heparin injection 500 Units, 500 Units, Intravenous, PRN, Phani Hernández MD, 500 Units at 01/31/23 1056  •  sodium chloride 0.9 % flush 20 mL, 20 mL, Intravenous, PRN, Phani Hernández MD, 20 mL at 01/31/23 1055     Objective  "  Vital Signs:   /79 (BP Location: Left arm, Patient Position: Sitting, Cuff Size: Adult)   Pulse 65   Temp 97.7 °F (36.5 °C) (Tympanic)   Resp 18   Ht 162.6 cm (64\")   Wt 75 kg (165 lb 4.8 oz)   SpO2 97% Comment: room air  BMI 28.37 kg/m²     Mallampatti classification : 1  Physical Exam  Vital Signs Reviewed  WDWN, Alert, in no acute distress, normal conversational, pale looking  HEENT:  PERRL, EOMI.  OP, nares clear, no sinus tenderness  Neck:  Supple, no JVD, no thyromegaly  Lymph: no axillary, cervical, supraclavicular lymphadenopathy noted bilaterally  Chest:  good aeration, bilateral diminished breath sounds, no wheezing, crackles or rhonchi, resonant to percussion b/l  CV: RRR, no MGR, pulses 2+, equal.  Abd:  Soft, NT, ND, + BS, no HSM  EXT:  no clubbing, no cyanosis, No BLE edema  Neuro:  A&Ox3, CN grossly intact, no focal deficits  Skin: No rashes or lesions noted     Result Review :   The following data was reviewed by: Fausto Leon MD on 01/31/2023:  Common labs    Common Labs 12/5/22 12/5/22 1/3/23 1/3/23 1/30/23 1/30/23    1022 1022 1008 1008 0826 0826   Glucose  98  70  80   BUN  21  32 (A)  27 (A)   Creatinine  1.12 (A)  0.99  1.10 (A)   Sodium  128 (A)  134 (A)  135 (A)   Potassium  4.3  4.3  4.8   Chloride  90 (A)  97 (A)  96 (A)   Calcium  9.6  9.3  9.0   Albumin  3.93  3.9  4.2   Total Bilirubin  0.6  <0.2  0.2   Alkaline Phosphatase  65  64  68   AST (SGOT)  33 (A)  28  37 (A)   ALT (SGPT)  17  19  22   WBC 8.48  7.16  6.41    Hemoglobin 12.5  11.8 (A)  12.7    Hematocrit 36.1  35.7  38.0    Platelets 338  416  254    (A) Abnormal value            CMP    CMP 12/5/22 1/3/23 1/30/23   Glucose 98 70 80   BUN 21 32 (A) 27 (A)   Creatinine 1.12 (A) 0.99 1.10 (A)   eGFR 54.3 (A) 63.0 55.5 (A)   Sodium 128 (A) 134 (A) 135 (A)   Potassium 4.3 4.3 4.8   Chloride 90 (A) 97 (A) 96 (A)   Calcium 9.6 9.3 9.0   Total Protein 7.7 7.6 7.4   Albumin 3.93 3.9 4.2   Globulin 3.8 3.7 3.2   Total " Bilirubin 0.6 <0.2 0.2   Alkaline Phosphatase 65 64 68   AST (SGOT) 33 (A) 28 37 (A)   ALT (SGPT) 17 19 22   Albumin/Globulin Ratio 1.0 1.1 1.3   BUN/Creatinine Ratio 18.8 32.3 (A) 24.5   Anion Gap 9.7 8.3 12.2   (A) Abnormal value       Comments are available for some flowsheets but are not being displayed.           CBC    CBC 12/5/22 1/3/23 1/30/23   WBC 8.48 7.16 6.41   RBC 3.77 3.67 (A) 3.92   Hemoglobin 12.5 11.8 (A) 12.7   Hematocrit 36.1 35.7 38.0   MCV 95.8 97.3 (A) 96.9   MCH 33.2 (A) 32.2 32.4   MCHC 34.6 33.1 33.4   RDW 12.5 12.2 (A) 12.8   Platelets 338 416 254   (A) Abnormal value            CBC w/diff    CBC w/Diff 12/5/22 1/3/23 1/30/23   WBC 8.48 7.16 6.41   RBC 3.77 3.67 (A) 3.92   Hemoglobin 12.5 11.8 (A) 12.7   Hematocrit 36.1 35.7 38.0   MCV 95.8 97.3 (A) 96.9   MCH 33.2 (A) 32.2 32.4   MCHC 34.6 33.1 33.4   RDW 12.5 12.2 (A) 12.8   Platelets 338 416 254   Neutrophil Rel % 71.2 57.6 51.4   Immature Granulocyte Rel % 0.5 0.4 0.5   Lymphocyte Rel % 13.0 (A) 30.4 35.1   Monocyte Rel % 14.7 (A) 9.8 10.9   Eosinophil Rel % 0.2 (A) 1.4 1.6   Basophil Rel % 0.4 0.4 0.5   (A) Abnormal value            Data reviewed: Radiologic studies CT chest from 12/21/22 was reviewed. Shows improved mass like opacity, has mild increased bibaisilar infiltrate, left more than right.       PROCEDURE:  CT CHEST W CONTRAST DIAGNOSTIC     COMPARISON:  [facility], CT, CT CHEST W CONTRAST DIAGNOSTIC, 9/30/2022, 18:07.  INDICATIONS:  surveillance, lung cancer with bone mets     TECHNIQUE:    After obtaining the patient's consent, CT images were obtained with non-ionic   intravenous contrast material.       PROTOCOL:     Standard imaging protocol performed                 RADIATION:      DLP: 543mGy*cm               Automated exposure control was utilized to minimize radiation dose.   CONTRAST:      100cc Isovue 370 I.V.     FINDINGS:          The anterior left upper lobe mass or masslike area of post treatment  consolidation has decreased   slightly, now measuring 3.9 x 2.2 cm (previously 5.5 by 2.8 cm).  There are new patchy areas of   ground-glass opacity in the posterior lower lobes bilaterally, left greater than right.  This   suggests an infectious or inflammatory process.  Correlate clinically.  Denser nodular area of   consolidation in the right lower lobe base on image number 111 measures 1.6 x 1.3 and appears new   as compared to prior study.  This may be due to infectious or inflammatory process.  Intrapulmonary   metastatic disease could also be in the differential diagnosis.  Short-term follow-up suggested.    Right lower lobe pulmonary nodule has decreased in size on image number 80, now measuring 5 mm   (previously 8 mm).  No other pulmonary nodules or masses are identified.  Central airways appear   grossly patent.  Previously seen pericardial effusion his nearly resolved.  There is trace   pericardial fluid remaining.  There are calcified lymph nodes present in the mediastinum.  No   definite mediastinal adenopathy is identified.  Right axillary lymphadenopathy measures 2.2 x 1.8   cm, and appears somewhat increased in size as compared to the prior study.  Borderline prominent   left axillary lymph nodes again noted and appears similar to prior.       Bilateral pleural effusions have resolved.  Bilateral adrenal masses appears similar in size, and   it again are suspicious for metastatic disease.  There is relative hypodensity within the liver   suggesting steatosis.  Probable right renal cyst.  There is multilevel degenerative change in the   spine.  No aggressive osseous lesions are identified.  Right subclavian Kumrmi-A-Xdmi catheter   noted.  Scattered atherosclerotic vascular calcification is noted.  No definite coronary   calcification identified.     IMPRESSION:                 1. Interval decrease in size of left upper lobe mass or masslike area of post treatment change, now   measuring 3.9 x 2.2 cm  (previously 5.5 x 2.8 cm).       2. There are new patchy areas of ground-glass opacity and nodularity in the lower lobes   bilaterally.  The appearance suggests an infectious or inflammatory process.  Correlate clinically.         3. There is a denser more nodular area now noted in the right lower lobe measuring 1.6 cm   maximally, which is indeterminate.  This may be due to infectious or inflammatory process or   intrapulmonary metastatic disease.  Short-term follow-up suggested.     3. Interval near complete resolution of pericardial effusion.  Bilateral pleural effusions have   resolved.     4. Bilateral adrenal masses appear unchanged, and again are suspicious for metastatic disease.     5. Increasing right axillary lymphadenopathy, suspicious for metastatic disease.      6. Additional findings as given above.              MICAH MOSQUERA MD         Electronically Signed and Approved By: MICAH MOSQUERA MD on 12/21/2022 at 15:04        Assessment and Plan    Diagnoses and all orders for this visit:    1. Multiple subsegmental pulmonary emboli without acute cor pulmonale (HCC) (Primary)  -     ipratropium-albuterol (DUO-NEB) 0.5-2.5 mg/3 ml nebulizer; Take 3 mL by nebulization 4 (Four) Times a Day As Needed for Wheezing.  Dispense: 360 mL; Refill: 3  -     fluticasone-salmeterol (Advair HFA) 115-21 MCG/ACT inhaler; Inhale 2 puffs 2 (Two) Times a Day.  Dispense: 1 each; Refill: 11  -     fludrocortisone 0.1 MG tablet; Take 2 tablets by mouth Daily.  Dispense: 60 tablet; Refill: 6  -     predniSONE (DELTASONE) 2.5 MG tablet; Take 1 tablet by mouth Daily. Take one tab daily for a week, then 1 tab every other day for a week, then 1 tab every 3rd day till complete.  Dispense: 15 tablet; Refill: 0  -     tiotropium bromide monohydrate (SPIRIVA RESPIMAT) 2.5 MCG/ACT aerosol solution inhaler; Inhale 2 puffs Daily.  Dispense: 1 each; Refill: 11  -     cefdinir (OMNICEF) 300 MG capsule; Take 1 capsule by mouth 2 (Two) Times a Day.   Dispense: 20 capsule; Refill: 0  -     guaiFENesin (Mucinex) 600 MG 12 hr tablet; Take 1 tablet by mouth 2 (Two) Times a Day.  Dispense: 60 tablet; Refill: 2  -     CT Chest With Contrast Diagnostic; Future    2. Hypoadrenalism (HCC)  -     ipratropium-albuterol (DUO-NEB) 0.5-2.5 mg/3 ml nebulizer; Take 3 mL by nebulization 4 (Four) Times a Day As Needed for Wheezing.  Dispense: 360 mL; Refill: 3  -     fluticasone-salmeterol (Advair HFA) 115-21 MCG/ACT inhaler; Inhale 2 puffs 2 (Two) Times a Day.  Dispense: 1 each; Refill: 11  -     fludrocortisone 0.1 MG tablet; Take 2 tablets by mouth Daily.  Dispense: 60 tablet; Refill: 6  -     predniSONE (DELTASONE) 2.5 MG tablet; Take 1 tablet by mouth Daily. Take one tab daily for a week, then 1 tab every other day for a week, then 1 tab every 3rd day till complete.  Dispense: 15 tablet; Refill: 0  -     tiotropium bromide monohydrate (SPIRIVA RESPIMAT) 2.5 MCG/ACT aerosol solution inhaler; Inhale 2 puffs Daily.  Dispense: 1 each; Refill: 11  -     cefdinir (OMNICEF) 300 MG capsule; Take 1 capsule by mouth 2 (Two) Times a Day.  Dispense: 20 capsule; Refill: 0  -     guaiFENesin (Mucinex) 600 MG 12 hr tablet; Take 1 tablet by mouth 2 (Two) Times a Day.  Dispense: 60 tablet; Refill: 2  -     CT Chest With Contrast Diagnostic; Future    3. Malignant neoplasm of upper lobe of left lung (HCC)  -     ipratropium-albuterol (DUO-NEB) 0.5-2.5 mg/3 ml nebulizer; Take 3 mL by nebulization 4 (Four) Times a Day As Needed for Wheezing.  Dispense: 360 mL; Refill: 3  -     fluticasone-salmeterol (Advair HFA) 115-21 MCG/ACT inhaler; Inhale 2 puffs 2 (Two) Times a Day.  Dispense: 1 each; Refill: 11  -     fludrocortisone 0.1 MG tablet; Take 2 tablets by mouth Daily.  Dispense: 60 tablet; Refill: 6  -     predniSONE (DELTASONE) 2.5 MG tablet; Take 1 tablet by mouth Daily. Take one tab daily for a week, then 1 tab every other day for a week, then 1 tab every 3rd day till complete.  Dispense:  15 tablet; Refill: 0  -     tiotropium bromide monohydrate (SPIRIVA RESPIMAT) 2.5 MCG/ACT aerosol solution inhaler; Inhale 2 puffs Daily.  Dispense: 1 each; Refill: 11  -     cefdinir (OMNICEF) 300 MG capsule; Take 1 capsule by mouth 2 (Two) Times a Day.  Dispense: 20 capsule; Refill: 0  -     guaiFENesin (Mucinex) 600 MG 12 hr tablet; Take 1 tablet by mouth 2 (Two) Times a Day.  Dispense: 60 tablet; Refill: 2  -     CT Chest With Contrast Diagnostic; Future    4. Weakness  -     ipratropium-albuterol (DUO-NEB) 0.5-2.5 mg/3 ml nebulizer; Take 3 mL by nebulization 4 (Four) Times a Day As Needed for Wheezing.  Dispense: 360 mL; Refill: 3  -     fluticasone-salmeterol (Advair HFA) 115-21 MCG/ACT inhaler; Inhale 2 puffs 2 (Two) Times a Day.  Dispense: 1 each; Refill: 11  -     fludrocortisone 0.1 MG tablet; Take 2 tablets by mouth Daily.  Dispense: 60 tablet; Refill: 6  -     predniSONE (DELTASONE) 2.5 MG tablet; Take 1 tablet by mouth Daily. Take one tab daily for a week, then 1 tab every other day for a week, then 1 tab every 3rd day till complete.  Dispense: 15 tablet; Refill: 0  -     tiotropium bromide monohydrate (SPIRIVA RESPIMAT) 2.5 MCG/ACT aerosol solution inhaler; Inhale 2 puffs Daily.  Dispense: 1 each; Refill: 11  -     cefdinir (OMNICEF) 300 MG capsule; Take 1 capsule by mouth 2 (Two) Times a Day.  Dispense: 20 capsule; Refill: 0  -     guaiFENesin (Mucinex) 600 MG 12 hr tablet; Take 1 tablet by mouth 2 (Two) Times a Day.  Dispense: 60 tablet; Refill: 2  -     CT Chest With Contrast Diagnostic; Future    5. Anemia, unspecified type  -     ipratropium-albuterol (DUO-NEB) 0.5-2.5 mg/3 ml nebulizer; Take 3 mL by nebulization 4 (Four) Times a Day As Needed for Wheezing.  Dispense: 360 mL; Refill: 3  -     fluticasone-salmeterol (Advair HFA) 115-21 MCG/ACT inhaler; Inhale 2 puffs 2 (Two) Times a Day.  Dispense: 1 each; Refill: 11  -     fludrocortisone 0.1 MG tablet; Take 2 tablets by mouth Daily.  Dispense: 60  tablet; Refill: 6  -     predniSONE (DELTASONE) 2.5 MG tablet; Take 1 tablet by mouth Daily. Take one tab daily for a week, then 1 tab every other day for a week, then 1 tab every 3rd day till complete.  Dispense: 15 tablet; Refill: 0  -     tiotropium bromide monohydrate (SPIRIVA RESPIMAT) 2.5 MCG/ACT aerosol solution inhaler; Inhale 2 puffs Daily.  Dispense: 1 each; Refill: 11  -     cefdinir (OMNICEF) 300 MG capsule; Take 1 capsule by mouth 2 (Two) Times a Day.  Dispense: 20 capsule; Refill: 0  -     guaiFENesin (Mucinex) 600 MG 12 hr tablet; Take 1 tablet by mouth 2 (Two) Times a Day.  Dispense: 60 tablet; Refill: 2  -     CT Chest With Contrast Diagnostic; Future    6. Productive cough  -     ipratropium-albuterol (DUO-NEB) 0.5-2.5 mg/3 ml nebulizer; Take 3 mL by nebulization 4 (Four) Times a Day As Needed for Wheezing.  Dispense: 360 mL; Refill: 3  -     fluticasone-salmeterol (Advair HFA) 115-21 MCG/ACT inhaler; Inhale 2 puffs 2 (Two) Times a Day.  Dispense: 1 each; Refill: 11  -     fludrocortisone 0.1 MG tablet; Take 2 tablets by mouth Daily.  Dispense: 60 tablet; Refill: 6  -     predniSONE (DELTASONE) 2.5 MG tablet; Take 1 tablet by mouth Daily. Take one tab daily for a week, then 1 tab every other day for a week, then 1 tab every 3rd day till complete.  Dispense: 15 tablet; Refill: 0  -     tiotropium bromide monohydrate (SPIRIVA RESPIMAT) 2.5 MCG/ACT aerosol solution inhaler; Inhale 2 puffs Daily.  Dispense: 1 each; Refill: 11  -     cefdinir (OMNICEF) 300 MG capsule; Take 1 capsule by mouth 2 (Two) Times a Day.  Dispense: 20 capsule; Refill: 0  -     guaiFENesin (Mucinex) 600 MG 12 hr tablet; Take 1 tablet by mouth 2 (Two) Times a Day.  Dispense: 60 tablet; Refill: 2  -     CT Chest With Contrast Diagnostic; Future    7. Chronic obstructive pulmonary disease, unspecified COPD type (HCC)  -     ipratropium-albuterol (DUO-NEB) 0.5-2.5 mg/3 ml nebulizer; Take 3 mL by nebulization 4 (Four) Times a Day As  Needed for Wheezing.  Dispense: 360 mL; Refill: 3  -     fluticasone-salmeterol (Advair HFA) 115-21 MCG/ACT inhaler; Inhale 2 puffs 2 (Two) Times a Day.  Dispense: 1 each; Refill: 11  -     fludrocortisone 0.1 MG tablet; Take 2 tablets by mouth Daily.  Dispense: 60 tablet; Refill: 6  -     predniSONE (DELTASONE) 2.5 MG tablet; Take 1 tablet by mouth Daily. Take one tab daily for a week, then 1 tab every other day for a week, then 1 tab every 3rd day till complete.  Dispense: 15 tablet; Refill: 0  -     tiotropium bromide monohydrate (SPIRIVA RESPIMAT) 2.5 MCG/ACT aerosol solution inhaler; Inhale 2 puffs Daily.  Dispense: 1 each; Refill: 11  -     cefdinir (OMNICEF) 300 MG capsule; Take 1 capsule by mouth 2 (Two) Times a Day.  Dispense: 20 capsule; Refill: 0  -     guaiFENesin (Mucinex) 600 MG 12 hr tablet; Take 1 tablet by mouth 2 (Two) Times a Day.  Dispense: 60 tablet; Refill: 2  -     CT Chest With Contrast Diagnostic; Future    8. Nicotine dependence, cigarettes, in remission  -     ipratropium-albuterol (DUO-NEB) 0.5-2.5 mg/3 ml nebulizer; Take 3 mL by nebulization 4 (Four) Times a Day As Needed for Wheezing.  Dispense: 360 mL; Refill: 3  -     fluticasone-salmeterol (Advair HFA) 115-21 MCG/ACT inhaler; Inhale 2 puffs 2 (Two) Times a Day.  Dispense: 1 each; Refill: 11  -     fludrocortisone 0.1 MG tablet; Take 2 tablets by mouth Daily.  Dispense: 60 tablet; Refill: 6  -     predniSONE (DELTASONE) 2.5 MG tablet; Take 1 tablet by mouth Daily. Take one tab daily for a week, then 1 tab every other day for a week, then 1 tab every 3rd day till complete.  Dispense: 15 tablet; Refill: 0  -     tiotropium bromide monohydrate (SPIRIVA RESPIMAT) 2.5 MCG/ACT aerosol solution inhaler; Inhale 2 puffs Daily.  Dispense: 1 each; Refill: 11  -     cefdinir (OMNICEF) 300 MG capsule; Take 1 capsule by mouth 2 (Two) Times a Day.  Dispense: 20 capsule; Refill: 0  -     guaiFENesin (Mucinex) 600 MG 12 hr tablet; Take 1 tablet by  mouth 2 (Two) Times a Day.  Dispense: 60 tablet; Refill: 2  -     CT Chest With Contrast Diagnostic; Future    9. Dyspnea on exertion  -     ipratropium-albuterol (DUO-NEB) 0.5-2.5 mg/3 ml nebulizer; Take 3 mL by nebulization 4 (Four) Times a Day As Needed for Wheezing.  Dispense: 360 mL; Refill: 3  -     fluticasone-salmeterol (Advair HFA) 115-21 MCG/ACT inhaler; Inhale 2 puffs 2 (Two) Times a Day.  Dispense: 1 each; Refill: 11  -     fludrocortisone 0.1 MG tablet; Take 2 tablets by mouth Daily.  Dispense: 60 tablet; Refill: 6  -     predniSONE (DELTASONE) 2.5 MG tablet; Take 1 tablet by mouth Daily. Take one tab daily for a week, then 1 tab every other day for a week, then 1 tab every 3rd day till complete.  Dispense: 15 tablet; Refill: 0  -     tiotropium bromide monohydrate (SPIRIVA RESPIMAT) 2.5 MCG/ACT aerosol solution inhaler; Inhale 2 puffs Daily.  Dispense: 1 each; Refill: 11  -     cefdinir (OMNICEF) 300 MG capsule; Take 1 capsule by mouth 2 (Two) Times a Day.  Dispense: 20 capsule; Refill: 0  -     guaiFENesin (Mucinex) 600 MG 12 hr tablet; Take 1 tablet by mouth 2 (Two) Times a Day.  Dispense: 60 tablet; Refill: 2  -     CT Chest With Contrast Diagnostic; Future    10. Pneumonia due to Klebsiella pneumoniae, unspecified laterality, unspecified part of lung (HCC)  -     ipratropium-albuterol (DUO-NEB) 0.5-2.5 mg/3 ml nebulizer; Take 3 mL by nebulization 4 (Four) Times a Day As Needed for Wheezing.  Dispense: 360 mL; Refill: 3  -     fluticasone-salmeterol (Advair HFA) 115-21 MCG/ACT inhaler; Inhale 2 puffs 2 (Two) Times a Day.  Dispense: 1 each; Refill: 11  -     fludrocortisone 0.1 MG tablet; Take 2 tablets by mouth Daily.  Dispense: 60 tablet; Refill: 6  -     predniSONE (DELTASONE) 2.5 MG tablet; Take 1 tablet by mouth Daily. Take one tab daily for a week, then 1 tab every other day for a week, then 1 tab every 3rd day till complete.  Dispense: 15 tablet; Refill: 0  -     tiotropium bromide monohydrate  (SPIRIVA RESPIMAT) 2.5 MCG/ACT aerosol solution inhaler; Inhale 2 puffs Daily.  Dispense: 1 each; Refill: 11  -     cefdinir (OMNICEF) 300 MG capsule; Take 1 capsule by mouth 2 (Two) Times a Day.  Dispense: 20 capsule; Refill: 0  -     guaiFENesin (Mucinex) 600 MG 12 hr tablet; Take 1 tablet by mouth 2 (Two) Times a Day.  Dispense: 60 tablet; Refill: 2  -     CT Chest With Contrast Diagnostic; Future    Other orders  -     tiotropium bromide monohydrate (Spiriva Respimat) 2.5 MCG/ACT aerosol solution inhaler; Inhale 2 puffs Daily for 2 days.  Dispense: 2 each; Refill: 0      COPD: Continue with Advair twice daily.  I will start her on Spiriva 2.5 mcg once daily.  Use albuterol as needed.  Continue with guaifenesin 600 mg twice daily.    Persistent pneumonia: I will start her back on cefdinir 300 mg twice daily for 10 days.  Repeat CT scan of chest with contrast in 2 months.  Aspiration precautions was discussed in detail.  Keep the head of bed up with sleep, not eat close to bedtime.    Relative adrenal insufficiency: I will try to wean her off prednisone and increased dose of Florinef.  I will increase Florinef 0.2 mg daily.  We will wean prednisone to 2.5 mg once daily for 7 days, then 2.5 mg every other day for 7 days, then complete course with 2.5 mg prednisone every third day to complete 15 tablets prednisone.    Pulmonary embolism: With ongoing treatment for malignancy.  Continue with Eliquis twice daily.    Lung cancer, stage IV: Currently on Opdivo immunotherapy monthly per Dr. Hernández.    Follow Up   Return in about 3 months (around 4/30/2023).  Patient was given instructions and counseling regarding her condition or for health maintenance advice. Please see specific information pulled into the AVS if appropriate.       Electronically signed by Fausto Leon MD, 1/31/2023, 15:52 EST.

## 2023-02-02 ENCOUNTER — TELEPHONE (OUTPATIENT)
Dept: ONCOLOGY | Facility: HOSPITAL | Age: 68
End: 2023-02-02
Payer: MEDICARE

## 2023-02-02 NOTE — TELEPHONE ENCOUNTER
The pt called and ask to have Dr Hernández to review the steroid med changes that Dr Leon made. The pt wants to make sure that it will not affect her next infusion tx. The pt states that Dr Leon is weaning her off of Prednisone and is increasing her Florinef.   no

## 2023-02-03 DIAGNOSIS — D64.9 ANEMIA, UNSPECIFIED TYPE: ICD-10-CM

## 2023-02-03 DIAGNOSIS — E27.40 HYPOADRENALISM: ICD-10-CM

## 2023-02-03 DIAGNOSIS — I26.94 MULTIPLE SUBSEGMENTAL PULMONARY EMBOLI WITHOUT ACUTE COR PULMONALE: ICD-10-CM

## 2023-02-03 DIAGNOSIS — C34.12 MALIGNANT NEOPLASM OF UPPER LOBE OF LEFT LUNG: ICD-10-CM

## 2023-02-03 DIAGNOSIS — R05.8 PRODUCTIVE COUGH: ICD-10-CM

## 2023-02-03 DIAGNOSIS — F17.211 NICOTINE DEPENDENCE, CIGARETTES, IN REMISSION: ICD-10-CM

## 2023-02-03 DIAGNOSIS — J44.9 CHRONIC OBSTRUCTIVE PULMONARY DISEASE, UNSPECIFIED COPD TYPE: ICD-10-CM

## 2023-02-03 DIAGNOSIS — J15.0 PNEUMONIA DUE TO KLEBSIELLA PNEUMONIAE, UNSPECIFIED LATERALITY, UNSPECIFIED PART OF LUNG: ICD-10-CM

## 2023-02-03 DIAGNOSIS — R06.09 DYSPNEA ON EXERTION: ICD-10-CM

## 2023-02-03 DIAGNOSIS — R53.1 WEAKNESS: ICD-10-CM

## 2023-02-03 RX ORDER — IPRATROPIUM BROMIDE AND ALBUTEROL SULFATE 2.5; .5 MG/3ML; MG/3ML
3 SOLUTION RESPIRATORY (INHALATION) 4 TIMES DAILY PRN
Qty: 360 ML | Refills: 3 | Status: SHIPPED | OUTPATIENT
Start: 2023-02-03

## 2023-02-05 DIAGNOSIS — C34.12 MALIGNANT NEOPLASM OF UPPER LOBE OF LEFT LUNG: ICD-10-CM

## 2023-02-05 DIAGNOSIS — J44.9 CHRONIC OBSTRUCTIVE PULMONARY DISEASE, UNSPECIFIED COPD TYPE: ICD-10-CM

## 2023-02-05 DIAGNOSIS — J15.0 PNEUMONIA DUE TO KLEBSIELLA PNEUMONIAE, UNSPECIFIED LATERALITY, UNSPECIFIED PART OF LUNG: ICD-10-CM

## 2023-02-05 DIAGNOSIS — I26.94 MULTIPLE SUBSEGMENTAL PULMONARY EMBOLI WITHOUT ACUTE COR PULMONALE: ICD-10-CM

## 2023-02-05 DIAGNOSIS — R53.1 WEAKNESS: ICD-10-CM

## 2023-02-05 DIAGNOSIS — D64.9 ANEMIA, UNSPECIFIED TYPE: ICD-10-CM

## 2023-02-05 DIAGNOSIS — E27.40 HYPOADRENALISM: ICD-10-CM

## 2023-02-05 DIAGNOSIS — F17.211 NICOTINE DEPENDENCE, CIGARETTES, IN REMISSION: ICD-10-CM

## 2023-02-05 DIAGNOSIS — R06.09 DYSPNEA ON EXERTION: ICD-10-CM

## 2023-02-05 DIAGNOSIS — R05.8 PRODUCTIVE COUGH: ICD-10-CM

## 2023-02-06 RX ORDER — FLUDROCORTISONE ACETATE 0.1 MG/1
TABLET ORAL
Qty: 15 TABLET | OUTPATIENT
Start: 2023-02-06

## 2023-02-08 ENCOUNTER — OFFICE VISIT (OUTPATIENT)
Dept: PODIATRY | Facility: CLINIC | Age: 68
End: 2023-02-08
Payer: MEDICARE

## 2023-02-08 VITALS
HEART RATE: 59 BPM | HEIGHT: 64 IN | SYSTOLIC BLOOD PRESSURE: 112 MMHG | WEIGHT: 166 LBS | BODY MASS INDEX: 28.34 KG/M2 | DIASTOLIC BLOOD PRESSURE: 59 MMHG | OXYGEN SATURATION: 99 % | TEMPERATURE: 97.9 F

## 2023-02-08 DIAGNOSIS — L60.0 ONYCHOCRYPTOSIS: ICD-10-CM

## 2023-02-08 DIAGNOSIS — M79.672 FOOT PAIN, BILATERAL: Primary | ICD-10-CM

## 2023-02-08 DIAGNOSIS — M79.671 FOOT PAIN, BILATERAL: Primary | ICD-10-CM

## 2023-02-08 DIAGNOSIS — B35.1 ONYCHOMYCOSIS: ICD-10-CM

## 2023-02-08 PROCEDURE — 11721 DEBRIDE NAIL 6 OR MORE: CPT | Performed by: PODIATRIST

## 2023-02-08 PROCEDURE — 99203 OFFICE O/P NEW LOW 30 MIN: CPT | Performed by: PODIATRIST

## 2023-02-08 NOTE — PROGRESS NOTES
[facility] - PODIATRY    Today's Date: 02/08/23    Patient Name: Linda Ortiz  MRN: [id number]  CSN: [id number]  PCP: Anuradha Hope MD, Last PCP Visit:  11/30/2022  Referring Provider: Anuradha Hope MD    SUBJECTIVE     Chief Complaint   Patient presents with   • Left Foot - Nail Problem, Ingrown Toenail, Pain   • Right Foot - Nail Problem, Ingrown Toenail, Pain     HPI: Linda Ortiz, a 67 y.o.female, comes to clinic.    New, Established, New Problem:  New  Location:  Toenails  Duration:   Greater than five years  Onset:  Gradual  Nature:  sore with palpation.  Stable, worsening, improving:   worsening  Aggravating factors:  Pain with shoe gear and ambulation.  Previous Treatment: Unable to trim their own toenails.    No other pedal complaints at this time.    Patient denies any fevers, chills, nausea, vomiting, shortness of breath, nor any other constitutional signs nor symptoms.       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   • Hypocalcemia 08/09/2022   • Malignant neoplasm of upper lobe of left lung (HCC) 06/09/2022   • Metastatic cancer (HCC)    • Multiple subsegmental pulmonary emboli without acute cor pulmonale (HCC) 10/10/2022     Past Surgical History:   Procedure Laterality Date   • BRONCHOSCOPY N/A 9/30/2022    Procedure: BRONCHOSCOPY WITH BAL, WASHINGS;  Surgeon: Fausto Leon MD;  Location: [facility] ENDOSCOPY;  Service: Pulmonary;  Laterality: N/A;  PNEUMONIA, PERSISTANT COUGH   • HIP SURGERY Bilateral    • KNEE ARTHROSCOPY     • VENOUS ACCESS DEVICE (PORT) INSERTION N/A 7/15/2022    Procedure: INSERTION VENOUS ACCESS DEVICE;  Surgeon: Eber Hull MD;  Location: [facility] OR [facility];  Service: General;  Laterality: N/A;     Family History   Problem Relation Age of Onset   • Breast cancer Mother    • Lung cancer Father    • Thyroid cancer Maternal  Aunt    • Breast cancer Maternal Aunt    • Lung cancer Paternal Aunt    • COPD Paternal Uncle      Social History     Socioeconomic History   • Marital status: Single   Tobacco Use   • Smoking status: Former     Packs/day: 1.00     Years: 47.00     Pack years: 47.00     Types: Cigarettes     Start date:      Quit date: 2022     Years since quittin.0   • Smokeless tobacco: Never   Vaping Use   • Vaping Use: Former   • Start date: 2013   • Quit date: 2014   Substance and Sexual Activity   • Alcohol use: Not Currently   • Drug use: Never   • Sexual activity: Defer     Allergies   Allergen Reactions   • Hydrocodone Nausea And Vomiting and Dizziness     Current Outpatient Medications   Medication Sig Dispense Refill   • acetaminophen (TYLENOL) 325 MG tablet Take 650 mg by mouth Every 6 (Six) Hours As Needed for Mild Pain .     • cefdinir (OMNICEF) 300 MG capsule Take 1 capsule by mouth 2 (Two) Times a Day. 20 capsule 0   • Eliquis 5 MG tablet tablet TAKE 1 TABLET BY MOUTH TWICE A DAY 60 tablet 2   • famotidine (PEPCID) 20 MG tablet Take 1 tablet by mouth 2 (Two) Times a Day for 90 days. 180 tablet 1   • fludrocortisone 0.1 MG tablet Take 2 tablets by mouth Daily. 60 tablet 6   • fluticasone-salmeterol (Advair HFA) 115-21 MCG/ACT inhaler Inhale 2 puffs 2 (Two) Times a Day. 1 each 11   • furosemide (LASIX) 40 MG tablet TAKE 1 TABLET (40 MG TOTAL) BY MOUTH IN THE MORNING AND 1 TABLET (40 MG TOTAL) IN THE EVENING.     • guaiFENesin (Mucinex) 600 MG 12 hr tablet Take 1 tablet by mouth 2 (Two) Times a Day. 60 tablet 2   • ipratropium-albuterol (DUO-NEB) 0.5-2.5 mg/3 ml nebulizer Take 3 mL by nebulization 4 (Four) Times a Day As Needed for Wheezing. 360 mL 3   • levothyroxine (SYNTHROID, LEVOTHROID) 50 MCG tablet Take 1 tablet by mouth Daily. 30 tablet 1   • predniSONE (DELTASONE) 2.5 MG tablet Take 1 tablet by mouth Daily. Take one tab daily for a week, then 1 tab every other day for a week, then 1 tab  every 3rd day till complete. 15 tablet 0   • tiotropium bromide monohydrate (SPIRIVA RESPIMAT) 2.5 MCG/ACT aerosol solution inhaler Inhale 2 puffs Daily. 1 each 11     No current facility-administered medications for this visit.     Review of Systems   Constitutional: Negative.    Skin:        Painful toenails.   All other systems reviewed and are negative.      OBJECTIVE     Vitals:    02/08/23 1342   BP: 112/59   Pulse: 59   Temp: 97.9 °F (36.6 °C)   SpO2: 99%       Patient seen in no apparent distress.      PHYSICAL EXAM:     Foot/Ankle Exam:       General:   Appearance: elderly    Orientation: AAOx3    Affect: appropriate    Gait: unimpaired    Shoe Gear:  Casual shoes    VASCULAR      Right Foot Vascularity   Normal vascular exam    Dorsalis pedis:  2+  Posterior tibial:  2+  Skin Temperature: warm    Edema Grading:  None  CFT:  < 3 seconds  Pedal Hair Growth:  Absent  Varicosities: mild varicosities       Left Foot Vascularity   Normal vascular exam    Dorsalis pedis:  2+  Posterior tibial:  2+  Skin Temperature: warm    Edema Grading:  None  CFT:  < 3 seconds  Pedal Hair Growth:  Absent  Varicosities: mild varicosities        NEUROLOGIC     Right Foot Neurologic   Normal sensation    Light touch sensation:  Normal  Vibratory sensation:  Normal  Hot/Cold sensation: normal    Protective Sensation using [place]zeem Monofilament:  10     Left Foot Neurologic   Normal sensation    Light touch sensation:  Normal  Vibratory sensation:  Normal  Hot/cold sensation: normal    Protective Sensation using [place]em Monofilament:  10     MUSCLE STRENGTH     Right Foot Muscle Strength   Foot dorsiflexion:  4  Foot plantar flexion:  4  Foot inversion:  4  Foot eversion:  4     Left Foot Muscle Strength   Foot dorsiflexion:  4  Foot plantar flexion:  4  Foot inversion:  4  Foot eversion:  4     RANGE OF MOTION      Right Foot Range of Motion   Foot and ankle ROM within normal limits       Left Foot Range of Motion    Foot and ankle ROM within normal limits       DERMATOLOGIC     Right Foot Dermatologic   Skin: skin intact    Nails: onychomycosis, abnormally thick, subungual debris, dystrophic nails and ingrown toenail    Nails comment:  Toenails 1, 2, 3, 4, and 5     Left Foot Dermatologic   Skin: skin intact    Nails: onychomycosis, abnormally thick, subungual debris, dystrophic nails and ingrown toenail    Nails comment:  Toenails 1, 2, 3, 4, and 5      ASSESSMENT/PLAN     Diagnoses and all orders for this visit:    1. Foot pain, bilateral (Primary)    2. Onychomycosis    3. Onychocryptosis        Comprehensive lower extremity examination and evaluation was performed.    Discussed findings and treatment plan including risks, benefits, and treatment options with patient in detail. Patient agreed with treatment plan.    Toenails 1, 2, 3, 4, 5 on Right and 1, 2, 3, 4, 5 on Left were debrided with nail nippers then filed with a Dremel nail kamla.  Patient tolerated procedure well without complications.    An After Visit Summary was printed and given to the patient at discharge, including (if requested) any available informative/educational handouts regarding diagnosis, treatment, or medications. All questions were answered to patient/family satisfaction. Should symptoms fail to improve or worsen they agree to call or return to clinic or to go to the Emergency Department. Discussed the importance of following up with any needed screening tests/labs/specialist appointments and any requested follow-up recommended by me today. Importance of maintaining follow-up discussed and patient accepts that missed appointments can delay diagnosis and potentially lead to worsening of conditions.    Return in about 9 weeks (around 4/12/2023) for Toenail Care., or sooner if acute issues arise.    This document has been electronically signed by Gustavo Barrera DPM on February 8, 2023 14:02 EST

## 2023-02-14 ENCOUNTER — TRANSCRIBE ORDERS (OUTPATIENT)
Dept: ADMINISTRATIVE | Facility: HOSPITAL | Age: 68
End: 2023-02-14
Payer: MEDICARE

## 2023-02-14 ENCOUNTER — LAB (OUTPATIENT)
Dept: LAB | Facility: HOSPITAL | Age: 68
End: 2023-02-14
Payer: MEDICARE

## 2023-02-14 DIAGNOSIS — R07.9 CHEST PAIN, UNSPECIFIED TYPE: ICD-10-CM

## 2023-02-14 DIAGNOSIS — R07.9 CHEST PAIN, UNSPECIFIED TYPE: Primary | ICD-10-CM

## 2023-02-14 LAB
CHOLEST SERPL-MCNC: 223 MG/DL (ref 0–200)
HDLC SERPL-MCNC: 73 MG/DL (ref 40–60)
LDLC SERPL CALC-MCNC: 137 MG/DL (ref 0–100)
LDLC/HDLC SERPL: 1.84 {RATIO}
TRIGL SERPL-MCNC: 77 MG/DL (ref 0–150)
VLDLC SERPL-MCNC: 13 MG/DL (ref 5–40)

## 2023-02-14 PROCEDURE — 36415 COLL VENOUS BLD VENIPUNCTURE: CPT

## 2023-02-14 PROCEDURE — 80061 LIPID PANEL: CPT

## 2023-02-20 ENCOUNTER — TELEPHONE (OUTPATIENT)
Dept: RADIATION ONCOLOGY | Facility: HOSPITAL | Age: 68
End: 2023-02-20
Payer: MEDICARE

## 2023-02-20 NOTE — TELEPHONE ENCOUNTER
LVM on patient phone regarding chaning appt on 03.23.23 as Dr. Salcido will not be here that day.

## 2023-02-21 DIAGNOSIS — E03.9 HYPOTHYROIDISM, UNSPECIFIED TYPE: ICD-10-CM

## 2023-02-21 RX ORDER — LEVOTHYROXINE SODIUM 0.05 MG/1
TABLET ORAL
Qty: 90 TABLET | Refills: 1 | Status: SHIPPED | OUTPATIENT
Start: 2023-02-21

## 2023-02-27 ENCOUNTER — HOSPITAL ENCOUNTER (OUTPATIENT)
Dept: ONCOLOGY | Facility: HOSPITAL | Age: 68
Discharge: HOME | End: 2023-02-27
Admitting: INTERNAL MEDICINE
Payer: MEDICARE

## 2023-02-27 ENCOUNTER — OFFICE VISIT (OUTPATIENT)
Dept: ONCOLOGY | Facility: HOSPITAL | Age: 68
End: 2023-02-27
Payer: MEDICARE

## 2023-02-27 VITALS
BODY MASS INDEX: 29.59 KG/M2 | DIASTOLIC BLOOD PRESSURE: 65 MMHG | OXYGEN SATURATION: 98 % | RESPIRATION RATE: 16 BRPM | TEMPERATURE: 97.9 F | HEART RATE: 65 BPM | WEIGHT: 172.4 LBS | SYSTOLIC BLOOD PRESSURE: 136 MMHG

## 2023-02-27 DIAGNOSIS — Z79.899 ENCOUNTER FOR LONG-TERM (CURRENT) USE OF HIGH-RISK MEDICATION: ICD-10-CM

## 2023-02-27 DIAGNOSIS — C34.12 MALIGNANT NEOPLASM OF UPPER LOBE OF LEFT LUNG: Primary | ICD-10-CM

## 2023-02-27 DIAGNOSIS — C34.12 MALIGNANT NEOPLASM OF UPPER LOBE OF LEFT LUNG: ICD-10-CM

## 2023-02-27 DIAGNOSIS — E03.9 HYPOTHYROIDISM, UNSPECIFIED TYPE: ICD-10-CM

## 2023-02-27 DIAGNOSIS — Z79.899 ENCOUNTER FOR LONG-TERM (CURRENT) USE OF HIGH-RISK MEDICATION: Primary | ICD-10-CM

## 2023-02-27 DIAGNOSIS — Z45.2 ENCOUNTER FOR ADJUSTMENT OR MANAGEMENT OF VASCULAR ACCESS DEVICE: ICD-10-CM

## 2023-02-27 LAB
ALBUMIN SERPL-MCNC: 4.1 G/DL (ref 3.5–5.2)
ALBUMIN/GLOB SERPL: 1.4 G/DL
ALP SERPL-CCNC: 68 U/L (ref 39–117)
ALT SERPL W P-5'-P-CCNC: 16 U/L (ref 1–33)
ANION GAP SERPL CALCULATED.3IONS-SCNC: 11.2 MMOL/L (ref 5–15)
AST SERPL-CCNC: 31 U/L (ref 1–32)
BASOPHILS # BLD AUTO: 0.04 10*3/MM3 (ref 0–0.2)
BASOPHILS NFR BLD AUTO: 0.8 % (ref 0–1.5)
BILIRUB SERPL-MCNC: 0.3 MG/DL (ref 0–1.2)
BUN SERPL-MCNC: 20 MG/DL (ref 8–23)
BUN/CREAT SERPL: 19.8 (ref 7–25)
CALCIUM SPEC-SCNC: 8.8 MG/DL (ref 8.6–10.5)
CHLORIDE SERPL-SCNC: 98 MMOL/L (ref 98–107)
CO2 SERPL-SCNC: 29.8 MMOL/L (ref 22–29)
CREAT SERPL-MCNC: 1.01 MG/DL (ref 0.57–1)
DEPRECATED RDW RBC AUTO: 46 FL (ref 37–54)
EGFRCR SERPLBLD CKD-EPI 2021: 61.1 ML/MIN/1.73
EOSINOPHIL # BLD AUTO: 0.06 10*3/MM3 (ref 0–0.4)
EOSINOPHIL NFR BLD AUTO: 1.2 % (ref 0.3–6.2)
ERYTHROCYTE [DISTWIDTH] IN BLOOD BY AUTOMATED COUNT: 13 % (ref 12.3–15.4)
GLOBULIN UR ELPH-MCNC: 3 GM/DL
GLUCOSE SERPL-MCNC: 91 MG/DL (ref 65–99)
HCT VFR BLD AUTO: 34.6 % (ref 34–46.6)
HGB BLD-MCNC: 11.9 G/DL (ref 12–15.9)
IMM GRANULOCYTES # BLD AUTO: 0.01 10*3/MM3 (ref 0–0.05)
IMM GRANULOCYTES NFR BLD AUTO: 0.2 % (ref 0–0.5)
LYMPHOCYTES # BLD AUTO: 1.82 10*3/MM3 (ref 0.7–3.1)
LYMPHOCYTES NFR BLD AUTO: 36.7 % (ref 19.6–45.3)
MCH RBC QN AUTO: 32.6 PG (ref 26.6–33)
MCHC RBC AUTO-ENTMCNC: 34.4 G/DL (ref 31.5–35.7)
MCV RBC AUTO: 94.8 FL (ref 79–97)
MONOCYTES # BLD AUTO: 0.56 10*3/MM3 (ref 0.1–0.9)
MONOCYTES NFR BLD AUTO: 11.3 % (ref 5–12)
NEUTROPHILS NFR BLD AUTO: 2.47 10*3/MM3 (ref 1.7–7)
NEUTROPHILS NFR BLD AUTO: 49.8 % (ref 42.7–76)
PLATELET # BLD AUTO: 267 10*3/MM3 (ref 140–450)
PMV BLD AUTO: 9.4 FL (ref 6–12)
POTASSIUM SERPL-SCNC: 3.7 MMOL/L (ref 3.5–5.2)
PROT SERPL-MCNC: 7.1 G/DL (ref 6–8.5)
RBC # BLD AUTO: 3.65 10*6/MM3 (ref 3.77–5.28)
SODIUM SERPL-SCNC: 139 MMOL/L (ref 136–145)
T4 FREE SERPL-MCNC: 1.13 NG/DL (ref 0.93–1.7)
TSH SERPL DL<=0.05 MIU/L-ACNC: 3.45 UIU/ML (ref 0.27–4.2)
WBC NRBC COR # BLD: 4.96 10*3/MM3 (ref 3.4–10.8)

## 2023-02-27 PROCEDURE — 85025 COMPLETE CBC W/AUTO DIFF WBC: CPT | Performed by: INTERNAL MEDICINE

## 2023-02-27 PROCEDURE — 84439 ASSAY OF FREE THYROXINE: CPT | Performed by: INTERNAL MEDICINE

## 2023-02-27 PROCEDURE — 84443 ASSAY THYROID STIM HORMONE: CPT | Performed by: INTERNAL MEDICINE

## 2023-02-27 PROCEDURE — G0463 HOSPITAL OUTPT CLINIC VISIT: HCPCS

## 2023-02-27 PROCEDURE — 80053 COMPREHEN METABOLIC PANEL: CPT | Performed by: INTERNAL MEDICINE

## 2023-02-27 PROCEDURE — 99214 OFFICE O/P EST MOD 30 MIN: CPT | Performed by: INTERNAL MEDICINE

## 2023-02-27 PROCEDURE — 25010000002 HEPARIN LOCK FLUSH PER 10 UNITS: Performed by: INTERNAL MEDICINE

## 2023-02-27 PROCEDURE — 36591 DRAW BLOOD OFF VENOUS DEVICE: CPT

## 2023-02-27 RX ORDER — HEPARIN SODIUM (PORCINE) LOCK FLUSH IV SOLN 100 UNIT/ML 100 UNIT/ML
500 SOLUTION INTRAVENOUS AS NEEDED
Status: CANCELLED | OUTPATIENT
Start: 2023-02-27

## 2023-02-27 RX ORDER — SODIUM CHLORIDE 0.9 % (FLUSH) 0.9 %
20 SYRINGE (ML) INJECTION AS NEEDED
Status: DISCONTINUED | OUTPATIENT
Start: 2023-02-27 | End: 2023-02-28 | Stop reason: HOSPADM

## 2023-02-27 RX ORDER — SODIUM CHLORIDE 0.9 % (FLUSH) 0.9 %
20 SYRINGE (ML) INJECTION AS NEEDED
Status: CANCELLED | OUTPATIENT
Start: 2023-02-27

## 2023-02-27 RX ORDER — HEPARIN SODIUM (PORCINE) LOCK FLUSH IV SOLN 100 UNIT/ML 100 UNIT/ML
500 SOLUTION INTRAVENOUS AS NEEDED
Status: DISCONTINUED | OUTPATIENT
Start: 2023-02-27 | End: 2023-02-28 | Stop reason: HOSPADM

## 2023-02-27 RX ADMIN — HEPARIN SODIUM (PORCINE) LOCK FLUSH IV SOLN 100 UNIT/ML 500 UNITS: 100 SOLUTION at 08:21

## 2023-02-27 RX ADMIN — Medication 20 ML: at 08:21

## 2023-02-27 NOTE — PROGRESS NOTES
Chief Complaint  Lung Cancer    Anuradha Hope MD    Subjective          Linda Ortiz presents to [facility] HEMATOLOGY & ONCOLOGY for ongoing treatment.  Patient states she is tolerating her infusions well.  No issues from her Port-A-Cath.  She has new masses or adenopathy, no unusual aches or pains.  She notes adequate appetite and energy level.  She is taking her Synthroid as prescribed.    Oncology/Hematology History Overview Note   5/19/2022 Left Adrenal mass biopsy at [facility] revealed: poorly differentiated non-small cell carcinoma, high grade. Positive fo AE1/3 and Cam5.2. Immunohistochemistry was positive for PAX8, HeParlGlypican3, Vimentin, focal positive for chromagranin, GATA3.  The case was sent to [facility] for second opinion with a final diagnosis of poorly differentiated carcinoma with diffuse reduced BRG-1 expression.    6/21/2022 XRT initiated to Right hand     7/19/2022 Opdivo + Yervoy initiated x 4 doses  10/11/2022 Opdivo only          Malignant neoplasm of upper lobe of left lung (HCC)   6/9/2022 Initial Diagnosis    Lung cancer (HCC)     7/19/2022 -  Chemotherapy    OP LUNG Nivolumab 1 mg/kg / Ipilimumab 3 mg/kg / Nivolumab 480 mg      1/3/2023 Cancer Staged    Staging form: Lung, AJCC 8th Edition  - Clinical: Stage IV (cM1) - Signed by Phani Hernández MD on 1/3/2023     Secondary carcinoid tumor of bone (HCC) (Resolved)   6/14/2022 Initial Diagnosis    Secondary carcinoid tumor of bone (HCC)     6/20/2022 -  Radiation    RADIATION THERAPY Treatment Details (Noted on 6/14/2022)  Site: Right Hand  Technique: 3D CRT  Goal: No goal specified  Planned Treatment Start Date: 6/20/2022         Review of Systems   Constitutional: Negative for appetite change, diaphoresis, fatigue, fever, unexpected weight gain and unexpected weight loss.   HENT: Negative for hearing loss, mouth sores, sore throat, swollen glands, trouble swallowing and voice change.    Eyes: Negative for blurred  vision.   Respiratory: Negative for cough, shortness of breath and wheezing.    Cardiovascular: Negative for chest pain and palpitations.   Gastrointestinal: Negative for abdominal pain, blood in stool, constipation, diarrhea, nausea and vomiting.   Endocrine: Negative for cold intolerance and heat intolerance.   Genitourinary: Negative for difficulty urinating, dysuria, frequency, hematuria and urinary incontinence.   Musculoskeletal: Negative for arthralgias, back pain and myalgias.   Skin: Negative for rash, skin lesions and wound.   Neurological: Negative for dizziness, seizures, weakness, numbness and headache.   Hematological: Does not bruise/bleed easily.   Psychiatric/Behavioral: Negative for depressed mood. The patient is not nervous/anxious.      Current Outpatient Medications on File Prior to Visit   Medication Sig Dispense Refill   • acetaminophen (TYLENOL) 325 MG tablet Take 650 mg by mouth Every 6 (Six) Hours As Needed for Mild Pain .     • cefdinir (OMNICEF) 300 MG capsule Take 1 capsule by mouth 2 (Two) Times a Day. 20 capsule 0   • Eliquis 5 MG tablet tablet TAKE 1 TABLET BY MOUTH TWICE A DAY 60 tablet 2   • famotidine (PEPCID) 20 MG tablet Take 1 tablet by mouth 2 (Two) Times a Day for 90 days. 180 tablet 1   • fludrocortisone 0.1 MG tablet Take 2 tablets by mouth Daily. 60 tablet 6   • fluticasone-salmeterol (Advair HFA) 115-21 MCG/ACT inhaler Inhale 2 puffs 2 (Two) Times a Day. 1 each 11   • furosemide (LASIX) 40 MG tablet TAKE 1 TABLET (40 MG TOTAL) BY MOUTH IN THE MORNING AND 1 TABLET (40 MG TOTAL) IN THE EVENING.     • guaiFENesin (Mucinex) 600 MG 12 hr tablet Take 1 tablet by mouth 2 (Two) Times a Day. 60 tablet 2   • ipratropium-albuterol (DUO-NEB) 0.5-2.5 mg/3 ml nebulizer Take 3 mL by nebulization 4 (Four) Times a Day As Needed for Wheezing. 360 mL 3   • levothyroxine (SYNTHROID, LEVOTHROID) 50 MCG tablet TAKE 1 TABLET BY MOUTH EVERY DAY 90 tablet 1   • predniSONE (DELTASONE) 2.5 MG tablet  Take 1 tablet by mouth Daily. Take one tab daily for a week, then 1 tab every other day for a week, then 1 tab every 3rd day till complete. 15 tablet 0   • tiotropium bromide monohydrate (SPIRIVA RESPIMAT) 2.5 MCG/ACT aerosol solution inhaler Inhale 2 puffs Daily. 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   • Hypocalcemia 2022   • Malignant neoplasm of upper lobe of left lung (HCC) 2022   • Metastatic cancer (HCC)    • Multiple subsegmental pulmonary emboli without acute cor pulmonale (HCC) 10/10/2022     Past Surgical History:   Procedure Laterality Date   • BRONCHOSCOPY N/A 2022    Procedure: BRONCHOSCOPY WITH BAL, WASHINGS;  Surgeon: Fausto Leon MD;  Location: [facility] ENDOSCOPY;  Service: Pulmonary;  Laterality: N/A;  PNEUMONIA, PERSISTANT COUGH   • HIP SURGERY Bilateral    • KNEE ARTHROSCOPY     • VENOUS ACCESS DEVICE (PORT) INSERTION N/A 7/15/2022    Procedure: INSERTION VENOUS ACCESS DEVICE;  Surgeon: Eber Hull MD;  Location: [facility] OR [facility];  Service: General;  Laterality: N/A;     Social History     Socioeconomic History   • Marital status: Single   Tobacco Use   • Smoking status: Former     Packs/day: 1.00     Years: 47.00     Pack years: 47.00     Types: Cigarettes     Start date:      Quit date: 2022     Years since quittin.0   • Smokeless tobacco: Never   Vaping Use   • Vaping Use:  Former   • Start date: 5/30/2013   • Quit date: 5/30/2014   Substance and Sexual Activity   • Alcohol use: Not Currently   • Drug use: Never   • Sexual activity: Defer     Family History   Problem Relation Age of Onset   • Breast cancer Mother    • Lung cancer Father    • Thyroid cancer Maternal Aunt    • Breast cancer Maternal Aunt    • Lung cancer Paternal Aunt    • COPD Paternal Uncle        Objective   Physical Exam  Vitals reviewed. Exam conducted with a chaperone present.   Constitutional:       General: She is not in acute distress.     Appearance: Normal appearance.   Cardiovascular:      Rate and Rhythm: Normal rate and regular rhythm.      Heart sounds: Normal heart sounds. No murmur heard.    No gallop.   Pulmonary:      Effort: Pulmonary effort is normal.      Breath sounds: Normal breath sounds.      Comments: Port-A-Cath  Abdominal:      General: Abdomen is flat. Bowel sounds are normal.      Palpations: Abdomen is soft.   Musculoskeletal:      Cervical back: Neck supple.      Right lower leg: No edema.      Left lower leg: No edema.   Lymphadenopathy:      Cervical: No cervical adenopathy.   Neurological:      Mental Status: She is alert and oriented to person, place, and time.   Psychiatric:         Mood and Affect: Mood normal.         Behavior: Behavior normal.         Vitals:    02/27/23 1007   BP: 136/65   Pulse: 65   Resp: 16   Temp: 97.9 °F (36.6 °C)   TempSrc: Temporal   SpO2: 98%   Weight: 78.2 kg (172 lb 6.4 oz)   PainSc: 0-No pain     ECOG score: 0         PHQ-9 Total Score:                    Result Review :   The following data was reviewed by: Phani Hernández MD on 02/27/2023:  Lab Results   Component Value Date    HGB 11.9 (L) 02/27/2023    HCT 34.6 02/27/2023    MCV 94.8 02/27/2023     02/27/2023    WBC 4.96 02/27/2023    NEUTROABS 2.47 02/27/2023    LYMPHSABS 1.82 02/27/2023    MONOSABS 0.56 02/27/2023    EOSABS 0.06 02/27/2023    BASOSABS 0.04 02/27/2023     Lab Results    Component Value Date    GLUCOSE 91 02/27/2023    BUN 20 02/27/2023    CREATININE 1.01 (H) 02/27/2023     02/27/2023    K 3.7 02/27/2023    CL 98 02/27/2023    CO2 29.8 (H) 02/27/2023    CALCIUM 8.8 02/27/2023    PROTEINTOT 7.1 02/27/2023    ALBUMIN 4.1 02/27/2023    BILITOT 0.3 02/27/2023    ALKPHOS 68 02/27/2023    AST 31 02/27/2023    ALT 16 02/27/2023     Lab Results   Component Value Date    MG 2.3 10/05/2022    PHOS 3.1 10/05/2022    FREET4 1.13 02/27/2023    TSH 3.450 02/27/2023     Lab Results   Component Value Date    IRON 46 10/01/2022    LABIRON 58 (H) 10/01/2022    TRANSFERRIN 53 (L) 10/01/2022    TIBC 79 (L) 10/01/2022     Lab Results   Component Value Date    FERRITIN 3,023.00 (H) 10/01/2022    RWAUDBYQ33 1,092 (H) 10/02/2022    FOLATE 11.60 10/02/2022     No results found for: PSA, CEA, AFP, ,           Assessment and Plan    Diagnoses and all orders for this visit:    1. Malignant neoplasm of upper lobe of left lung (HCC) (Primary)  Assessment & Plan:  Patient is on maintenance nivolumab.  Tolerating well.  Lab work today is adequate for treatment.  Proceed with cycle 10 as planned.  I will see her back for cycle 11-day 1 with lab work prior to monitor for toxicities and restaging scans to assess response to therapy.    Orders:  -     CT chest w contrast; Future  -     CT abdomen pelvis w contrast; Future  -     CBC and Differential; Future  -     Comprehensive metabolic panel; Future  -     TSH; Future  -     T4, free; Future    2. Encounter for long-term (current) use of high-risk medication  -     CBC and Differential; Future  -     Comprehensive metabolic panel; Future  -     TSH; Future  -     T4, free; Future    3. Hypothyroidism, unspecified type  Assessment & Plan:  Patient is on Synthroid.  TSH and T4 look good.  Continue same dose.  Reassess next visit.            Patient Follow Up: 4 weeks    Patient was given instructions and counseling regarding her condition or for  health maintenance advice. Please see specific information pulled into the AVS if appropriate.     Phani Hernández MD    2/27/2023

## 2023-02-27 NOTE — ASSESSMENT & PLAN NOTE
Patient is on maintenance nivolumab.  Tolerating well.  Lab work today is adequate for treatment.  Proceed with cycle 10 as planned.  I will see her back for cycle 11-day 1 with lab work prior to monitor for toxicities and restaging scans to assess response to therapy.

## 2023-02-28 ENCOUNTER — HOSPITAL ENCOUNTER (OUTPATIENT)
Dept: ONCOLOGY | Facility: HOSPITAL | Age: 68
Discharge: HOME | End: 2023-02-28
Admitting: INTERNAL MEDICINE
Payer: MEDICARE

## 2023-02-28 VITALS
WEIGHT: 173.94 LBS | BODY MASS INDEX: 29.7 KG/M2 | SYSTOLIC BLOOD PRESSURE: 133 MMHG | RESPIRATION RATE: 20 BRPM | HEART RATE: 59 BPM | TEMPERATURE: 97.6 F | OXYGEN SATURATION: 97 % | DIASTOLIC BLOOD PRESSURE: 66 MMHG | HEIGHT: 64 IN

## 2023-02-28 DIAGNOSIS — Z45.2 ENCOUNTER FOR ADJUSTMENT OR MANAGEMENT OF VASCULAR ACCESS DEVICE: ICD-10-CM

## 2023-02-28 DIAGNOSIS — Z79.899 ENCOUNTER FOR LONG-TERM (CURRENT) USE OF HIGH-RISK MEDICATION: Primary | ICD-10-CM

## 2023-02-28 DIAGNOSIS — C34.12 MALIGNANT NEOPLASM OF UPPER LOBE OF LEFT LUNG: ICD-10-CM

## 2023-02-28 PROCEDURE — 25010000002 HEPARIN LOCK FLUSH PER 10 UNITS: Performed by: INTERNAL MEDICINE

## 2023-02-28 PROCEDURE — 96413 CHEMO IV INFUSION 1 HR: CPT

## 2023-02-28 PROCEDURE — 25010000002 NIVOLUMAB 240 MG/24ML SOLUTION 24 ML VIAL: Performed by: INTERNAL MEDICINE

## 2023-02-28 RX ORDER — SODIUM CHLORIDE 9 MG/ML
250 INJECTION, SOLUTION INTRAVENOUS ONCE
Status: COMPLETED | OUTPATIENT
Start: 2023-02-28 | End: 2023-02-28

## 2023-02-28 RX ORDER — HEPARIN SODIUM (PORCINE) LOCK FLUSH IV SOLN 100 UNIT/ML 100 UNIT/ML
500 SOLUTION INTRAVENOUS AS NEEDED
Status: CANCELLED | OUTPATIENT
Start: 2023-02-28

## 2023-02-28 RX ORDER — SODIUM CHLORIDE 0.9 % (FLUSH) 0.9 %
20 SYRINGE (ML) INJECTION AS NEEDED
Status: CANCELLED | OUTPATIENT
Start: 2023-02-28

## 2023-02-28 RX ORDER — SODIUM CHLORIDE 9 MG/ML
250 INJECTION, SOLUTION INTRAVENOUS ONCE
Status: CANCELLED | OUTPATIENT
Start: 2023-02-28

## 2023-02-28 RX ORDER — HEPARIN SODIUM (PORCINE) LOCK FLUSH IV SOLN 100 UNIT/ML 100 UNIT/ML
500 SOLUTION INTRAVENOUS AS NEEDED
Status: DISCONTINUED | OUTPATIENT
Start: 2023-02-28 | End: 2023-03-02 | Stop reason: HOSPADM

## 2023-02-28 RX ORDER — SODIUM CHLORIDE 0.9 % (FLUSH) 0.9 %
20 SYRINGE (ML) INJECTION AS NEEDED
Status: DISCONTINUED | OUTPATIENT
Start: 2023-02-28 | End: 2023-03-02 | Stop reason: HOSPADM

## 2023-02-28 RX ADMIN — SODIUM CHLORIDE 480 MG: 9 INJECTION, SOLUTION INTRAVENOUS at 09:44

## 2023-02-28 RX ADMIN — SODIUM CHLORIDE 250 ML: 9 INJECTION, SOLUTION INTRAVENOUS at 09:44

## 2023-02-28 RX ADMIN — HEPARIN SODIUM (PORCINE) LOCK FLUSH IV SOLN 100 UNIT/ML 500 UNITS: 100 SOLUTION at 10:20

## 2023-02-28 RX ADMIN — Medication 20 ML: at 10:20

## 2023-03-03 ENCOUNTER — OFFICE VISIT (OUTPATIENT)
Dept: RADIATION ONCOLOGY | Facility: HOSPITAL | Age: 68
End: 2023-03-03
Payer: MEDICARE

## 2023-03-03 VITALS
TEMPERATURE: 98.5 F | OXYGEN SATURATION: 98 % | WEIGHT: 176.59 LBS | DIASTOLIC BLOOD PRESSURE: 56 MMHG | BODY MASS INDEX: 30.3 KG/M2 | SYSTOLIC BLOOD PRESSURE: 127 MMHG | RESPIRATION RATE: 16 BRPM | HEART RATE: 88 BPM

## 2023-03-03 DIAGNOSIS — C34.12 MALIGNANT NEOPLASM OF UPPER LOBE OF LEFT LUNG: Primary | ICD-10-CM

## 2023-03-03 PROCEDURE — 99213 OFFICE O/P EST LOW 20 MIN: CPT | Performed by: RADIOLOGY

## 2023-03-03 PROCEDURE — G0463 HOSPITAL OUTPT CLINIC VISIT: HCPCS | Performed by: RADIOLOGY

## 2023-03-03 NOTE — PROGRESS NOTES
Follow Up Office Visit      Encounter Date: 03/03/2023   Patient Name: Linda Ortiz  YOB: 1955   Medical Record Number: [id number]   Primary Diagnosis: Malignant neoplasm of upper lobe of left lung (HCC) [C34.12]   Cancer Staging: Cancer Staging   Malignant neoplasm of upper lobe of left lung (HCC)  Staging form: Lung, AJCC 8th Edition  - Clinical: Stage IV (cM1) - Signed by Phani Hernández MD on 1/3/2023        Chief Complaint:    Chief Complaint   Patient presents with   • Follow-up       History of Present Illness: Linda Ortiz returns for routine scheduled follow-up.  She reports feeling well overall and improved compared to when last evaluated.  She does have some very mild fatigue and mild bilateral hand pain.  She denies weight loss, headaches, vision changes, focal weakness other than her right hand, confusion or imbalance.  CT scan of the chest on 12/21/2022 reveals interval decrease in size of the left upper lobe mass consistent with treatment changes.  There were bilateral adrenal masses that appeared unchanged.  Additionally, there was persistent right axillary adenopathy.    Subjective      Review of Systems: Review of Systems   Constitutional: Positive for fatigue (1/10). Negative for appetite change and unexpected weight change.   Respiratory: Positive for cough (Productive, yellow secretions, improving.). Negative for shortness of breath and wheezing.    Cardiovascular: Negative for chest pain, palpitations and leg swelling.   Gastrointestinal: Negative for blood in stool, constipation, diarrhea, nausea and vomiting.   Genitourinary: Negative for difficulty urinating, dysuria, frequency, hematuria and urgency.   Musculoskeletal: Positive for arthralgias (Bilateral hands.). Negative for back pain, gait problem and joint swelling.   Skin: Negative for color change and rash.   Neurological: Positive for dizziness (Occasional, with positional changes, ongoing.). Negative for  weakness and headaches.   Psychiatric/Behavioral: Negative for sleep disturbance.       The following portions of the patient's history were reviewed and updated as appropriate: allergies, current medications, past family history, past medical history, past social history, past surgical history and problem list.    Medications:     Current Outpatient Medications:   •  acetaminophen (TYLENOL) 325 MG tablet, Take 2 tablets by mouth Every 6 (Six) Hours As Needed for Mild Pain., Disp: , Rfl:   •  Eliquis 5 MG tablet tablet, TAKE 1 TABLET BY MOUTH TWICE A DAY, Disp: 60 tablet, Rfl: 2  •  famotidine (PEPCID) 20 MG tablet, Take 1 tablet by mouth 2 (Two) Times a Day for 90 days., Disp: 180 tablet, Rfl: 1  •  fludrocortisone 0.1 MG tablet, Take 2 tablets by mouth Daily., Disp: 60 tablet, Rfl: 6  •  fluticasone-salmeterol (Advair HFA) 115-21 MCG/ACT inhaler, Inhale 2 puffs 2 (Two) Times a Day., Disp: 1 each, Rfl: 11  •  furosemide (LASIX) 40 MG tablet, TAKE 1 TABLET (40 MG TOTAL) BY MOUTH IN THE MORNING AND 1 TABLET (40 MG TOTAL) IN THE EVENING., Disp: , Rfl:   •  guaiFENesin (Mucinex) 600 MG 12 hr tablet, Take 1 tablet by mouth 2 (Two) Times a Day., Disp: 60 tablet, Rfl: 2  •  ipratropium-albuterol (DUO-NEB) 0.5-2.5 mg/3 ml nebulizer, Take 3 mL by nebulization 4 (Four) Times a Day As Needed for Wheezing., Disp: 360 mL, Rfl: 3  •  levothyroxine (SYNTHROID, LEVOTHROID) 50 MCG tablet, TAKE 1 TABLET BY MOUTH EVERY DAY, Disp: 90 tablet, Rfl: 1  •  predniSONE (DELTASONE) 2.5 MG tablet, Take 1 tablet by mouth Daily. Take one tab daily for a week, then 1 tab every other day for a week, then 1 tab every 3rd day till complete., Disp: 15 tablet, Rfl: 0  •  tiotropium bromide monohydrate (SPIRIVA RESPIMAT) 2.5 MCG/ACT aerosol solution inhaler, Inhale 2 puffs Daily., Disp: 1 each, Rfl: 11    Allergies:   Allergies   Allergen Reactions   • Hydrocodone Nausea And Vomiting and Dizziness       ECOG: (1) Restricted in physically strenuous  activity, ambulatory and able to do work of light nature  Quality of Life: 90 - Limited Activity     Objective     Physical Exam:   Vital Signs:   Vitals:    03/03/23 1316   BP: 127/56   Pulse: 88   Resp: 16   Temp: 98.5 °F (36.9 °C)   TempSrc: Temporal   SpO2: 98%   Weight: 80.1 kg (176 lb 9.4 oz)   PainSc:   2   PainLoc: Hand     Body mass index is 30.3 kg/m².     Physical Exam  Constitutional:       General: She is not in acute distress.     Appearance: Normal appearance. She is not ill-appearing, toxic-appearing or diaphoretic.   HENT:      Head: Normocephalic and atraumatic.   Pulmonary:      Effort: Pulmonary effort is normal. No respiratory distress.   Musculoskeletal:      Comments: Stable atrophy of right hand most prominent at the thenar eminence   Skin:     General: Skin is warm and dry.   Neurological:      General: No focal deficit present.      Mental Status: She is alert and oriented to person, place, and time.      Cranial Nerves: No cranial nerve deficit.      Gait: Gait normal.   Psychiatric:         Mood and Affect: Mood normal.         Behavior: Behavior normal.         Judgment: Judgment normal.         Radiographs: CT Chest With Contrast Diagnostic    Result Date: 12/21/2022    1. Interval decrease in size of left upper lobe mass or masslike area of post treatment change, now measuring 3.9 x 2.2 cm (previously 5.5 x 2.8 cm).   2. There are new patchy areas of ground-glass opacity and nodularity in the lower lobes bilaterally.  The appearance suggests an infectious or inflammatory process.  Correlate clinically.   3. There is a denser more nodular area now noted in the right lower lobe measuring 1.6 cm maximally, which is indeterminate.  This may be due to infectious or inflammatory process or intrapulmonary metastatic disease.  Short-term follow-up suggested.  3. Interval near complete resolution of pericardial effusion.  Bilateral pleural effusions have resolved.  4. Bilateral adrenal masses  appear unchanged, and again are suspicious for metastatic disease.  5. Increasing right axillary lymphadenopathy, suspicious for metastatic disease.  6. Additional findings as given above.      MICAH MOSQUERA MD       Electronically Signed and Approved By: MICAH MOSQUERA MD on 12/21/2022 at 15:04             NM Bone Scan Whole Body    Result Date: 12/19/2022    1. Radiopharmaceutical accumulation corresponding to the region of the radial aspect of the right wrist and 1st metacarpal bone.  The activity is likely related to arthropathy in this region.  Recommend correlation with MRI of the right wrist for better characterization. 2. No additional significant focal abnormal radiopharmaceutical accumulation to indicate osseous malignancy or metastatic disease.     SUJATHA WEBB MD       Electronically Signed and Approved By: SUJATHA WEBB MD on 12/19/2022 at 16:28             CT Abdomen Pelvis With Contrast    Result Date: 12/21/2022   Interval decrease in size of bilateral adrenal metastases and retroperitoneal adenopathy.  No new suspicious abdominopelvic findings     REGAN ELAINE MD       Electronically Signed and Approved By: REGAN ELAINE MD on 12/21/2022 at 15:05           I personally reviewed the CT scan of the chest, abdomen and pelvis from 12/21/2022.  I additionally reviewed the bone scan from 12/19/2022.  The pertinent findings are as above.    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    She is now status post external beam radiotherapy with interval clinical response.  She is currently undergoing immunotherapy with potentially mixed radiographic response.  Nonetheless she is doing quite well overall clinically.  ECOG 1    Ms. Ortiz will continue to undergo systemic therapy as directed by   Edgar.  She will follow-up with me in 6 months.  She was encouraged to contact me with any questions or concerns regarding her care    Torin Salcido MD  Radiation Oncology  [facility]    This document has been signed by Torin Salcido MD on March 3, 2023 14:25 EST

## 2023-03-24 ENCOUNTER — HOSPITAL ENCOUNTER (OUTPATIENT)
Dept: CT IMAGING | Facility: HOSPITAL | Age: 68
Discharge: HOME | End: 2023-03-24
Admitting: INTERNAL MEDICINE
Payer: MEDICARE

## 2023-03-24 DIAGNOSIS — E27.40 ADRENAL INSUFFICIENCY: ICD-10-CM

## 2023-03-24 DIAGNOSIS — R73.01 IMPAIRED FASTING BLOOD SUGAR: ICD-10-CM

## 2023-03-24 DIAGNOSIS — D64.9 ANEMIA, UNSPECIFIED TYPE: ICD-10-CM

## 2023-03-24 DIAGNOSIS — Z76.89 ENCOUNTER TO ESTABLISH CARE: ICD-10-CM

## 2023-03-24 DIAGNOSIS — C34.90 METASTATIC NON-SMALL CELL LUNG CANCER: ICD-10-CM

## 2023-03-24 DIAGNOSIS — J44.9 CHRONIC OBSTRUCTIVE PULMONARY DISEASE, UNSPECIFIED COPD TYPE: ICD-10-CM

## 2023-03-24 DIAGNOSIS — R53.1 WEAKNESS: ICD-10-CM

## 2023-03-24 DIAGNOSIS — R06.09 DYSPNEA ON EXERTION: ICD-10-CM

## 2023-03-24 DIAGNOSIS — I26.94 MULTIPLE SUBSEGMENTAL PULMONARY EMBOLI WITHOUT ACUTE COR PULMONALE: ICD-10-CM

## 2023-03-24 DIAGNOSIS — E78.2 MIXED HYPERLIPIDEMIA: ICD-10-CM

## 2023-03-24 DIAGNOSIS — R60.1 ANASARCA: ICD-10-CM

## 2023-03-24 DIAGNOSIS — E27.40 HYPOADRENALISM: ICD-10-CM

## 2023-03-24 DIAGNOSIS — I26.99 OTHER ACUTE PULMONARY EMBOLISM, UNSPECIFIED WHETHER ACUTE COR PULMONALE PRESENT: ICD-10-CM

## 2023-03-24 DIAGNOSIS — F17.211 NICOTINE DEPENDENCE, CIGARETTES, IN REMISSION: ICD-10-CM

## 2023-03-24 DIAGNOSIS — C34.12 MALIGNANT NEOPLASM OF UPPER LOBE OF LEFT LUNG: ICD-10-CM

## 2023-03-24 DIAGNOSIS — J15.0 PNEUMONIA DUE TO KLEBSIELLA PNEUMONIAE, UNSPECIFIED LATERALITY, UNSPECIFIED PART OF LUNG: ICD-10-CM

## 2023-03-24 DIAGNOSIS — J90 PLEURAL EFFUSION: ICD-10-CM

## 2023-03-24 DIAGNOSIS — Z79.899 ENCOUNTER FOR LONG-TERM (CURRENT) USE OF HIGH-RISK MEDICATION: ICD-10-CM

## 2023-03-24 DIAGNOSIS — R05.8 PRODUCTIVE COUGH: ICD-10-CM

## 2023-03-24 LAB
ALBUMIN SERPL-MCNC: 3.8 G/DL (ref 3.5–5.2)
ALBUMIN/GLOB SERPL: 1.2 G/DL
ALP SERPL-CCNC: 64 U/L (ref 39–117)
ALT SERPL W P-5'-P-CCNC: 19 U/L (ref 1–33)
ANION GAP SERPL CALCULATED.3IONS-SCNC: 8 MMOL/L (ref 5–15)
AST SERPL-CCNC: 38 U/L (ref 1–32)
BASOPHILS # BLD AUTO: 0.03 10*3/MM3 (ref 0–0.2)
BASOPHILS NFR BLD AUTO: 0.6 % (ref 0–1.5)
BILIRUB SERPL-MCNC: 0.3 MG/DL (ref 0–1.2)
BUN SERPL-MCNC: 19 MG/DL (ref 8–23)
BUN/CREAT SERPL: 19.2 (ref 7–25)
CALCIUM SPEC-SCNC: 8.7 MG/DL (ref 8.6–10.5)
CHLORIDE SERPL-SCNC: 99 MMOL/L (ref 98–107)
CHOLEST SERPL-MCNC: 206 MG/DL (ref 0–200)
CO2 SERPL-SCNC: 30 MMOL/L (ref 22–29)
CREAT SERPL-MCNC: 0.99 MG/DL (ref 0.57–1)
DEPRECATED RDW RBC AUTO: 44.6 FL (ref 37–54)
EGFRCR SERPLBLD CKD-EPI 2021: 62.6 ML/MIN/1.73
EOSINOPHIL # BLD AUTO: 0.09 10*3/MM3 (ref 0–0.4)
EOSINOPHIL NFR BLD AUTO: 1.9 % (ref 0.3–6.2)
ERYTHROCYTE [DISTWIDTH] IN BLOOD BY AUTOMATED COUNT: 13 % (ref 12.3–15.4)
FOLATE SERPL-MCNC: >20 NG/ML (ref 4.78–24.2)
GLOBULIN UR ELPH-MCNC: 3.3 GM/DL
GLUCOSE SERPL-MCNC: 91 MG/DL (ref 65–99)
HBA1C MFR BLD: 5.6 % (ref 4.8–5.6)
HCT VFR BLD AUTO: 34.4 % (ref 34–46.6)
HDLC SERPL-MCNC: 54 MG/DL (ref 40–60)
HGB BLD-MCNC: 11.6 G/DL (ref 12–15.9)
IMM GRANULOCYTES # BLD AUTO: 0.02 10*3/MM3 (ref 0–0.05)
IMM GRANULOCYTES NFR BLD AUTO: 0.4 % (ref 0–0.5)
LDLC SERPL CALC-MCNC: 134 MG/DL (ref 0–100)
LDLC/HDLC SERPL: 2.44 {RATIO}
LYMPHOCYTES # BLD AUTO: 1.42 10*3/MM3 (ref 0.7–3.1)
LYMPHOCYTES NFR BLD AUTO: 30.1 % (ref 19.6–45.3)
MCH RBC QN AUTO: 31.7 PG (ref 26.6–33)
MCHC RBC AUTO-ENTMCNC: 33.7 G/DL (ref 31.5–35.7)
MCV RBC AUTO: 94 FL (ref 79–97)
MONOCYTES # BLD AUTO: 0.53 10*3/MM3 (ref 0.1–0.9)
MONOCYTES NFR BLD AUTO: 11.3 % (ref 5–12)
NEUTROPHILS NFR BLD AUTO: 2.62 10*3/MM3 (ref 1.7–7)
NEUTROPHILS NFR BLD AUTO: 55.7 % (ref 42.7–76)
NRBC BLD AUTO-RTO: 0 /100 WBC (ref 0–0.2)
PLATELET # BLD AUTO: 270 10*3/MM3 (ref 140–450)
PMV BLD AUTO: 10.1 FL (ref 6–12)
POTASSIUM SERPL-SCNC: 4 MMOL/L (ref 3.5–5.2)
PROT SERPL-MCNC: 7.1 G/DL (ref 6–8.5)
RBC # BLD AUTO: 3.66 10*6/MM3 (ref 3.77–5.28)
SODIUM SERPL-SCNC: 137 MMOL/L (ref 136–145)
T4 FREE SERPL-MCNC: 1.23 NG/DL (ref 0.93–1.7)
TRIGL SERPL-MCNC: 101 MG/DL (ref 0–150)
TSH SERPL DL<=0.05 MIU/L-ACNC: 3.69 UIU/ML (ref 0.27–4.2)
VIT B12 BLD-MCNC: 732 PG/ML (ref 211–946)
VLDLC SERPL-MCNC: 18 MG/DL (ref 5–40)
WBC NRBC COR # BLD: 4.71 10*3/MM3 (ref 3.4–10.8)

## 2023-03-24 PROCEDURE — 84439 ASSAY OF FREE THYROXINE: CPT | Performed by: INTERNAL MEDICINE

## 2023-03-24 PROCEDURE — 82746 ASSAY OF FOLIC ACID SERUM: CPT | Performed by: STUDENT IN AN ORGANIZED HEALTH CARE EDUCATION/TRAINING PROGRAM

## 2023-03-24 PROCEDURE — 82607 VITAMIN B-12: CPT | Performed by: STUDENT IN AN ORGANIZED HEALTH CARE EDUCATION/TRAINING PROGRAM

## 2023-03-24 PROCEDURE — 80061 LIPID PANEL: CPT | Performed by: STUDENT IN AN ORGANIZED HEALTH CARE EDUCATION/TRAINING PROGRAM

## 2023-03-24 PROCEDURE — 80053 COMPREHEN METABOLIC PANEL: CPT | Performed by: STUDENT IN AN ORGANIZED HEALTH CARE EDUCATION/TRAINING PROGRAM

## 2023-03-24 PROCEDURE — 74177 CT ABD & PELVIS W/CONTRAST: CPT

## 2023-03-24 PROCEDURE — 83036 HEMOGLOBIN GLYCOSYLATED A1C: CPT | Performed by: STUDENT IN AN ORGANIZED HEALTH CARE EDUCATION/TRAINING PROGRAM

## 2023-03-24 PROCEDURE — 25510000001 IOPAMIDOL PER 1 ML: Performed by: INTERNAL MEDICINE

## 2023-03-24 PROCEDURE — 84443 ASSAY THYROID STIM HORMONE: CPT | Performed by: STUDENT IN AN ORGANIZED HEALTH CARE EDUCATION/TRAINING PROGRAM

## 2023-03-24 PROCEDURE — 85025 COMPLETE CBC W/AUTO DIFF WBC: CPT | Performed by: STUDENT IN AN ORGANIZED HEALTH CARE EDUCATION/TRAINING PROGRAM

## 2023-03-24 PROCEDURE — 71260 CT THORAX DX C+: CPT

## 2023-03-24 RX ORDER — HEPARIN SODIUM (PORCINE) LOCK FLUSH IV SOLN 100 UNIT/ML 100 UNIT/ML
SOLUTION INTRAVENOUS
Status: DISPENSED
Start: 2023-03-24 | End: 2023-03-24

## 2023-03-24 RX ADMIN — IOPAMIDOL 100 ML: 755 INJECTION, SOLUTION INTRAVENOUS at 11:11

## 2023-03-27 ENCOUNTER — TELEPHONE (OUTPATIENT)
Dept: FAMILY MEDICINE CLINIC | Facility: CLINIC | Age: 68
End: 2023-03-27
Payer: MEDICARE

## 2023-03-27 ENCOUNTER — TELEPHONE (OUTPATIENT)
Dept: PULMONOLOGY | Facility: CLINIC | Age: 68
End: 2023-03-27
Payer: MEDICARE

## 2023-03-27 ENCOUNTER — OFFICE VISIT (OUTPATIENT)
Dept: ONCOLOGY | Facility: HOSPITAL | Age: 68
End: 2023-03-27
Payer: MEDICARE

## 2023-03-27 ENCOUNTER — HOSPITAL ENCOUNTER (OUTPATIENT)
Dept: ONCOLOGY | Facility: HOSPITAL | Age: 68
Discharge: HOME | End: 2023-03-27
Payer: MEDICARE

## 2023-03-27 VITALS
DIASTOLIC BLOOD PRESSURE: 63 MMHG | HEART RATE: 63 BPM | BODY MASS INDEX: 30.49 KG/M2 | RESPIRATION RATE: 18 BRPM | SYSTOLIC BLOOD PRESSURE: 121 MMHG | WEIGHT: 177.69 LBS | OXYGEN SATURATION: 100 % | TEMPERATURE: 96.8 F

## 2023-03-27 DIAGNOSIS — E27.1 ADRENAL INSUFFICIENCY (ADDISON'S DISEASE): ICD-10-CM

## 2023-03-27 DIAGNOSIS — C34.12 MALIGNANT NEOPLASM OF UPPER LOBE OF LEFT LUNG: Primary | ICD-10-CM

## 2023-03-27 PROCEDURE — G0463 HOSPITAL OUTPT CLINIC VISIT: HCPCS | Performed by: INTERNAL MEDICINE

## 2023-03-27 PROCEDURE — 1125F AMNT PAIN NOTED PAIN PRSNT: CPT | Performed by: INTERNAL MEDICINE

## 2023-03-27 PROCEDURE — 1160F RVW MEDS BY RX/DR IN RCRD: CPT | Performed by: INTERNAL MEDICINE

## 2023-03-27 PROCEDURE — 1159F MED LIST DOCD IN RCRD: CPT | Performed by: INTERNAL MEDICINE

## 2023-03-27 PROCEDURE — 3074F SYST BP LT 130 MM HG: CPT | Performed by: INTERNAL MEDICINE

## 2023-03-27 PROCEDURE — 99214 OFFICE O/P EST MOD 30 MIN: CPT | Performed by: INTERNAL MEDICINE

## 2023-03-27 PROCEDURE — 3078F DIAST BP <80 MM HG: CPT | Performed by: INTERNAL MEDICINE

## 2023-03-27 RX ORDER — SODIUM CHLORIDE 9 MG/ML
250 INJECTION, SOLUTION INTRAVENOUS ONCE
Status: CANCELLED | OUTPATIENT
Start: 2023-03-28

## 2023-03-27 NOTE — PROGRESS NOTES
Chief Complaint  Malignant neoplasm of upper lobe of left lung (HCC)    Anuradha Hope MD    Subjective          Linda Ortiz presents to [facility] for ongoing treatment.  Patient states she is tolerating her infusions well.  No issues from her Port-A-Cath.  She has new masses or adenopathy, no unusual aches or pains.  She notes adequate appetite and energy level.  She is taking her Synthroid as prescribed. Cough is more productive but improving. She is off steroid. Dr. Leon her pulmonologist increased her fludrocortisone to 2 tablets up from 1/2 tablet. This has made her more stiff and uncomfortable. No rash, diarrhea.     Oncology/Hematology History Overview Note   5/19/2022 Left Adrenal mass biopsy at [facility] revealed: poorly differentiated non-small cell carcinoma, high grade. Positive fo AE1/3 and Cam5.2. Immunohistochemistry was positive for PAX8, HeParlGlypican3, Vimentin, focal positive for chromagranin, GATA3.  The case was sent to [facility] for second opinion with a final diagnosis of poorly differentiated carcinoma with diffuse reduced BRG-1 expression.    6/21/2022 XRT initiated to Right hand     7/19/2022 Opdivo + Yervoy initiated x 4 doses  10/11/2022 Opdivo only          Malignant neoplasm of upper lobe of left lung (HCC)   6/9/2022 Initial Diagnosis    Lung cancer (HCC)     7/19/2022 -  Chemotherapy    OP LUNG Nivolumab 1 mg/kg / Ipilimumab 3 mg/kg / Nivolumab 480 mg      1/3/2023 Cancer Staged    Staging form: Lung, AJCC 8th Edition  - Clinical: Stage IV (cM1) - Signed by Phani Hernández MD on 1/3/2023     Secondary carcinoid tumor of bone (HCC) (Resolved)   6/14/2022 Initial Diagnosis    Secondary carcinoid tumor of bone (HCC)     6/20/2022 -  Radiation    RADIATION THERAPY Treatment Details (Noted on 6/14/2022)  Site: Right Hand  Technique: 3D CRT  Goal: No goal specified  Planned Treatment Start Date: 6/20/2022         Review of Systems    Constitutional: Positive for fatigue. Negative for appetite change, diaphoresis, fever, unexpected weight gain and unexpected weight loss.   HENT: Negative for hearing loss, mouth sores, sore throat, swollen glands, trouble swallowing and voice change.    Eyes: Negative for blurred vision.   Respiratory: Negative for cough, shortness of breath and wheezing.    Cardiovascular: Negative for chest pain and palpitations.   Gastrointestinal: Negative for abdominal pain, blood in stool, constipation, diarrhea, nausea and vomiting.   Endocrine: Negative for cold intolerance and heat intolerance.   Genitourinary: Negative for difficulty urinating, dysuria, frequency, hematuria and urinary incontinence.   Musculoskeletal: Positive for arthralgias and back pain. Negative for myalgias.   Skin: Negative for rash, skin lesions and wound.   Neurological: Negative for dizziness, seizures, weakness, numbness and headache.   Hematological: Does not bruise/bleed easily.   Psychiatric/Behavioral: Negative for depressed mood. The patient is not nervous/anxious.    All other systems reviewed and are negative.    Current Outpatient Medications on File Prior to Visit   Medication Sig Dispense Refill   • acetaminophen (TYLENOL) 325 MG tablet Take 2 tablets by mouth Every 6 (Six) Hours As Needed for Mild Pain.     • Eliquis 5 MG tablet tablet TAKE 1 TABLET BY MOUTH TWICE A DAY 60 tablet 2   • famotidine (PEPCID) 20 MG tablet Take 1 tablet by mouth 2 (Two) Times a Day for 90 days. 180 tablet 1   • fludrocortisone 0.1 MG tablet Take 2 tablets by mouth Daily. 60 tablet 6   • fluticasone-salmeterol (Advair HFA) 115-21 MCG/ACT inhaler Inhale 2 puffs 2 (Two) Times a Day. 1 each 11   • furosemide (LASIX) 40 MG tablet TAKE 1 TABLET (40 MG TOTAL) BY MOUTH IN THE MORNING AND 1 TABLET (40 MG TOTAL) IN THE EVENING.     • guaiFENesin (Mucinex) 600 MG 12 hr tablet Take 1 tablet by mouth 2 (Two) Times a Day. 60 tablet 2   • ipratropium-albuterol  (DUO-NEB) 0.5-2.5 mg/3 ml nebulizer Take 3 mL by nebulization 4 (Four) Times a Day As Needed for Wheezing. 360 mL 3   • levothyroxine (SYNTHROID, LEVOTHROID) 50 MCG tablet TAKE 1 TABLET BY MOUTH EVERY DAY 90 tablet 1   • predniSONE (DELTASONE) 2.5 MG tablet Take 1 tablet by mouth Daily. Take one tab daily for a week, then 1 tab every other day for a week, then 1 tab every 3rd day till complete. 15 tablet 0   • tiotropium bromide monohydrate (SPIRIVA RESPIMAT) 2.5 MCG/ACT aerosol solution inhaler Inhale 2 puffs Daily. 1 each 11     No current facility-administered medications on file prior to visit.       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   • Hypocalcemia 2022   • Malignant neoplasm of upper lobe of left lung (HCC) 2022   • Metastatic cancer (HCC)    • Multiple subsegmental pulmonary emboli without acute cor pulmonale (HCC) 10/10/2022     Past Surgical History:   Procedure Laterality Date   • BRONCHOSCOPY N/A 2022    Procedure: BRONCHOSCOPY WITH BAL, WASHINGS;  Surgeon: Fausto Leon MD;  Location: [facility] ENDOSCOPY;  Service: Pulmonary;  Laterality: N/A;  PNEUMONIA, PERSISTANT COUGH   • HIP SURGERY Bilateral    • KNEE ARTHROSCOPY     • VENOUS ACCESS DEVICE (PORT) INSERTION N/A 7/15/2022    Procedure: INSERTION VENOUS ACCESS DEVICE;  Surgeon: Eber Hull MD;  [place] [facility] [place] [facility] [place]  Service: General;  Laterality: N/A;     Social History     Socioeconomic History   • Marital status: Single   Tobacco Use   • Smoking status: Former     Packs/day: 1.00     Years: 47.00     Pack years: 47.00     Types: Cigarettes     Start date:      Quit date: 2022     Years since quittin.1   • Smokeless tobacco: Never   Vaping Use   • Vaping Use: Former   • Start date:  5/30/2013   • Quit date: 5/30/2014   Substance and Sexual Activity   • Alcohol use: Not Currently   • Drug use: Never   • Sexual activity: Defer     Family History   Problem Relation Age of Onset   • Breast cancer Mother    • Lung cancer Father    • Thyroid cancer Maternal Aunt    • Breast cancer Maternal Aunt    • Lung cancer Paternal Aunt    • COPD Paternal Uncle        Objective   Physical Exam  Vitals reviewed. Exam conducted with a chaperone present.   Constitutional:       General: She is not in acute distress.     Appearance: Normal appearance.   Pulmonary:      Effort: Pulmonary effort is normal.      Comments: Port-A-Cath  Musculoskeletal:      Cervical back: Neck supple.      Right lower leg: No edema.      Left lower leg: No edema.   Lymphadenopathy:      Cervical: No cervical adenopathy.   Neurological:      Mental Status: She is alert and oriented to person, place, and time.   Psychiatric:         Mood and Affect: Mood normal.         Behavior: Behavior normal.         Vitals:    03/27/23 0933   BP: 121/63   Pulse: 63   Resp: 18   Temp: 96.8 °F (36 °C)   TempSrc: Temporal   SpO2: 100%   Weight: 80.6 kg (177 lb 11.1 oz)   PainSc:   4   PainLoc: Generalized               PHQ-9 Total Score:                  Result Review :   The following data was reviewed by: Leon Stokes MD on 03/27/23:  Lab Results   Component Value Date    HGB 11.6 (L) 03/24/2023    HCT 34.4 03/24/2023    MCV 94.0 03/24/2023     03/24/2023    WBC 4.71 03/24/2023    NEUTROABS 2.62 03/24/2023    LYMPHSABS 1.42 03/24/2023    MONOSABS 0.53 03/24/2023    EOSABS 0.09 03/24/2023    BASOSABS 0.03 03/24/2023     Lab Results   Component Value Date    GLUCOSE 91 03/24/2023    BUN 19 03/24/2023    CREATININE 0.99 03/24/2023     03/24/2023    K 4.0 03/24/2023    CL 99 03/24/2023    CO2 30.0 (H) 03/24/2023    CALCIUM 8.7 03/24/2023    PROTEINTOT 7.1 03/24/2023    ALBUMIN 3.8 03/24/2023    BILITOT 0.3 03/24/2023    ALKPHOS 64  03/24/2023    AST 38 (H) 03/24/2023    ALT 19 03/24/2023     Lab Results   Component Value Date    MG 2.3 10/05/2022    PHOS 3.1 10/05/2022    FREET4 1.23 03/24/2023    TSH 3.690 03/24/2023     Lab Results   Component Value Date    IRON 46 10/01/2022    LABIRON 58 (H) 10/01/2022    TRANSFERRIN 53 (L) 10/01/2022    TIBC 79 (L) 10/01/2022     Lab Results   Component Value Date    FERRITIN 3,023.00 (H) 10/01/2022    [id number] 732 03/24/2023    FOLATE >20.00 03/24/2023     No results found for: PSA, CEA, AFP, ,      Labs personally reviewed and normal K and Na.     3/24/23 CT Chest, abdomen, pelvis personally reviewed and per my read with response in adrenal mets, some changes in LUIS of lung, new 1.4 cm pleural based nodule in RLL.      CT Chest With Contrast Diagnostic    Result Date: 3/24/2023     1. Stable consolidative abnormality in the left upper lobe  2. Near complete resolution of airspace and ground-glass opacities in the lower lobes  3. New 1.4 cm pleural-based nodular lesion in the right lower lobe which may be inflammatory or metastatic  4. Interval decrease in size of right axillary lymph node     REGAN ELAINE MD       Electronically Signed and Approved By: REGAN ELAINE MD on 3/24/2023 at 15:45             CT Abdomen Pelvis With Contrast    Result Date: 3/24/2023     Interval decrease in size of bilateral adrenal metastases.  No significant change in size of retroperitoneal lymph node.  No new suspicious abdominopelvic findings     REGAN ELAINE MD       Electronically Signed and Approved By: REGAN ELAINE MD on 3/24/2023 at 15:53                  Assessment and Plan    Diagnoses and all orders for this visit:    1. Malignant neoplasm of upper lobe of left lung (HCC) (Primary)    2. Adrenal insufficiency (Homer's disease) (HCC)    Other orders  -     sodium chloride 0.9 % infusion 250 mL  -     Nivolumab (OPDIVO) 480 mg in sodium chloride 0.9 % 148 mL chemo IVPB    Patient is on  maintenance nivolumab.  Tolerating well.  Lab work today is adequate for treatment. CT imaging 3/24/23 showing overall response. New RLL nodule only 1.4 cm and could be inflammatory.  Proceed with cycle 11 as planned. Patient to return for cycle 12-day 1 with lab work prior to monitor for toxicities.    Adrenal insufficiency  She has been weaned off prednisone per Dr. Leon. Recently increased to 2 tablets of fludrocortisone (0.2 mg total) daily with side effects. Reviewed labs and will decrease her to one tablet per day (0.1 mg). Patient to reach out to Dr. Leon's office with update.          Patient Follow Up: 4 weeks    Patient was given instructions and counseling regarding her condition or for health maintenance advice. Please see specific information pulled into the AVS if appropriate.     Leon Stokes MD    3/27/2023

## 2023-03-27 NOTE — TELEPHONE ENCOUNTER
Pt called asking if she can take 1/2 of 0.1mg, QD, Fludrocortisone and 5 mg prednisone QD.   Per Dr. Leon it is ok for her to do that. Pt stated she had enough tablets to do that right now and we did not need to send prescription to pharmacy.

## 2023-03-27 NOTE — TELEPHONE ENCOUNTER
The patient phoned stating that she keeps missing phone calls from the office. I gave the patient her lab results  normal thyroid and B-12 level and that all of her other lab work was good and to continue her current management.

## 2023-03-28 ENCOUNTER — HOSPITAL ENCOUNTER (OUTPATIENT)
Dept: ONCOLOGY | Facility: HOSPITAL | Age: 68
Discharge: HOME | End: 2023-03-28
Admitting: INTERNAL MEDICINE
Payer: MEDICARE

## 2023-03-28 ENCOUNTER — DOCUMENTATION (OUTPATIENT)
Dept: NUTRITION | Facility: HOSPITAL | Age: 68
End: 2023-03-28
Payer: MEDICARE

## 2023-03-28 VITALS
OXYGEN SATURATION: 96 % | TEMPERATURE: 96.2 F | BODY MASS INDEX: 30.75 KG/M2 | SYSTOLIC BLOOD PRESSURE: 110 MMHG | HEART RATE: 65 BPM | RESPIRATION RATE: 17 BRPM | DIASTOLIC BLOOD PRESSURE: 77 MMHG | WEIGHT: 179.23 LBS

## 2023-03-28 DIAGNOSIS — Z45.2 ENCOUNTER FOR ADJUSTMENT OR MANAGEMENT OF VASCULAR ACCESS DEVICE: Primary | ICD-10-CM

## 2023-03-28 DIAGNOSIS — C34.12 MALIGNANT NEOPLASM OF UPPER LOBE OF LEFT LUNG: ICD-10-CM

## 2023-03-28 DIAGNOSIS — Z79.899 ENCOUNTER FOR LONG-TERM (CURRENT) USE OF HIGH-RISK MEDICATION: ICD-10-CM

## 2023-03-28 PROCEDURE — 25010000002 HEPARIN LOCK FLUSH PER 10 UNITS: Performed by: INTERNAL MEDICINE

## 2023-03-28 PROCEDURE — 25010000002 NIVOLUMAB 240 MG/24ML SOLUTION 24 ML VIAL: Performed by: INTERNAL MEDICINE

## 2023-03-28 PROCEDURE — 96413 CHEMO IV INFUSION 1 HR: CPT

## 2023-03-28 RX ORDER — SODIUM CHLORIDE 0.9 % (FLUSH) 0.9 %
20 SYRINGE (ML) INJECTION AS NEEDED
OUTPATIENT
Start: 2023-03-28

## 2023-03-28 RX ORDER — HEPARIN SODIUM (PORCINE) LOCK FLUSH IV SOLN 100 UNIT/ML 100 UNIT/ML
500 SOLUTION INTRAVENOUS AS NEEDED
Status: DISCONTINUED | OUTPATIENT
Start: 2023-03-28 | End: 2023-03-30 | Stop reason: HOSPADM

## 2023-03-28 RX ORDER — SODIUM CHLORIDE 9 MG/ML
250 INJECTION, SOLUTION INTRAVENOUS ONCE
Status: COMPLETED | OUTPATIENT
Start: 2023-03-28 | End: 2023-03-28

## 2023-03-28 RX ORDER — HEPARIN SODIUM (PORCINE) LOCK FLUSH IV SOLN 100 UNIT/ML 100 UNIT/ML
500 SOLUTION INTRAVENOUS AS NEEDED
OUTPATIENT
Start: 2023-03-28

## 2023-03-28 RX ORDER — SODIUM CHLORIDE 0.9 % (FLUSH) 0.9 %
20 SYRINGE (ML) INJECTION AS NEEDED
Status: DISCONTINUED | OUTPATIENT
Start: 2023-03-28 | End: 2023-03-30 | Stop reason: HOSPADM

## 2023-03-28 RX ADMIN — HEPARIN SODIUM (PORCINE) LOCK FLUSH IV SOLN 100 UNIT/ML 500 UNITS: 100 SOLUTION at 09:26

## 2023-03-28 RX ADMIN — SODIUM CHLORIDE 250 ML: 9 INJECTION, SOLUTION INTRAVENOUS at 08:24

## 2023-03-28 RX ADMIN — Medication 20 ML: at 09:25

## 2023-03-28 RX ADMIN — SODIUM CHLORIDE 480 MG: 9 INJECTION, SOLUTION INTRAVENOUS at 08:42

## 2023-03-28 NOTE — PROGRESS NOTES
"Nutrition referral received 3/28/23, although likely in error.  Checked with pt's infusion RN earlier who stated the MST was filled out, however pt has no issues eating and has not experienced recent wt loss (RN unsure of reason for referral sent).  Pt has actually gained 6# over the past month.  Pt's visit is now \"signed\", therefore no further intervention will occur, however Oncology RD remains available per protocol.  "

## 2023-04-07 DIAGNOSIS — D64.9 ANEMIA, UNSPECIFIED TYPE: ICD-10-CM

## 2023-04-07 DIAGNOSIS — R05.8 PRODUCTIVE COUGH: ICD-10-CM

## 2023-04-07 DIAGNOSIS — J44.9 CHRONIC OBSTRUCTIVE PULMONARY DISEASE, UNSPECIFIED COPD TYPE: ICD-10-CM

## 2023-04-07 DIAGNOSIS — F17.211 NICOTINE DEPENDENCE, CIGARETTES, IN REMISSION: ICD-10-CM

## 2023-04-07 DIAGNOSIS — E27.40 HYPOADRENALISM: ICD-10-CM

## 2023-04-07 DIAGNOSIS — R53.1 WEAKNESS: ICD-10-CM

## 2023-04-07 DIAGNOSIS — R06.09 DYSPNEA ON EXERTION: ICD-10-CM

## 2023-04-07 DIAGNOSIS — C34.12 MALIGNANT NEOPLASM OF UPPER LOBE OF LEFT LUNG: ICD-10-CM

## 2023-04-07 DIAGNOSIS — J15.0 PNEUMONIA DUE TO KLEBSIELLA PNEUMONIAE, UNSPECIFIED LATERALITY, UNSPECIFIED PART OF LUNG: ICD-10-CM

## 2023-04-07 DIAGNOSIS — I26.94 MULTIPLE SUBSEGMENTAL PULMONARY EMBOLI WITHOUT ACUTE COR PULMONALE: ICD-10-CM

## 2023-04-07 RX ORDER — PREDNISONE 2.5 MG
TABLET ORAL
Qty: 15 TABLET | Refills: 0 | OUTPATIENT
Start: 2023-04-07

## 2023-04-12 ENCOUNTER — OFFICE VISIT (OUTPATIENT)
Dept: PODIATRY | Facility: CLINIC | Age: 68
End: 2023-04-12
Payer: MEDICARE

## 2023-04-12 VITALS
HEART RATE: 71 BPM | OXYGEN SATURATION: 95 % | HEIGHT: 64 IN | WEIGHT: 179 LBS | SYSTOLIC BLOOD PRESSURE: 99 MMHG | BODY MASS INDEX: 30.56 KG/M2 | DIASTOLIC BLOOD PRESSURE: 63 MMHG | TEMPERATURE: 98 F

## 2023-04-12 DIAGNOSIS — M79.672 FOOT PAIN, BILATERAL: Primary | ICD-10-CM

## 2023-04-12 DIAGNOSIS — B35.1 ONYCHOMYCOSIS: ICD-10-CM

## 2023-04-12 DIAGNOSIS — M79.671 FOOT PAIN, BILATERAL: Primary | ICD-10-CM

## 2023-04-12 DIAGNOSIS — L60.0 ONYCHOCRYPTOSIS: ICD-10-CM

## 2023-04-12 PROCEDURE — 3078F DIAST BP <80 MM HG: CPT | Performed by: PODIATRIST

## 2023-04-12 PROCEDURE — 11721 DEBRIDE NAIL 6 OR MORE: CPT | Performed by: PODIATRIST

## 2023-04-12 PROCEDURE — 3074F SYST BP LT 130 MM HG: CPT | Performed by: PODIATRIST

## 2023-04-12 PROCEDURE — 1160F RVW MEDS BY RX/DR IN RCRD: CPT | Performed by: PODIATRIST

## 2023-04-12 PROCEDURE — 1159F MED LIST DOCD IN RCRD: CPT | Performed by: PODIATRIST

## 2023-04-12 RX ORDER — PREDNISONE 10 MG/1
5 TABLET ORAL
COMMUNITY
Start: 2023-03-28

## 2023-04-12 NOTE — PROGRESS NOTES
[facility] - PODIATRY    Today's Date: 04/12/23    Patient Name: Linda Ortiz  MRN: [id number]  CSN: [id number]  PCP: Anuradha Hope MD, Last PCP Visit:  11/30/2022  Referring Provider: No ref. provider found    SUBJECTIVE     Chief Complaint   Patient presents with   • Left Foot - Follow-up, Nail Problem   • Right Foot - Follow-up, Nail Problem     HPI: Linda Ortiz, a 68 y.o.female, comes to clinic.    New, Established, New Problem:  est  Location:  Toenails  Duration:   Greater than five years  Onset:  Gradual  Nature:  sore with palpation.  Stable, worsening, improving:   improving  Aggravating factors:  Pain with shoe gear and ambulation.  Previous Treatment: debridement    Medical changes:  Lung blood clots.    Patient denies any fevers, chills, nausea, vomiting, shortness of breath, nor any other constitutional signs nor symptoms.       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 ENDOSCOPY;  Service: Pulmonary;  Laterality: N/A;  PNEUMONIA, PERSISTANT COUGH   • HIP SURGERY Bilateral    • KNEE ARTHROSCOPY     • VENOUS ACCESS DEVICE (PORT) INSERTION N/A 7/15/2022    Procedure: INSERTION VENOUS ACCESS DEVICE;  Surgeon: Eber Hull MD;  Location: [facility] OR [facility];  Service: General;  Laterality: N/A;     Family History   Problem Relation Age of Onset   • Breast cancer Mother    • Lung cancer Father    • Thyroid cancer Maternal Aunt    • Breast cancer Maternal Aunt    • Lung cancer Paternal Aunt    •  COPD Paternal Uncle      Social History     Socioeconomic History   • Marital status: Single   Tobacco Use   • Smoking status: Former     Packs/day: 1.00     Years: 47.00     Pack years: 47.00     Types: Cigarettes     Start date:      Quit date: 2022     Years since quittin.1   • Smokeless tobacco: Never   Vaping Use   • Vaping Use: Former   • Start date: 2013   • Quit date: 2014   Substance and Sexual Activity   • Alcohol use: Not Currently   • Drug use: Never   • Sexual activity: Defer     Allergies   Allergen Reactions   • Hydrocodone Nausea And Vomiting and Dizziness     Current Outpatient Medications   Medication Sig Dispense Refill   • acetaminophen (TYLENOL) 325 MG tablet Take 2 tablets by mouth Every 6 (Six) Hours As Needed for Mild Pain.     • Eliquis 5 MG tablet tablet TAKE 1 TABLET BY MOUTH TWICE A DAY 60 tablet 2   • famotidine (PEPCID) 20 MG tablet Take 1 tablet by mouth 2 (Two) Times a Day for 90 days. 180 tablet 1   • fludrocortisone 0.1 MG tablet Take 2 tablets by mouth Daily. (Patient taking differently: Take 2 tablets by mouth Daily. 1qd) 60 tablet 6   • fluticasone-salmeterol (Advair HFA) 115-21 MCG/ACT inhaler Inhale 2 puffs 2 (Two) Times a Day. 1 each 11   • furosemide (LASIX) 40 MG tablet TAKE 1 TABLET (40 MG TOTAL) BY MOUTH IN THE MORNING AND 1 TABLET (40 MG TOTAL) IN THE EVENING.     • guaiFENesin (Mucinex) 600 MG 12 hr tablet Take 1 tablet by mouth 2 (Two) Times a Day. 60 tablet 2   • ipratropium-albuterol (DUO-NEB) 0.5-2.5 mg/3 ml nebulizer Take 3 mL by nebulization 4 (Four) Times a Day As Needed for Wheezing. 360 mL 3   • levothyroxine (SYNTHROID, LEVOTHROID) 50 MCG tablet TAKE 1 TABLET BY MOUTH EVERY DAY 90 tablet 1   • predniSONE (DELTASONE) 10 MG tablet 5 mg. 1qd     • tiotropium bromide monohydrate (SPIRIVA RESPIMAT) 2.5 MCG/ACT aerosol solution inhaler Inhale 2 puffs Daily. 1 each 11     No current facility-administered medications for this visit.     Review of  Systems   Constitutional: Negative.    Skin:        Painful toenails.   All other systems reviewed and are negative.      OBJECTIVE     Vitals:    04/12/23 1455   BP: 99/63   Pulse:    Temp:    SpO2:        Patient seen in no apparent distress.      PHYSICAL EXAM:     Foot/Ankle Exam    GENERAL  Appearance:  elderly  Orientation:  AAOx3  Affect:  appropriate  Gait:  unimpaired  Assistance:  independent  Right shoe gear: casual shoe  Left shoe gear: casual shoe    VASCULAR     Right Foot Vascularity   Normal vascular exam    Dorsalis pedis:  2+  Posterior tibial:  2+  Skin temperature:  warm  Edema grading:  None  CFT:  < 3 seconds  Pedal hair growth:  Absent  Varicosities:  mild varicosities     Left Foot Vascularity   Normal vascular exam    Dorsalis pedis:  2+  Posterior tibial:  2+  Skin temperature:  warm  Edema grading:  None  CFT:  < 3 seconds  Pedal hair growth:  Absent  Varicosities:  mild varicosities     NEUROLOGIC     Right Foot Neurologic   Normal sensation    Light touch sensation: normal  Vibratory sensation: normal  Hot/Cold sensation: normal  Protective Sensation using [place] Monofilament:   Sites intact: 10  Sites tested: 10     Left Foot Neurologic   Normal sensation    Light touch sensation: normal  Vibratory sensation: normal  Hot/Cold sensation:  normal  Protective Sensation using [place] Monofilament:   Sites intact: 10  Sites tested: 10    MUSCLE STRENGTH     Right Foot Muscle Strength   Foot dorsiflexion:  4  Foot plantar flexion:  4  Foot inversion:  4  Foot eversion:  4     Left Foot Muscle Strength   Foot dorsiflexion:  4  Foot plantar flexion:  4  Foot inversion:  4  Foot eversion:  4    RANGE OF MOTION     Right Foot Range of Motion   Foot and ankle ROM within normal limits       Left Foot Range of Motion   Foot and ankle ROM within normal limits      DERMATOLOGIC      Right Foot Dermatologic   Skin  Right foot skin is intact.   Nails  1.  Positive for elongated,  onychomycosis, abnormal thickness, subungual debris, dystrophic nail and ingrown toenail.  2.  Positive for elongated, onychomycosis, abnormal thickness, subungual debris, dystrophic nail and ingrown toenail.  3.  Positive for elongated, onychomycosis, abnormal thickness, subungual debris, dystrophic nail and ingrown toenail.  4.  Positive for elongated, onychomycosis, abnormal thickness, subungual debris, dystrophic nail and ingrown toenail.  5.  Positive for elongated, onychomycosis, abnormal thickness, subungual debris, dystrophic nail and ingrown toenail.     Left Foot Dermatologic   Skin  Left foot skin is intact.   Nails  1.  Positive for elongated, onychomycosis, abnormal thickness, subungual debris, dystrophic nail and ingrown toenail.  2.  Positive for elongated, onychomycosis, abnormal thickness, subungual debris, dystrophic nail and ingrown toenail.  3.  Positive for elongated, onychomycosis, abnormal thickness, subungual debris, dystrophic nail and ingrown toenail.  4.  Positive for elongated, onychomycosis, abnormally thick, subungual debris, dystrophic nail and ingrown toenail.  5.  Positive for elongated, onychomycosis, abnormally thick, subungual debris, dystrophic nail and ingrown toenail.      ASSESSMENT/PLAN     Diagnoses and all orders for this visit:    1. Foot pain, bilateral (Primary)    2. Onychomycosis    3. Onychocryptosis        Comprehensive lower extremity examination and evaluation was performed.    Discussed findings and treatment plan including risks, benefits, and treatment options with patient in detail. Patient agreed with treatment plan.    Toenails 1, 2, 3, 4, 5 on Right and 1, 2, 3, 4, 5 on Left were debrided with nail nippers then filed with a [place] nail kamla.  Patient tolerated procedure well without complications.    An After Visit Summary was printed and given to the patient at discharge, including (if requested) any available informative/educational handouts regarding  diagnosis, treatment, or medications. All questions were answered to patient/family satisfaction. Should symptoms fail to improve or worsen they agree to call or return to clinic or to go to the Emergency Department. Discussed the importance of following up with any needed screening tests/labs/specialist appointments and any requested follow-up recommended by me today. Importance of maintaining follow-up discussed and patient accepts that missed appointments can delay diagnosis and potentially lead to worsening of conditions.    Return in about 9 weeks (around 6/14/2023) for Toenail Care., or sooner if acute issues arise.    This document has been electronically signed by Gustavo Barrera DPM on April 12, 2023 15:10 EDT

## 2023-04-14 ENCOUNTER — TRANSCRIBE ORDERS (OUTPATIENT)
Dept: ADMINISTRATIVE | Facility: HOSPITAL | Age: 68
End: 2023-04-14
Payer: MEDICARE

## 2023-04-14 ENCOUNTER — TELEPHONE (OUTPATIENT)
Dept: FAMILY MEDICINE CLINIC | Facility: CLINIC | Age: 68
End: 2023-04-14

## 2023-04-14 ENCOUNTER — LAB (OUTPATIENT)
Dept: LAB | Facility: HOSPITAL | Age: 68
End: 2023-04-14
Payer: MEDICARE

## 2023-04-14 ENCOUNTER — TELEMEDICINE (OUTPATIENT)
Dept: FAMILY MEDICINE CLINIC | Facility: CLINIC | Age: 68
End: 2023-04-14
Payer: MEDICARE

## 2023-04-14 DIAGNOSIS — R05.1 ACUTE COUGH: ICD-10-CM

## 2023-04-14 DIAGNOSIS — N18.32 STAGE 3B CHRONIC KIDNEY DISEASE: ICD-10-CM

## 2023-04-14 DIAGNOSIS — R09.81 CONGESTION OF NASAL SINUS: Primary | ICD-10-CM

## 2023-04-14 DIAGNOSIS — N18.32 STAGE 3B CHRONIC KIDNEY DISEASE: Primary | ICD-10-CM

## 2023-04-14 LAB
25(OH)D3 SERPL-MCNC: 46.9 NG/ML (ref 30–100)
ALBUMIN SERPL-MCNC: 4.3 G/DL (ref 3.5–5.2)
ANION GAP SERPL CALCULATED.3IONS-SCNC: 13.6 MMOL/L (ref 5–15)
BASOPHILS # BLD AUTO: 0.05 10*3/MM3 (ref 0–0.2)
BASOPHILS NFR BLD AUTO: 0.5 % (ref 0–1.5)
BILIRUB UR QL STRIP: NEGATIVE
BUN SERPL-MCNC: 24 MG/DL (ref 8–23)
BUN/CREAT SERPL: 21.8 (ref 7–25)
CALCIUM SPEC-SCNC: 9.1 MG/DL (ref 8.6–10.5)
CHLORIDE SERPL-SCNC: 98 MMOL/L (ref 98–107)
CLARITY UR: CLEAR
CO2 SERPL-SCNC: 25.4 MMOL/L (ref 22–29)
COLOR UR: YELLOW
CREAT SERPL-MCNC: 1.1 MG/DL (ref 0.57–1)
CREAT UR-MCNC: 25.9 MG/DL
DEPRECATED RDW RBC AUTO: 42.3 FL (ref 37–54)
EGFRCR SERPLBLD CKD-EPI 2021: 55.2 ML/MIN/1.73
EOSINOPHIL # BLD AUTO: 0.09 10*3/MM3 (ref 0–0.4)
EOSINOPHIL NFR BLD AUTO: 0.9 % (ref 0.3–6.2)
ERYTHROCYTE [DISTWIDTH] IN BLOOD BY AUTOMATED COUNT: 12.3 % (ref 12.3–15.4)
GLUCOSE SERPL-MCNC: 98 MG/DL (ref 65–99)
GLUCOSE UR STRIP-MCNC: NEGATIVE MG/DL
HCT VFR BLD AUTO: 37.8 % (ref 34–46.6)
HGB BLD-MCNC: 12.4 G/DL (ref 12–15.9)
HGB UR QL STRIP.AUTO: NEGATIVE
IMM GRANULOCYTES # BLD AUTO: 0.06 10*3/MM3 (ref 0–0.05)
IMM GRANULOCYTES NFR BLD AUTO: 0.6 % (ref 0–0.5)
KETONES UR QL STRIP: NEGATIVE
LEUKOCYTE ESTERASE UR QL STRIP.AUTO: NEGATIVE
LYMPHOCYTES # BLD AUTO: 1.45 10*3/MM3 (ref 0.7–3.1)
LYMPHOCYTES NFR BLD AUTO: 15.2 % (ref 19.6–45.3)
MAGNESIUM SERPL-MCNC: 2.1 MG/DL (ref 1.6–2.4)
MCH RBC QN AUTO: 31.5 PG (ref 26.6–33)
MCHC RBC AUTO-ENTMCNC: 32.8 G/DL (ref 31.5–35.7)
MCV RBC AUTO: 95.9 FL (ref 79–97)
MONOCYTES # BLD AUTO: 0.59 10*3/MM3 (ref 0.1–0.9)
MONOCYTES NFR BLD AUTO: 6.2 % (ref 5–12)
NEUTROPHILS NFR BLD AUTO: 7.28 10*3/MM3 (ref 1.7–7)
NEUTROPHILS NFR BLD AUTO: 76.6 % (ref 42.7–76)
NITRITE UR QL STRIP: NEGATIVE
NRBC BLD AUTO-RTO: 0 /100 WBC (ref 0–0.2)
PH UR STRIP.AUTO: 8 [PH] (ref 5–8)
PHOSPHATE SERPL-MCNC: 3.6 MG/DL (ref 2.5–4.5)
PLATELET # BLD AUTO: 339 10*3/MM3 (ref 140–450)
PMV BLD AUTO: 9.8 FL (ref 6–12)
POTASSIUM SERPL-SCNC: 4.9 MMOL/L (ref 3.5–5.2)
PROT ?TM UR-MCNC: 6 MG/DL
PROT UR QL STRIP: NEGATIVE
PROT/CREAT UR: 0.23 MG/G{CREAT}
PTH-INTACT SERPL-MCNC: 78.8 PG/ML (ref 15–65)
RBC # BLD AUTO: 3.94 10*6/MM3 (ref 3.77–5.28)
SODIUM SERPL-SCNC: 137 MMOL/L (ref 136–145)
SP GR UR STRIP: 1.01 (ref 1–1.03)
UROBILINOGEN UR QL STRIP: NORMAL
WBC NRBC COR # BLD: 9.52 10*3/MM3 (ref 3.4–10.8)

## 2023-04-14 PROCEDURE — 82570 ASSAY OF URINE CREATININE: CPT

## 2023-04-14 PROCEDURE — 81003 URINALYSIS AUTO W/O SCOPE: CPT

## 2023-04-14 PROCEDURE — 3044F HG A1C LEVEL LT 7.0%: CPT | Performed by: STUDENT IN AN ORGANIZED HEALTH CARE EDUCATION/TRAINING PROGRAM

## 2023-04-14 PROCEDURE — 80069 RENAL FUNCTION PANEL: CPT

## 2023-04-14 PROCEDURE — 85025 COMPLETE CBC W/AUTO DIFF WBC: CPT

## 2023-04-14 PROCEDURE — 36415 COLL VENOUS BLD VENIPUNCTURE: CPT

## 2023-04-14 PROCEDURE — 84156 ASSAY OF PROTEIN URINE: CPT

## 2023-04-14 PROCEDURE — 99213 OFFICE O/P EST LOW 20 MIN: CPT | Performed by: STUDENT IN AN ORGANIZED HEALTH CARE EDUCATION/TRAINING PROGRAM

## 2023-04-14 PROCEDURE — 83735 ASSAY OF MAGNESIUM: CPT

## 2023-04-14 PROCEDURE — 82306 VITAMIN D 25 HYDROXY: CPT

## 2023-04-14 PROCEDURE — 83970 ASSAY OF PARATHORMONE: CPT

## 2023-04-14 RX ORDER — AMOXICILLIN AND CLAVULANATE POTASSIUM 875; 125 MG/1; MG/1
1 TABLET, FILM COATED ORAL 2 TIMES DAILY
Qty: 20 TABLET | Refills: 0 | Status: SHIPPED | OUTPATIENT
Start: 2023-04-14

## 2023-04-14 RX ORDER — BROMPHENIRAMINE MALEATE, PSEUDOEPHEDRINE HYDROCHLORIDE, AND DEXTROMETHORPHAN HYDROBROMIDE 2; 30; 10 MG/5ML; MG/5ML; MG/5ML
5 SYRUP ORAL 4 TIMES DAILY PRN
Qty: 118 ML | Refills: 0 | Status: SHIPPED | OUTPATIENT
Start: 2023-04-14

## 2023-04-14 NOTE — PROGRESS NOTES
Chief Complaint  Telehealth visit for cough/congestion    Patient's location; bedroom  Providers location; office    You have chosen to receive care through a telephone visit. Do you consent to use a telephone visit for your medical care today?  YES    Subjective         Linda Ortiz is a 68 y.o. female who presents to [facility]    Spoke with 68 years old for cough/congestion for 3 to 4 days.    Patient does not report any fever/sick contact or recent travel.  Does not report any nausea/vomiting or GI symptoms.  Does report some body aches.  Patient does take chronic prednisone due to underlying comorbidities and using Spiriva inhaler.  Patient is just concerned and does not want to get pneumonia or symptoms worsening.    Objective   Vital Signs:   There were no vitals filed for this visit.   There is no height or weight on file to calculate BMI.   Wt Readings from Last 3 Encounters:   04/12/23 81.2 kg (179 lb)   03/28/23 81.3 kg (179 lb 3.7 oz)   03/27/23 80.6 kg (177 lb 11.1 oz)      BP Readings from Last 3 Encounters:   04/12/23 99/63   03/28/23 110/77   03/27/23 121/63        Patient Care Team:  Anuradha Hope MD as PCP - General (Family Medicine)  Phani Hernández MD as Consulting Physician (Hematology and Oncology)  Kia Morrison APRN as Nurse Practitioner (Nurse Practitioner)     Physical Exam  Constitutional:       Appearance: Normal appearance.   Neurological:      Mental Status: She is alert.                       Assessment and Plan   Diagnoses and all orders for this visit:    1. Congestion of nasal sinus (Primary)  -     amoxicillin-clavulanate (Augmentin) 875-125 MG per tablet; Take 1 tablet by mouth 2 (Two) Times a Day.  Dispense: 20 tablet; Refill: 0  -     brompheniramine-pseudoephedrine-DM 30-2-10 MG/5ML syrup; Take 5 mL by mouth 4 (Four) Times a Day As Needed for Congestion.  Dispense: 118 mL; Refill: 0    2. Acute cough  -     amoxicillin-clavulanate  (Augmentin) 875-125 MG per tablet; Take 1 tablet by mouth 2 (Two) Times a Day.  Dispense: 20 tablet; Refill: 0  -     brompheniramine-pseudoephedrine-DM 30-2-10 MG/5ML syrup; Take 5 mL by mouth 4 (Four) Times a Day As Needed for Congestion.  Dispense: 118 mL; Refill: 0      We will empirically treat patient with Augmentin and Bromfed.  Differential diagnosis discussed including viral    Patient to call me in 24 to 48 hours if not better or any worsening.  May consider other interventions including chest x-ray  Tobacco Use: Medium Risk   • Smoking Tobacco Use: Former   • Smokeless Tobacco Use: Never   • Passive Exposure: Not on file            Follow Up   No follow-ups on file.  Patient was given instructions and counseling regarding her condition or for health maintenance advice. Please see specific information pulled into the AVS if appropriate.

## 2023-04-14 NOTE — TELEPHONE ENCOUNTER
Patient states that her insurance does not cover the Bromfed cough medication. Patient is requesting to know if provider can requesting something that is affordable or covered by insurance.

## 2023-04-14 NOTE — TELEPHONE ENCOUNTER
Caller: Linda Ortiz    Relationship to patient: Self    Best call back number: [phone]    Patient is needing: PATIENT CALLED BACK STATING THAT SHE CAN AFFORD THE MEDICATION WITHOUT THE INSURANCE. PATIENT STATES THAT IT IS ONLY $16 WITHOUT INSURANCE. PATIENT STATES SHE WILL JUST KEEP THAT MEDICATION.

## 2023-04-24 ENCOUNTER — HOSPITAL ENCOUNTER (OUTPATIENT)
Dept: ONCOLOGY | Facility: HOSPITAL | Age: 68
Discharge: HOME | End: 2023-04-24
Admitting: INTERNAL MEDICINE
Payer: MEDICARE

## 2023-04-24 ENCOUNTER — OFFICE VISIT (OUTPATIENT)
Dept: ONCOLOGY | Facility: HOSPITAL | Age: 68
End: 2023-04-24
Payer: MEDICARE

## 2023-04-24 VITALS
DIASTOLIC BLOOD PRESSURE: 59 MMHG | RESPIRATION RATE: 16 BRPM | HEART RATE: 74 BPM | TEMPERATURE: 97.7 F | WEIGHT: 182.76 LBS | SYSTOLIC BLOOD PRESSURE: 112 MMHG | BODY MASS INDEX: 31.35 KG/M2 | OXYGEN SATURATION: 99 %

## 2023-04-24 DIAGNOSIS — C34.12 MALIGNANT NEOPLASM OF UPPER LOBE OF LEFT LUNG: ICD-10-CM

## 2023-04-24 DIAGNOSIS — Z45.2 ENCOUNTER FOR ADJUSTMENT OR MANAGEMENT OF VASCULAR ACCESS DEVICE: ICD-10-CM

## 2023-04-24 DIAGNOSIS — C34.12 MALIGNANT NEOPLASM OF UPPER LOBE OF LEFT LUNG: Primary | ICD-10-CM

## 2023-04-24 DIAGNOSIS — Z79.899 ENCOUNTER FOR LONG-TERM (CURRENT) USE OF HIGH-RISK MEDICATION: Primary | ICD-10-CM

## 2023-04-24 DIAGNOSIS — Z79.899 ENCOUNTER FOR LONG-TERM (CURRENT) USE OF HIGH-RISK MEDICATION: ICD-10-CM

## 2023-04-24 LAB
ALBUMIN SERPL-MCNC: 4.2 G/DL (ref 3.5–5.2)
ALBUMIN/GLOB SERPL: 1.4 G/DL
ALP SERPL-CCNC: 67 U/L (ref 39–117)
ALT SERPL W P-5'-P-CCNC: 17 U/L (ref 1–33)
ANION GAP SERPL CALCULATED.3IONS-SCNC: 12.1 MMOL/L (ref 5–15)
AST SERPL-CCNC: 31 U/L (ref 1–32)
BASOPHILS # BLD AUTO: 0.05 10*3/MM3 (ref 0–0.2)
BASOPHILS NFR BLD AUTO: 0.8 % (ref 0–1.5)
BILIRUB SERPL-MCNC: 0.2 MG/DL (ref 0–1.2)
BUN SERPL-MCNC: 23 MG/DL (ref 8–23)
BUN/CREAT SERPL: 21.1 (ref 7–25)
CALCIUM SPEC-SCNC: 9.2 MG/DL (ref 8.6–10.5)
CHLORIDE SERPL-SCNC: 97 MMOL/L (ref 98–107)
CO2 SERPL-SCNC: 26.9 MMOL/L (ref 22–29)
CREAT SERPL-MCNC: 1.09 MG/DL (ref 0.57–1)
DEPRECATED RDW RBC AUTO: 43 FL (ref 37–54)
EGFRCR SERPLBLD CKD-EPI 2021: 55.8 ML/MIN/1.73
EOSINOPHIL # BLD AUTO: 0.1 10*3/MM3 (ref 0–0.4)
EOSINOPHIL NFR BLD AUTO: 1.7 % (ref 0.3–6.2)
ERYTHROCYTE [DISTWIDTH] IN BLOOD BY AUTOMATED COUNT: 12 % (ref 12.3–15.4)
GLOBULIN UR ELPH-MCNC: 3 GM/DL
GLUCOSE SERPL-MCNC: 87 MG/DL (ref 65–99)
HCT VFR BLD AUTO: 36.8 % (ref 34–46.6)
HGB BLD-MCNC: 12.6 G/DL (ref 12–15.9)
IMM GRANULOCYTES # BLD AUTO: 0.03 10*3/MM3 (ref 0–0.05)
IMM GRANULOCYTES NFR BLD AUTO: 0.5 % (ref 0–0.5)
LYMPHOCYTES # BLD AUTO: 2.11 10*3/MM3 (ref 0.7–3.1)
LYMPHOCYTES NFR BLD AUTO: 35.8 % (ref 19.6–45.3)
MCH RBC QN AUTO: 33.2 PG (ref 26.6–33)
MCHC RBC AUTO-ENTMCNC: 34.2 G/DL (ref 31.5–35.7)
MCV RBC AUTO: 97.1 FL (ref 79–97)
MONOCYTES # BLD AUTO: 0.56 10*3/MM3 (ref 0.1–0.9)
MONOCYTES NFR BLD AUTO: 9.5 % (ref 5–12)
NEUTROPHILS NFR BLD AUTO: 3.04 10*3/MM3 (ref 1.7–7)
NEUTROPHILS NFR BLD AUTO: 51.7 % (ref 42.7–76)
PLATELET # BLD AUTO: 270 10*3/MM3 (ref 140–450)
PMV BLD AUTO: 9.3 FL (ref 6–12)
POTASSIUM SERPL-SCNC: 3.6 MMOL/L (ref 3.5–5.2)
PROT SERPL-MCNC: 7.2 G/DL (ref 6–8.5)
RBC # BLD AUTO: 3.79 10*6/MM3 (ref 3.77–5.28)
SODIUM SERPL-SCNC: 136 MMOL/L (ref 136–145)
T4 FREE SERPL-MCNC: 1.18 NG/DL (ref 0.93–1.7)
TSH SERPL DL<=0.05 MIU/L-ACNC: 7.68 UIU/ML (ref 0.27–4.2)
WBC NRBC COR # BLD: 5.89 10*3/MM3 (ref 3.4–10.8)

## 2023-04-24 PROCEDURE — 84439 ASSAY OF FREE THYROXINE: CPT | Performed by: INTERNAL MEDICINE

## 2023-04-24 PROCEDURE — 85025 COMPLETE CBC W/AUTO DIFF WBC: CPT | Performed by: INTERNAL MEDICINE

## 2023-04-24 PROCEDURE — 36591 DRAW BLOOD OFF VENOUS DEVICE: CPT

## 2023-04-24 PROCEDURE — 84443 ASSAY THYROID STIM HORMONE: CPT | Performed by: INTERNAL MEDICINE

## 2023-04-24 PROCEDURE — G0463 HOSPITAL OUTPT CLINIC VISIT: HCPCS | Performed by: INTERNAL MEDICINE

## 2023-04-24 PROCEDURE — 25010000002 HEPARIN LOCK FLUSH PER 10 UNITS: Performed by: INTERNAL MEDICINE

## 2023-04-24 PROCEDURE — 80053 COMPREHEN METABOLIC PANEL: CPT | Performed by: INTERNAL MEDICINE

## 2023-04-24 RX ORDER — SODIUM CHLORIDE 9 MG/ML
250 INJECTION, SOLUTION INTRAVENOUS ONCE
Status: CANCELLED | OUTPATIENT
Start: 2023-04-25

## 2023-04-24 RX ORDER — SODIUM CHLORIDE 0.9 % (FLUSH) 0.9 %
20 SYRINGE (ML) INJECTION AS NEEDED
Status: DISCONTINUED | OUTPATIENT
Start: 2023-04-24 | End: 2023-04-25 | Stop reason: HOSPADM

## 2023-04-24 RX ORDER — HEPARIN SODIUM (PORCINE) LOCK FLUSH IV SOLN 100 UNIT/ML 100 UNIT/ML
500 SOLUTION INTRAVENOUS AS NEEDED
Status: CANCELLED | OUTPATIENT
Start: 2023-04-24

## 2023-04-24 RX ORDER — SODIUM CHLORIDE 0.9 % (FLUSH) 0.9 %
20 SYRINGE (ML) INJECTION AS NEEDED
Status: CANCELLED | OUTPATIENT
Start: 2023-04-24

## 2023-04-24 RX ORDER — HEPARIN SODIUM (PORCINE) LOCK FLUSH IV SOLN 100 UNIT/ML 100 UNIT/ML
500 SOLUTION INTRAVENOUS AS NEEDED
Status: DISCONTINUED | OUTPATIENT
Start: 2023-04-24 | End: 2023-04-25 | Stop reason: HOSPADM

## 2023-04-24 RX ADMIN — HEPARIN SODIUM (PORCINE) LOCK FLUSH IV SOLN 100 UNIT/ML 500 UNITS: 100 SOLUTION at 09:28

## 2023-04-24 RX ADMIN — Medication 20 ML: at 09:28

## 2023-04-24 NOTE — ASSESSMENT & PLAN NOTE
Metastatic.  Patient is on palliative treatment with nivolumab.  Tolerating the infusions well.  Most recent imaging showed stable to improved disease.  Lab work is adequate for treatment.  Proceed with nivolumab cycle 12 as planned.  RTC 4 weeks for OV, cycle 13 with lab work prior to monitor for toxicities.

## 2023-04-24 NOTE — PROGRESS NOTES
Chief Complaint  Lung Cancer    Anuradha Hope MD    Subjective          Linda Ortiz presents to [facility] for ongoing treatment of her lung cancer.  She is on nivolumab.  She is tolerating the infusions well.  She denies any issues or side effects.  She recently finished a course of antibiotics for sinus infection but is feeling better.  The right thumb area remains painful from cancer involvement but overall improved.  She can use the hand a little better.  She denies any masses or adenopathy.  No unusual aches or pains.  She has issues from her Port-A-Cath.    Oncology/Hematology History Overview Note   5/19/2022 Left Adrenal mass biopsy at [facility] revealed: poorly differentiated non-small cell carcinoma, high grade. Positive fo AE1/3 and Cam5.2. Immunohistochemistry was positive for PAX8, HeParlGlypican3, Vimentin, focal positive for chromagranin, GATA3.  The case was sent to [facility] for second opinion with a final diagnosis of poorly differentiated carcinoma with diffuse reduced BRG-1 expression.    6/21/2022 XRT initiated to Right hand     7/19/2022 Opdivo + Yervoy initiated x 4 doses  10/11/2022 Opdivo only          Malignant neoplasm of upper lobe of left lung   6/9/2022 Initial Diagnosis    Lung cancer (HCC)     7/19/2022 -  Chemotherapy    OP LUNG Nivolumab 1 mg/kg / Ipilimumab 3 mg/kg / Nivolumab 480 mg      1/3/2023 Cancer Staged    Staging form: Lung, AJCC 8th Edition  - Clinical: Stage IV (cM1) - Signed by Phani Hernández MD on 1/3/2023     Secondary carcinoid tumor of bone (Resolved)   6/14/2022 Initial Diagnosis    Secondary carcinoid tumor of bone (HCC)     6/20/2022 -  Radiation    RADIATION THERAPY Treatment Details (Noted on 6/14/2022)  Site: Right Hand  Technique: 3D CRT  Goal: No goal specified  Planned Treatment Start Date: 6/20/2022         Review of Systems   Constitutional: Negative for appetite change, diaphoresis, fatigue, fever, unexpected  weight gain and unexpected weight loss.   HENT: Negative for hearing loss, mouth sores, sore throat, swollen glands, trouble swallowing and voice change.    Eyes: Negative for blurred vision.   Respiratory: Negative for cough, shortness of breath and wheezing.    Cardiovascular: Negative for chest pain and palpitations.   Gastrointestinal: Negative for abdominal pain, blood in stool, constipation, diarrhea, nausea and vomiting.   Endocrine: Negative for cold intolerance and heat intolerance.   Genitourinary: Negative for difficulty urinating, dysuria, frequency, hematuria and urinary incontinence.   Musculoskeletal: Negative for arthralgias, back pain and myalgias.   Skin: Negative for rash, skin lesions and wound.   Neurological: Negative for dizziness, seizures, weakness, numbness and headache.   Hematological: Does not bruise/bleed easily.   Psychiatric/Behavioral: Negative for depressed mood. The patient is not nervous/anxious.      Current Outpatient Medications on File Prior to Visit   Medication Sig Dispense Refill   • amoxicillin-clavulanate (Augmentin) 875-125 MG per tablet Take 1 tablet by mouth 2 (Two) Times a Day. 20 tablet 0   • Eliquis 5 MG tablet tablet TAKE 1 TABLET BY MOUTH TWICE A DAY 60 tablet 2   • famotidine (PEPCID) 20 MG tablet Take 1 tablet by mouth 2 (Two) Times a Day for 90 days. 180 tablet 1   • fludrocortisone 0.1 MG tablet Take 2 tablets by mouth Daily. (Patient taking differently: Take 2 tablets by mouth Daily. 1qd) 60 tablet 6   • fluticasone-salmeterol (Advair HFA) 115-21 MCG/ACT inhaler Inhale 2 puffs 2 (Two) Times a Day. 1 each 11   • furosemide (LASIX) 40 MG tablet TAKE 1 TABLET (40 MG TOTAL) BY MOUTH IN THE MORNING AND 1 TABLET (40 MG TOTAL) IN THE EVENING.     • guaiFENesin (Mucinex) 600 MG 12 hr tablet Take 1 tablet by mouth 2 (Two) Times a Day. 60 tablet 2   • ipratropium-albuterol (DUO-NEB) 0.5-2.5 mg/3 ml nebulizer Take 3 mL by nebulization 4 (Four) Times a Day As Needed for  Wheezing. 360 mL 3   • levothyroxine (SYNTHROID, LEVOTHROID) 50 MCG tablet TAKE 1 TABLET BY MOUTH EVERY DAY 90 tablet 1   • predniSONE (DELTASONE) 10 MG tablet 5 mg. 1qd     • tiotropium bromide monohydrate (SPIRIVA RESPIMAT) 2.5 MCG/ACT aerosol solution inhaler Inhale 2 puffs Daily. 1 each 11   • acetaminophen (TYLENOL) 325 MG tablet Take 2 tablets by mouth Every 6 (Six) Hours As Needed for Mild Pain. (Patient not taking: Reported on 4/14/2023)     • brompheniramine-pseudoephedrine-DM 30-2-10 MG/5ML syrup Take 5 mL by mouth 4 (Four) Times a Day As Needed for Congestion. (Patient not taking: Reported on 4/24/2023) 118 mL 0     Current Facility-Administered Medications on File Prior to Visit   Medication Dose Route Frequency Provider Last Rate Last Admin   • heparin injection 500 Units  500 Units Intravenous PRN Phani Hernández MD   500 Units at 04/24/23 0928   • sodium chloride 0.9 % flush 20 mL  20 mL Intravenous PRN Phani Hernández MD   20 mL at 04/24/23 0928       Allergies   Allergen Reactions   • Hydrocodone Nausea And Vomiting and Dizziness     Past Medical History:   Diagnosis Date   • Arthritis    • Bone cancer 05/2022   • Decreased thyroid stimulating hormone (TSH) level 11/07/2022   • 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   • Hypocalcemia 08/09/2022   • Malignant neoplasm of upper lobe of left lung 06/09/2022   • Metastatic cancer    • Multiple subsegmental pulmonary emboli without acute cor pulmonale 10/10/2022     Past Surgical History:   Procedure Laterality Date   • BRONCHOSCOPY N/A 9/30/2022    Procedure: BRONCHOSCOPY WITH BAL, WASHINGS;  Surgeon: Fausto Leon MD;  Location: [facility] ENDOSCOPY;  Service: Pulmonary;  Laterality: N/A;  PNEUMONIA, PERSISTANT COUGH   • HIP SURGERY Bilateral    • KNEE ARTHROSCOPY     • VENOUS ACCESS DEVICE (PORT) INSERTION N/A 7/15/2022    Procedure: INSERTION VENOUS ACCESS DEVICE;  Surgeon: Eber Hull MD;  [facility] [facility] [place] [facility];  Service: General;  Laterality: N/A;      Social History     Socioeconomic History   • Marital status: Single   Tobacco Use   • Smoking status: Former     Packs/day: 1.00     Years: 47.00     Pack years: 47.00     Types: Cigarettes     Start date:      Quit date: 2022     Years since quittin.2   • Smokeless tobacco: Never   Vaping Use   • Vaping Use: Former   • Start date: 2013   • Quit date: 2014   Substance and Sexual Activity   • Alcohol use: Not Currently   • Drug use: Never   • Sexual activity: Defer     Family History   Problem Relation Age of Onset   • Breast cancer Mother    • Lung cancer Father    • Thyroid cancer Maternal Aunt    • Breast cancer Maternal Aunt    • Lung cancer Paternal Aunt    • COPD Paternal Uncle        Objective   Physical Exam  Vitals reviewed. Exam conducted with a chaperone present.   Constitutional:       General: She is not in acute distress.     Appearance: Normal appearance.   Cardiovascular:      Rate and Rhythm: Normal rate and regular rhythm.      Heart sounds: Normal heart sounds. No murmur heard.    No gallop.   Pulmonary:      Effort: Pulmonary effort is normal.      Breath sounds: Normal breath sounds.      Comments: Port-A-Cath  Abdominal:      General: Abdomen is flat. Bowel sounds are normal.      Palpations: Abdomen is soft.   Musculoskeletal:      Cervical back: Neck supple.      Right lower leg: No edema.      Left lower leg: No edema.   Lymphadenopathy:      Cervical: No cervical adenopathy.   Neurological:      Mental Status: She is alert and oriented to person, place, and time.   Psychiatric:         Mood and Affect: Mood normal.         Behavior: Behavior normal.         Vitals:    23 0944   BP: 112/59   Pulse: 74   Resp: 16   Temp: 97.7 °F (36.5 °C)   TempSrc: Temporal   SpO2: 99%   Weight: 82.9 kg (182 lb 12.2 oz)   PainSc:   2   PainLoc: Generalized     ECOG score: 0         PHQ-9 Total Score:                    Result Review :   The following data was reviewed by:  Phani Hernández MD on 04/24/2023:  Lab Results   Component Value Date    HGB 12.6 04/24/2023    HCT 36.8 04/24/2023    MCV 97.1 (H) 04/24/2023     04/24/2023    WBC 5.89 04/24/2023    NEUTROABS 3.04 04/24/2023    LYMPHSABS 2.11 04/24/2023    MONOSABS 0.56 04/24/2023    EOSABS 0.10 04/24/2023    BASOSABS 0.05 04/24/2023     Lab Results   Component Value Date    GLUCOSE 87 04/24/2023    BUN 23 04/24/2023    CREATININE 1.09 (H) 04/24/2023     04/24/2023    K 3.6 04/24/2023    CL 97 (L) 04/24/2023    CO2 26.9 04/24/2023    CALCIUM 9.2 04/24/2023    PROTEINTOT 7.2 04/24/2023    ALBUMIN 4.2 04/24/2023    BILITOT 0.2 04/24/2023    ALKPHOS 67 04/24/2023    AST 31 04/24/2023    ALT 17 04/24/2023     Lab Results   Component Value Date    MG 2.1 04/14/2023    PHOS 3.6 04/14/2023    FREET4 1.18 04/24/2023    TSH 7.680 (H) 04/24/2023     Lab Results   Component Value Date    IRON 46 10/01/2022    LABIRON 58 (H) 10/01/2022    TRANSFERRIN 53 (L) 10/01/2022    TIBC 79 (L) 10/01/2022     Lab Results   Component Value Date    FERRITIN 3,023.00 (H) 10/01/2022    [id number] 732 03/24/2023    FOLATE >20.00 03/24/2023     No results found for: PSA, CEA, AFP, ,           Assessment and Plan    Diagnoses and all orders for this visit:    1. Malignant neoplasm of upper lobe of left lung (Primary)  Assessment & Plan:  Metastatic.  Patient is on palliative treatment with nivolumab.  Tolerating the infusions well.  Most recent imaging showed stable to improved disease.  Lab work is adequate for treatment.  Proceed with nivolumab cycle 12 as planned.  RTC 4 weeks for OV, cycle 13 with lab work prior to monitor for toxicities.    Orders:  -     CBC and Differential; Future  -     Comprehensive metabolic panel; Future  -     TSH; Future  -     T4, free; Future    2. Encounter for long-term (current) use of high-risk medication  -     CBC and Differential; Future  -     Comprehensive metabolic panel; Future  -     TSH;  Future  -     T4, free; Future    Other orders  -     sodium chloride 0.9 % infusion 250 mL  -     Nivolumab (OPDIVO) 480 mg in sodium chloride 0.9 % 148 mL chemo IVPB          Patient Follow Up: 4 weeks    Patient was given instructions and counseling regarding her condition or for health maintenance advice. Please see specific information pulled into the AVS if appropriate.     Phani Hernández MD    4/24/2023

## 2023-04-25 ENCOUNTER — HOSPITAL ENCOUNTER (OUTPATIENT)
Dept: ONCOLOGY | Facility: HOSPITAL | Age: 68
Discharge: HOME | End: 2023-04-25
Admitting: INTERNAL MEDICINE
Payer: MEDICARE

## 2023-04-25 VITALS
RESPIRATION RATE: 16 BRPM | BODY MASS INDEX: 31.31 KG/M2 | TEMPERATURE: 98.1 F | WEIGHT: 183.42 LBS | SYSTOLIC BLOOD PRESSURE: 102 MMHG | DIASTOLIC BLOOD PRESSURE: 56 MMHG | HEIGHT: 64 IN | OXYGEN SATURATION: 99 % | HEART RATE: 71 BPM

## 2023-04-25 DIAGNOSIS — C34.12 MALIGNANT NEOPLASM OF UPPER LOBE OF LEFT LUNG: ICD-10-CM

## 2023-04-25 DIAGNOSIS — Z45.2 ENCOUNTER FOR ADJUSTMENT OR MANAGEMENT OF VASCULAR ACCESS DEVICE: ICD-10-CM

## 2023-04-25 DIAGNOSIS — Z79.899 ENCOUNTER FOR LONG-TERM (CURRENT) USE OF HIGH-RISK MEDICATION: Primary | ICD-10-CM

## 2023-04-25 PROCEDURE — 25010000002 HEPARIN LOCK FLUSH PER 10 UNITS: Performed by: INTERNAL MEDICINE

## 2023-04-25 PROCEDURE — 96413 CHEMO IV INFUSION 1 HR: CPT

## 2023-04-25 PROCEDURE — 25010000002 NIVOLUMAB 240 MG/24ML SOLUTION 24 ML VIAL: Performed by: INTERNAL MEDICINE

## 2023-04-25 RX ORDER — HEPARIN SODIUM (PORCINE) LOCK FLUSH IV SOLN 100 UNIT/ML 100 UNIT/ML
500 SOLUTION INTRAVENOUS AS NEEDED
Status: DISCONTINUED | OUTPATIENT
Start: 2023-04-25 | End: 2023-04-26 | Stop reason: HOSPADM

## 2023-04-25 RX ORDER — SODIUM CHLORIDE 9 MG/ML
250 INJECTION, SOLUTION INTRAVENOUS ONCE
Status: COMPLETED | OUTPATIENT
Start: 2023-04-25 | End: 2023-04-25

## 2023-04-25 RX ORDER — SODIUM CHLORIDE 0.9 % (FLUSH) 0.9 %
20 SYRINGE (ML) INJECTION AS NEEDED
Status: DISCONTINUED | OUTPATIENT
Start: 2023-04-25 | End: 2023-04-26 | Stop reason: HOSPADM

## 2023-04-25 RX ORDER — HEPARIN SODIUM (PORCINE) LOCK FLUSH IV SOLN 100 UNIT/ML 100 UNIT/ML
500 SOLUTION INTRAVENOUS AS NEEDED
OUTPATIENT
Start: 2023-04-25

## 2023-04-25 RX ORDER — SODIUM CHLORIDE 0.9 % (FLUSH) 0.9 %
20 SYRINGE (ML) INJECTION AS NEEDED
OUTPATIENT
Start: 2023-04-25

## 2023-04-25 RX ADMIN — Medication 20 ML: at 09:52

## 2023-04-25 RX ADMIN — HEPARIN SODIUM (PORCINE) LOCK FLUSH IV SOLN 100 UNIT/ML 500 UNITS: 100 SOLUTION at 09:53

## 2023-04-25 RX ADMIN — SODIUM CHLORIDE 480 MG: 9 INJECTION, SOLUTION INTRAVENOUS at 09:15

## 2023-04-25 RX ADMIN — SODIUM CHLORIDE 250 ML: 9 INJECTION, SOLUTION INTRAVENOUS at 09:11

## 2023-04-27 ENCOUNTER — OFFICE VISIT (OUTPATIENT)
Dept: PULMONOLOGY | Facility: CLINIC | Age: 68
End: 2023-04-27
Payer: MEDICARE

## 2023-04-27 VITALS
DIASTOLIC BLOOD PRESSURE: 65 MMHG | SYSTOLIC BLOOD PRESSURE: 114 MMHG | BODY MASS INDEX: 31.84 KG/M2 | TEMPERATURE: 97.3 F | HEIGHT: 64 IN | HEART RATE: 78 BPM | OXYGEN SATURATION: 98 % | WEIGHT: 186.5 LBS | RESPIRATION RATE: 16 BRPM

## 2023-04-27 DIAGNOSIS — F17.211 NICOTINE DEPENDENCE, CIGARETTES, IN REMISSION: ICD-10-CM

## 2023-04-27 DIAGNOSIS — J15.0 PNEUMONIA DUE TO KLEBSIELLA PNEUMONIAE, UNSPECIFIED LATERALITY, UNSPECIFIED PART OF LUNG: ICD-10-CM

## 2023-04-27 DIAGNOSIS — D64.9 ANEMIA, UNSPECIFIED TYPE: ICD-10-CM

## 2023-04-27 DIAGNOSIS — R06.09 DYSPNEA ON EXERTION: ICD-10-CM

## 2023-04-27 DIAGNOSIS — C34.12 MALIGNANT NEOPLASM OF UPPER LOBE OF LEFT LUNG: ICD-10-CM

## 2023-04-27 DIAGNOSIS — R53.1 WEAKNESS: ICD-10-CM

## 2023-04-27 DIAGNOSIS — J43.2 CENTRILOBULAR EMPHYSEMA: ICD-10-CM

## 2023-04-27 DIAGNOSIS — J44.9 CHRONIC OBSTRUCTIVE PULMONARY DISEASE, UNSPECIFIED COPD TYPE: ICD-10-CM

## 2023-04-27 DIAGNOSIS — I26.94 MULTIPLE SUBSEGMENTAL PULMONARY EMBOLI WITHOUT ACUTE COR PULMONALE: Primary | ICD-10-CM

## 2023-04-27 DIAGNOSIS — E27.40 HYPOADRENALISM: ICD-10-CM

## 2023-04-27 DIAGNOSIS — R05.8 PRODUCTIVE COUGH: ICD-10-CM

## 2023-04-27 RX ORDER — FLUTICASONE PROPIONATE AND SALMETEROL XINAFOATE 115; 21 UG/1; UG/1
2 AEROSOL, METERED RESPIRATORY (INHALATION)
Qty: 1 EACH | Refills: 11 | Status: SHIPPED | OUTPATIENT
Start: 2023-04-27

## 2023-04-27 RX ORDER — IPRATROPIUM BROMIDE AND ALBUTEROL SULFATE 2.5; .5 MG/3ML; MG/3ML
3 SOLUTION RESPIRATORY (INHALATION) 4 TIMES DAILY PRN
Qty: 360 ML | Refills: 3 | Status: SHIPPED | OUTPATIENT
Start: 2023-04-27

## 2023-04-27 RX ORDER — FLUDROCORTISONE ACETATE 0.1 MG/1
0.1 TABLET ORAL DAILY
Qty: 30 TABLET | Refills: 6 | Status: SHIPPED | OUTPATIENT
Start: 2023-04-27

## 2023-04-27 NOTE — PROGRESS NOTES
Primary Care Provider  Anuradha Hope MD     Referring Provider  No ref. provider found     Chief Complaint  COPD, Pulmonary Embolism, and Lung Ca    Subjective          Linda Ortiz presents to [facility]  History of Present Illness  Linda Ortiz is a 68 y.o. female patient with history of hospitalization in late September 2022 with history of metastatic lung cancer on Opdivo and Yervoy therapy she was found to have hyponatremia, Klebsiella pneumonia, adrenal insufficiency, adrenal crisis, septic shock, hypothermia and had bronchoscopy while she was in hospital.  She was also found to have pulmonary embolism in distal segmental and proximal subsegmental right lower lobe and was started on anticoagulation.  She was then discharged home on prednisone, fludrocortisone, Eliquis and supplemental oxygen.  She has 47-pack-year smoking history, quit smoking in 2022.  She has mild to moderate obstructive airway disease, emphysema with low diffusion capacity.  Since her last office visit, has been on Advair and Spiriva.  Has rarely been using any rescue inhaler.  She continues to use Florinef 0.1 mg once daily and prednisone 5 mg once daily.  She has been on Eliquis 5 mg twice daily.  She is on Opdivo every 4 weeks.  Her recent imaging per Dr. Hernández has been stable.  She has intermittent cough with phlegm. She follows with Dr. Hernández and has Opdivo infusion- once a month immunotherapy.  She has gained weight since last office visit.    Review of Systems     General:  Fatigue, No Fever No weight loss or loss of appetite  HEENT: No dysphagia, No Visual Changes, no rhinorrhea  Respiratory:  + cough,+Dyspnea, intermittent mucoid phlegm, No Pleuritic Pain, no wheezing, no hemoptysis.  Cardiovascular: Denies chest pain, denies palpitations,+FOOTE, No Chest Pressure  Gastrointestinal:  No Abdominal Pain, No Nausea, No Vomiting, No Diarrhea  Genitourinary:  No Dysuria, No  Frequency, No Hesitancy  Musculoskeletal: No muscle pain or swelling  Endocrine:  No Heat Intolerance, No Cold Intolerance  Hematologic:  No Bleeding, No Bruising  Psychiatric:  No Anxiety, No Depression  Neurologic:  No Confusion, no Dysarthria, No Headaches  Skin:  No Rash, No Open Wounds    Family History   Problem Relation Age of Onset   • Breast cancer Mother    • Lung cancer Father    • Thyroid cancer Maternal Aunt    • Breast cancer Maternal Aunt    • Lung cancer Paternal Aunt    • COPD Paternal Uncle         Social History     Socioeconomic History   • Marital status: Single   Tobacco Use   • Smoking status: Former     Packs/day: 1.00     Years: 47.00     Pack years: 47.00     Types: Cigarettes     Start date:      Quit date: 2022     Years since quittin.2     Passive exposure: Past   • Smokeless tobacco: Never   Vaping Use   • Vaping Use: Former   • Start date: 2013   • Quit date: 2014   Substance and Sexual Activity   • Alcohol use: Not Currently   • Drug use: Never   • Sexual activity: Defer        Past Medical History:   Diagnosis Date   • Arthritis    • Bone cancer 2022   • Decreased thyroid stimulating hormone (TSH) level 2022   • 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   • Hypocalcemia 2022   • Malignant neoplasm of upper lobe of left lung 2022   • Metastatic cancer    • Multiple subsegmental pulmonary emboli without acute cor pulmonale 10/10/2022        Immunization History   Administered Date(s) Administered   • Fluzone High Dose =>65 Years ([employer] ONLY) 2022   • Fluzone High-Dose 65+yrs 2022   • PEDS-Pneumococcal Conjugate (PCV7) 2022   • Pneumococcal Conjugate 20-Valent (PCV20) 2022         Allergies   Allergen Reactions   • Hydrocodone Nausea And Vomiting and Dizziness          Current Outpatient Medications:   •  acetaminophen (TYLENOL) 325 MG tablet, Take 2 tablets by mouth Every 6 (Six) Hours As Needed for Mild Pain., Disp: , Rfl:   •   "apixaban (Eliquis) 5 MG tablet tablet, Take 1 tablet by mouth 2 (Two) Times a Day., Disp: 60 tablet, Rfl: 6  •  brompheniramine-pseudoephedrine-DM 30-2-10 MG/5ML syrup, Take 5 mL by mouth 4 (Four) Times a Day As Needed for Congestion., Disp: 118 mL, Rfl: 0  •  famotidine (PEPCID) 20 MG tablet, Take 1 tablet by mouth 2 (Two) Times a Day for 90 days., Disp: 180 tablet, Rfl: 1  •  fludrocortisone 0.1 MG tablet, Take 1 tablet by mouth Daily., Disp: 30 tablet, Rfl: 6  •  fluticasone-salmeterol (Advair HFA) 115-21 MCG/ACT inhaler, Inhale 2 puffs 2 (Two) Times a Day., Disp: 1 each, Rfl: 11  •  furosemide (LASIX) 40 MG tablet, TAKE 1 TABLET (40 MG TOTAL) BY MOUTH IN THE MORNING AND 1 TABLET (40 MG TOTAL) IN THE EVENING., Disp: , Rfl:   •  guaiFENesin (Mucinex) 600 MG 12 hr tablet, Take 1 tablet by mouth 2 (Two) Times a Day., Disp: 60 tablet, Rfl: 2  •  ipratropium-albuterol (DUO-NEB) 0.5-2.5 mg/3 ml nebulizer, Take 3 mL by nebulization 4 (Four) Times a Day As Needed for Wheezing., Disp: 360 mL, Rfl: 3  •  levothyroxine (SYNTHROID, LEVOTHROID) 50 MCG tablet, TAKE 1 TABLET BY MOUTH EVERY DAY, Disp: 90 tablet, Rfl: 1  •  predniSONE (DELTASONE) 10 MG tablet, 5 mg. 1qd, Disp: , Rfl:   •  tiotropium bromide monohydrate (SPIRIVA RESPIMAT) 2.5 MCG/ACT aerosol solution inhaler, Inhale 2 puffs Daily., Disp: 1 each, Rfl: 11     Objective   Vital Signs:   /65 (BP Location: Left arm, Patient Position: Sitting, Cuff Size: Adult)   Pulse 78   Temp 97.3 °F (36.3 °C) (Tympanic)   Resp 16   Ht 162.6 cm (64\")   Wt 84.6 kg (186 lb 8 oz)   SpO2 98% Comment: room air  BMI 32.01 kg/m²     Mallampatti classification : 1  Physical Exam  Vital Signs Reviewed  WDWN, Alert, in no acute distress, normal conversational, pale looking  HEENT:  PERRL, EOMI.  OP, nares clear, no sinus tenderness  Neck:  Supple, no JVD, no thyromegaly  Lymph: no axillary, cervical, supraclavicular lymphadenopathy noted bilaterally  Chest:  good aeration, " bilateral diminished breath sounds, no wheezing, crackles or rhonchi, resonant to percussion b/l  CV: RRR, no MGR, pulses 2+, equal.  Abd:  Soft, NT, ND, + BS, no HSM  EXT:  no clubbing, no cyanosis, No BLE edema  Neuro:  A&Ox3, CN grossly intact, no focal deficits  Skin: No rashes or lesions noted     Result Review :   The following data was reviewed by: Fausto Leon MD on 01/31/2023:  Common labs        3/24/2023    11:10 3/24/2023    11:11 4/14/2023    16:00 4/24/2023    09:24   Common Labs   Glucose  91   98   87     BUN  19   24   23     Creatinine  0.99   1.10   1.09     Sodium  137   137   136     Potassium  4.0   4.9   3.6     Chloride  99   98   97     Calcium  8.7   9.1   9.2     Albumin  3.8   4.3   4.2     Total Bilirubin  0.3    0.2     Alkaline Phosphatase  64    67     AST (SGOT)  38    31     ALT (SGPT)  19    17     WBC 4.71    9.52   5.89     Hemoglobin 11.6    12.4   12.6     Hematocrit 34.4    37.8   36.8     Platelets 270    339   270     Total Cholesterol  206       Triglycerides  101       HDL Cholesterol  54       LDL Cholesterol   134       Hemoglobin A1C 5.60          CMP        3/24/2023    11:11 4/14/2023    16:00 4/24/2023    09:24   CMP   Glucose 91   98   87     BUN 19   24   23     Creatinine 0.99   1.10   1.09     EGFR 62.6   55.2   55.8     Sodium 137   137   136     Potassium 4.0   4.9   3.6     Chloride 99   98   97     Calcium 8.7   9.1   9.2     Total Protein 7.1    7.2     Albumin 3.8   4.3   4.2     Globulin 3.3    3.0     Total Bilirubin 0.3    0.2     Alkaline Phosphatase 64    67     AST (SGOT) 38    31     ALT (SGPT) 19    17     Albumin/Globulin Ratio 1.2    1.4     BUN/Creatinine Ratio 19.2   21.8   21.1     Anion Gap 8.0   13.6   12.1       CBC        3/24/2023    11:10 4/14/2023    16:00 4/24/2023    09:24   CBC   WBC 4.71   9.52   5.89     RBC 3.66   3.94   3.79     Hemoglobin 11.6   12.4   12.6     Hematocrit 34.4   37.8   36.8     MCV 94.0   95.9   97.1     MCH  31.7   31.5   33.2     MCHC 33.7   32.8   34.2     RDW 13.0   12.3   12.0     Platelets 270   339   270       CBC w/diff    CBC w/Diff 12/5/22 1/3/23 1/30/23   WBC 8.48 7.16 6.41   RBC 3.77 3.67 (A) 3.92   Hemoglobin 12.5 11.8 (A) 12.7   Hematocrit 36.1 35.7 38.0   MCV 95.8 97.3 (A) 96.9   MCH 33.2 (A) 32.2 32.4   MCHC 34.6 33.1 33.4   RDW 12.5 12.2 (A) 12.8   Platelets 338 416 254   Neutrophil Rel % 71.2 57.6 51.4   Immature Granulocyte Rel % 0.5 0.4 0.5   Lymphocyte Rel % 13.0 (A) 30.4 35.1   Monocyte Rel % 14.7 (A) 9.8 10.9   Eosinophil Rel % 0.2 (A) 1.4 1.6   Basophil Rel % 0.4 0.4 0.5   (A) Abnormal value            Data reviewed: Radiologic studies CT chest from 12/21/22 was reviewed. Shows improved mass like opacity, has mild increased bibaisilar infiltrate, left more than right.       PROCEDURE:  CT CHEST W CONTRAST DIAGNOSTIC     COMPARISON:  [facility], CT, CT CHEST W CONTRAST DIAGNOSTIC, 9/30/2022, 18:07.  INDICATIONS:  surveillance, lung cancer with bone mets     TECHNIQUE:    After obtaining the patient's consent, CT images were obtained with non-ionic   intravenous contrast material.       PROTOCOL:     Standard imaging protocol performed                 RADIATION:      DLP: 543mGy*cm               Automated exposure control was utilized to minimize radiation dose.   CONTRAST:      100cc Isovue 370 I.V.     FINDINGS:          The anterior left upper lobe mass or masslike area of post treatment consolidation has decreased   slightly, now measuring 3.9 x 2.2 cm (previously 5.5 by 2.8 cm).  There are new patchy areas of   ground-glass opacity in the posterior lower lobes bilaterally, left greater than right.  This   suggests an infectious or inflammatory process.  Correlate clinically.  Denser nodular area of   consolidation in the right lower lobe base on image number 111 measures 1.6 x 1.3 and appears new   as compared to prior study.  This may be due to infectious or inflammatory process.   Intrapulmonary   metastatic disease could also be in the differential diagnosis.  Short-term follow-up suggested.    Right lower lobe pulmonary nodule has decreased in size on image number 80, now measuring 5 mm   (previously 8 mm).  No other pulmonary nodules or masses are identified.  Central airways appear   grossly patent.  Previously seen pericardial effusion his nearly resolved.  There is trace   pericardial fluid remaining.  There are calcified lymph nodes present in the mediastinum.  No   definite mediastinal adenopathy is identified.  Right axillary lymphadenopathy measures 2.2 x 1.8   cm, and appears somewhat increased in size as compared to the prior study.  Borderline prominent   left axillary lymph nodes again noted and appears similar to prior.       Bilateral pleural effusions have resolved.  Bilateral adrenal masses appears similar in size, and   it again are suspicious for metastatic disease.  There is relative hypodensity within the liver   suggesting steatosis.  Probable right renal cyst.  There is multilevel degenerative change in the   spine.  No aggressive osseous lesions are identified.  Right subclavian [id number] catheter   noted.  Scattered atherosclerotic vascular calcification is noted.  No definite coronary   calcification identified.     IMPRESSION:                 1. Interval decrease in size of left upper lobe mass or masslike area of post treatment change, now   measuring 3.9 x 2.2 cm (previously 5.5 x 2.8 cm).       2. There are new patchy areas of ground-glass opacity and nodularity in the lower lobes   bilaterally.  The appearance suggests an infectious or inflammatory process.  Correlate clinically.         3. There is a denser more nodular area now noted in the right lower lobe measuring 1.6 cm   maximally, which is indeterminate.  This may be due to infectious or inflammatory process or   intrapulmonary metastatic disease.  Short-term follow-up suggested.     3. Interval  near complete resolution of pericardial effusion.  Bilateral pleural effusions have   resolved.     4. Bilateral adrenal masses appear unchanged, and again are suspicious for metastatic disease.     5. Increasing right axillary lymphadenopathy, suspicious for metastatic disease.      6. Additional findings as given above.              MICAH MOSQUERA MD         Electronically Signed and Approved By: MICAH MOSQUERA MD on 12/21/2022 at 15:04        Assessment and Plan    Diagnoses and all orders for this visit:    1. Multiple subsegmental pulmonary emboli without acute cor pulmonale (Primary)  -     tiotropium bromide monohydrate (SPIRIVA RESPIMAT) 2.5 MCG/ACT aerosol solution inhaler; Inhale 2 puffs Daily.  Dispense: 1 each; Refill: 11  -     ipratropium-albuterol (DUO-NEB) 0.5-2.5 mg/3 ml nebulizer; Take 3 mL by nebulization 4 (Four) Times a Day As Needed for Wheezing.  Dispense: 360 mL; Refill: 3  -     fluticasone-salmeterol (Advair HFA) 115-21 MCG/ACT inhaler; Inhale 2 puffs 2 (Two) Times a Day.  Dispense: 1 each; Refill: 11  -     fludrocortisone 0.1 MG tablet; Take 1 tablet by mouth Daily.  Dispense: 30 tablet; Refill: 6  -     apixaban (Eliquis) 5 MG tablet tablet; Take 1 tablet by mouth 2 (Two) Times a Day.  Dispense: 60 tablet; Refill: 6    2. Malignant neoplasm of upper lobe of left lung  -     tiotropium bromide monohydrate (SPIRIVA RESPIMAT) 2.5 MCG/ACT aerosol solution inhaler; Inhale 2 puffs Daily.  Dispense: 1 each; Refill: 11  -     ipratropium-albuterol (DUO-NEB) 0.5-2.5 mg/3 ml nebulizer; Take 3 mL by nebulization 4 (Four) Times a Day As Needed for Wheezing.  Dispense: 360 mL; Refill: 3  -     fluticasone-salmeterol (Advair HFA) 115-21 MCG/ACT inhaler; Inhale 2 puffs 2 (Two) Times a Day.  Dispense: 1 each; Refill: 11  -     fludrocortisone 0.1 MG tablet; Take 1 tablet by mouth Daily.  Dispense: 30 tablet; Refill: 6    3. Productive cough  -     tiotropium bromide monohydrate (SPIRIVA RESPIMAT) 2.5 MCG/ACT  aerosol solution inhaler; Inhale 2 puffs Daily.  Dispense: 1 each; Refill: 11  -     ipratropium-albuterol (DUO-NEB) 0.5-2.5 mg/3 ml nebulizer; Take 3 mL by nebulization 4 (Four) Times a Day As Needed for Wheezing.  Dispense: 360 mL; Refill: 3  -     fluticasone-salmeterol (Advair HFA) 115-21 MCG/ACT inhaler; Inhale 2 puffs 2 (Two) Times a Day.  Dispense: 1 each; Refill: 11  -     fludrocortisone 0.1 MG tablet; Take 1 tablet by mouth Daily.  Dispense: 30 tablet; Refill: 6    4. Weakness  -     tiotropium bromide monohydrate (SPIRIVA RESPIMAT) 2.5 MCG/ACT aerosol solution inhaler; Inhale 2 puffs Daily.  Dispense: 1 each; Refill: 11  -     ipratropium-albuterol (DUO-NEB) 0.5-2.5 mg/3 ml nebulizer; Take 3 mL by nebulization 4 (Four) Times a Day As Needed for Wheezing.  Dispense: 360 mL; Refill: 3  -     fluticasone-salmeterol (Advair HFA) 115-21 MCG/ACT inhaler; Inhale 2 puffs 2 (Two) Times a Day.  Dispense: 1 each; Refill: 11  -     fludrocortisone 0.1 MG tablet; Take 1 tablet by mouth Daily.  Dispense: 30 tablet; Refill: 6    5. Centrilobular emphysema    6. Hypoadrenalism  -     tiotropium bromide monohydrate (SPIRIVA RESPIMAT) 2.5 MCG/ACT aerosol solution inhaler; Inhale 2 puffs Daily.  Dispense: 1 each; Refill: 11  -     ipratropium-albuterol (DUO-NEB) 0.5-2.5 mg/3 ml nebulizer; Take 3 mL by nebulization 4 (Four) Times a Day As Needed for Wheezing.  Dispense: 360 mL; Refill: 3  -     fluticasone-salmeterol (Advair HFA) 115-21 MCG/ACT inhaler; Inhale 2 puffs 2 (Two) Times a Day.  Dispense: 1 each; Refill: 11  -     fludrocortisone 0.1 MG tablet; Take 1 tablet by mouth Daily.  Dispense: 30 tablet; Refill: 6    7. Anemia, unspecified type  -     tiotropium bromide monohydrate (SPIRIVA RESPIMAT) 2.5 MCG/ACT aerosol solution inhaler; Inhale 2 puffs Daily.  Dispense: 1 each; Refill: 11  -     ipratropium-albuterol (DUO-NEB) 0.5-2.5 mg/3 ml nebulizer; Take 3 mL by nebulization 4 (Four) Times a Day As Needed for Wheezing.   Dispense: 360 mL; Refill: 3  -     fluticasone-salmeterol (Advair HFA) 115-21 MCG/ACT inhaler; Inhale 2 puffs 2 (Two) Times a Day.  Dispense: 1 each; Refill: 11  -     fludrocortisone 0.1 MG tablet; Take 1 tablet by mouth Daily.  Dispense: 30 tablet; Refill: 6    8. Chronic obstructive pulmonary disease, unspecified COPD type  -     tiotropium bromide monohydrate (SPIRIVA RESPIMAT) 2.5 MCG/ACT aerosol solution inhaler; Inhale 2 puffs Daily.  Dispense: 1 each; Refill: 11  -     ipratropium-albuterol (DUO-NEB) 0.5-2.5 mg/3 ml nebulizer; Take 3 mL by nebulization 4 (Four) Times a Day As Needed for Wheezing.  Dispense: 360 mL; Refill: 3  -     fluticasone-salmeterol (Advair HFA) 115-21 MCG/ACT inhaler; Inhale 2 puffs 2 (Two) Times a Day.  Dispense: 1 each; Refill: 11  -     fludrocortisone 0.1 MG tablet; Take 1 tablet by mouth Daily.  Dispense: 30 tablet; Refill: 6    9. Nicotine dependence, cigarettes, in remission  -     tiotropium bromide monohydrate (SPIRIVA RESPIMAT) 2.5 MCG/ACT aerosol solution inhaler; Inhale 2 puffs Daily.  Dispense: 1 each; Refill: 11  -     ipratropium-albuterol (DUO-NEB) 0.5-2.5 mg/3 ml nebulizer; Take 3 mL by nebulization 4 (Four) Times a Day As Needed for Wheezing.  Dispense: 360 mL; Refill: 3  -     fluticasone-salmeterol (Advair HFA) 115-21 MCG/ACT inhaler; Inhale 2 puffs 2 (Two) Times a Day.  Dispense: 1 each; Refill: 11  -     fludrocortisone 0.1 MG tablet; Take 1 tablet by mouth Daily.  Dispense: 30 tablet; Refill: 6    10. Dyspnea on exertion  -     tiotropium bromide monohydrate (SPIRIVA RESPIMAT) 2.5 MCG/ACT aerosol solution inhaler; Inhale 2 puffs Daily.  Dispense: 1 each; Refill: 11  -     ipratropium-albuterol (DUO-NEB) 0.5-2.5 mg/3 ml nebulizer; Take 3 mL by nebulization 4 (Four) Times a Day As Needed for Wheezing.  Dispense: 360 mL; Refill: 3  -     fluticasone-salmeterol (Advair HFA) 115-21 MCG/ACT inhaler; Inhale 2 puffs 2 (Two) Times a Day.  Dispense: 1 each; Refill: 11  -      fludrocortisone 0.1 MG tablet; Take 1 tablet by mouth Daily.  Dispense: 30 tablet; Refill: 6    11. Pneumonia due to Klebsiella pneumoniae, unspecified laterality, unspecified part of lung  -     tiotropium bromide monohydrate (SPIRIVA RESPIMAT) 2.5 MCG/ACT aerosol solution inhaler; Inhale 2 puffs Daily.  Dispense: 1 each; Refill: 11  -     ipratropium-albuterol (DUO-NEB) 0.5-2.5 mg/3 ml nebulizer; Take 3 mL by nebulization 4 (Four) Times a Day As Needed for Wheezing.  Dispense: 360 mL; Refill: 3  -     fluticasone-salmeterol (Advair HFA) 115-21 MCG/ACT inhaler; Inhale 2 puffs 2 (Two) Times a Day.  Dispense: 1 each; Refill: 11  -     fludrocortisone 0.1 MG tablet; Take 1 tablet by mouth Daily.  Dispense: 30 tablet; Refill: 6      COPD: Continue with Advair twice daily.  Continue Spiriva 2.5 mcg once daily.  Use albuterol as needed.  Continue with guaifenesin 600 mg twice daily.    Persistent pneumonia: Status posttreatment with antibiotics, stable now.  Aspiration precautions was discussed in detail.  Keep the head of bed up with sleep, not eat close to bedtime.    Relative adrenal insufficiency: Continue with Florinef 0.1 mg once daily and prednisone 5 mg daily.    Pulmonary embolism: With ongoing treatment for malignancy.  Continue with Eliquis twice daily.    Lung cancer, stage IV: Currently on Opdivo immunotherapy monthly per Dr. Hernández.    Follow Up   Return in about 6 months (around 10/27/2023).  Patient was given instructions and counseling regarding her condition or for health maintenance advice. Please see specific information pulled into the AVS if appropriate.       Electronically signed by Fausto Leon MD, 4/27/2023, 14:57 EDT.

## 2023-05-01 DIAGNOSIS — J44.9 CHRONIC OBSTRUCTIVE PULMONARY DISEASE, UNSPECIFIED COPD TYPE: ICD-10-CM

## 2023-05-01 DIAGNOSIS — J15.0 PNEUMONIA DUE TO KLEBSIELLA PNEUMONIAE, UNSPECIFIED LATERALITY, UNSPECIFIED PART OF LUNG: ICD-10-CM

## 2023-05-01 DIAGNOSIS — R06.09 DYSPNEA ON EXERTION: ICD-10-CM

## 2023-05-01 DIAGNOSIS — R05.8 PRODUCTIVE COUGH: ICD-10-CM

## 2023-05-01 DIAGNOSIS — I26.94 MULTIPLE SUBSEGMENTAL PULMONARY EMBOLI WITHOUT ACUTE COR PULMONALE: ICD-10-CM

## 2023-05-01 DIAGNOSIS — C34.12 MALIGNANT NEOPLASM OF UPPER LOBE OF LEFT LUNG: ICD-10-CM

## 2023-05-01 DIAGNOSIS — F17.211 NICOTINE DEPENDENCE, CIGARETTES, IN REMISSION: ICD-10-CM

## 2023-05-01 DIAGNOSIS — E27.40 HYPOADRENALISM: ICD-10-CM

## 2023-05-01 DIAGNOSIS — D64.9 ANEMIA, UNSPECIFIED TYPE: ICD-10-CM

## 2023-05-01 DIAGNOSIS — R53.1 WEAKNESS: ICD-10-CM

## 2023-05-01 RX ORDER — PREDNISONE 2.5 MG/1
TABLET ORAL
Qty: 15 TABLET | Refills: 0 | OUTPATIENT
Start: 2023-05-01

## 2023-05-05 ENCOUNTER — OFFICE VISIT (OUTPATIENT)
Dept: FAMILY MEDICINE CLINIC | Facility: CLINIC | Age: 68
End: 2023-05-05
Payer: MEDICARE

## 2023-05-05 ENCOUNTER — TELEPHONE (OUTPATIENT)
Dept: PULMONOLOGY | Facility: CLINIC | Age: 68
End: 2023-05-05
Payer: MEDICARE

## 2023-05-05 VITALS
BODY MASS INDEX: 31.09 KG/M2 | HEIGHT: 64 IN | WEIGHT: 182.1 LBS | TEMPERATURE: 97 F | RESPIRATION RATE: 16 BRPM | HEART RATE: 88 BPM | SYSTOLIC BLOOD PRESSURE: 126 MMHG | OXYGEN SATURATION: 93 % | DIASTOLIC BLOOD PRESSURE: 72 MMHG

## 2023-05-05 DIAGNOSIS — E03.9 ACQUIRED HYPOTHYROIDISM: ICD-10-CM

## 2023-05-05 DIAGNOSIS — K21.9 GASTROESOPHAGEAL REFLUX DISEASE WITHOUT ESOPHAGITIS: Primary | ICD-10-CM

## 2023-05-05 DIAGNOSIS — C34.90 METASTATIC NON-SMALL CELL LUNG CANCER: ICD-10-CM

## 2023-05-05 DIAGNOSIS — E27.40 ADRENAL INSUFFICIENCY: ICD-10-CM

## 2023-05-05 DIAGNOSIS — N18.31 STAGE 3A CHRONIC KIDNEY DISEASE: ICD-10-CM

## 2023-05-05 PROCEDURE — 3044F HG A1C LEVEL LT 7.0%: CPT | Performed by: STUDENT IN AN ORGANIZED HEALTH CARE EDUCATION/TRAINING PROGRAM

## 2023-05-05 PROCEDURE — 1160F RVW MEDS BY RX/DR IN RCRD: CPT | Performed by: STUDENT IN AN ORGANIZED HEALTH CARE EDUCATION/TRAINING PROGRAM

## 2023-05-05 PROCEDURE — 1159F MED LIST DOCD IN RCRD: CPT | Performed by: STUDENT IN AN ORGANIZED HEALTH CARE EDUCATION/TRAINING PROGRAM

## 2023-05-05 PROCEDURE — 99214 OFFICE O/P EST MOD 30 MIN: CPT | Performed by: STUDENT IN AN ORGANIZED HEALTH CARE EDUCATION/TRAINING PROGRAM

## 2023-05-05 PROCEDURE — 3074F SYST BP LT 130 MM HG: CPT | Performed by: STUDENT IN AN ORGANIZED HEALTH CARE EDUCATION/TRAINING PROGRAM

## 2023-05-05 PROCEDURE — 3078F DIAST BP <80 MM HG: CPT | Performed by: STUDENT IN AN ORGANIZED HEALTH CARE EDUCATION/TRAINING PROGRAM

## 2023-05-05 RX ORDER — FAMOTIDINE 20 MG/1
1 TABLET, FILM COATED ORAL EVERY 12 HOURS SCHEDULED
COMMUNITY
Start: 2023-05-01 | End: 2023-05-05 | Stop reason: SDUPTHER

## 2023-05-05 RX ORDER — FAMOTIDINE 20 MG/1
20 TABLET, FILM COATED ORAL EVERY 12 HOURS SCHEDULED
Qty: 180 TABLET | Refills: 1 | Status: SHIPPED | OUTPATIENT
Start: 2023-05-05

## 2023-05-05 RX ORDER — LEVOTHYROXINE SODIUM 0.07 MG/1
75 TABLET ORAL DAILY
Qty: 90 TABLET | Refills: 1 | Status: SHIPPED | OUTPATIENT
Start: 2023-05-05

## 2023-05-05 RX ORDER — TIOTROPIUM BROMIDE INHALATION SPRAY 3.12 UG/1
2 SPRAY, METERED RESPIRATORY (INHALATION)
Qty: 2 EACH | Refills: 0 | COMMUNITY
Start: 2023-05-05 | End: 2023-05-06

## 2023-05-05 NOTE — TELEPHONE ENCOUNTER
Patient stopped by today and stated that she did receive 240 doses when the delivery was made to the house.  At this time, patient does not need this medication from [facility].  Please let patient know if you need anything else on this note.

## 2023-05-05 NOTE — PROGRESS NOTES
"Chief Complaint  Following up on GERD/CKD/hypothyroid and medications    Subjective         Linda Ortiz is a 68 y.o. female who presents to [facility]    68 years old comes to the clinic today to follow-up.    Patient is following up with hematologist/pulmonary and nephrologist.    Controlled pulmonary disease on current inhalers.  Had chest CT done recently and reviewed by pulmonary.    Lung cancer; improving and following up with hematologist very closely.  Recent scans showed improved findings.    CKD is controlled, aware to avoid NSAIDs.    Reports no chest pain or shortness of breath, staying physically somewhat active.    12+ review of systems are unremarkable otherwise    Recent blood work showed abnormal TSH; patient has not changed her levothyroxine in last few months.        Objective   Vital Signs:   Vitals:    05/05/23 1404   BP: 126/72   BP Location: Left arm   Patient Position: Sitting   Cuff Size: Large Adult   Pulse: 88   Resp: 16   Temp: 97 °F (36.1 °C)   TempSrc: Temporal   SpO2: 93%   Weight: 82.6 kg (182 lb 1.6 oz)   Height: 162.6 cm (64\")      Body mass index is 31.26 kg/m².   Wt Readings from Last 3 Encounters:   05/05/23 82.6 kg (182 lb 1.6 oz)   04/27/23 84.6 kg (186 lb 8 oz)   04/25/23 83.2 kg (183 lb 6.8 oz)      BP Readings from Last 3 Encounters:   05/05/23 126/72   04/27/23 114/65   04/25/23 102/56        Patient Care Team:  Anuradha Hope MD as PCP - General (Family Medicine)  Phani Hernández MD as Consulting Physician (Hematology and Oncology)  Kia Morrison APRN as Nurse Practitioner (Nurse Practitioner)     Physical Exam  Vitals reviewed.   Constitutional:       Appearance: Normal appearance. She is well-developed.   HENT:      Head: Normocephalic and atraumatic.      Right Ear: External ear normal.      Left Ear: External ear normal.      Mouth/Throat:      Pharynx: No oropharyngeal exudate.   Eyes:      Conjunctiva/sclera: Conjunctivae " normal.      Pupils: Pupils are equal, round, and reactive to light.   Cardiovascular:      Rate and Rhythm: Normal rate and regular rhythm.      Heart sounds: No murmur heard.    No friction rub. No gallop.   Pulmonary:      Effort: Pulmonary effort is normal.      Breath sounds: Normal breath sounds. No wheezing or rhonchi.   Abdominal:      General: Bowel sounds are normal. There is no distension.      Palpations: Abdomen is soft.      Tenderness: There is no abdominal tenderness.   Skin:     General: Skin is warm and dry.   Neurological:      Mental Status: She is alert and oriented to person, place, and time.      Cranial Nerves: No cranial nerve deficit.   Psychiatric:         Mood and Affect: Mood and affect normal.         Behavior: Behavior normal.         Thought Content: Thought content normal.         Judgment: Judgment normal.                Patient has been erroneously marked as diabetic. Based on the available clinical information, she does not have diabetes and should therefore be excluded from diabetic health maintenance and quality measures for the remainder of the reporting period.       Assessment and Plan   Diagnoses and all orders for this visit:    1. Gastroesophageal reflux disease without esophagitis (Primary)  Comments:  Chronic, controlled on Pepcid  Orders:  -     famotidine (PEPCID) 20 MG tablet; Take 1 tablet by mouth Every 12 (Twelve) Hours.  Dispense: 180 tablet; Refill: 1    2. Adrenal insufficiency  Comments:  Chronic, stable, following up with nephrologist/hematologist    3. Metastatic non-small cell lung cancer  Comments:  Chronic, stable, continue with pulmonary/hematologist    4. Acquired hypothyroidism  Comments:  abnormal TSH, will change levothyroxin to 75mcg today and rpt bw in 6 weeks   Orders:  -     levothyroxine (Synthroid) 75 MCG tablet; Take 1 tablet by mouth Daily.  Dispense: 90 tablet; Refill: 1  -     TSH Rfx On Abnormal To Free T4; Future    5. Stage 3a chronic  kidney disease  Comments:  Chronic, controlled, aware to avoid NSAIDs      Recommendations from specialists reviewed, recent blood work reviewed  Continue current medication management as discussed  Levothyroxine changed today      Tobacco Use: Medium Risk   • Smoking Tobacco Use: Former   • Smokeless Tobacco Use: Never   • Passive Exposure: Past            Follow Up   Return in about 6 months (around 11/5/2023) for Medicare Wellness.  Patient was given instructions and counseling regarding her condition or for health maintenance advice. Please see specific information pulled into the AVS if appropriate.

## 2023-05-05 NOTE — TELEPHONE ENCOUNTER
[facility] pharmacy faxed an alternative request form for Duoneb.   I called [facility] and ask what they needed to get the duoneb approved.   The Pharmacist informed pt needed to bring her other insurance card to the pharmacy.   I have contacted pt a couple times this week and left voicemails to call office back to inform she needed to take her insurance cards to pharmacy.   I called pt relative, Ericka Alfred, whom is listed on her verbal release form. I informed Ericka I could not get in touch with toyin and I wanted to make sure she was getting her meds. I ask Ericka to either have her call our office back to explain or please inform her to take insurance cards to her pharmacy.   Ericka stated she would inform the pt.

## 2023-05-22 ENCOUNTER — HOSPITAL ENCOUNTER (OUTPATIENT)
Dept: ONCOLOGY | Facility: HOSPITAL | Age: 68
Discharge: HOME | End: 2023-05-22
Admitting: INTERNAL MEDICINE
Payer: MEDICARE

## 2023-05-22 ENCOUNTER — OFFICE VISIT (OUTPATIENT)
Dept: ONCOLOGY | Facility: HOSPITAL | Age: 68
End: 2023-05-22
Payer: MEDICARE

## 2023-05-22 VITALS
RESPIRATION RATE: 18 BRPM | BODY MASS INDEX: 31.5 KG/M2 | DIASTOLIC BLOOD PRESSURE: 64 MMHG | OXYGEN SATURATION: 97 % | HEIGHT: 64 IN | SYSTOLIC BLOOD PRESSURE: 122 MMHG | TEMPERATURE: 97.8 F | WEIGHT: 184.53 LBS | HEART RATE: 77 BPM

## 2023-05-22 DIAGNOSIS — E03.9 ACQUIRED HYPOTHYROIDISM: ICD-10-CM

## 2023-05-22 DIAGNOSIS — Z79.899 ENCOUNTER FOR LONG-TERM (CURRENT) USE OF HIGH-RISK MEDICATION: Primary | ICD-10-CM

## 2023-05-22 DIAGNOSIS — Z45.2 ENCOUNTER FOR ADJUSTMENT OR MANAGEMENT OF VASCULAR ACCESS DEVICE: ICD-10-CM

## 2023-05-22 DIAGNOSIS — C34.12 MALIGNANT NEOPLASM OF UPPER LOBE OF LEFT LUNG: Primary | ICD-10-CM

## 2023-05-22 DIAGNOSIS — C34.12 MALIGNANT NEOPLASM OF UPPER LOBE OF LEFT LUNG: ICD-10-CM

## 2023-05-22 LAB
ALBUMIN SERPL-MCNC: 4.4 G/DL (ref 3.5–5.2)
ALBUMIN/GLOB SERPL: 1.3 G/DL
ALP SERPL-CCNC: 65 U/L (ref 39–117)
ALT SERPL W P-5'-P-CCNC: 20 U/L (ref 1–33)
ANION GAP SERPL CALCULATED.3IONS-SCNC: 10.2 MMOL/L (ref 5–15)
AST SERPL-CCNC: 35 U/L (ref 1–32)
BASOPHILS # BLD AUTO: 0.04 10*3/MM3 (ref 0–0.2)
BASOPHILS NFR BLD AUTO: 0.5 % (ref 0–1.5)
BILIRUB SERPL-MCNC: 0.4 MG/DL (ref 0–1.2)
BUN SERPL-MCNC: 34 MG/DL (ref 8–23)
BUN/CREAT SERPL: 30.4 (ref 7–25)
CALCIUM SPEC-SCNC: 10.3 MG/DL (ref 8.6–10.5)
CHLORIDE SERPL-SCNC: 95 MMOL/L (ref 98–107)
CO2 SERPL-SCNC: 30.8 MMOL/L (ref 22–29)
CREAT SERPL-MCNC: 1.12 MG/DL (ref 0.57–1)
DEPRECATED RDW RBC AUTO: 40.7 FL (ref 37–54)
EGFRCR SERPLBLD CKD-EPI 2021: 54 ML/MIN/1.73
EOSINOPHIL # BLD AUTO: 0.05 10*3/MM3 (ref 0–0.4)
EOSINOPHIL NFR BLD AUTO: 0.6 % (ref 0.3–6.2)
ERYTHROCYTE [DISTWIDTH] IN BLOOD BY AUTOMATED COUNT: 11.6 % (ref 12.3–15.4)
GLOBULIN UR ELPH-MCNC: 3.5 GM/DL
GLUCOSE SERPL-MCNC: 94 MG/DL (ref 65–99)
HCT VFR BLD AUTO: 40.5 % (ref 34–46.6)
HGB BLD-MCNC: 14 G/DL (ref 12–15.9)
IMM GRANULOCYTES # BLD AUTO: 0.03 10*3/MM3 (ref 0–0.05)
IMM GRANULOCYTES NFR BLD AUTO: 0.3 % (ref 0–0.5)
LYMPHOCYTES # BLD AUTO: 1.51 10*3/MM3 (ref 0.7–3.1)
LYMPHOCYTES NFR BLD AUTO: 17.6 % (ref 19.6–45.3)
MCH RBC QN AUTO: 33 PG (ref 26.6–33)
MCHC RBC AUTO-ENTMCNC: 34.6 G/DL (ref 31.5–35.7)
MCV RBC AUTO: 95.5 FL (ref 79–97)
MONOCYTES # BLD AUTO: 0.73 10*3/MM3 (ref 0.1–0.9)
MONOCYTES NFR BLD AUTO: 8.5 % (ref 5–12)
NEUTROPHILS NFR BLD AUTO: 6.22 10*3/MM3 (ref 1.7–7)
NEUTROPHILS NFR BLD AUTO: 72.5 % (ref 42.7–76)
PLATELET # BLD AUTO: 294 10*3/MM3 (ref 140–450)
PMV BLD AUTO: 9.6 FL (ref 6–12)
POTASSIUM SERPL-SCNC: 4.2 MMOL/L (ref 3.5–5.2)
PROT SERPL-MCNC: 7.9 G/DL (ref 6–8.5)
RBC # BLD AUTO: 4.24 10*6/MM3 (ref 3.77–5.28)
SODIUM SERPL-SCNC: 136 MMOL/L (ref 136–145)
T4 FREE SERPL-MCNC: 1.3 NG/DL (ref 0.93–1.7)
TSH SERPL DL<=0.05 MIU/L-ACNC: 7.37 UIU/ML (ref 0.27–4.2)
WBC NRBC COR # BLD: 8.58 10*3/MM3 (ref 3.4–10.8)

## 2023-05-22 PROCEDURE — 85025 COMPLETE CBC W/AUTO DIFF WBC: CPT | Performed by: INTERNAL MEDICINE

## 2023-05-22 PROCEDURE — 25010000002 HEPARIN LOCK FLUSH PER 10 UNITS: Performed by: INTERNAL MEDICINE

## 2023-05-22 PROCEDURE — 84443 ASSAY THYROID STIM HORMONE: CPT | Performed by: INTERNAL MEDICINE

## 2023-05-22 PROCEDURE — G0463 HOSPITAL OUTPT CLINIC VISIT: HCPCS

## 2023-05-22 PROCEDURE — 80053 COMPREHEN METABOLIC PANEL: CPT | Performed by: INTERNAL MEDICINE

## 2023-05-22 PROCEDURE — 36591 DRAW BLOOD OFF VENOUS DEVICE: CPT

## 2023-05-22 PROCEDURE — 84439 ASSAY OF FREE THYROXINE: CPT | Performed by: INTERNAL MEDICINE

## 2023-05-22 RX ORDER — HEPARIN SODIUM (PORCINE) LOCK FLUSH IV SOLN 100 UNIT/ML 100 UNIT/ML
500 SOLUTION INTRAVENOUS AS NEEDED
Status: CANCELLED | OUTPATIENT
Start: 2023-05-22

## 2023-05-22 RX ORDER — HEPARIN SODIUM (PORCINE) LOCK FLUSH IV SOLN 100 UNIT/ML 100 UNIT/ML
500 SOLUTION INTRAVENOUS AS NEEDED
Status: DISCONTINUED | OUTPATIENT
Start: 2023-05-22 | End: 2023-05-23 | Stop reason: HOSPADM

## 2023-05-22 RX ORDER — SODIUM CHLORIDE 0.9 % (FLUSH) 0.9 %
20 SYRINGE (ML) INJECTION AS NEEDED
Status: CANCELLED | OUTPATIENT
Start: 2023-05-22

## 2023-05-22 RX ORDER — SODIUM CHLORIDE 0.9 % (FLUSH) 0.9 %
20 SYRINGE (ML) INJECTION AS NEEDED
Status: DISCONTINUED | OUTPATIENT
Start: 2023-05-22 | End: 2023-05-23 | Stop reason: HOSPADM

## 2023-05-22 RX ADMIN — HEPARIN SODIUM (PORCINE) LOCK FLUSH IV SOLN 100 UNIT/ML 500 UNITS: 100 SOLUTION at 12:29

## 2023-05-22 RX ADMIN — Medication 20 ML: at 12:29

## 2023-05-22 NOTE — PROGRESS NOTES
Chief Complaint  Malignant neoplasm of upper lobe of left lung    Anuradha Hope MD    Subjective          Linda Ortiz presents to [facility] HEMATOLOGY & ONCOLOGY for ongoing treatment of her metastatic lung cancer.  She is on immunotherapy with maintenance nivolumab.  She is tolerating the infusions well.  She denies any masses or adenopathy.  No unusual aches or pains.  She notes good appetite.  Her energy level is adequate for ADLs.  She denies issues from her Port-A-Cath.    Oncology/Hematology History Overview Note   5/19/2022 Left Adrenal mass biopsy at [facility] revealed: poorly differentiated non-small cell carcinoma, high grade. Positive fo AE1/3 and Cam5.2. Immunohistochemistry was positive for PAX8, HeParlGlypican3, Vimentin, focal positive for chromagranin, GATA3.  The case was sent to [facility] for second opinion with a final diagnosis of poorly differentiated carcinoma with diffuse reduced BRG-1 expression.    6/21/2022 XRT initiated to Right hand     7/19/2022 Opdivo + Yervoy initiated x 4 doses  10/11/2022 Opdivo only          Malignant neoplasm of upper lobe of left lung   6/9/2022 Initial Diagnosis    Lung cancer (HCC)     7/19/2022 -  Chemotherapy    OP LUNG Nivolumab 1 mg/kg / Ipilimumab 3 mg/kg / Nivolumab 480 mg      1/3/2023 Cancer Staged    Staging form: Lung, AJCC 8th Edition  - Clinical: Stage IV (cM1) - Signed by Phani Hernández MD on 1/3/2023     Secondary carcinoid tumor of bone (Resolved)   6/14/2022 Initial Diagnosis    Secondary carcinoid tumor of bone (HCC)     6/20/2022 -  Radiation    RADIATION THERAPY Treatment Details (Noted on 6/14/2022)  Site: Right Hand  Technique: 3D CRT  Goal: No goal specified  Planned Treatment Start Date: 6/20/2022         Review of Systems   Constitutional: Negative for appetite change, diaphoresis, fatigue, fever, unexpected weight gain and unexpected weight loss.   HENT: Negative for hearing loss, mouth sores, sore throat,  swollen glands, trouble swallowing and voice change.    Eyes: Negative for blurred vision.   Respiratory: Negative for cough, shortness of breath and wheezing.    Cardiovascular: Negative for chest pain and palpitations.   Gastrointestinal: Negative for abdominal pain, blood in stool, constipation, diarrhea, nausea and vomiting.   Endocrine: Negative for cold intolerance and heat intolerance.   Genitourinary: Negative for difficulty urinating, dysuria, frequency, hematuria and urinary incontinence.   Musculoskeletal: Negative for arthralgias, back pain and myalgias.   Skin: Negative for rash, skin lesions and wound.   Neurological: Negative for dizziness, seizures, weakness, numbness and headache.   Hematological: Does not bruise/bleed easily.   Psychiatric/Behavioral: Negative for depressed mood. The patient is not nervous/anxious.      Current Outpatient Medications on File Prior to Visit   Medication Sig Dispense Refill   • acetaminophen (TYLENOL) 325 MG tablet Take 2 tablets by mouth Every 6 (Six) Hours As Needed for Mild Pain.     • apixaban (Eliquis) 5 MG tablet tablet Take 1 tablet by mouth 2 (Two) Times a Day. 60 tablet 6   • brompheniramine-pseudoephedrine-DM 30-2-10 MG/5ML syrup Take 5 mL by mouth 4 (Four) Times a Day As Needed for Congestion. 118 mL 0   • famotidine (PEPCID) 20 MG tablet Take 1 tablet by mouth Every 12 (Twelve) Hours. 180 tablet 1   • fludrocortisone 0.1 MG tablet Take 1 tablet by mouth Daily. 30 tablet 6   • fluticasone-salmeterol (Advair HFA) 115-21 MCG/ACT inhaler Inhale 2 puffs 2 (Two) Times a Day. 1 each 11   • furosemide (LASIX) 40 MG tablet TAKE 1 TABLET (40 MG TOTAL) BY MOUTH IN THE MORNING AND 1 TABLET (40 MG TOTAL) IN THE EVENING.     • guaiFENesin (Mucinex) 600 MG 12 hr tablet Take 1 tablet by mouth 2 (Two) Times a Day. 60 tablet 2   • ipratropium-albuterol (DUO-NEB) 0.5-2.5 mg/3 ml nebulizer Take 3 mL by nebulization 4 (Four) Times a Day As Needed for Wheezing. 360 mL 3   •  levothyroxine (Synthroid) 75 MCG tablet Take 1 tablet by mouth Daily. 90 tablet 1   • predniSONE (DELTASONE) 10 MG tablet 5 mg. 1qd     • tiotropium bromide monohydrate (SPIRIVA RESPIMAT) 2.5 MCG/ACT aerosol solution inhaler Inhale 2 puffs Daily. 1 each 11   • tiotropium bromide monohydrate (Spiriva Respimat) 2.5 MCG/ACT aerosol solution inhaler Inhale 2 puffs Daily for 1 day. (Patient not taking: Reported on 5/5/2023) 2 each 0     Current Facility-Administered Medications on File Prior to Visit   Medication Dose Route Frequency Provider Last Rate Last Admin   • [DISCONTINUED] heparin injection 500 Units  500 Units Intravenous PRN Phani Hernández MD   500 Units at 05/22/23 1229   • [DISCONTINUED] sodium chloride 0.9 % flush 20 mL  20 mL Intravenous PRN Phani Hernández MD   20 mL at 05/22/23 1229       Allergies   Allergen Reactions   • Hydrocodone Nausea And Vomiting and Dizziness     Past Medical History:   Diagnosis Date   • Arthritis    • Bone cancer 05/2022   • Decreased thyroid stimulating hormone (TSH) level 11/07/2022   • 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   • Hypocalcemia 08/09/2022   • Malignant neoplasm of upper lobe of left lung 06/09/2022   • Metastatic cancer    • Multiple subsegmental pulmonary emboli without acute cor pulmonale 10/10/2022     Past Surgical History:   Procedure Laterality Date   • BRONCHOSCOPY N/A 9/30/2022    Procedure: BRONCHOSCOPY WITH BAL, WASHINGS;  Surgeon: Fausto Leon MD;  Location: [facility] ENDOSCOPY;  Service: Pulmonary;  Laterality: N/A;  PNEUMONIA, PERSISTANT COUGH   • HIP SURGERY Bilateral    • KNEE ARTHROSCOPY     • VENOUS ACCESS DEVICE (PORT) INSERTION N/A 7/15/2022    Procedure: INSERTION VENOUS ACCESS DEVICE;  Surgeon: Eber Hull MD;  [place] [facility] [place] [facility]  Service: General;  Laterality: N/A;     Social History     Socioeconomic History   • Marital status: Single   Tobacco Use   • Smoking status: Former     Packs/day: 1.00     Years: 47.00     Pack years: 47.00     " Types: Cigarettes     Start date:      Quit date: 2022     Years since quittin.3     Passive exposure: Past   • Smokeless tobacco: Never   Vaping Use   • Vaping Use: Former   • Start date: 2013   • Quit date: 2014   Substance and Sexual Activity   • Alcohol use: Not Currently   • Drug use: Never   • Sexual activity: Defer     Family History   Problem Relation Age of Onset   • Breast cancer Mother    • Lung cancer Father    • Thyroid cancer Maternal Aunt    • Breast cancer Maternal Aunt    • Lung cancer Paternal Aunt    • COPD Paternal Uncle        Objective   Physical Exam  Vitals reviewed. Exam conducted with a chaperone present.   Constitutional:       General: She is not in acute distress.     Appearance: Normal appearance.   Cardiovascular:      Rate and Rhythm: Normal rate and regular rhythm.      Heart sounds: Normal heart sounds. No murmur heard.    No gallop.   Pulmonary:      Effort: Pulmonary effort is normal.      Breath sounds: Normal breath sounds.      Comments: Port-A-Cath  Abdominal:      General: Abdomen is flat. Bowel sounds are normal.      Palpations: Abdomen is soft.   Musculoskeletal:      Cervical back: Neck supple.      Right lower leg: No edema.      Left lower leg: No edema.   Lymphadenopathy:      Cervical: No cervical adenopathy.   Neurological:      Mental Status: She is alert and oriented to person, place, and time.   Psychiatric:         Mood and Affect: Mood normal.         Behavior: Behavior normal.         Vitals:    23 1312   BP: 122/64   Pulse: 77   Resp: 18   Temp: 97.8 °F (36.6 °C)   SpO2: 97%   Weight: 83.7 kg (184 lb 8.4 oz)   Height: 162.6 cm (64.02\")   PainSc: 0-No pain     ECOG score: 0         PHQ-9 Total Score:                    Result Review :   The following data was reviewed by: Phani Hernández MD on 2023:  Lab Results   Component Value Date    HGB 14.0 2023    HCT 40.5 2023    MCV 95.5 2023     2023 "    WBC 8.58 05/22/2023    NEUTROABS 6.22 05/22/2023    LYMPHSABS 1.51 05/22/2023    MONOSABS 0.73 05/22/2023    EOSABS 0.05 05/22/2023    BASOSABS 0.04 05/22/2023     Lab Results   Component Value Date    GLUCOSE 94 05/22/2023    BUN 34 (H) 05/22/2023    CREATININE 1.12 (H) 05/22/2023     05/22/2023    K 4.2 05/22/2023    CL 95 (L) 05/22/2023    CO2 30.8 (H) 05/22/2023    CALCIUM 10.3 05/22/2023    PROTEINTOT 7.9 05/22/2023    ALBUMIN 4.4 05/22/2023    BILITOT 0.4 05/22/2023    ALKPHOS 65 05/22/2023    AST 35 (H) 05/22/2023    ALT 20 05/22/2023     Lab Results   Component Value Date    MG 2.1 04/14/2023    PHOS 3.6 04/14/2023    FREET4 1.30 05/22/2023    TSH 7.370 (H) 05/22/2023     Lab Results   Component Value Date    IRON 46 10/01/2022    LABIRON 58 (H) 10/01/2022    TRANSFERRIN 53 (L) 10/01/2022    TIBC 79 (L) 10/01/2022     Lab Results   Component Value Date    FERRITIN 3,023.00 (H) 10/01/2022    [id number] 732 03/24/2023    FOLATE >20.00 03/24/2023     No results found for: PSA, CEA, AFP, ,           Assessment and Plan    Diagnoses and all orders for this visit:    1. Malignant neoplasm of upper lobe of left lung (Primary)  Assessment & Plan:  Metastatic.  Patient is on palliative treatment nivolumab.  Tolerating the infusions well.  Lab work today is adequate for treatment.  Proceed with cycle 13 as planned.  I will see her back in 4 weeks time for OV, cycle 14 with lab work prior to monitor for toxicities and restaging scans to assess response to therapy and restaging scans to assess response to therapy.    Orders:  -     CT chest w contrast; Future  -     CT abdomen pelvis w contrast; Future    2. Acquired hypothyroidism  Assessment & Plan:  TSH is elevated but the free T4 is normal.  No change in dose at this time.  Repeat lab work next visit.            Patient Follow Up: 4 weeks    Patient was given instructions and counseling regarding her condition or for health maintenance advice.  Please see specific information pulled into the AVS if appropriate.     Phani Hernández MD    5/23/2023

## 2023-05-23 ENCOUNTER — HOSPITAL ENCOUNTER (OUTPATIENT)
Dept: ONCOLOGY | Facility: HOSPITAL | Age: 68
Discharge: HOME | End: 2023-05-23
Admitting: INTERNAL MEDICINE
Payer: MEDICARE

## 2023-05-23 VITALS
WEIGHT: 187.39 LBS | DIASTOLIC BLOOD PRESSURE: 74 MMHG | HEIGHT: 64 IN | BODY MASS INDEX: 31.99 KG/M2 | HEART RATE: 74 BPM | RESPIRATION RATE: 16 BRPM | TEMPERATURE: 98.4 F | OXYGEN SATURATION: 98 % | SYSTOLIC BLOOD PRESSURE: 122 MMHG

## 2023-05-23 DIAGNOSIS — Z79.899 ENCOUNTER FOR LONG-TERM (CURRENT) USE OF HIGH-RISK MEDICATION: Primary | ICD-10-CM

## 2023-05-23 DIAGNOSIS — Z45.2 ENCOUNTER FOR ADJUSTMENT OR MANAGEMENT OF VASCULAR ACCESS DEVICE: ICD-10-CM

## 2023-05-23 DIAGNOSIS — C34.12 MALIGNANT NEOPLASM OF UPPER LOBE OF LEFT LUNG: ICD-10-CM

## 2023-05-23 PROCEDURE — 96413 CHEMO IV INFUSION 1 HR: CPT

## 2023-05-23 PROCEDURE — 25010000002 HEPARIN LOCK FLUSH PER 10 UNITS: Performed by: INTERNAL MEDICINE

## 2023-05-23 PROCEDURE — 25010000002 NIVOLUMAB 240 MG/24ML SOLUTION 24 ML VIAL: Performed by: INTERNAL MEDICINE

## 2023-05-23 RX ORDER — HEPARIN SODIUM (PORCINE) LOCK FLUSH IV SOLN 100 UNIT/ML 100 UNIT/ML
500 SOLUTION INTRAVENOUS AS NEEDED
Status: DISCONTINUED | OUTPATIENT
Start: 2023-05-23 | End: 2023-05-24 | Stop reason: HOSPADM

## 2023-05-23 RX ORDER — HEPARIN SODIUM (PORCINE) LOCK FLUSH IV SOLN 100 UNIT/ML 100 UNIT/ML
500 SOLUTION INTRAVENOUS AS NEEDED
OUTPATIENT
Start: 2023-05-23

## 2023-05-23 RX ORDER — SODIUM CHLORIDE 9 MG/ML
250 INJECTION, SOLUTION INTRAVENOUS ONCE
Status: COMPLETED | OUTPATIENT
Start: 2023-05-23 | End: 2023-05-23

## 2023-05-23 RX ORDER — SODIUM CHLORIDE 0.9 % (FLUSH) 0.9 %
20 SYRINGE (ML) INJECTION AS NEEDED
OUTPATIENT
Start: 2023-05-23

## 2023-05-23 RX ORDER — SODIUM CHLORIDE 0.9 % (FLUSH) 0.9 %
20 SYRINGE (ML) INJECTION AS NEEDED
Status: DISCONTINUED | OUTPATIENT
Start: 2023-05-23 | End: 2023-05-24 | Stop reason: HOSPADM

## 2023-05-23 RX ORDER — SODIUM CHLORIDE 9 MG/ML
250 INJECTION, SOLUTION INTRAVENOUS ONCE
Status: CANCELLED | OUTPATIENT
Start: 2023-05-23

## 2023-05-23 RX ADMIN — SODIUM CHLORIDE 480 MG: 9 INJECTION, SOLUTION INTRAVENOUS at 10:41

## 2023-05-23 RX ADMIN — HEPARIN SODIUM (PORCINE) LOCK FLUSH IV SOLN 100 UNIT/ML 500 UNITS: 100 SOLUTION at 11:27

## 2023-05-23 RX ADMIN — SODIUM CHLORIDE 250 ML: 9 INJECTION, SOLUTION INTRAVENOUS at 10:41

## 2023-05-23 RX ADMIN — Medication 20 ML: at 11:27

## 2023-05-23 NOTE — ASSESSMENT & PLAN NOTE
TSH is elevated but the free T4 is normal.  No change in dose at this time.  Repeat lab work next visit.

## 2023-05-23 NOTE — ASSESSMENT & PLAN NOTE
Metastatic.  Patient is on palliative treatment nivolumab.  Tolerating the infusions well.  Lab work today is adequate for treatment.  Proceed with cycle 13 as planned.  I will see her back in 4 weeks time for OV, cycle 14 with lab work prior to monitor for toxicities and restaging scans to assess response to therapy and restaging scans to assess response to therapy.

## 2023-05-24 DIAGNOSIS — C34.12 MALIGNANT NEOPLASM OF UPPER LOBE OF LEFT LUNG: Primary | ICD-10-CM

## 2023-05-24 DIAGNOSIS — I26.94 MULTIPLE SUBSEGMENTAL PULMONARY EMBOLI WITHOUT ACUTE COR PULMONALE: ICD-10-CM

## 2023-05-24 DIAGNOSIS — D64.9 ANEMIA, UNSPECIFIED TYPE: ICD-10-CM

## 2023-05-24 DIAGNOSIS — J15.0 PNEUMONIA DUE TO KLEBSIELLA PNEUMONIAE, UNSPECIFIED LATERALITY, UNSPECIFIED PART OF LUNG: ICD-10-CM

## 2023-05-24 DIAGNOSIS — R06.09 DYSPNEA ON EXERTION: ICD-10-CM

## 2023-05-24 DIAGNOSIS — F17.211 NICOTINE DEPENDENCE, CIGARETTES, IN REMISSION: ICD-10-CM

## 2023-05-24 DIAGNOSIS — C34.12 MALIGNANT NEOPLASM OF UPPER LOBE OF LEFT LUNG: ICD-10-CM

## 2023-05-24 DIAGNOSIS — E27.40 HYPOADRENALISM: ICD-10-CM

## 2023-05-24 DIAGNOSIS — J44.9 CHRONIC OBSTRUCTIVE PULMONARY DISEASE, UNSPECIFIED COPD TYPE: ICD-10-CM

## 2023-05-24 DIAGNOSIS — R53.1 WEAKNESS: ICD-10-CM

## 2023-05-24 DIAGNOSIS — R05.8 PRODUCTIVE COUGH: ICD-10-CM

## 2023-05-24 RX ORDER — PREDNISONE 2.5 MG/1
TABLET ORAL
Qty: 15 TABLET | Refills: 0 | OUTPATIENT
Start: 2023-05-24

## 2023-05-26 ENCOUNTER — DOCUMENTATION (OUTPATIENT)
Dept: ONCOLOGY | Facility: HOSPITAL | Age: 68
End: 2023-05-26
Payer: MEDICARE

## 2023-05-26 NOTE — PROGRESS NOTES
Diagnosis: Lung cancer    Reason for Referral: Nutritional needs    Content of Visit: OSW received an email from Ms. Ortiz, expressing interested in losing some weight. She is currently monitoring her sugar and carb intake and counting calories, as well as walking for exercise. OSW forwarded this to oncology RDs to please advise and address Ms. Ortiz's nutritional needs. OSW support remains available.     Resources/Referrals Provided: Oncology RD

## 2023-06-14 ENCOUNTER — HOSPITAL ENCOUNTER (OUTPATIENT)
Dept: CT IMAGING | Facility: HOSPITAL | Age: 68
Discharge: HOME | End: 2023-06-14
Payer: MEDICARE

## 2023-06-14 DIAGNOSIS — C34.12 MALIGNANT NEOPLASM OF UPPER LOBE OF LEFT LUNG: ICD-10-CM

## 2023-06-14 DIAGNOSIS — Z79.899 ENCOUNTER FOR LONG-TERM (CURRENT) USE OF HIGH-RISK MEDICATION: ICD-10-CM

## 2023-06-14 LAB
ALBUMIN SERPL-MCNC: 4.4 G/DL (ref 3.5–5.2)
ALBUMIN/GLOB SERPL: 1.3 G/DL
ALP SERPL-CCNC: 53 U/L (ref 39–117)
ALT SERPL W P-5'-P-CCNC: 23 U/L (ref 1–33)
ANION GAP SERPL CALCULATED.3IONS-SCNC: 11 MMOL/L (ref 5–15)
AST SERPL-CCNC: 38 U/L (ref 1–32)
BASOPHILS # BLD AUTO: 0.03 10*3/MM3 (ref 0–0.2)
BASOPHILS NFR BLD AUTO: 0.4 % (ref 0–1.5)
BILIRUB SERPL-MCNC: 0.2 MG/DL (ref 0–1.2)
BUN SERPL-MCNC: 30 MG/DL (ref 8–23)
BUN/CREAT SERPL: 27.8 (ref 7–25)
CALCIUM SPEC-SCNC: 9.2 MG/DL (ref 8.6–10.5)
CHLORIDE SERPL-SCNC: 96 MMOL/L (ref 98–107)
CO2 SERPL-SCNC: 27 MMOL/L (ref 22–29)
CREAT SERPL-MCNC: 1.08 MG/DL (ref 0.57–1)
DEPRECATED RDW RBC AUTO: 41.2 FL (ref 37–54)
EGFRCR SERPLBLD CKD-EPI 2021: 56.4 ML/MIN/1.73
EOSINOPHIL # BLD AUTO: 0.01 10*3/MM3 (ref 0–0.4)
EOSINOPHIL NFR BLD AUTO: 0.1 % (ref 0.3–6.2)
ERYTHROCYTE [DISTWIDTH] IN BLOOD BY AUTOMATED COUNT: 12 % (ref 12.3–15.4)
GLOBULIN UR ELPH-MCNC: 3.3 GM/DL
GLUCOSE SERPL-MCNC: 116 MG/DL (ref 65–99)
HCT VFR BLD AUTO: 39.3 % (ref 34–46.6)
HGB BLD-MCNC: 13.8 G/DL (ref 12–15.9)
IMM GRANULOCYTES # BLD AUTO: 0.03 10*3/MM3 (ref 0–0.05)
IMM GRANULOCYTES NFR BLD AUTO: 0.4 % (ref 0–0.5)
LYMPHOCYTES # BLD AUTO: 1.21 10*3/MM3 (ref 0.7–3.1)
LYMPHOCYTES NFR BLD AUTO: 16.3 % (ref 19.6–45.3)
MCH RBC QN AUTO: 32.8 PG (ref 26.6–33)
MCHC RBC AUTO-ENTMCNC: 35.1 G/DL (ref 31.5–35.7)
MCV RBC AUTO: 93.3 FL (ref 79–97)
MONOCYTES # BLD AUTO: 0.49 10*3/MM3 (ref 0.1–0.9)
MONOCYTES NFR BLD AUTO: 6.6 % (ref 5–12)
NEUTROPHILS NFR BLD AUTO: 5.67 10*3/MM3 (ref 1.7–7)
NEUTROPHILS NFR BLD AUTO: 76.2 % (ref 42.7–76)
NRBC BLD AUTO-RTO: 0 /100 WBC (ref 0–0.2)
PLATELET # BLD AUTO: 264 10*3/MM3 (ref 140–450)
PMV BLD AUTO: 9.7 FL (ref 6–12)
POTASSIUM SERPL-SCNC: 4.5 MMOL/L (ref 3.5–5.2)
PROT SERPL-MCNC: 7.7 G/DL (ref 6–8.5)
RBC # BLD AUTO: 4.21 10*6/MM3 (ref 3.77–5.28)
SODIUM SERPL-SCNC: 134 MMOL/L (ref 136–145)
T4 FREE SERPL-MCNC: 1.57 NG/DL (ref 0.93–1.7)
TSH SERPL DL<=0.05 MIU/L-ACNC: 1.97 UIU/ML (ref 0.27–4.2)
WBC NRBC COR # BLD: 7.44 10*3/MM3 (ref 3.4–10.8)

## 2023-06-14 PROCEDURE — 85025 COMPLETE CBC W/AUTO DIFF WBC: CPT | Performed by: INTERNAL MEDICINE

## 2023-06-14 PROCEDURE — 80053 COMPREHEN METABOLIC PANEL: CPT | Performed by: INTERNAL MEDICINE

## 2023-06-14 PROCEDURE — 84443 ASSAY THYROID STIM HORMONE: CPT | Performed by: INTERNAL MEDICINE

## 2023-06-14 PROCEDURE — 84439 ASSAY OF FREE THYROXINE: CPT | Performed by: INTERNAL MEDICINE

## 2023-06-14 PROCEDURE — 71260 CT THORAX DX C+: CPT

## 2023-06-14 PROCEDURE — 25510000001 IOPAMIDOL PER 1 ML: Performed by: INTERNAL MEDICINE

## 2023-06-14 PROCEDURE — 74177 CT ABD & PELVIS W/CONTRAST: CPT

## 2023-06-14 RX ORDER — HEPARIN SODIUM (PORCINE) LOCK FLUSH IV SOLN 100 UNIT/ML 100 UNIT/ML
SOLUTION INTRAVENOUS
Status: DISCONTINUED
Start: 2023-06-14 | End: 2023-06-15 | Stop reason: HOSPADM

## 2023-06-14 RX ADMIN — IOPAMIDOL 100 ML: 755 INJECTION, SOLUTION INTRAVENOUS at 17:38

## 2023-06-15 NOTE — TELEPHONE ENCOUNTER
Caller: Linda Ortiz    Relationship to patient: Self    Best call back number: [phone]    Patient is needing: TO CANCEL LAB APPT ON 6-19-23. SHE HAD LABS DONE 6-14-23.     SHE ALSO WOULD LIKE DR. FLOWERS TO RELEASE THE CT SCAN RESULTS TO HER MYCHART.

## 2023-06-19 ENCOUNTER — OFFICE VISIT (OUTPATIENT)
Dept: ONCOLOGY | Facility: HOSPITAL | Age: 68
End: 2023-06-19
Payer: MEDICARE

## 2023-06-19 VITALS
WEIGHT: 181 LBS | TEMPERATURE: 97.2 F | RESPIRATION RATE: 18 BRPM | BODY MASS INDEX: 31.05 KG/M2 | HEART RATE: 72 BPM | OXYGEN SATURATION: 99 % | SYSTOLIC BLOOD PRESSURE: 129 MMHG | DIASTOLIC BLOOD PRESSURE: 81 MMHG

## 2023-06-19 DIAGNOSIS — C34.12 MALIGNANT NEOPLASM OF UPPER LOBE OF LEFT LUNG: Primary | ICD-10-CM

## 2023-06-19 DIAGNOSIS — E03.9 ACQUIRED HYPOTHYROIDISM: ICD-10-CM

## 2023-06-19 PROCEDURE — 99214 OFFICE O/P EST MOD 30 MIN: CPT | Performed by: INTERNAL MEDICINE

## 2023-06-19 PROCEDURE — G0463 HOSPITAL OUTPT CLINIC VISIT: HCPCS | Performed by: INTERNAL MEDICINE

## 2023-06-19 PROCEDURE — 1159F MED LIST DOCD IN RCRD: CPT | Performed by: INTERNAL MEDICINE

## 2023-06-19 PROCEDURE — 3079F DIAST BP 80-89 MM HG: CPT | Performed by: INTERNAL MEDICINE

## 2023-06-19 PROCEDURE — 1126F AMNT PAIN NOTED NONE PRSNT: CPT | Performed by: INTERNAL MEDICINE

## 2023-06-19 PROCEDURE — 3074F SYST BP LT 130 MM HG: CPT | Performed by: INTERNAL MEDICINE

## 2023-06-19 PROCEDURE — 1160F RVW MEDS BY RX/DR IN RCRD: CPT | Performed by: INTERNAL MEDICINE

## 2023-06-19 NOTE — ASSESSMENT & PLAN NOTE
Patient is on levothyroxine.  TSH and T4 have been normal.  Continue same dose.  Reassess next visit.

## 2023-06-19 NOTE — ASSESSMENT & PLAN NOTE
Metastatic.  Patient is on palliative treatment with nivolumab.  Tolerating the infusions well.  I see no evidence of disease progression by history, physical examination or recent CT scans which were reviewed.  Lab work is adequate for treatment.  Proceed with nivolumab as planned.  RTC 4 weeks for OV, nivolumab with lab work prior to monitor for toxicities.

## 2023-06-19 NOTE — PROGRESS NOTES
Chief Complaint  Lung Cancer      Anuradha Hope MD    Subjective          Linda Ortiz presents to [facility] HEMATOLOGY & ONCOLOGY for ongoing treatment of her lung cancer.  She is on nivolumab.  She is tolerating the infusions well.  She denies any issues or side effects.  She has been trying exercise more.  She still gets winded with heavier activity but she is slowly improving her endurance.  She reports good appetite.  She denies unusual aches or pains.  No issues from her Port-A-Cath.    Oncology/Hematology History Overview Note   5/19/2022 Left Adrenal mass biopsy at [facility] revealed: poorly differentiated non-small cell carcinoma, high grade. Positive fo AE1/3 and Cam5.2. Immunohistochemistry was positive for PAX8, HeParlGlypican3, Vimentin, focal positive for chromagranin, GATA3.  The case was sent to [facility] for second opinion with a final diagnosis of poorly differentiated carcinoma with diffuse reduced BRG-1 expression.    6/21/2022 XRT initiated to Right hand     7/19/2022 Opdivo + Yervoy initiated x 4 doses  10/11/2022 Opdivo only          Malignant neoplasm of upper lobe of left lung   6/9/2022 Initial Diagnosis    Lung cancer (HCC)     7/19/2022 -  Chemotherapy    OP LUNG Nivolumab 1 mg/kg / Ipilimumab 3 mg/kg / Nivolumab 480 mg        1/3/2023 Cancer Staged    Staging form: Lung, AJCC 8th Edition  - Clinical: Stage IV (cM1) - Signed by Phani Hernández MD on 1/3/2023     Secondary carcinoid tumor of bone (Resolved)   6/14/2022 Initial Diagnosis    Secondary carcinoid tumor of bone (HCC)     6/20/2022 -  Radiation    RADIATION THERAPY Treatment Details (Noted on 6/14/2022)  Site: Right Hand  Technique: 3D CRT  Goal: No goal specified  Planned Treatment Start Date: 6/20/2022         Review of Systems   Constitutional:  Positive for fatigue. Negative for appetite change, diaphoresis, fever, unexpected weight gain and unexpected weight loss.   HENT:  Negative for hearing loss,  mouth sores, sore throat, swollen glands, trouble swallowing and voice change.    Eyes:  Negative for blurred vision.   Respiratory:  Negative for cough, shortness of breath and wheezing.    Cardiovascular:  Negative for chest pain and palpitations.   Gastrointestinal:  Negative for abdominal pain, blood in stool, constipation, diarrhea, nausea and vomiting.   Endocrine: Negative for cold intolerance and heat intolerance.   Genitourinary:  Negative for difficulty urinating, dysuria, frequency, hematuria and urinary incontinence.   Musculoskeletal:  Negative for arthralgias, back pain and myalgias.   Skin:  Negative for rash, skin lesions and wound.   Neurological:  Negative for dizziness, seizures, weakness, numbness and headache.   Hematological:  Does not bruise/bleed easily.   Psychiatric/Behavioral:  Negative for depressed mood. The patient is not nervous/anxious.    Current Outpatient Medications on File Prior to Visit   Medication Sig Dispense Refill    acetaminophen (TYLENOL) 325 MG tablet Take 2 tablets by mouth Every 6 (Six) Hours As Needed for Mild Pain.      apixaban (Eliquis) 5 MG tablet tablet Take 1 tablet by mouth 2 (Two) Times a Day. 60 tablet 6    brompheniramine-pseudoephedrine-DM 30-2-10 MG/5ML syrup Take 5 mL by mouth 4 (Four) Times a Day As Needed for Congestion. 118 mL 0    famotidine (PEPCID) 20 MG tablet Take 1 tablet by mouth Every 12 (Twelve) Hours. 180 tablet 1    fludrocortisone 0.1 MG tablet Take 1 tablet by mouth Daily. 30 tablet 6    fluticasone-salmeterol (Advair HFA) 115-21 MCG/ACT inhaler Inhale 2 puffs 2 (Two) Times a Day. 1 each 11    furosemide (LASIX) 40 MG tablet TAKE 1 TABLET (40 MG TOTAL) BY MOUTH IN THE MORNING AND 1 TABLET (40 MG TOTAL) IN THE EVENING.      guaiFENesin (Mucinex) 600 MG 12 hr tablet Take 1 tablet by mouth 2 (Two) Times a Day. 60 tablet 2    ipratropium-albuterol (DUO-NEB) 0.5-2.5 mg/3 ml nebulizer Take 3 mL by nebulization 4 (Four) Times a Day As Needed for  Wheezing. 360 mL 3    levothyroxine (Synthroid) 75 MCG tablet Take 1 tablet by mouth Daily. 90 tablet 1    predniSONE (DELTASONE) 10 MG tablet 5 mg. 1qd      tiotropium bromide monohydrate (SPIRIVA RESPIMAT) 2.5 MCG/ACT aerosol solution inhaler Inhale 2 puffs Daily. 1 each 11    tiotropium bromide monohydrate (Spiriva Respimat) 2.5 MCG/ACT aerosol solution inhaler Inhale 2 puffs Daily for 1 day. (Patient not taking: Reported on 2023) 2 each 0     No current facility-administered medications on file prior to visit.       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    Hypocalcemia 2022    Malignant neoplasm of upper lobe of left lung 2022    Metastatic cancer     Multiple subsegmental pulmonary emboli without acute cor pulmonale 10/10/2022     Past Surgical History:   Procedure Laterality Date    BRONCHOSCOPY N/A 2022    Procedure: BRONCHOSCOPY WITH BAL, WASHINGS;  Surgeon: Fausto Leon MD;  Location: [facility] ENDOSCOPY;  Service: Pulmonary;  Laterality: N/A;  PNEUMONIA, PERSISTANT COUGH    HIP SURGERY Bilateral     KNEE ARTHROSCOPY      VENOUS ACCESS DEVICE (PORT) INSERTION N/A 7/15/2022    Procedure: INSERTION VENOUS ACCESS DEVICE;  Surgeon: Eber Hull MD;  [place] [facility] [place] [facility]  Service: General;  Laterality: N/A;     Social History     Socioeconomic History    Marital status: Single   Tobacco Use    Smoking status: Former     Packs/day: 1.00     Years: 47.00     Pack years: 47.00     Types: Cigarettes     Start date:      Quit date: 2022     Years since quittin.3     Passive exposure: Past    Smokeless tobacco: Never   Vaping Use    Vaping Use: Former    Start date: 2013    Quit date: 2014   Substance and Sexual Activity    Alcohol use: Not Currently     Drug use: Never    Sexual activity: Defer     Family History   Problem Relation Age of Onset    Breast cancer Mother     Lung cancer Father     Thyroid cancer Maternal Aunt     Breast cancer Maternal Aunt     Lung cancer Paternal Aunt     COPD Paternal Uncle        Objective   Physical Exam  Vitals reviewed. Exam conducted with a chaperone present.   Constitutional:       General: She is not in acute distress.     Appearance: Normal appearance.   Cardiovascular:      Rate and Rhythm: Normal rate and regular rhythm.      Heart sounds: Normal heart sounds. No murmur heard.    No gallop.   Pulmonary:      Effort: Pulmonary effort is normal.      Breath sounds: Normal breath sounds.      Comments: Port-A-Cath  Abdominal:      General: Abdomen is flat. Bowel sounds are normal.      Palpations: Abdomen is soft.   Musculoskeletal:      Cervical back: Neck supple.      Right lower leg: No edema.      Left lower leg: No edema.   Lymphadenopathy:      Cervical: No cervical adenopathy.   Neurological:      Mental Status: She is alert and oriented to person, place, and time.   Psychiatric:         Mood and Affect: Mood normal.         Behavior: Behavior normal.       Vitals:    06/19/23 1259   BP: 129/81   Pulse: 72   Resp: 18   Temp: 97.2 °F (36.2 °C)   TempSrc: Temporal   SpO2: 99%   Weight: 82.1 kg (181 lb)   PainSc: 0-No pain     ECOG score: 0         PHQ-9 Total Score:                    Result Review :   The following data was reviewed by: Phani Hernández MD on 06/19/2023:  Lab Results   Component Value Date    HGB 13.8 06/14/2023    HCT 39.3 06/14/2023    MCV 93.3 06/14/2023     06/14/2023    WBC 7.44 06/14/2023    NEUTROABS 5.67 06/14/2023    LYMPHSABS 1.21 06/14/2023    MONOSABS 0.49 06/14/2023    EOSABS 0.01 06/14/2023    BASOSABS 0.03 06/14/2023     Lab Results   Component Value Date    GLUCOSE 116 (H) 06/14/2023    BUN 30 (H) 06/14/2023    CREATININE 1.08 (H) 06/14/2023     (L) 06/14/2023    K 4.5  06/14/2023    CL 96 (L) 06/14/2023    CO2 27.0 06/14/2023    CALCIUM 9.2 06/14/2023    PROTEINTOT 7.7 06/14/2023    ALBUMIN 4.4 06/14/2023    BILITOT 0.2 06/14/2023    ALKPHOS 53 06/14/2023    AST 38 (H) 06/14/2023    ALT 23 06/14/2023     Lab Results   Component Value Date    MG 2.1 04/14/2023    PHOS 3.6 04/14/2023    FREET4 1.57 06/14/2023    TSH 1.970 06/14/2023     Lab Results   Component Value Date    IRON 46 10/01/2022    LABIRON 58 (H) 10/01/2022    TRANSFERRIN 53 (L) 10/01/2022    TIBC 79 (L) 10/01/2022     Lab Results   Component Value Date    FERRITIN 3,023.00 (H) 10/01/2022    [id number] 732 03/24/2023    FOLATE >20.00 03/24/2023     No results found for: PSA, CEA, AFP, ,     Data reviewed : Radiologic studies CT chest, abdomen, pelvis reviewed       Assessment and Plan    Diagnoses and all orders for this visit:    1. Malignant neoplasm of upper lobe of left lung (Primary)  Assessment & Plan:  Metastatic.  Patient is on palliative treatment with nivolumab.  Tolerating the infusions well.  I see no evidence of disease progression by history, physical examination or recent CT scans which were reviewed.  Lab work is adequate for treatment.  Proceed with nivolumab as planned.  RTC 4 weeks for OV, nivolumab with lab work prior to monitor for toxicities.      2. Acquired hypothyroidism  Assessment & Plan:  Patient is on levothyroxine.  TSH and T4 have been normal.  Continue same dose.  Reassess next visit.              Patient Follow Up: 4 weeks    Patient was given instructions and counseling regarding her condition or for health maintenance advice. Please see specific information pulled into the AVS if appropriate.     Phani Hernández MD    6/19/2023

## 2023-08-12 DIAGNOSIS — D64.9 ANEMIA, UNSPECIFIED TYPE: ICD-10-CM

## 2023-08-12 DIAGNOSIS — R05.8 PRODUCTIVE COUGH: ICD-10-CM

## 2023-08-12 DIAGNOSIS — E27.40 HYPOADRENALISM: ICD-10-CM

## 2023-08-12 DIAGNOSIS — R53.1 WEAKNESS: ICD-10-CM

## 2023-08-12 DIAGNOSIS — J44.9 CHRONIC OBSTRUCTIVE PULMONARY DISEASE, UNSPECIFIED COPD TYPE: ICD-10-CM

## 2023-08-12 DIAGNOSIS — C34.12 MALIGNANT NEOPLASM OF UPPER LOBE OF LEFT LUNG: ICD-10-CM

## 2023-08-12 DIAGNOSIS — F17.211 NICOTINE DEPENDENCE, CIGARETTES, IN REMISSION: ICD-10-CM

## 2023-08-12 DIAGNOSIS — J15.0 PNEUMONIA DUE TO KLEBSIELLA PNEUMONIAE, UNSPECIFIED LATERALITY, UNSPECIFIED PART OF LUNG: ICD-10-CM

## 2023-08-12 DIAGNOSIS — I26.94 MULTIPLE SUBSEGMENTAL PULMONARY EMBOLI WITHOUT ACUTE COR PULMONALE: ICD-10-CM

## 2023-08-12 DIAGNOSIS — R06.09 DYSPNEA ON EXERTION: ICD-10-CM

## 2023-08-14 ENCOUNTER — OFFICE VISIT (OUTPATIENT)
Dept: ONCOLOGY | Facility: HOSPITAL | Age: 68
End: 2023-08-14
Payer: MEDICARE

## 2023-08-14 ENCOUNTER — HOSPITAL ENCOUNTER (OUTPATIENT)
Dept: ONCOLOGY | Facility: HOSPITAL | Age: 68
Discharge: HOME | End: 2023-08-14
Admitting: INTERNAL MEDICINE
Payer: MEDICARE

## 2023-08-14 VITALS
SYSTOLIC BLOOD PRESSURE: 114 MMHG | DIASTOLIC BLOOD PRESSURE: 72 MMHG | WEIGHT: 188.05 LBS | TEMPERATURE: 97.5 F | BODY MASS INDEX: 32.26 KG/M2 | RESPIRATION RATE: 18 BRPM | HEART RATE: 84 BPM | OXYGEN SATURATION: 98 %

## 2023-08-14 DIAGNOSIS — Z45.2 ENCOUNTER FOR ADJUSTMENT OR MANAGEMENT OF VASCULAR ACCESS DEVICE: ICD-10-CM

## 2023-08-14 DIAGNOSIS — Z79.899 ENCOUNTER FOR LONG-TERM (CURRENT) USE OF HIGH-RISK MEDICATION: ICD-10-CM

## 2023-08-14 DIAGNOSIS — Z79.899 ENCOUNTER FOR LONG-TERM (CURRENT) USE OF HIGH-RISK MEDICATION: Primary | ICD-10-CM

## 2023-08-14 DIAGNOSIS — C34.12 MALIGNANT NEOPLASM OF UPPER LOBE OF LEFT LUNG: Primary | ICD-10-CM

## 2023-08-14 DIAGNOSIS — C34.12 MALIGNANT NEOPLASM OF UPPER LOBE OF LEFT LUNG: ICD-10-CM

## 2023-08-14 LAB
ALBUMIN SERPL-MCNC: 3.8 G/DL (ref 3.5–5.2)
ALBUMIN/GLOB SERPL: 1.4 G/DL
ALP SERPL-CCNC: 54 U/L (ref 39–117)
ALT SERPL W P-5'-P-CCNC: 20 U/L (ref 1–33)
ANION GAP SERPL CALCULATED.3IONS-SCNC: 11.1 MMOL/L (ref 5–15)
AST SERPL-CCNC: 34 U/L (ref 1–32)
BASOPHILS # BLD AUTO: 0.04 10*3/MM3 (ref 0–0.2)
BASOPHILS NFR BLD AUTO: 0.6 % (ref 0–1.5)
BILIRUB SERPL-MCNC: 0.2 MG/DL (ref 0–1.2)
BUN SERPL-MCNC: 24 MG/DL (ref 8–23)
BUN/CREAT SERPL: 24.5 (ref 7–25)
CALCIUM SPEC-SCNC: 8.5 MG/DL (ref 8.6–10.5)
CHLORIDE SERPL-SCNC: 100 MMOL/L (ref 98–107)
CO2 SERPL-SCNC: 27.9 MMOL/L (ref 22–29)
CREAT SERPL-MCNC: 0.98 MG/DL (ref 0.57–1)
DEPRECATED RDW RBC AUTO: 40.6 FL (ref 37–54)
EGFRCR SERPLBLD CKD-EPI 2021: 63.4 ML/MIN/1.73
EOSINOPHIL # BLD AUTO: 0.07 10*3/MM3 (ref 0–0.4)
EOSINOPHIL NFR BLD AUTO: 1 % (ref 0.3–6.2)
ERYTHROCYTE [DISTWIDTH] IN BLOOD BY AUTOMATED COUNT: 11.5 % (ref 12.3–15.4)
GLOBULIN UR ELPH-MCNC: 2.8 GM/DL
GLUCOSE SERPL-MCNC: 87 MG/DL (ref 65–99)
HCT VFR BLD AUTO: 39.9 % (ref 34–46.6)
HGB BLD-MCNC: 13.2 G/DL (ref 12–15.9)
IMM GRANULOCYTES # BLD AUTO: 0.08 10*3/MM3 (ref 0–0.05)
IMM GRANULOCYTES NFR BLD AUTO: 1.2 % (ref 0–0.5)
LYMPHOCYTES # BLD AUTO: 2.23 10*3/MM3 (ref 0.7–3.1)
LYMPHOCYTES NFR BLD AUTO: 32.6 % (ref 19.6–45.3)
MCH RBC QN AUTO: 31.7 PG (ref 26.6–33)
MCHC RBC AUTO-ENTMCNC: 33.1 G/DL (ref 31.5–35.7)
MCV RBC AUTO: 95.7 FL (ref 79–97)
MONOCYTES # BLD AUTO: 0.72 10*3/MM3 (ref 0.1–0.9)
MONOCYTES NFR BLD AUTO: 10.5 % (ref 5–12)
NEUTROPHILS NFR BLD AUTO: 3.7 10*3/MM3 (ref 1.7–7)
NEUTROPHILS NFR BLD AUTO: 54.1 % (ref 42.7–76)
PLATELET # BLD AUTO: 258 10*3/MM3 (ref 140–450)
PMV BLD AUTO: 10 FL (ref 6–12)
POTASSIUM SERPL-SCNC: 4.1 MMOL/L (ref 3.5–5.2)
PROT SERPL-MCNC: 6.6 G/DL (ref 6–8.5)
RBC # BLD AUTO: 4.17 10*6/MM3 (ref 3.77–5.28)
SODIUM SERPL-SCNC: 139 MMOL/L (ref 136–145)
T4 FREE SERPL-MCNC: 1.74 NG/DL (ref 0.93–1.7)
TSH SERPL DL<=0.05 MIU/L-ACNC: 2.9 UIU/ML (ref 0.27–4.2)
WBC NRBC COR # BLD: 6.84 10*3/MM3 (ref 3.4–10.8)

## 2023-08-14 PROCEDURE — 85025 COMPLETE CBC W/AUTO DIFF WBC: CPT | Performed by: INTERNAL MEDICINE

## 2023-08-14 PROCEDURE — 3078F DIAST BP <80 MM HG: CPT | Performed by: INTERNAL MEDICINE

## 2023-08-14 PROCEDURE — 25010000002 HEPARIN LOCK FLUSH PER 10 UNITS: Performed by: INTERNAL MEDICINE

## 2023-08-14 PROCEDURE — 3074F SYST BP LT 130 MM HG: CPT | Performed by: INTERNAL MEDICINE

## 2023-08-14 PROCEDURE — 84443 ASSAY THYROID STIM HORMONE: CPT | Performed by: INTERNAL MEDICINE

## 2023-08-14 PROCEDURE — 1126F AMNT PAIN NOTED NONE PRSNT: CPT | Performed by: INTERNAL MEDICINE

## 2023-08-14 PROCEDURE — 80053 COMPREHEN METABOLIC PANEL: CPT | Performed by: INTERNAL MEDICINE

## 2023-08-14 PROCEDURE — 36591 DRAW BLOOD OFF VENOUS DEVICE: CPT

## 2023-08-14 PROCEDURE — 99214 OFFICE O/P EST MOD 30 MIN: CPT | Performed by: INTERNAL MEDICINE

## 2023-08-14 PROCEDURE — G0463 HOSPITAL OUTPT CLINIC VISIT: HCPCS | Performed by: INTERNAL MEDICINE

## 2023-08-14 PROCEDURE — 84439 ASSAY OF FREE THYROXINE: CPT | Performed by: INTERNAL MEDICINE

## 2023-08-14 RX ORDER — SODIUM CHLORIDE 0.9 % (FLUSH) 0.9 %
20 SYRINGE (ML) INJECTION AS NEEDED
Status: DISCONTINUED | OUTPATIENT
Start: 2023-08-14 | End: 2023-08-15 | Stop reason: HOSPADM

## 2023-08-14 RX ORDER — SODIUM CHLORIDE 0.9 % (FLUSH) 0.9 %
20 SYRINGE (ML) INJECTION AS NEEDED
Status: CANCELLED | OUTPATIENT
Start: 2023-08-14

## 2023-08-14 RX ORDER — PREDNISONE 2.5 MG/1
TABLET ORAL
Qty: 15 TABLET | Refills: 0 | Status: SHIPPED | OUTPATIENT
Start: 2023-08-14

## 2023-08-14 RX ORDER — SODIUM CHLORIDE 9 MG/ML
250 INJECTION, SOLUTION INTRAVENOUS ONCE
Status: CANCELLED | OUTPATIENT
Start: 2023-08-15

## 2023-08-14 RX ORDER — HEPARIN SODIUM (PORCINE) LOCK FLUSH IV SOLN 100 UNIT/ML 100 UNIT/ML
500 SOLUTION INTRAVENOUS AS NEEDED
Status: DISCONTINUED | OUTPATIENT
Start: 2023-08-14 | End: 2023-08-15 | Stop reason: HOSPADM

## 2023-08-14 RX ORDER — HEPARIN SODIUM (PORCINE) LOCK FLUSH IV SOLN 100 UNIT/ML 100 UNIT/ML
500 SOLUTION INTRAVENOUS AS NEEDED
Status: CANCELLED | OUTPATIENT
Start: 2023-08-14

## 2023-08-14 RX ADMIN — Medication 20 ML: at 08:32

## 2023-08-14 RX ADMIN — HEPARIN SODIUM (PORCINE) LOCK FLUSH IV SOLN 100 UNIT/ML 500 UNITS: 100 SOLUTION at 08:32

## 2023-08-14 NOTE — ADDENDUM NOTE
Encounter addended by: Adriana Goodrich RN on: 8/14/2023 8:37 AM   Actions taken: MAR administration accepted, Flowsheet accepted, Charge Capture section accepted

## 2023-08-14 NOTE — ASSESSMENT & PLAN NOTE
Metastatic.  Patient is on palliative treatment nivolumab.  Tolerating the infusions well.  Lab work today is adequate for treatment.  Proceed with nivolumab as planned.  RTC 4 weeks for OV, nivolumab with lab work prior to monitor for toxicities and restaging scans to assess response to therapy.

## 2023-08-14 NOTE — PROGRESS NOTES
Chief Complaint  Lung Cancer    Anuradha Hope MD Patel, Jahin, MD    Subjective          Linda Ortiz presents to [facility] for ongoing treatment of her lung cancer.  She is on immunotherapy with nivolumab.  Tolerating the infusions well.  She denies issues or side effects.  She does report a GI bug last week with nausea and vomiting but this has now resolved.    Oncology/Hematology History Overview Note   5/19/2022 Left Adrenal mass biopsy at [facility] revealed: poorly differentiated non-small cell carcinoma, high grade. Positive fo AE1/3 and Cam5.2. Immunohistochemistry was positive for PAX8, HeParlGlypican3, Vimentin, focal positive for chromagranin, GATA3.  The case was sent to [facility] for second opinion with a final diagnosis of poorly differentiated carcinoma with diffuse reduced BRG-1 expression.    6/21/2022 XRT initiated to Right hand     7/19/2022 Opdivo + Yervoy initiated x 4 doses  10/11/2022 Opdivo only          Malignant neoplasm of upper lobe of left lung   6/9/2022 Initial Diagnosis    Lung cancer (HCC)     7/19/2022 -  Chemotherapy    OP LUNG Nivolumab 1 mg/kg / Ipilimumab 3 mg/kg / Nivolumab 480 mg        1/3/2023 Cancer Staged    Staging form: Lung, AJCC 8th Edition  - Clinical: Stage IV (cM1) - Signed by Phani Hernández MD on 1/3/2023     Secondary carcinoid tumor of bone (Resolved)   6/14/2022 Initial Diagnosis    Secondary carcinoid tumor of bone (HCC)     6/20/2022 -  Radiation    RADIATION THERAPY Treatment Details (Noted on 6/14/2022)  Site: Right Hand  Technique: 3D CRT  Goal: No goal specified  Planned Treatment Start Date: 6/20/2022         Review of Systems   Constitutional:  Negative for appetite change, diaphoresis, fatigue, fever, unexpected weight gain and unexpected weight loss.   HENT:  Negative for hearing loss, mouth sores, sore throat, swollen glands, trouble swallowing and voice change.    Eyes:  Negative for blurred vision.    Respiratory:  Negative for cough, shortness of breath and wheezing.    Cardiovascular:  Negative for chest pain and palpitations.   Gastrointestinal:  Positive for nausea and vomiting. Negative for abdominal pain, blood in stool, constipation and diarrhea.   Endocrine: Negative for cold intolerance and heat intolerance.   Genitourinary:  Negative for difficulty urinating, dysuria, frequency, hematuria and urinary incontinence.   Musculoskeletal:  Negative for arthralgias, back pain and myalgias.   Skin:  Negative for rash, skin lesions and wound.   Neurological:  Negative for dizziness, seizures, weakness, numbness and headache.   Hematological:  Does not bruise/bleed easily.   Psychiatric/Behavioral:  Negative for depressed mood. The patient is not nervous/anxious.    Current Outpatient Medications on File Prior to Visit   Medication Sig Dispense Refill    acetaminophen (TYLENOL) 325 MG tablet Take 2 tablets by mouth Every 6 (Six) Hours As Needed for Mild Pain.      apixaban (Eliquis) 5 MG tablet tablet Take 1 tablet by mouth 2 (Two) Times a Day. 60 tablet 6    famotidine (PEPCID) 20 MG tablet Take 1 tablet by mouth Every 12 (Twelve) Hours. 180 tablet 1    fludrocortisone 0.1 MG tablet Take 1 tablet by mouth Daily. 30 tablet 6    fluticasone-salmeterol (Advair HFA) 115-21 MCG/ACT inhaler Inhale 2 puffs 2 (Two) Times a Day. 1 each 11    furosemide (LASIX) 40 MG tablet TAKE 1 TABLET (40 MG TOTAL) BY MOUTH IN THE MORNING AND 1 TABLET (40 MG TOTAL) IN THE EVENING.      guaiFENesin (Mucinex) 600 MG 12 hr tablet Take 1 tablet by mouth 2 (Two) Times a Day. 60 tablet 2    ipratropium-albuterol (DUO-NEB) 0.5-2.5 mg/3 ml nebulizer Take 3 mL by nebulization 4 (Four) Times a Day As Needed for Wheezing. 360 mL 3    levothyroxine (Synthroid) 75 MCG tablet Take 1 tablet by mouth Daily. 90 tablet 1    predniSONE (DELTASONE) 10 MG tablet 0.5 tablets. 1qd      predniSONE (DELTASONE) 2.5 MG tablet TAKE ONE TAB DAILY FOR A WEEK,  THEN 1 TAB EVERY OTHER DAY FOR A WEEK, THEN 1 TAB EVERY 3RD DAY TILL COMPLETE. 15 tablet 0    brompheniramine-pseudoephedrine-DM 30-2-10 MG/5ML syrup Take 5 mL by mouth 4 (Four) Times a Day As Needed for Congestion. (Patient not taking: Reported on 7/17/2023) 118 mL 0    tiotropium bromide monohydrate (SPIRIVA RESPIMAT) 2.5 MCG/ACT aerosol solution inhaler Inhale 2 puffs Daily. (Patient not taking: Reported on 8/14/2023) 1 each 11    tiotropium bromide monohydrate (Spiriva Respimat) 2.5 MCG/ACT aerosol solution inhaler Inhale 2 puffs Daily for 1 day. (Patient not taking: Reported on 5/5/2023) 2 each 0     Current Facility-Administered Medications on File Prior to Visit   Medication Dose Route Frequency Provider Last Rate Last Admin    heparin injection 500 Units  500 Units Intravenous PRN Phani Hernández MD   500 Units at 08/14/23 0832    sodium chloride 0.9 % flush 20 mL  20 mL Intravenous PRN Phani Hernández MD   20 mL at 08/14/23 0832       Allergies   Allergen Reactions    Hydrocodone Nausea And Vomiting and Dizziness     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    Hypocalcemia 08/09/2022    Malignant neoplasm of upper lobe of left lung 06/09/2022    Metastatic cancer     Multiple subsegmental pulmonary emboli without acute cor pulmonale 10/10/2022     Past Surgical History:   Procedure Laterality Date    BRONCHOSCOPY N/A 9/30/2022    Procedure: BRONCHOSCOPY WITH BAL, WASHINGS;  Surgeon: Fausto Leon MD;  Location: [facility] ENDOSCOPY;  Service: Pulmonary;  Laterality: N/A;  PNEUMONIA, PERSISTANT COUGH    HIP SURGERY Bilateral     KNEE ARTHROSCOPY      VENOUS ACCESS DEVICE (PORT) INSERTION N/A 7/15/2022    Procedure: INSERTION VENOUS ACCESS DEVICE;  Surgeon: Eber Hull MD;  Location: [facility] OR [facility];  Service: General;  Laterality: N/A;     Social History     Socioeconomic  History    Marital status: Single   Tobacco Use    Smoking status: Former     Packs/day: 1.00     Years: 47.00     Pack years: 47.00     Types: Cigarettes     Start date:      Quit date: 2022     Years since quittin.5     Passive exposure: Past    Smokeless tobacco: Never   Vaping Use    Vaping Use: Former    Start date: 2013    Quit date: 2014   Substance and Sexual Activity    Alcohol use: Not Currently    Drug use: Never    Sexual activity: Defer     Family History   Problem Relation Age of Onset    Breast cancer Mother     Lung cancer Father     Thyroid cancer Maternal Aunt     Breast cancer Maternal Aunt     Lung cancer Paternal Aunt     COPD Paternal Uncle        Objective   Physical Exam  Vitals reviewed. Exam conducted with a chaperone present.   Constitutional:       General: She is not in acute distress.     Appearance: Normal appearance.   Cardiovascular:      Rate and Rhythm: Normal rate and regular rhythm.      Heart sounds: Normal heart sounds. No murmur heard.    No gallop.   Pulmonary:      Effort: Pulmonary effort is normal.      Breath sounds: Normal breath sounds.      Comments: Port-A-Cath  Abdominal:      General: Abdomen is flat. Bowel sounds are normal.      Palpations: Abdomen is soft.   Musculoskeletal:      Cervical back: Neck supple.      Right lower leg: No edema.      Left lower leg: No edema.   Lymphadenopathy:      Cervical: No cervical adenopathy.   Neurological:      Mental Status: She is alert and oriented to person, place, and time.   Psychiatric:         Mood and Affect: Mood normal.         Behavior: Behavior normal.       Vitals:    23 0900   BP: 114/72   Pulse: 84   Resp: 18   Temp: 97.5 øF (36.4 øC)   TempSrc: Temporal   SpO2: 98%   Weight: 85.3 kg (188 lb 0.8 oz)   PainSc: 0-No pain     ECOG score: 0         PHQ-9 Total Score:                    Result Review :   The following data was reviewed by: Phani Hernández MD on 2023:  Lab Results    Component Value Date    HGB 13.2 08/14/2023    HCT 39.9 08/14/2023    MCV 95.7 08/14/2023     08/14/2023    WBC 6.84 08/14/2023    NEUTROABS 3.70 08/14/2023    LYMPHSABS 2.23 08/14/2023    MONOSABS 0.72 08/14/2023    EOSABS 0.07 08/14/2023    BASOSABS 0.04 08/14/2023     Lab Results   Component Value Date    GLUCOSE 87 08/14/2023    BUN 24 (H) 08/14/2023    CREATININE 0.98 08/14/2023     08/14/2023    K 4.1 08/14/2023     08/14/2023    CO2 27.9 08/14/2023    CALCIUM 8.5 (L) 08/14/2023    PROTEINTOT 6.6 08/14/2023    ALBUMIN 3.8 08/14/2023    BILITOT 0.2 08/14/2023    ALKPHOS 54 08/14/2023    AST 34 (H) 08/14/2023    ALT 20 08/14/2023     Lab Results   Component Value Date    MG 2.1 04/14/2023    PHOS 3.6 04/14/2023    FREET4 1.74 (H) 08/14/2023    TSH 2.900 08/14/2023     Lab Results   Component Value Date    IRON 46 10/01/2022    LABIRON 58 (H) 10/01/2022    TRANSFERRIN 53 (L) 10/01/2022    TIBC 79 (L) 10/01/2022     Lab Results   Component Value Date    FERRITIN 3,023.00 (H) 10/01/2022    [id number] 732 03/24/2023    FOLATE >20.00 03/24/2023     No results found for: PSA, CEA, AFP, ,           Assessment and Plan    Diagnoses and all orders for this visit:    1. Malignant neoplasm of upper lobe of left lung (Primary)  Assessment & Plan:  Metastatic.  Patient is on palliative treatment nivolumab.  Tolerating the infusions well.  Lab work today is adequate for treatment.  Proceed with nivolumab as planned.  RTC 4 weeks for OV, nivolumab with lab work prior to monitor for toxicities and restaging scans to assess response to therapy.    Orders:  -     CT chest w contrast; Future  -     CT abdomen pelvis w contrast; Future  -     CBC and Differential; Future  -     Comprehensive metabolic panel; Future  -     TSH; Future  -     T4, free; Future    2. Encounter for long-term (current) use of high-risk medication  -     CBC and Differential; Future  -     Comprehensive metabolic panel;  Future  -     TSH; Future  -     T4, free; Future    Other orders  -     sodium chloride 0.9 % infusion 250 mL  -     Nivolumab (OPDIVO) 480 mg in sodium chloride 0.9 % 148 mL chemo IVPB            Patient Follow Up: 4 weeks    Patient was given instructions and counseling regarding her condition or for health maintenance advice. Please see specific information pulled into the AVS if appropriate.     Phani Hernández MD    8/14/2023

## 2023-08-15 ENCOUNTER — HOSPITAL ENCOUNTER (OUTPATIENT)
Dept: ONCOLOGY | Facility: HOSPITAL | Age: 68
Discharge: HOME | End: 2023-08-15
Admitting: INTERNAL MEDICINE
Payer: MEDICARE

## 2023-08-15 VITALS
OXYGEN SATURATION: 99 % | SYSTOLIC BLOOD PRESSURE: 120 MMHG | BODY MASS INDEX: 32.52 KG/M2 | WEIGHT: 190.48 LBS | DIASTOLIC BLOOD PRESSURE: 67 MMHG | HEIGHT: 64 IN | RESPIRATION RATE: 18 BRPM | TEMPERATURE: 97.8 F | HEART RATE: 74 BPM

## 2023-08-15 DIAGNOSIS — C34.12 MALIGNANT NEOPLASM OF UPPER LOBE OF LEFT LUNG: Primary | ICD-10-CM

## 2023-08-15 DIAGNOSIS — Z79.899 ENCOUNTER FOR LONG-TERM (CURRENT) USE OF HIGH-RISK MEDICATION: ICD-10-CM

## 2023-08-15 DIAGNOSIS — Z45.2 ENCOUNTER FOR ADJUSTMENT OR MANAGEMENT OF VASCULAR ACCESS DEVICE: ICD-10-CM

## 2023-08-15 PROCEDURE — 25010000002 NIVOLUMAB 240 MG/24ML SOLUTION 24 ML VIAL: Performed by: INTERNAL MEDICINE

## 2023-08-15 PROCEDURE — 25010000002 HEPARIN LOCK FLUSH PER 10 UNITS: Performed by: INTERNAL MEDICINE

## 2023-08-15 PROCEDURE — 96413 CHEMO IV INFUSION 1 HR: CPT

## 2023-08-15 RX ORDER — HEPARIN SODIUM (PORCINE) LOCK FLUSH IV SOLN 100 UNIT/ML 100 UNIT/ML
500 SOLUTION INTRAVENOUS AS NEEDED
OUTPATIENT
Start: 2023-08-15

## 2023-08-15 RX ORDER — HEPARIN SODIUM (PORCINE) LOCK FLUSH IV SOLN 100 UNIT/ML 100 UNIT/ML
500 SOLUTION INTRAVENOUS AS NEEDED
Status: DISCONTINUED | OUTPATIENT
Start: 2023-08-15 | End: 2023-08-16 | Stop reason: HOSPADM

## 2023-08-15 RX ORDER — SODIUM CHLORIDE 0.9 % (FLUSH) 0.9 %
20 SYRINGE (ML) INJECTION AS NEEDED
OUTPATIENT
Start: 2023-08-15

## 2023-08-15 RX ORDER — SODIUM CHLORIDE 0.9 % (FLUSH) 0.9 %
20 SYRINGE (ML) INJECTION AS NEEDED
Status: DISCONTINUED | OUTPATIENT
Start: 2023-08-15 | End: 2023-08-16 | Stop reason: HOSPADM

## 2023-08-15 RX ORDER — SODIUM CHLORIDE 9 MG/ML
250 INJECTION, SOLUTION INTRAVENOUS ONCE
Status: COMPLETED | OUTPATIENT
Start: 2023-08-15 | End: 2023-08-15

## 2023-08-15 RX ADMIN — HEPARIN SODIUM (PORCINE) LOCK FLUSH IV SOLN 100 UNIT/ML 500 UNITS: 100 SOLUTION at 10:41

## 2023-08-15 RX ADMIN — SODIUM CHLORIDE 480 MG: 9 INJECTION, SOLUTION INTRAVENOUS at 10:01

## 2023-08-15 RX ADMIN — SODIUM CHLORIDE 250 ML: 9 INJECTION, SOLUTION INTRAVENOUS at 10:01

## 2023-08-15 RX ADMIN — Medication 20 ML: at 10:41

## 2023-09-05 PROBLEM — C7B.03: Status: ACTIVE | Noted: 2022-06-14

## 2023-09-05 NOTE — PROGRESS NOTES
Follow Up Office Visit      Encounter Date: 09/07/2023   Patient Name: Linda Ortiz  YOB: 1955   Medical Record Number: [id number]   Primary Diagnosis: Secondary malignant neoplasm of bone [C79.51]     Cancer Staging   Malignant neoplasm of upper lobe of left lung  Staging form: Lung, AJCC 8th Edition  - Clinical: Stage IV (cM1) - Signed by Phani Hernández MD on 1/3/2023    Radiation Completion Date:  7/7/2022 to right hand     Chief Complaint:    Chief Complaint   Patient presents with    Follow-up     metastatic non-small cell lung cancer       Oncology/Hematology History Overview Note   5/19/2022 Left Adrenal mass biopsy at [facility] revealed: poorly differentiated non-small cell carcinoma, high grade. Positive fo AE1/3 and Cam5.2. Immunohistochemistry was positive for PAX8, HeParlGlypican3, Vimentin, focal positive for chromagranin, GATA3.  The case was sent to [facility] for second opinion with a final diagnosis of poorly differentiated carcinoma with diffuse reduced BRG-1 expression.    6/21/2022 XRT initiated to Right hand     7/19/2022 Opdivo + Yervoy initiated x 4 doses  10/11/2022 Opdivo only          Malignant neoplasm of upper lobe of left lung   6/9/2022 Initial Diagnosis    Lung cancer (HCC)     7/19/2022 -  Chemotherapy    OP LUNG Nivolumab 1 mg/kg / Ipilimumab 3 mg/kg / Nivolumab 480 mg        1/3/2023 Cancer Staged    Staging form: Lung, AJCC 8th Edition  - Clinical: Stage IV (cM1) - Signed by Phani Hernández MD on 1/3/2023     Secondary carcinoid tumor of bone   6/14/2022 Initial Diagnosis    Secondary carcinoid tumor of bone (HCC)     6/21/2022 - 7/7/2022 Radiation    Radiation OncologyTreatment Course:  Linda Ortiz received 3000 cGy in 10 fractions to right hand.          History of Present Illness: Linda Ortiz is a 68 y.o. female who returns to [facility] for routine 6 month follow-up. She reports feeling well overall with no new complaints or  concerns. She has been working with occupational therapy and has regained strength and use of her right hand although she does still have limited ROM in the right thumb. She is able to  and hold items with her right hand. She is also able to now use her right hand for activities of daily living including using a knife to cut food. She was unable to do any of these activities previously. She denies any pain in her hand. She denies headaches, change in vision, shortness of breath, focal weakness, confusion or imbalance. She does have a cough occasionally productive of yellow sputum. She followed up with Dr. Hernández on 8/14/23 and continues to receive nivolumab and is reportedly tolerating well. She underwent CT CAP earlier today; results not yet available. Her CT CAP on 6/14/23 showed stable findings.      Subjective      Review of Systems: Review of Systems   Constitutional:  Negative for appetite change, fatigue and unexpected weight change.   HENT:  Positive for rhinorrhea (Ongoing). Negative for hearing loss, sore throat and trouble swallowing.    Eyes:  Negative for visual disturbance (Wears glasses).   Respiratory:  Positive for cough (Productive, yellow secretions, improving.  Ongoing). Negative for chest tightness, shortness of breath and wheezing.    Cardiovascular:  Negative for chest pain, palpitations and leg swelling.   Gastrointestinal:  Negative for blood in stool, constipation, diarrhea, nausea and vomiting.   Genitourinary:  Negative for difficulty urinating, dysuria, frequency, hematuria and urgency.   Musculoskeletal:  Positive for arthralgias (Bilateral hands,ongoing). Negative for back pain, gait problem and joint swelling.        Decreased ROM in right thumb, ongoing.     Skin:  Negative for color change and rash.   Neurological:  Negative for dizziness, weakness and headaches.   Psychiatric/Behavioral:  Negative for sleep disturbance.      The following portions of the patient's history were  reviewed and updated as appropriate: allergies, current medications, past family history, past medical history, past social history, past surgical history and problem list.    Medications:     Current Outpatient Medications:     apixaban (Eliquis) 5 MG tablet tablet, Take 1 tablet by mouth 2 (Two) Times a Day., Disp: 60 tablet, Rfl: 6    famotidine (PEPCID) 20 MG tablet, Take 1 tablet by mouth Every 12 (Twelve) Hours., Disp: 180 tablet, Rfl: 1    fludrocortisone 0.1 MG tablet, Take 1 tablet by mouth Daily., Disp: 30 tablet, Rfl: 6    fluticasone-salmeterol (Advair HFA) 115-21 MCG/ACT inhaler, Inhale 2 puffs 2 (Two) Times a Day., Disp: 1 each, Rfl: 11    furosemide (LASIX) 40 MG tablet, TAKE 1 TABLET (40 MG TOTAL) BY MOUTH IN THE MORNING AND 1 TABLET (40 MG TOTAL) IN THE EVENING., Disp: , Rfl:     guaiFENesin (Mucinex) 600 MG 12 hr tablet, Take 1 tablet by mouth 2 (Two) Times a Day., Disp: 60 tablet, Rfl: 2    levothyroxine (Synthroid) 75 MCG tablet, Take 1 tablet by mouth Daily., Disp: 90 tablet, Rfl: 1    predniSONE (DELTASONE) 10 MG tablet, 0.5 tablets. 1qd, Disp: , Rfl:     tiotropium bromide monohydrate (SPIRIVA RESPIMAT) 2.5 MCG/ACT aerosol solution inhaler, Inhale 2 puffs Daily., Disp: 1 each, Rfl: 11    acetaminophen (TYLENOL) 325 MG tablet, Take 2 tablets by mouth Every 6 (Six) Hours As Needed for Mild Pain., Disp: , Rfl:     brompheniramine-pseudoephedrine-DM 30-2-10 MG/5ML syrup, Take 5 mL by mouth 4 (Four) Times a Day As Needed for Congestion. (Patient not taking: Reported on 7/17/2023), Disp: 118 mL, Rfl: 0    ipratropium-albuterol (DUO-NEB) 0.5-2.5 mg/3 ml nebulizer, Take 3 mL by nebulization 4 (Four) Times a Day As Needed for Wheezing. (Patient not taking: Reported on 9/7/2023), Disp: 360 mL, Rfl: 3    predniSONE (DELTASONE) 2.5 MG tablet, TAKE ONE TAB DAILY FOR A WEEK, THEN 1 TAB EVERY OTHER DAY FOR A WEEK, THEN 1 TAB EVERY 3RD DAY TILL COMPLETE. (Patient not taking: Reported on 9/7/2023), Disp: 15  tablet, Rfl: 0  No current facility-administered medications for this visit.    Facility-Administered Medications Ordered in Other Visits:     heparin 100 UNIT/ML injection  - ADS Override Pull, , , ,     Allergies:   Allergies   Allergen Reactions    Hydrocodone Nausea And Vomiting and Dizziness       Patient Smoking History:   Social History     Tobacco Use   Smoking Status Former    Packs/day: 1.00    Years: 47.00    Pack years: 47.00    Types: Cigarettes    Start date:     Quit date: 2022    Years since quittin.5    Passive exposure: Past   Smokeless Tobacco Never       Measures:  PHQ-9 Total Score: 0   Quality of Life: 100 - Full Activity   ECOG score: 0  ECOG: (0) Fully active, able to carry on all predisease performance without restriction  Pain: (on a scale of 0-10)   Pain Score    23 1409   PainSc: 0-No pain         Objective     Physical Exam:   Vital Signs:   Vitals:    23 1409   BP: 106/76   Pulse: 65   Resp: 16   Temp: 97.1 °F (36.2 °C)   TempSrc: Temporal   SpO2: 100%   Weight: 87.3 kg (192 lb 7.4 oz)   PainSc: 0-No pain     Body mass index is 33.02 kg/m².   Wt Readings from Last 3 Encounters:   23 87.3 kg (192 lb 7.4 oz)   08/15/23 86.4 kg (190 lb 7.6 oz)   23 85.3 kg (188 lb 0.8 oz)       Physical Exam  Vitals reviewed.   Constitutional:       General: She is not in acute distress.     Appearance: Normal appearance. She is normal weight. She is not ill-appearing.   HENT:      Head: Normocephalic and atraumatic.      Mouth/Throat:      Mouth: Mucous membranes are moist.      Pharynx: Oropharynx is clear. No oropharyngeal exudate or posterior oropharyngeal erythema.   Eyes:      Conjunctiva/sclera: Conjunctivae normal.      Pupils: Pupils are equal, round, and reactive to light.   Cardiovascular:      Rate and Rhythm: Normal rate and regular rhythm.      Pulses: Normal pulses.      Heart sounds: Normal heart sounds.      Comments: Port-A-Cath  Pulmonary:      Effort:  Pulmonary effort is normal. No respiratory distress.      Breath sounds: Normal breath sounds.   Musculoskeletal:         General: Normal range of motion.      Cervical back: Normal range of motion.      Comments: Stable atrophy of right hand most prominent at the thenar eminence; slight decreased range of motion within right hand; interval improvement in ROM within right hand, now able to make a full fist and  strength 4 out of 5.   Skin:     General: Skin is warm and dry.   Neurological:      General: No focal deficit present.      Mental Status: She is alert and oriented to person, place, and time. Mental status is at baseline.   Psychiatric:         Mood and Affect: Mood normal.         Behavior: Behavior normal.     Result Review: I independently reviewed the following data.   -- CT chest w contrast (08/14/2023 09:47) -not yet read  -- CT abdomen pelvis w contrast (08/14/2023 09:47) - not yet read  -- CT Abdomen Pelvis With Contrast (06/14/2023 17:40)   -- CT Chest With Contrast Diagnostic (06/14/2023 17:40)   -- CT Abdomen Pelvis With Contrast (03/24/2023 11:10)   -- CT Chest With Contrast Diagnostic (03/24/2023 11:10)     Pathology:   Lab Results   Component Value Date    CLININFO  09/30/2022     Hoarseness of voice, persistent cough and pneumonia.    Check for pneumocystis and fungus.      FINALDX  09/30/2022     1. Lung, lingula, BAL:   - Negative for malignant cells  - GMS stain, positive for fungal forms and negative for Pneumocystis, see comment        2. Lung, NOS, bronchial washing:   - Negative for malignant cells  - GMS stain, positive for fungal forms and negative for Pneumocystis, see comment       Imaging: CT CAP today on 9/7/23 results not yet available    Labs:   WBC   Date Value Ref Range Status   09/07/2023 8.58 3.40 - 10.80 10*3/mm3 Final   05/19/2022 7.81 4.5 - 11.0 10*3/uL Final     Hemoglobin   Date Value Ref Range Status   09/07/2023 13.5 12.0 - 15.9 g/dL Final   05/19/2022 9.7 (L)  12.0 - 16.0 g/dL Final     Hematocrit   Date Value Ref Range Status   09/07/2023 38.5 34.0 - 46.6 % Final   05/19/2022 30.2 (L) 36.0 - 46.0 % Final     Platelets   Date Value Ref Range Status   09/07/2023 259 140 - 450 10*3/mm3 Final   05/19/2022 436 140 - 440 10*3/uL Final     TSH   Date Value Ref Range Status   09/07/2023 2.090 0.270 - 4.200 uIU/mL Final     Assessment / Plan      Impression: Linda Ortiz is a pleasant 68 y.o. female with biopsy-proven stage IV poorly differentiated carcinoma consistent with lung primary. Staging scans demonstrate disease in the left upper lobe, right lobe, hilar mediastinal lymph nodes, bilateral adrenal glands and the right hand. MRI brain does not show evidence of intracranial metastatic disease. 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 She completed palliative radiation to the symptomatic right hand on 7/7/2022. She is with interval clinical response and significant improvement as the right hand is now functional and not painful. She continues to receive immunotherapy with nivolumab under the care of Dr. Hernández. She underwent CT CAP prior to today's appointment and results are not available. Her CT CAP on 6/14/2023 showed stable findings.     Assessment/Plan:   Diagnoses and all orders for this visit:    1. Secondary malignant neoplasm of bone (Primary)    2. Malignant neoplasm of upper lobe of left lung    3. Immunotherapy       Follow Up:   Return for follow-up in 6 months.   Follow-up with Dr. Hernández on 9/11/23 and continue immunotherapy as directed.   Follow-up with Pulmonology on 11/9/23.    Return in about 6 months (around 3/7/2024) for Office Visit.  Linda Ortiz was encouraged to contact me in the interim with any questions or concerns regarding her care.        KENNY Rivera  Radiation Oncology  [facility]    This document has been signed by KENNY Mohr on September 7, 2023  15:16 EDT

## 2023-09-06 DIAGNOSIS — I26.94 MULTIPLE SUBSEGMENTAL PULMONARY EMBOLI WITHOUT ACUTE COR PULMONALE: ICD-10-CM

## 2023-09-06 DIAGNOSIS — R05.8 PRODUCTIVE COUGH: ICD-10-CM

## 2023-09-06 DIAGNOSIS — E27.40 HYPOADRENALISM: ICD-10-CM

## 2023-09-06 DIAGNOSIS — C34.12 MALIGNANT NEOPLASM OF UPPER LOBE OF LEFT LUNG: ICD-10-CM

## 2023-09-06 DIAGNOSIS — J44.9 CHRONIC OBSTRUCTIVE PULMONARY DISEASE, UNSPECIFIED COPD TYPE: ICD-10-CM

## 2023-09-06 DIAGNOSIS — D64.9 ANEMIA, UNSPECIFIED TYPE: ICD-10-CM

## 2023-09-06 DIAGNOSIS — R53.1 WEAKNESS: ICD-10-CM

## 2023-09-06 DIAGNOSIS — R06.09 DYSPNEA ON EXERTION: ICD-10-CM

## 2023-09-06 DIAGNOSIS — F17.211 NICOTINE DEPENDENCE, CIGARETTES, IN REMISSION: ICD-10-CM

## 2023-09-06 DIAGNOSIS — J15.0 PNEUMONIA DUE TO KLEBSIELLA PNEUMONIAE, UNSPECIFIED LATERALITY, UNSPECIFIED PART OF LUNG: ICD-10-CM

## 2023-09-07 ENCOUNTER — TRANSCRIBE ORDERS (OUTPATIENT)
Dept: ADMINISTRATIVE | Facility: HOSPITAL | Age: 68
End: 2023-09-07
Payer: MEDICARE

## 2023-09-07 ENCOUNTER — HOSPITAL ENCOUNTER (OUTPATIENT)
Dept: CT IMAGING | Facility: HOSPITAL | Age: 68
Discharge: HOME | End: 2023-09-07
Payer: MEDICARE

## 2023-09-07 ENCOUNTER — LAB (OUTPATIENT)
Dept: LAB | Facility: HOSPITAL | Age: 68
End: 2023-09-07
Payer: MEDICARE

## 2023-09-07 ENCOUNTER — OFFICE VISIT (OUTPATIENT)
Dept: RADIATION ONCOLOGY | Facility: HOSPITAL | Age: 68
End: 2023-09-07
Payer: MEDICARE

## 2023-09-07 ENCOUNTER — APPOINTMENT (OUTPATIENT)
Dept: RADIATION ONCOLOGY | Facility: HOSPITAL | Age: 68
End: 2023-09-07
Payer: MEDICARE

## 2023-09-07 VITALS
TEMPERATURE: 97.1 F | HEART RATE: 65 BPM | RESPIRATION RATE: 16 BRPM | OXYGEN SATURATION: 100 % | WEIGHT: 192.46 LBS | BODY MASS INDEX: 33.02 KG/M2 | SYSTOLIC BLOOD PRESSURE: 106 MMHG | DIASTOLIC BLOOD PRESSURE: 76 MMHG

## 2023-09-07 DIAGNOSIS — C34.12 MALIGNANT NEOPLASM OF UPPER LOBE OF LEFT LUNG: ICD-10-CM

## 2023-09-07 DIAGNOSIS — C79.51 SECONDARY MALIGNANT NEOPLASM OF BONE: Primary | ICD-10-CM

## 2023-09-07 DIAGNOSIS — Z79.899 ENCOUNTER FOR LONG-TERM (CURRENT) USE OF HIGH-RISK MEDICATION: ICD-10-CM

## 2023-09-07 DIAGNOSIS — Z29.8 IMMUNOTHERAPY: ICD-10-CM

## 2023-09-07 DIAGNOSIS — R07.9 CHEST PAIN, UNSPECIFIED TYPE: Primary | ICD-10-CM

## 2023-09-07 DIAGNOSIS — R07.9 CHEST PAIN, UNSPECIFIED TYPE: ICD-10-CM

## 2023-09-07 LAB
ALBUMIN SERPL-MCNC: 4.4 G/DL (ref 3.5–5.2)
ALBUMIN/GLOB SERPL: 1.4 G/DL
ALP SERPL-CCNC: 56 U/L (ref 39–117)
ALT SERPL W P-5'-P-CCNC: 19 U/L (ref 1–33)
ANION GAP SERPL CALCULATED.3IONS-SCNC: 9.6 MMOL/L (ref 5–15)
AST SERPL-CCNC: 31 U/L (ref 1–32)
BASOPHILS # BLD AUTO: 0.03 10*3/MM3 (ref 0–0.2)
BASOPHILS NFR BLD AUTO: 0.3 % (ref 0–1.5)
BILIRUB SERPL-MCNC: 0.3 MG/DL (ref 0–1.2)
BUN SERPL-MCNC: 24 MG/DL (ref 8–23)
BUN/CREAT SERPL: 23.3 (ref 7–25)
CALCIUM SPEC-SCNC: 8.8 MG/DL (ref 8.6–10.5)
CHLORIDE SERPL-SCNC: 95 MMOL/L (ref 98–107)
CHOLEST SERPL-MCNC: 231 MG/DL (ref 0–200)
CO2 SERPL-SCNC: 30.4 MMOL/L (ref 22–29)
CREAT SERPL-MCNC: 1.03 MG/DL (ref 0.57–1)
DEPRECATED RDW RBC AUTO: 41.3 FL (ref 37–54)
EGFRCR SERPLBLD CKD-EPI 2021: 59.7 ML/MIN/1.73
EOSINOPHIL # BLD AUTO: 0.07 10*3/MM3 (ref 0–0.4)
EOSINOPHIL NFR BLD AUTO: 0.8 % (ref 0.3–6.2)
ERYTHROCYTE [DISTWIDTH] IN BLOOD BY AUTOMATED COUNT: 12 % (ref 12.3–15.4)
GLOBULIN UR ELPH-MCNC: 3.1 GM/DL
GLUCOSE SERPL-MCNC: 106 MG/DL (ref 65–99)
HCT VFR BLD AUTO: 38.5 % (ref 34–46.6)
HDLC SERPL-MCNC: 61 MG/DL (ref 40–60)
HGB BLD-MCNC: 13.5 G/DL (ref 12–15.9)
IMM GRANULOCYTES # BLD AUTO: 0.06 10*3/MM3 (ref 0–0.05)
IMM GRANULOCYTES NFR BLD AUTO: 0.7 % (ref 0–0.5)
LDLC SERPL CALC-MCNC: 133 MG/DL (ref 0–100)
LDLC/HDLC SERPL: 2.1 {RATIO}
LYMPHOCYTES # BLD AUTO: 1.15 10*3/MM3 (ref 0.7–3.1)
LYMPHOCYTES NFR BLD AUTO: 13.4 % (ref 19.6–45.3)
MCH RBC QN AUTO: 32.9 PG (ref 26.6–33)
MCHC RBC AUTO-ENTMCNC: 35.1 G/DL (ref 31.5–35.7)
MCV RBC AUTO: 93.9 FL (ref 79–97)
MONOCYTES # BLD AUTO: 0.7 10*3/MM3 (ref 0.1–0.9)
MONOCYTES NFR BLD AUTO: 8.2 % (ref 5–12)
NEUTROPHILS NFR BLD AUTO: 6.57 10*3/MM3 (ref 1.7–7)
NEUTROPHILS NFR BLD AUTO: 76.6 % (ref 42.7–76)
NRBC BLD AUTO-RTO: 0 /100 WBC (ref 0–0.2)
PLATELET # BLD AUTO: 259 10*3/MM3 (ref 140–450)
PMV BLD AUTO: 9.9 FL (ref 6–12)
POTASSIUM SERPL-SCNC: 4.1 MMOL/L (ref 3.5–5.2)
PROT SERPL-MCNC: 7.5 G/DL (ref 6–8.5)
RBC # BLD AUTO: 4.1 10*6/MM3 (ref 3.77–5.28)
SODIUM SERPL-SCNC: 135 MMOL/L (ref 136–145)
T4 FREE SERPL-MCNC: 1.56 NG/DL (ref 0.93–1.7)
TRIGL SERPL-MCNC: 210 MG/DL (ref 0–150)
TSH SERPL DL<=0.05 MIU/L-ACNC: 2.09 UIU/ML (ref 0.27–4.2)
VLDLC SERPL-MCNC: 37 MG/DL (ref 5–40)
WBC NRBC COR # BLD: 8.58 10*3/MM3 (ref 3.4–10.8)

## 2023-09-07 PROCEDURE — 84443 ASSAY THYROID STIM HORMONE: CPT

## 2023-09-07 PROCEDURE — 25510000001 IOPAMIDOL PER 1 ML: Performed by: INTERNAL MEDICINE

## 2023-09-07 PROCEDURE — 71260 CT THORAX DX C+: CPT

## 2023-09-07 PROCEDURE — G0463 HOSPITAL OUTPT CLINIC VISIT: HCPCS | Performed by: NURSE PRACTITIONER

## 2023-09-07 PROCEDURE — 36415 COLL VENOUS BLD VENIPUNCTURE: CPT

## 2023-09-07 PROCEDURE — 80061 LIPID PANEL: CPT

## 2023-09-07 PROCEDURE — 80053 COMPREHEN METABOLIC PANEL: CPT

## 2023-09-07 PROCEDURE — 84439 ASSAY OF FREE THYROXINE: CPT

## 2023-09-07 PROCEDURE — 85025 COMPLETE CBC W/AUTO DIFF WBC: CPT

## 2023-09-07 PROCEDURE — 74177 CT ABD & PELVIS W/CONTRAST: CPT

## 2023-09-07 RX ORDER — PREDNISONE 2.5 MG/1
TABLET ORAL
Qty: 15 TABLET | Refills: 0 | Status: SHIPPED | OUTPATIENT
Start: 2023-09-07

## 2023-09-07 RX ORDER — HEPARIN SODIUM (PORCINE) LOCK FLUSH IV SOLN 100 UNIT/ML 100 UNIT/ML
SOLUTION INTRAVENOUS
Status: DISCONTINUED
Start: 2023-09-07 | End: 2023-09-08 | Stop reason: HOSPADM

## 2023-09-07 RX ADMIN — IOPAMIDOL 100 ML: 755 INJECTION, SOLUTION INTRAVENOUS at 13:56

## 2023-09-08 ENCOUNTER — TELEPHONE (OUTPATIENT)
Dept: ONCOLOGY | Facility: HOSPITAL | Age: 68
End: 2023-09-08
Payer: MEDICARE

## 2023-09-08 NOTE — TELEPHONE ENCOUNTER
Caller: Linda Ortiz    Relationship: Self    Best call back number: [phone]      What was the call regarding: PT MISSED CALL FROM THE OFFICE, NO MESSAGE LEFT

## 2023-09-11 ENCOUNTER — OFFICE VISIT (OUTPATIENT)
Dept: ONCOLOGY | Facility: HOSPITAL | Age: 68
End: 2023-09-11
Payer: MEDICARE

## 2023-09-11 VITALS
TEMPERATURE: 97.4 F | RESPIRATION RATE: 16 BRPM | DIASTOLIC BLOOD PRESSURE: 56 MMHG | HEART RATE: 70 BPM | OXYGEN SATURATION: 96 % | SYSTOLIC BLOOD PRESSURE: 109 MMHG | WEIGHT: 192.46 LBS | BODY MASS INDEX: 33.02 KG/M2

## 2023-09-11 DIAGNOSIS — C34.12 MALIGNANT NEOPLASM OF UPPER LOBE OF LEFT LUNG: Primary | ICD-10-CM

## 2023-09-11 DIAGNOSIS — Z79.899 ENCOUNTER FOR LONG-TERM (CURRENT) USE OF HIGH-RISK MEDICATION: ICD-10-CM

## 2023-09-11 PROCEDURE — G0463 HOSPITAL OUTPT CLINIC VISIT: HCPCS

## 2023-09-11 PROCEDURE — G0463 HOSPITAL OUTPT CLINIC VISIT: HCPCS | Performed by: INTERNAL MEDICINE

## 2023-09-11 NOTE — PROGRESS NOTES
Chief Complaint  Lung Cancer    Anuradha Hope MD Patel, Jahin, MD    Subjective          Linda Ortiz presents to [facility] for ongoing treatment of her lung cancer.  She is on maintenance nivolumab.  Tolerating the infusions well.  She denies new masses or adenopathy.  No unusual aches or pains.  She reports adequate appetite and energy level.    Oncology/Hematology History Overview Note   5/19/2022 Left Adrenal mass biopsy at [facility] revealed: poorly differentiated non-small cell carcinoma, high grade. Positive fo AE1/3 and Cam5.2. Immunohistochemistry was positive for PAX8, HeParlGlypican3, Vimentin, focal positive for chromagranin, GATA3.  The case was sent to [facility] for second opinion with a final diagnosis of poorly differentiated carcinoma with diffuse reduced BRG-1 expression.    6/21/2022 XRT initiated to Right hand     7/19/2022 Opdivo + Yervoy initiated x 4 doses  10/11/2022 Opdivo only          Malignant neoplasm of upper lobe of left lung   6/9/2022 Initial Diagnosis    Lung cancer (HCC)     7/19/2022 -  Chemotherapy    OP LUNG Nivolumab 1 mg/kg / Ipilimumab 3 mg/kg / Nivolumab 480 mg        1/3/2023 Cancer Staged    Staging form: Lung, AJCC 8th Edition  - Clinical: Stage IV (cM1) - Signed by Phani Hernández MD on 1/3/2023     Secondary carcinoid tumor of bone   6/14/2022 Initial Diagnosis    Secondary carcinoid tumor of bone (HCC)     6/21/2022 - 7/7/2022 Radiation    Radiation OncologyTreatment Course:  Linda Ortiz received 3000 cGy in 10 fractions to right hand.          Review of Systems   Constitutional:  Negative for appetite change, diaphoresis, fatigue, fever, unexpected weight gain and unexpected weight loss.   HENT:  Negative for hearing loss, mouth sores, sore throat, swollen glands, trouble swallowing and voice change.    Eyes:  Negative for blurred vision.   Respiratory:  Negative for cough, shortness of breath and wheezing.     Cardiovascular:  Negative for chest pain and palpitations.   Gastrointestinal:  Negative for abdominal pain, blood in stool, constipation, diarrhea, nausea and vomiting.   Endocrine: Negative for cold intolerance and heat intolerance.   Genitourinary:  Negative for difficulty urinating, dysuria, frequency, hematuria and urinary incontinence.   Musculoskeletal:  Negative for arthralgias, back pain and myalgias.   Skin:  Negative for rash, skin lesions and wound.   Neurological:  Negative for dizziness, seizures, weakness, numbness and headache.   Hematological:  Does not bruise/bleed easily.   Psychiatric/Behavioral:  Negative for depressed mood. The patient is not nervous/anxious.    Current Outpatient Medications on File Prior to Visit   Medication Sig Dispense Refill    acetaminophen (TYLENOL) 325 MG tablet Take 2 tablets by mouth Every 6 (Six) Hours As Needed for Mild Pain.      apixaban (Eliquis) 5 MG tablet tablet Take 1 tablet by mouth 2 (Two) Times a Day. 60 tablet 6    famotidine (PEPCID) 20 MG tablet Take 1 tablet by mouth Every 12 (Twelve) Hours. 180 tablet 1    fludrocortisone 0.1 MG tablet Take 1 tablet by mouth Daily. 30 tablet 6    fluticasone-salmeterol (Advair HFA) 115-21 MCG/ACT inhaler Inhale 2 puffs 2 (Two) Times a Day. 1 each 11    furosemide (LASIX) 40 MG tablet TAKE 1 TABLET (40 MG TOTAL) BY MOUTH IN THE MORNING AND 1 TABLET (40 MG TOTAL) IN THE EVENING.      guaiFENesin (Mucinex) 600 MG 12 hr tablet Take 1 tablet by mouth 2 (Two) Times a Day. 60 tablet 2    levothyroxine (Synthroid) 75 MCG tablet Take 1 tablet by mouth Daily. 90 tablet 1    predniSONE (DELTASONE) 10 MG tablet 0.5 tablets. 1qd      predniSONE (DELTASONE) 2.5 MG tablet TAKE ONE TAB DAILY FOR A WEEK, THEN 1 TAB EVERY OTHER DAY FOR A WEEK, THEN 1 TAB EVERY 3RD DAY TILL COMPLETE. 15 tablet 0    tiotropium bromide monohydrate (SPIRIVA RESPIMAT) 2.5 MCG/ACT aerosol solution inhaler Inhale 2 puffs Daily. 1 each 11     No current  facility-administered medications on file prior to visit.       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    Hypocalcemia 2022    Malignant neoplasm of upper lobe of left lung 2022    Metastatic cancer     Multiple subsegmental pulmonary emboli without acute cor pulmonale 10/10/2022     Past Surgical History:   Procedure Laterality Date    BRONCHOSCOPY N/A 2022    Procedure: BRONCHOSCOPY WITH BAL, WASHINGS;  Surgeon: Fausto Leon MD;  Location: [facility] ENDOSCOPY;  Service: Pulmonary;  Laterality: N/A;  PNEUMONIA, PERSISTANT COUGH    HIP SURGERY Bilateral     KNEE ARTHROSCOPY      VENOUS ACCESS DEVICE (PORT) INSERTION N/A 7/15/2022    Procedure: INSERTION VENOUS ACCESS DEVICE;  Surgeon: Eber Hull MD;  Location: [facility] OR [facility];  Service: General;  Laterality: N/A;     Social History     Socioeconomic History    Marital status: Single   Tobacco Use    Smoking status: Former     Packs/day: 1.00     Years: 47.00     Pack years: 47.00     Types: Cigarettes     Start date:      Quit date: 2022     Years since quittin.6     Passive exposure: Past    Smokeless tobacco: Never   Vaping Use    Vaping Use: Former    Start date: 2013    Quit date: 2014   Substance and Sexual Activity    Alcohol use: Not Currently    Drug use: Never    Sexual activity: Defer     Family History   Problem Relation Age of Onset    Breast cancer Mother     Lung cancer Father     Thyroid cancer Maternal Aunt     Breast cancer Maternal Aunt     Lung cancer Paternal Aunt     COPD Paternal Uncle        Objective   Physical Exam  Vitals reviewed. Exam conducted with a chaperone present.   Constitutional:       General: She is not in acute distress.     Appearance: Normal appearance.   Cardiovascular:       Rate and Rhythm: Normal rate and regular rhythm.      Heart sounds: Normal heart sounds. No murmur heard.    No gallop.   Pulmonary:      Effort: Pulmonary effort is normal.      Breath sounds: Normal breath sounds.      Comments: Port-A-Cath  Abdominal:      General: Abdomen is flat. Bowel sounds are normal.      Palpations: Abdomen is soft.   Musculoskeletal:      Right lower leg: No edema.      Left lower leg: No edema.   Neurological:      Mental Status: She is alert and oriented to person, place, and time.   Psychiatric:         Mood and Affect: Mood normal.         Behavior: Behavior normal.       Vitals:    09/11/23 1027   BP: 109/56   Pulse: 70   Resp: 16   Temp: 97.4 °F (36.3 °C)   TempSrc: Temporal   SpO2: 96%   Weight: 87.3 kg (192 lb 7.4 oz)   PainSc: 0-No pain     ECOG score: 0         PHQ-9 Total Score:                    Result Review :   The following data was reviewed by: Phani Hernández MD on 09/11/2023:  Lab Results   Component Value Date    HGB 13.5 09/07/2023    HCT 38.5 09/07/2023    MCV 93.9 09/07/2023     09/07/2023    WBC 8.58 09/07/2023    NEUTROABS 6.57 09/07/2023    LYMPHSABS 1.15 09/07/2023    MONOSABS 0.70 09/07/2023    EOSABS 0.07 09/07/2023    BASOSABS 0.03 09/07/2023     Lab Results   Component Value Date    GLUCOSE 106 (H) 09/07/2023    BUN 24 (H) 09/07/2023    CREATININE 1.03 (H) 09/07/2023     (L) 09/07/2023    K 4.1 09/07/2023    CL 95 (L) 09/07/2023    CO2 30.4 (H) 09/07/2023    CALCIUM 8.8 09/07/2023    PROTEINTOT 7.5 09/07/2023    ALBUMIN 4.4 09/07/2023    BILITOT 0.3 09/07/2023    ALKPHOS 56 09/07/2023    AST 31 09/07/2023    ALT 19 09/07/2023     Lab Results   Component Value Date    MG 2.1 04/14/2023    PHOS 3.6 04/14/2023    FREET4 1.56 09/07/2023    TSH 2.090 09/07/2023     Lab Results   Component Value Date    IRON 46 10/01/2022    LABIRON 58 (H) 10/01/2022    TRANSFERRIN 53 (L) 10/01/2022    TIBC 79 (L) 10/01/2022     Lab Results   Component Value Date     FERRITIN 3,023.00 (H) 10/01/2022    WEVVBALJ33 732 03/24/2023    FOLATE >20.00 03/24/2023     No results found for: PSA, CEA, AFP, ,     Data reviewed : Radiologic studies CT chest, abdomen, pelvis reviewed       Assessment and Plan    Diagnoses and all orders for this visit:    1. Malignant neoplasm of upper lobe of left lung (Primary)  Assessment & Plan:  Patient is on maintenance nivolumab for her metastatic lung cancer.  Tolerating well.  Restaging scans show stable disease with no evidence of new or worsening findings.  Lab work today is adequate for treatment.  Proceed with nivolumab as planned.  RTC 4 weeks for OV, nivolumab with lab work prior to monitor for toxicities.    Orders:  -     CBC and Differential; Future  -     Comprehensive metabolic panel; Future  -     TSH; Future  -     T4, free; Future    2. Encounter for long-term (current) use of high-risk medication  -     CBC and Differential; Future  -     Comprehensive metabolic panel; Future  -     TSH; Future  -     T4, free; Future            Patient Follow Up: 4 weeks    Patient was given instructions and counseling regarding her condition or for health maintenance advice. Please see specific information pulled into the AVS if appropriate.     Phani Hernández MD    9/12/2023

## 2023-09-12 ENCOUNTER — DOCUMENTATION (OUTPATIENT)
Dept: ONCOLOGY | Facility: HOSPITAL | Age: 68
End: 2023-09-12
Payer: MEDICARE

## 2023-09-12 ENCOUNTER — HOSPITAL ENCOUNTER (OUTPATIENT)
Dept: ONCOLOGY | Facility: HOSPITAL | Age: 68
Discharge: HOME | End: 2023-09-12
Admitting: INTERNAL MEDICINE
Payer: MEDICARE

## 2023-09-12 VITALS
RESPIRATION RATE: 18 BRPM | DIASTOLIC BLOOD PRESSURE: 64 MMHG | TEMPERATURE: 97 F | OXYGEN SATURATION: 99 % | WEIGHT: 193.56 LBS | HEIGHT: 64 IN | HEART RATE: 67 BPM | BODY MASS INDEX: 33.05 KG/M2 | SYSTOLIC BLOOD PRESSURE: 121 MMHG

## 2023-09-12 DIAGNOSIS — Z79.899 ENCOUNTER FOR LONG-TERM (CURRENT) USE OF HIGH-RISK MEDICATION: ICD-10-CM

## 2023-09-12 DIAGNOSIS — C34.12 MALIGNANT NEOPLASM OF UPPER LOBE OF LEFT LUNG: Primary | ICD-10-CM

## 2023-09-12 DIAGNOSIS — Z45.2 ENCOUNTER FOR ADJUSTMENT OR MANAGEMENT OF VASCULAR ACCESS DEVICE: ICD-10-CM

## 2023-09-12 PROCEDURE — 25010000002 NIVOLUMAB 240 MG/24ML SOLUTION 24 ML VIAL: Performed by: INTERNAL MEDICINE

## 2023-09-12 PROCEDURE — 25010000002 HEPARIN LOCK FLUSH PER 10 UNITS: Performed by: INTERNAL MEDICINE

## 2023-09-12 PROCEDURE — 96413 CHEMO IV INFUSION 1 HR: CPT

## 2023-09-12 RX ORDER — SODIUM CHLORIDE 0.9 % (FLUSH) 0.9 %
20 SYRINGE (ML) INJECTION AS NEEDED
OUTPATIENT
Start: 2023-09-12

## 2023-09-12 RX ORDER — SODIUM CHLORIDE 9 MG/ML
250 INJECTION, SOLUTION INTRAVENOUS ONCE
Status: COMPLETED | OUTPATIENT
Start: 2023-09-12 | End: 2023-09-12

## 2023-09-12 RX ORDER — HEPARIN SODIUM (PORCINE) LOCK FLUSH IV SOLN 100 UNIT/ML 100 UNIT/ML
500 SOLUTION INTRAVENOUS AS NEEDED
OUTPATIENT
Start: 2023-09-12

## 2023-09-12 RX ORDER — SODIUM CHLORIDE 9 MG/ML
250 INJECTION, SOLUTION INTRAVENOUS ONCE
Status: CANCELLED | OUTPATIENT
Start: 2023-09-12

## 2023-09-12 RX ORDER — SODIUM CHLORIDE 0.9 % (FLUSH) 0.9 %
20 SYRINGE (ML) INJECTION AS NEEDED
Status: DISCONTINUED | OUTPATIENT
Start: 2023-09-12 | End: 2023-09-13 | Stop reason: HOSPADM

## 2023-09-12 RX ORDER — HEPARIN SODIUM (PORCINE) LOCK FLUSH IV SOLN 100 UNIT/ML 100 UNIT/ML
500 SOLUTION INTRAVENOUS AS NEEDED
Status: DISCONTINUED | OUTPATIENT
Start: 2023-09-12 | End: 2023-09-13 | Stop reason: HOSPADM

## 2023-09-12 RX ADMIN — Medication 20 ML: at 10:00

## 2023-09-12 RX ADMIN — SODIUM CHLORIDE 480 MG: 9 INJECTION, SOLUTION INTRAVENOUS at 09:24

## 2023-09-12 RX ADMIN — SODIUM CHLORIDE 250 ML: 9 INJECTION, SOLUTION INTRAVENOUS at 09:20

## 2023-09-12 RX ADMIN — HEPARIN SODIUM (PORCINE) LOCK FLUSH IV SOLN 100 UNIT/ML 500 UNITS: 100 SOLUTION at 10:01

## 2023-09-12 NOTE — PROGRESS NOTES
Diagnosis: Metastatic lung cancer    Reason for Referral: Financial assistance with rent and transportation expenses    Content of Visit: OSW met with Ms. Ortiz in the medical oncology infusion center this morning. Ms. Ortiz was very pleasant and provided OSW with a copy of her rental lease and W-9 to apply for additional assistance through [address]. OSW assisted her in completing and submitting this application today. Also provided Ms. Ortiz with a $15 gas card. Encouraged OSW support remains available. She expressed gratitude.    9/13/23: Received email from Analia Garcia confirming they mailed a check in the amount of $565 to [employer] for Ms. Ortiz's rent. Ms. Ortiz was copied on this email as well. OSW support remains available.    Resources/Referrals Provided: Rental assistance - [address]; gas assistance - $15 gas card

## 2023-09-12 NOTE — ASSESSMENT & PLAN NOTE
Patient is on maintenance nivolumab for her metastatic lung cancer.  Tolerating well.  Restaging scans show stable disease with no evidence of new or worsening findings.  Lab work today is adequate for treatment.  Proceed with nivolumab as planned.  RTC 4 weeks for OV, nivolumab with lab work prior to monitor for toxicities.

## 2023-09-13 DIAGNOSIS — E27.40 HYPOADRENALISM: ICD-10-CM

## 2023-09-13 DIAGNOSIS — C34.12 MALIGNANT NEOPLASM OF UPPER LOBE OF LEFT LUNG: ICD-10-CM

## 2023-09-13 DIAGNOSIS — R06.09 DYSPNEA ON EXERTION: ICD-10-CM

## 2023-09-13 DIAGNOSIS — I26.94 MULTIPLE SUBSEGMENTAL PULMONARY EMBOLI WITHOUT ACUTE COR PULMONALE: ICD-10-CM

## 2023-09-13 DIAGNOSIS — R05.8 PRODUCTIVE COUGH: ICD-10-CM

## 2023-09-13 DIAGNOSIS — J15.0 PNEUMONIA DUE TO KLEBSIELLA PNEUMONIAE, UNSPECIFIED LATERALITY, UNSPECIFIED PART OF LUNG: ICD-10-CM

## 2023-09-13 DIAGNOSIS — D64.9 ANEMIA, UNSPECIFIED TYPE: ICD-10-CM

## 2023-09-13 DIAGNOSIS — F17.211 NICOTINE DEPENDENCE, CIGARETTES, IN REMISSION: ICD-10-CM

## 2023-09-13 DIAGNOSIS — J44.9 CHRONIC OBSTRUCTIVE PULMONARY DISEASE, UNSPECIFIED COPD TYPE: ICD-10-CM

## 2023-09-13 DIAGNOSIS — R53.1 WEAKNESS: ICD-10-CM

## 2023-09-13 RX ORDER — PREDNISONE 2.5 MG/1
TABLET ORAL
Qty: 15 TABLET | Refills: 0 | OUTPATIENT
Start: 2023-09-13

## 2023-09-13 NOTE — TELEPHONE ENCOUNTER
Caller: Linda Ortiz    Relationship: Self    Best call back number: [phone] (home)       Requested Prescriptions:   Requested Prescriptions     Pending Prescriptions Disp Refills    predniSONE (DELTASONE) 2.5 MG tablet 15 tablet 0        Pharmacy where request should be sent:  [facility]    Last office visit with prescribing clinician: 4/27/2023   Last telemedicine visit with prescribing clinician: Visit date not found   Next office visit with prescribing clinician: Visit date not found     Additional details provided by patient: PT STATES THAT RX WAS DECLINED AT PHARMACY. IF IT CAN NOT BE REFILLED PLEASE CONTACT PT.     Does the patient have less than a 3 day supply:  [] Yes  [x] No    Would you like a call back once the refill request has been completed: [] Yes [x] No    If the office needs to give you a call back, can they leave a voicemail: [] Yes [x] No    Esdras Mckeon Rep   09/13/23 12:52 EDT

## 2023-09-27 ENCOUNTER — OFFICE VISIT (OUTPATIENT)
Dept: PODIATRY | Facility: CLINIC | Age: 68
End: 2023-09-27
Payer: MEDICARE

## 2023-09-27 VITALS
TEMPERATURE: 97.7 F | DIASTOLIC BLOOD PRESSURE: 80 MMHG | HEART RATE: 68 BPM | OXYGEN SATURATION: 92 % | HEIGHT: 64 IN | WEIGHT: 191 LBS | SYSTOLIC BLOOD PRESSURE: 126 MMHG | BODY MASS INDEX: 32.61 KG/M2

## 2023-09-27 DIAGNOSIS — M79.672 FOOT PAIN, BILATERAL: Primary | ICD-10-CM

## 2023-09-27 DIAGNOSIS — M79.671 FOOT PAIN, BILATERAL: Primary | ICD-10-CM

## 2023-09-27 DIAGNOSIS — L60.0 ONYCHOCRYPTOSIS: ICD-10-CM

## 2023-09-27 DIAGNOSIS — B35.1 ONYCHOMYCOSIS: ICD-10-CM

## 2023-09-27 NOTE — PROGRESS NOTES
[facility] - PODIATRY    Today's Date: 09/27/23    Patient Name: Linda Ortiz  MRN: [id number]  CSN: [id number]  PCP: Anuradha Hope MD, Last PCP Visit:  5/5/2023  Referring Provider: No ref. provider found    SUBJECTIVE     Chief Complaint   Patient presents with    Left Foot - Follow-up, Nail Problem    Right Foot - Follow-up, Nail Problem     HPI: Linda Ortiz, a 68 y.o.female, comes to clinic.    New, Established, New Problem:  est  Location:  Toenails  Duration:   Greater than five years  Onset:  Gradual  Nature:  sore with palpation.  Stable, worsening, improving:   Recurring  Aggravating factors:  Pain with shoe gear and ambulation.  Previous Treatment: debridement    Medical changes:  none    Patient denies any fevers, chills, nausea, vomiting, shortness of breath, nor any other constitutional signs nor symptoms.       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    Hypocalcemia 08/09/2022    Malignant neoplasm of upper lobe of left lung 06/09/2022    Metastatic cancer     Multiple subsegmental pulmonary emboli without acute cor pulmonale 10/10/2022     Past Surgical History:   Procedure Laterality Date    BRONCHOSCOPY N/A 9/30/2022    Procedure: BRONCHOSCOPY WITH BAL, WASHINGS;  Surgeon: Fausto Leon MD;  Location: [facility] ENDOSCOPY;  Service: Pulmonary;  Laterality: N/A;  PNEUMONIA, PERSISTANT COUGH    HIP SURGERY Bilateral     KNEE ARTHROSCOPY      VENOUS ACCESS DEVICE (PORT) INSERTION N/A 7/15/2022    Procedure: INSERTION VENOUS ACCESS DEVICE;  Surgeon: Eber Hull MD;  Location: [facility];  Service: General;  Laterality: N/A;     Family History   Problem Relation Age of Onset    Breast cancer Mother     Lung cancer Father     Thyroid cancer Maternal Aunt     Breast cancer Maternal Aunt     Lung cancer Paternal Aunt     COPD Paternal Uncle      Social  History     Socioeconomic History    Marital status: Single   Tobacco Use    Smoking status: Former     Packs/day: 1.00     Years: 47.00     Pack years: 47.00     Types: Cigarettes     Start date:      Quit date: 2022     Years since quittin.6     Passive exposure: Past    Smokeless tobacco: Never   Vaping Use    Vaping Use: Former    Start date: 2013    Quit date: 2014   Substance and Sexual Activity    Alcohol use: Not Currently    Drug use: Never    Sexual activity: Defer     Allergies   Allergen Reactions    Hydrocodone Nausea And Vomiting and Dizziness     Current Outpatient Medications   Medication Sig Dispense Refill    acetaminophen (TYLENOL) 325 MG tablet Take 2 tablets by mouth Every 6 (Six) Hours As Needed for Mild Pain.      apixaban (Eliquis) 5 MG tablet tablet Take 1 tablet by mouth 2 (Two) Times a Day. 60 tablet 6    famotidine (PEPCID) 20 MG tablet Take 1 tablet by mouth Every 12 (Twelve) Hours. 180 tablet 1    fludrocortisone 0.1 MG tablet Take 1 tablet by mouth Daily. 30 tablet 6    fluticasone-salmeterol (Advair HFA) 115-21 MCG/ACT inhaler Inhale 2 puffs 2 (Two) Times a Day. 1 each 11    furosemide (LASIX) 40 MG tablet TAKE 1 TABLET (40 MG TOTAL) BY MOUTH IN THE MORNING AND 1 TABLET (40 MG TOTAL) IN THE EVENING.      guaiFENesin (Mucinex) 600 MG 12 hr tablet Take 1 tablet by mouth 2 (Two) Times a Day. 60 tablet 2    levothyroxine (Synthroid) 75 MCG tablet Take 1 tablet by mouth Daily. 90 tablet 1    predniSONE (DELTASONE) 10 MG tablet 0.5 tablets. 1qd      predniSONE (DELTASONE) 2.5 MG tablet TAKE ONE TAB DAILY FOR A WEEK, THEN 1 TAB EVERY OTHER DAY FOR A WEEK, THEN 1 TAB EVERY 3RD DAY TILL COMPLETE. 15 tablet 0    tiotropium bromide monohydrate (SPIRIVA RESPIMAT) 2.5 MCG/ACT aerosol solution inhaler Inhale 2 puffs Daily. 1 each 11     No current facility-administered medications for this visit.     Review of Systems   Constitutional: Negative.    Skin:         Painful  toenails.   All other systems reviewed and are negative.    OBJECTIVE     Vitals:    09/27/23 1518   BP: 126/80   Pulse: 68   Temp: 97.7 °F (36.5 °C)   SpO2: 92%       Patient seen in no apparent distress.      PHYSICAL EXAM:     Foot/Ankle Exam    GENERAL  Appearance:  elderly  Orientation:  AAOx3  Affect:  appropriate  Gait:  unimpaired  Assistance:  independent  Right shoe gear: casual shoe  Left shoe gear: casual shoe    VASCULAR     Right Foot Vascularity   Dorsalis pedis:  2+  Posterior tibial:  2+  Skin temperature:  warm  Edema grading:  None  CFT:  < 3 seconds  Pedal hair growth:  Absent  Varicosities:  mild varicosities     Left Foot Vascularity   Dorsalis pedis:  2+  Posterior tibial:  2+  Skin temperature:  warm  Edema grading:  None  CFT:  < 3 seconds  Pedal hair growth:  Absent  Varicosities:  mild varicosities     NEUROLOGIC     Right Foot Neurologic   Normal sensation    Light touch sensation: normal  Vibratory sensation: normal  Hot/Cold sensation: normal  Protective Sensation using Chelsea-Azeem Monofilament:   Sites intact: 10  Sites tested: 10     Left Foot Neurologic   Normal sensation    Light touch sensation: normal  Vibratory sensation: normal  Hot/Cold sensation:  normal  Protective Sensation using Chelsea-Azeem Monofilament:   Sites intact: 10  Sites tested: 10    MUSCLE STRENGTH     Right Foot Muscle Strength   Foot dorsiflexion:  4  Foot plantar flexion:  4  Foot inversion:  4  Foot eversion:  4     Left Foot Muscle Strength   Foot dorsiflexion:  4  Foot plantar flexion:  4  Foot inversion:  4  Foot eversion:  4    RANGE OF MOTION     Right Foot Range of Motion   Foot and ankle ROM within normal limits       Left Foot Range of Motion   Foot and ankle ROM within normal limits      DERMATOLOGIC      Right Foot Dermatologic   Skin  Right foot skin is intact.   Nails  1.  Positive for elongated, onychomycosis, abnormal thickness, subungual debris, dystrophic nail and ingrown  toenail.  2.  Positive for elongated, onychomycosis, abnormal thickness, subungual debris, dystrophic nail and ingrown toenail.  3.  Positive for elongated, onychomycosis, abnormal thickness, subungual debris, dystrophic nail and ingrown toenail.  4.  Positive for elongated, onychomycosis, abnormal thickness, subungual debris, dystrophic nail and ingrown toenail.  5.  Positive for elongated, onychomycosis, abnormal thickness, subungual debris, dystrophic nail and ingrown toenail.     Left Foot Dermatologic   Skin  Left foot skin is intact.   Nails  1.  Positive for elongated, onychomycosis, abnormal thickness, subungual debris, dystrophic nail and ingrown toenail.  2.  Positive for elongated, onychomycosis, abnormal thickness, subungual debris, dystrophic nail and ingrown toenail.  3.  Positive for elongated, onychomycosis, abnormal thickness, subungual debris, dystrophic nail and ingrown toenail.  4.  Positive for elongated, onychomycosis, abnormally thick, subungual debris, dystrophic nail and ingrown toenail.  5.  Positive for elongated, onychomycosis, abnormally thick, subungual debris, dystrophic nail and ingrown toenail.    ASSESSMENT/PLAN     Diagnoses and all orders for this visit:    1. Foot pain, bilateral (Primary)    2. Onychomycosis    3. Onychocryptosis        Comprehensive lower extremity examination and evaluation was performed.    Discussed findings and treatment plan including risks, benefits, and treatment options with patient in detail. Patient agreed with treatment plan.    Toenails 1, 2, 3, 4, 5 on Right and 1, 2, 3, 4, 5 on Left were debrided with nail nippers then filed with a Tormel nail kamla.  Patient tolerated procedure well without complications.    An After Visit Summary was printed and given to the patient at discharge, including (if requested) any available informative/educational handouts regarding diagnosis, treatment, or medications. All questions were answered to patient/family  satisfaction. Should symptoms fail to improve or worsen they agree to call or return to clinic or to go to the Emergency Department. Discussed the importance of following up with any needed screening tests/labs/specialist appointments and any requested follow-up recommended by me today. Importance of maintaining follow-up discussed and patient accepts that missed appointments can delay diagnosis and potentially lead to worsening of conditions.    Return in about 9 weeks (around 11/29/2023) for Toenail Care., or sooner if acute issues arise.    This document has been electronically signed by Gustavo Barrera DPM on September 27, 2023 15:27 EDT

## 2023-10-05 ENCOUNTER — OFFICE VISIT (OUTPATIENT)
Dept: FAMILY MEDICINE CLINIC | Facility: CLINIC | Age: 68
End: 2023-10-05
Payer: MEDICARE

## 2023-10-05 VITALS
BODY MASS INDEX: 33.38 KG/M2 | OXYGEN SATURATION: 98 % | HEART RATE: 74 BPM | TEMPERATURE: 97.8 F | DIASTOLIC BLOOD PRESSURE: 68 MMHG | WEIGHT: 195.5 LBS | SYSTOLIC BLOOD PRESSURE: 118 MMHG | HEIGHT: 64 IN

## 2023-10-05 DIAGNOSIS — R09.81 SINUS CONGESTION: ICD-10-CM

## 2023-10-05 DIAGNOSIS — H61.23 IMPACTED CERUMEN OF BOTH EARS: Primary | ICD-10-CM

## 2023-10-05 NOTE — PROGRESS NOTES
"Linda Ortiz presents to [facility] with complaints of head congestion and right ear with pecking sounds      History of Present Illness  This is a 68-year-old female that presents today with head congestion and also with complaints that it sounds like pecking in her right ear.   She states that for approximately one week she has had head congestion. This pecking in her ear is her main concern and she states this has been present for a little bit.   She has seasonal allergies and takes Zyrtec intermittently but not routinely.   She also has Stage IV lung cancer and is currently undergoing treatments.     The following portions of the patient's history were personally reviewed and updated as appropriate: allergies, current medications, past medical history, past surgical history, past family history, and past social history.       Objective   Vital Signs:   /68 (BP Location: Left arm, Patient Position: Sitting, Cuff Size: Adult)   Pulse 74   Temp 97.8 °F (36.6 °C) (Temporal)   Ht 162.6 cm (64\")   Wt 88.7 kg (195 lb 8 oz)   SpO2 98%   BMI 33.56 kg/m²     Body mass index is 33.56 kg/m².    All labs, imaging, test results, and specialty provider notes reviewed with patient.     Physical Exam  Vitals and nursing note reviewed.   Constitutional:       Appearance: Normal appearance.   HENT:      Right Ear: There is impacted cerumen.      Left Ear: There is impacted cerumen.   Cardiovascular:      Rate and Rhythm: Normal rate and regular rhythm.   Pulmonary:      Effort: Pulmonary effort is normal.      Breath sounds: Normal breath sounds.   Skin:     General: Skin is warm.   Neurological:      Mental Status: She is alert and oriented to person, place, and time.   Psychiatric:         Attention and Perception: Attention normal.         Mood and Affect: Mood normal.         Speech: Speech normal.         Behavior: Behavior normal.          Ear Cerumen Removal    Date/Time: " 10/5/2023 4:51 PM  Performed by: Naina Morfin APRN  Authorized by: Naina Morfin APRN     Anesthesia:  Local Anesthetic: none  Location details: left ear and right ear  Procedure type: irrigation   Sedation:  Patient sedated: no                     Assessment and Plan:  Diagnoses and all orders for this visit:    1. Impacted cerumen of both ears (Primary)  -     carbamide peroxide (Debrox) 6.5 % otic solution; Administer 5 drops into both ears 2 (Two) Times a Day.  Dispense: 22 mL; Refill: 2    2. Sinus congestion    Other orders  -     Ear Cerumen Removal      Ear irrigation bilaterally performed here in the office. Recommending use of debrox ear drops over the next few days and then to return to office on Monday for recheck of ears.    Follow Up:  No follow-ups on file.    Patient was given instructions and counseling regarding her condition or for health maintenance advice. Please see specific information pulled into the AVS if appropriate.

## 2023-10-09 ENCOUNTER — OFFICE VISIT (OUTPATIENT)
Dept: FAMILY MEDICINE CLINIC | Facility: CLINIC | Age: 68
End: 2023-10-09
Payer: MEDICARE

## 2023-10-09 VITALS
SYSTOLIC BLOOD PRESSURE: 98 MMHG | HEIGHT: 64 IN | HEART RATE: 74 BPM | TEMPERATURE: 97.1 F | DIASTOLIC BLOOD PRESSURE: 62 MMHG | OXYGEN SATURATION: 96 % | WEIGHT: 198.1 LBS | BODY MASS INDEX: 33.82 KG/M2

## 2023-10-09 DIAGNOSIS — C34.12 MALIGNANT NEOPLASM OF UPPER LOBE OF LEFT LUNG: ICD-10-CM

## 2023-10-09 DIAGNOSIS — F17.211 NICOTINE DEPENDENCE, CIGARETTES, IN REMISSION: ICD-10-CM

## 2023-10-09 DIAGNOSIS — I26.94 MULTIPLE SUBSEGMENTAL PULMONARY EMBOLI WITHOUT ACUTE COR PULMONALE: ICD-10-CM

## 2023-10-09 DIAGNOSIS — R53.1 WEAKNESS: ICD-10-CM

## 2023-10-09 DIAGNOSIS — J44.9 CHRONIC OBSTRUCTIVE PULMONARY DISEASE, UNSPECIFIED COPD TYPE: ICD-10-CM

## 2023-10-09 DIAGNOSIS — R05.8 PRODUCTIVE COUGH: ICD-10-CM

## 2023-10-09 DIAGNOSIS — J15.0 PNEUMONIA DUE TO KLEBSIELLA PNEUMONIAE, UNSPECIFIED LATERALITY, UNSPECIFIED PART OF LUNG: ICD-10-CM

## 2023-10-09 DIAGNOSIS — D64.9 ANEMIA, UNSPECIFIED TYPE: ICD-10-CM

## 2023-10-09 DIAGNOSIS — E03.9 ACQUIRED HYPOTHYROIDISM: ICD-10-CM

## 2023-10-09 DIAGNOSIS — R06.09 DYSPNEA ON EXERTION: ICD-10-CM

## 2023-10-09 DIAGNOSIS — H61.23 IMPACTED CERUMEN OF BOTH EARS: Primary | ICD-10-CM

## 2023-10-09 DIAGNOSIS — E27.40 HYPOADRENALISM: ICD-10-CM

## 2023-10-09 NOTE — PROGRESS NOTES
"Linda Ortiz presents to [facility] with complaints of persistent right ear ringing, follow-up on ear irrigation.      History of Present Illness  This is a 68-year-old female who presents to the clinic with complaints of persistent right ear ringing and follow-up on ear irrigation.    Patient was last seen by myself last week, at that time was noted to have pretty severe cerumen impaction in both of her ears, right worse than left.  Was unable to successfully irrigate the ears due to the hardening of the cerumen, so sent patient in some Debrox for her to use over the weekend, and then come back in today for us to reevaluate.  Patient states that she was able to get a chunk out of it last night, but was not fully able to alleviate.  Does still have some ear ringing, muffled hearing, but no pain.  Denies any other upper respiratory symptoms.  Denies any fever/chills/body aches.    The following portions of the patient's history were personally reviewed and updated as appropriate: allergies, current medications, past medical history, past surgical history, past family history, and past social history.       Objective   Vital Signs:   BP 98/62 (BP Location: Left arm, Patient Position: Sitting, Cuff Size: Adult)   Pulse 74   Temp 97.1 øF (36.2 øC) (Temporal)   Ht 162.6 cm (64\")   Wt 89.9 kg (198 lb 1.6 oz)   SpO2 96%   BMI 34.00 kg/mý     Body mass index is 34 kg/mý.    All labs, imaging, test results, and specialty provider notes reviewed with patient.     Physical Exam  Vitals reviewed.   Constitutional:       Appearance: Normal appearance.   HENT:      Right Ear: Tympanic membrane and ear canal normal.      Left Ear: Tympanic membrane and ear canal normal.   Pulmonary:      Effort: Pulmonary effort is normal.   Neurological:      General: No focal deficit present.      Mental Status: She is alert and oriented to person, place, and time.            Ear Cerumen " Removal    Date/Time: 10/9/2023 1:46 PM    Performed by: Naina Morfin APRN  Authorized by: Naina Morfin APRN    Anesthesia:  Local Anesthetic: none  Ceruminolytics applied: Ceruminolytics applied prior to the procedure.  Location details: right ear  Patient tolerance: patient tolerated the procedure well with no immediate complications  Procedure type: irrigation   Sedation:  Patient sedated: no                         Assessment and Plan:  Diagnoses and all orders for this visit:    1. Impacted cerumen of both ears (Primary)    Other orders  -     Ear Cerumen Removal      Irrigated both ears with successful removal of impacted cerumen.  Did evaluate for any signs of infection, did not appear so.  Discussed with patient that she may continue to use Debrox on an as-needed basis.  Continue allergy medication as well.    Follow Up:  No follow-ups on file.    Patient was given instructions and counseling regarding her condition or for health maintenance advice. Please see specific information pulled into the AVS if appropriate.

## 2023-10-10 ENCOUNTER — HOSPITAL ENCOUNTER (OUTPATIENT)
Dept: ONCOLOGY | Facility: HOSPITAL | Age: 68
Discharge: HOME | End: 2023-10-10
Admitting: INTERNAL MEDICINE
Payer: MEDICARE

## 2023-10-10 VITALS
BODY MASS INDEX: 33.84 KG/M2 | HEIGHT: 64 IN | DIASTOLIC BLOOD PRESSURE: 66 MMHG | RESPIRATION RATE: 18 BRPM | OXYGEN SATURATION: 99 % | HEART RATE: 64 BPM | TEMPERATURE: 97.2 F | SYSTOLIC BLOOD PRESSURE: 132 MMHG | WEIGHT: 198.19 LBS

## 2023-10-10 DIAGNOSIS — Z45.2 ENCOUNTER FOR ADJUSTMENT OR MANAGEMENT OF VASCULAR ACCESS DEVICE: ICD-10-CM

## 2023-10-10 DIAGNOSIS — C34.12 MALIGNANT NEOPLASM OF UPPER LOBE OF LEFT LUNG: Primary | ICD-10-CM

## 2023-10-10 DIAGNOSIS — Z79.899 ENCOUNTER FOR LONG-TERM (CURRENT) USE OF HIGH-RISK MEDICATION: ICD-10-CM

## 2023-10-10 LAB
ALBUMIN SERPL-MCNC: 4.1 G/DL (ref 3.5–5.2)
ALBUMIN/GLOB SERPL: 1.3 G/DL
ALP SERPL-CCNC: 58 U/L (ref 39–117)
ALT SERPL W P-5'-P-CCNC: 20 U/L (ref 1–33)
ANION GAP SERPL CALCULATED.3IONS-SCNC: 8.6 MMOL/L (ref 5–15)
AST SERPL-CCNC: 35 U/L (ref 1–32)
BASOPHILS # BLD AUTO: 0.04 10*3/MM3 (ref 0–0.2)
BASOPHILS NFR BLD AUTO: 0.6 % (ref 0–1.5)
BILIRUB SERPL-MCNC: 0.3 MG/DL (ref 0–1.2)
BUN SERPL-MCNC: 20 MG/DL (ref 8–23)
BUN/CREAT SERPL: 19.6 (ref 7–25)
CALCIUM SPEC-SCNC: 9.3 MG/DL (ref 8.6–10.5)
CHLORIDE SERPL-SCNC: 96 MMOL/L (ref 98–107)
CO2 SERPL-SCNC: 30.4 MMOL/L (ref 22–29)
CREAT SERPL-MCNC: 1.02 MG/DL (ref 0.57–1)
DEPRECATED RDW RBC AUTO: 43.3 FL (ref 37–54)
EGFRCR SERPLBLD CKD-EPI 2021: 60.4 ML/MIN/1.73
EOSINOPHIL # BLD AUTO: 0.1 10*3/MM3 (ref 0–0.4)
EOSINOPHIL NFR BLD AUTO: 1.6 % (ref 0.3–6.2)
ERYTHROCYTE [DISTWIDTH] IN BLOOD BY AUTOMATED COUNT: 11.8 % (ref 12.3–15.4)
GLOBULIN UR ELPH-MCNC: 3.2 GM/DL
GLUCOSE SERPL-MCNC: 98 MG/DL (ref 65–99)
HCT VFR BLD AUTO: 40 % (ref 34–46.6)
HGB BLD-MCNC: 13 G/DL (ref 12–15.9)
IMM GRANULOCYTES # BLD AUTO: 0.04 10*3/MM3 (ref 0–0.05)
IMM GRANULOCYTES NFR BLD AUTO: 0.6 % (ref 0–0.5)
LYMPHOCYTES # BLD AUTO: 1.73 10*3/MM3 (ref 0.7–3.1)
LYMPHOCYTES NFR BLD AUTO: 27.5 % (ref 19.6–45.3)
MCH RBC QN AUTO: 31.5 PG (ref 26.6–33)
MCHC RBC AUTO-ENTMCNC: 32.5 G/DL (ref 31.5–35.7)
MCV RBC AUTO: 96.9 FL (ref 79–97)
MONOCYTES # BLD AUTO: 0.68 10*3/MM3 (ref 0.1–0.9)
MONOCYTES NFR BLD AUTO: 10.8 % (ref 5–12)
NEUTROPHILS NFR BLD AUTO: 3.7 10*3/MM3 (ref 1.7–7)
NEUTROPHILS NFR BLD AUTO: 58.9 % (ref 42.7–76)
PLATELET # BLD AUTO: 253 10*3/MM3 (ref 140–450)
PMV BLD AUTO: 9.6 FL (ref 6–12)
POTASSIUM SERPL-SCNC: 4.2 MMOL/L (ref 3.5–5.2)
PROT SERPL-MCNC: 7.3 G/DL (ref 6–8.5)
RBC # BLD AUTO: 4.13 10*6/MM3 (ref 3.77–5.28)
SODIUM SERPL-SCNC: 135 MMOL/L (ref 136–145)
T4 FREE SERPL-MCNC: 1.59 NG/DL (ref 0.93–1.7)
TSH SERPL DL<=0.05 MIU/L-ACNC: 4.94 UIU/ML (ref 0.27–4.2)
WBC NRBC COR # BLD: 6.29 10*3/MM3 (ref 3.4–10.8)

## 2023-10-10 PROCEDURE — 85025 COMPLETE CBC W/AUTO DIFF WBC: CPT | Performed by: INTERNAL MEDICINE

## 2023-10-10 PROCEDURE — 25810000003 SODIUM CHLORIDE 0.9 % SOLUTION: Performed by: INTERNAL MEDICINE

## 2023-10-10 PROCEDURE — 84443 ASSAY THYROID STIM HORMONE: CPT | Performed by: INTERNAL MEDICINE

## 2023-10-10 PROCEDURE — 25010000002 HEPARIN LOCK FLUSH PER 10 UNITS: Performed by: INTERNAL MEDICINE

## 2023-10-10 PROCEDURE — 80053 COMPREHEN METABOLIC PANEL: CPT | Performed by: INTERNAL MEDICINE

## 2023-10-10 PROCEDURE — 25010000002 NIVOLUMAB 240 MG/24ML SOLUTION 24 ML VIAL: Performed by: INTERNAL MEDICINE

## 2023-10-10 PROCEDURE — 84439 ASSAY OF FREE THYROXINE: CPT | Performed by: INTERNAL MEDICINE

## 2023-10-10 PROCEDURE — 96413 CHEMO IV INFUSION 1 HR: CPT

## 2023-10-10 RX ORDER — HEPARIN SODIUM (PORCINE) LOCK FLUSH IV SOLN 100 UNIT/ML 100 UNIT/ML
500 SOLUTION INTRAVENOUS AS NEEDED
Status: DISCONTINUED | OUTPATIENT
Start: 2023-10-10 | End: 2023-10-11 | Stop reason: HOSPADM

## 2023-10-10 RX ORDER — SODIUM CHLORIDE 9 MG/ML
250 INJECTION, SOLUTION INTRAVENOUS ONCE
Status: CANCELLED | OUTPATIENT
Start: 2023-10-10

## 2023-10-10 RX ORDER — SODIUM CHLORIDE 9 MG/ML
250 INJECTION, SOLUTION INTRAVENOUS ONCE
Status: COMPLETED | OUTPATIENT
Start: 2023-10-10 | End: 2023-10-10

## 2023-10-10 RX ORDER — PREDNISONE 2.5 MG/1
TABLET ORAL
Qty: 15 TABLET | Refills: 0 | Status: SHIPPED | OUTPATIENT
Start: 2023-10-10

## 2023-10-10 RX ORDER — SODIUM CHLORIDE 0.9 % (FLUSH) 0.9 %
20 SYRINGE (ML) INJECTION AS NEEDED
OUTPATIENT
Start: 2023-10-10

## 2023-10-10 RX ORDER — HEPARIN SODIUM (PORCINE) LOCK FLUSH IV SOLN 100 UNIT/ML 100 UNIT/ML
500 SOLUTION INTRAVENOUS AS NEEDED
OUTPATIENT
Start: 2023-10-10

## 2023-10-10 RX ORDER — LEVOTHYROXINE SODIUM 0.07 MG/1
75 TABLET ORAL DAILY
Qty: 90 TABLET | Refills: 1 | Status: SHIPPED | OUTPATIENT
Start: 2023-10-10

## 2023-10-10 RX ORDER — SODIUM CHLORIDE 0.9 % (FLUSH) 0.9 %
20 SYRINGE (ML) INJECTION AS NEEDED
Status: DISCONTINUED | OUTPATIENT
Start: 2023-10-10 | End: 2023-10-11 | Stop reason: HOSPADM

## 2023-10-10 RX ADMIN — Medication 20 ML: at 10:42

## 2023-10-10 RX ADMIN — SODIUM CHLORIDE 250 ML: 9 INJECTION, SOLUTION INTRAVENOUS at 09:42

## 2023-10-10 RX ADMIN — SODIUM CHLORIDE 480 MG: 9 INJECTION, SOLUTION INTRAVENOUS at 10:02

## 2023-10-10 RX ADMIN — HEPARIN SODIUM (PORCINE) LOCK FLUSH IV SOLN 100 UNIT/ML 500 UNITS: 100 SOLUTION at 10:42

## 2023-10-20 ENCOUNTER — TELEPHONE (OUTPATIENT)
Dept: ONCOLOGY | Facility: HOSPITAL | Age: 68
End: 2023-10-20
Payer: MEDICARE

## 2023-10-20 NOTE — TELEPHONE ENCOUNTER
LAZARO. The pt passed away on 10/10/23 at her home according to her Obituary at  [email or web address]

## 2023-12-15 NOTE — PROGRESS NOTES
On Treatment Note      Encounter Date: 07/05/2022   Patient Name: Linda Ortiz  YOB: 1955   Medical Record Number: [id number]       Treatment Site: right hand  Prescribed dose: 30 Gy/10 fractions  Completed dose: 24 Gy/8 fractions  Date of First Treatment: 6/21/22    Tolerance: no issues    Radiation related toxicities and management plan: stable pain, stable swelling. Erythema/hyperpigmentation on hand, no desquamation    Issues raised by patient or treatment team: none      Subjective      Review of Systems:   erythema/hyperpigmentation/pain on R hand  New SOA and dry  cough, no fever/chills  Fatigue  Stable irritative voiding sx (recent UA w Dr. Hernández)      The following portions of the patient's history were reviewed and updated as appropriate: allergies, current medications, past family history, past medical history, past social history, past surgical history and problem list.    Measures:   Pain: (on a scale of 0-10)   Pain Score    07/05/22 1038   PainSc:   8   PainLoc: Hand  Comment: right     Linda Ortiz reports a pain score of 8.  Given her pain assessment as noted, treatment options were discussed and the following options were decided upon as a follow-up plan to address the patient's pain: continuation of current treatment plan for pain.    Advanced Care Plan: N Advance Care Planning   ACP discussion was declined by the patient. Patient does not have an advance directive, declines further assistance.       KPS/Quality of Life: 70 - Difficulty Walking, Needs Assistance   ECOG: (1) Restricted in physically strenuous activity, ambulatory and able to do work of light nature  Depression Screening:Patient screened positive for depression based on a PHQ-9 score of 8 on 6/21/2022. Follow-up recommendations include: Elevated PHQ score reflective of acute illness, not depression.         Objective     Physical Exam:   Vital Signs:   Vitals:    07/05/22 1038    BP: 104/66   Pulse: 83   Resp: 16   Temp: 97.4 °F (36.3 °C)   SpO2: 100%      Body mass index is 27.32 kg/m².   Wt Readings from Last 3 Encounters:   07/05/22 72.2 kg (159 lb 2.8 oz)   06/28/22 74 kg (163 lb 2.3 oz)   06/21/22 73.7 kg (162 lb 7.7 oz)       General: NAD, sitting comfortably  Eye: EOMI, anicteric sclerae  Respiratory: Symmetric expansion, nonlabored respiration  Neuro: Alert oriented x3, cranial nerves III through XII are grossly intact.  Psych: Mood and affect appropriate  MSK - R hand - swelling and erythema over treatment site, no desquamation    Assessment / Plan      Plan:   I reviewed technical aspects of the radiation therapy treatment administration including dose delivery and daily treatment parameters to verify that these meet the specifications from my clinical treatment planning note. I reviewed the treatment setup notes. I reviewed and approved images obtained for “image guidance” with setup and treatment.     We will continue radiation therapy as prescribed.        Monserrat Blanco MD  Radiation Oncology  [facility]    This document has been signed by Monserrat Blanco MD on July 5, 2022 11:52 EDT      Detail Level: Detailed

## (undated) DEVICE — SINGLE USE BIOPSY VALVE MAJ-210: Brand: SINGLE USE BIOPSY VALVE (STERILE)

## (undated) DEVICE — NON-WOVEN ADHESIVE WOUND DRESSING: Brand: PRIMAPORE ADHESIVE DRESSING 10*8CM

## (undated) DEVICE — ADHS LIQ MASTISOL 2/3ML

## (undated) DEVICE — VASCULAR ACCESS-LF: Brand: MEDLINE INDUSTRIES, INC.

## (undated) DEVICE — CUP SPECI 4OZ LF STRL

## (undated) DEVICE — GLV SURG BIOGEL LTX PF 7 1/2

## (undated) DEVICE — PENCL E/S SMOKEEVAC W/TELESCP CANN

## (undated) DEVICE — SINGLE USE SUCTION VALVE MAJ-209: Brand: SINGLE USE SUCTION VALVE (STERILE)

## (undated) DEVICE — DECANTER: Brand: UNBRANDED

## (undated) DEVICE — INTENDED FOR TISSUE SEPARATION, AND OTHER PROCEDURES THAT REQUIRE A SHARP SURGICAL BLADE TO PUNCTURE OR CUT.: Brand: BARD-PARKER ® CARBON RIB-BACK BLADES

## (undated) DEVICE — NON-WOVEN ADHESIVE WOUND DRESSING: Brand: PRIMAPORE ADHESIVE WOUND DRSG 7.2*5CM

## (undated) DEVICE — Device

## (undated) DEVICE — BLCK/BITE BLOX WO/DENTL/RIM W/STRAP 54F

## (undated) DEVICE — SLV SCD KN/LEN ADJ EXPRSS BLENDED MD 1P/U

## (undated) DEVICE — SEAMLESS VIRAL BARRIER ADHESIVE PATCH, LATEX-FREE, 1.5"W X 4.5"L ON 3"W X 5"L, STERILE, ULTRASOUND PROBE COVER, INDIVIDUALLY WRAPPED: Brand: SHEATHES3D

## (undated) DEVICE — DEV ATOMIZATION MUCOSAL/NASALTRACH

## (undated) DEVICE — SOLIDIFIER LIQLOC PLS 1500CC BT

## (undated) DEVICE — SOL IRR NACL 0.9PCT BT 1000ML

## (undated) DEVICE — LINER SURG CANSTR SXN S/RIGD 1500CC

## (undated) DEVICE — ANTIBACTERIAL VIOLET BRAIDED (POLYGLACTIN 910), SYNTHETIC ABSORBABLE SUTURE: Brand: COATED VICRYL

## (undated) DEVICE — SUT MNCRYL 4/0 PS2 18 IN